# Patient Record
Sex: MALE | Race: WHITE | NOT HISPANIC OR LATINO | Employment: FULL TIME | URBAN - METROPOLITAN AREA
[De-identification: names, ages, dates, MRNs, and addresses within clinical notes are randomized per-mention and may not be internally consistent; named-entity substitution may affect disease eponyms.]

---

## 2017-01-05 DIAGNOSIS — F33.0 MAJOR DEPRESSIVE DISORDER, RECURRENT EPISODE, MILD (H): Primary | ICD-10-CM

## 2017-01-05 DIAGNOSIS — F41.9 ANXIETY: ICD-10-CM

## 2017-01-05 NOTE — TELEPHONE ENCOUNTER
PARoxetine (PAXIL) 20 MG tablet     Last Written Prescription Date: 9/22/16  Last Fill Quantity: 90, # refills: 1  Last Office Visit with [facility] primary care provider:  9/22/16        Last PHQ-9 score on record=   PHQ-9 SCORE 11/25/2016   Total Score -   Total Score 3             Sagar Faarax  [facility]

## 2017-01-10 RX ORDER — PAROXETINE 20 MG/1
TABLET, FILM COATED ORAL
Qty: 90 TABLET | Refills: 0 | OUTPATIENT
Start: 2017-01-10

## 2017-03-12 ENCOUNTER — TELEPHONE (OUTPATIENT)
Dept: NURSING | Facility: CLINIC | Age: 52
End: 2017-03-12

## 2017-03-12 NOTE — TELEPHONE ENCOUNTER
"Call Type: Triage Call    Presenting Problem: On February 6th Raphael \"rubbed against fire coral  in the ocean and has rash on arm.\"  Raphael has tried hctz and is not  working.     This was done on Feb 6th.   No fever.   [facility]  Triage/Skin Lesions/disposition is to be seen within 24 hours and  Raphael agreed.  Triage Note:  Guideline Title: Skin Lesions  Recommended Disposition: See Provider within 24 hours  Original Inclination: Wanted to speak with a nurse  Override Disposition:  Intended Action: Call PCP/HCP  Physician Contacted: No  Painful lesion(s) unrelieved with 24 hours of home care measures ?  YES  Thermal or chemical burn ? NO  Blisters on mouth OR surrounding area ? NO  Known or suspected exposure to Poison [place], [place] OR [place] ? NO  Skin tear(s) caused by friction/shear injury ? NO  Previously confirmed diagnosis of athlete's foot and similar symptoms ? NO  One or more enlarged or tender lymph nodes ? NO  Associated with new onset wheezing or difficulty breathing ? NO  Exposure to , e.g., Super Glue ? NO  Possible exposure to chickenpox or has rash that looks like chickenpox ? NO  Female with any rash, warts or sores on perineal/perianal area. ? NO  Any painful blisters on perineal/perianal area. ? NO  Male with rash, warts, or sores on penis. ? NO  Male with rash, warts, or sores on scrotum/genital area. ? NO  Non-blister lesions on mouth, lip, tongue ? NO  Skin changes that looks like hives (itchy welts or wheals) ? NO  Jaundice (yellowish tint to skin or whites of eyes) that is worsening or not  previously evaluated. ? NO  Large areas of peeling skin or new onset of large blisters over body not related  to sun/UVB exposure ? NO  Foot or toe skin lesions complicated by diagnosed diabetes mellitus. ? NO  Localized or widespread rash that does not have the appearance of hives (itchy  welts or wheals) ? NO  Any temperature elevation in an immunocompromised individual OR frail elderly with  signs of " dehydration ? NO  Any new OR worsening signs and symptoms of soft tissue infection ? NO  Known herpes zoster or undiagnosed blister-like rash on or near eye ? NO  Known herpes zoster or undiagnosed blister-like rash on tip of nose ? NO  Physician Instructions:  Care Advice: Keep any pressure off of the affected area, be careful to avoid  injury to the area.  During pregnancy, call provider if temperature is 100 F (37.7 C) or greater  OR any temperature elevation for 3 days even while taking acetaminophen.  CAUTIONS  SYMPTOM / CONDITION MANAGEMENT  Apply heat or cold, whichever feels better, to the affected area. Try a  heating pad on low or a warm compress. Or use a cloth-covered ice pack,  cold gel pack or a cold compress. Apply either for 15-20 minutes every 2-3  hours while awake.  Analgesic/Antipyretic Advice - NSAIDs: Consider aspirin, ibuprofen,  naproxen or ketoprofen for pain or fever as directed on label or by  pharmacist/provider. PRECAUTIONS: - You should not take this medicine for  more than 10 days unless recommended by your provider. EXCEPTIONS: - Should  not be used if taking blood thinners or have bleeding problems. - Do not  use if have history of sensitivity/allergy to any of these medications  or history of cardiovascular, ulcer, kidney, liver disease or diabetes  unless approved by provider. - Do not exceed recommended dose or frequency.  Analgesic/Antipyretic Advice - Acetaminophen: Consider acetaminophen as  directed on label or by pharmacist/provider for pain or fever. PRECAUTIONS:  - Use if there is no history of liver disease, alcoholism, or intake of  three or more alcohol drinks per day - Only if approved by provider during  pregnancy or when breastfeeding - Do not exceed recommended dose or  frequency. Do not take more than 3000 milligrams (mg) in 24 hours. Do not  take this medicine for more than 10 days unless recommended by your  provider. - During pregnancy, acetaminophen should not  be taken more than 3  consecutive days without telling provider - To make sure you don't take too  much, check other medicines you take to see if they also contain  acetaminophen.  See a provider within 4 hours if having new signs or symptoms of soft  tissue infection (such as redness, warmth, tenderness, swelling  may have drainage).

## 2017-03-13 ENCOUNTER — OFFICE VISIT (OUTPATIENT)
Dept: FAMILY MEDICINE | Facility: CLINIC | Age: 52
End: 2017-03-13
Payer: COMMERCIAL

## 2017-03-13 VITALS
HEIGHT: 67 IN | HEART RATE: 58 BPM | TEMPERATURE: 96.9 F | WEIGHT: 149.2 LBS | DIASTOLIC BLOOD PRESSURE: 76 MMHG | OXYGEN SATURATION: 99 % | BODY MASS INDEX: 23.42 KG/M2 | SYSTOLIC BLOOD PRESSURE: 126 MMHG

## 2017-03-13 DIAGNOSIS — L08.9 SUPERFICIAL SKIN INFECTION: Primary | ICD-10-CM

## 2017-03-13 PROCEDURE — 99213 OFFICE O/P EST LOW 20 MIN: CPT | Performed by: NURSE PRACTITIONER

## 2017-03-13 RX ORDER — CEPHALEXIN 500 MG/1
500 CAPSULE ORAL 4 TIMES DAILY
Qty: 40 CAPSULE | Refills: 0 | Status: SHIPPED | OUTPATIENT
Start: 2017-03-13 | End: 2017-08-25

## 2017-03-13 NOTE — PATIENT INSTRUCTIONS
Based on your medical history and these are the current health maintenance or preventive care services that you are due for (some may have been done at this visit)  Health Maintenance Due   Topic Date Due     ASTHMA CONTROL TEST Q6 MOS (NO INBASKET)  12/24/2016         At [facility], we strive to deliver an exceptional experience to you, every time we see you.    If you receive a survey in the mail, please send us back your thoughts. We really do value your feedback.    Your care team's suggested websites for health information:  Www.[email or web address] : Up to date and easily searchable information on multiple topics.  Www.medlineplus.gov : medication info, interactive tutorials, watch real surgeries online  Www.familydoctor.org : good info from the Academy of Family Physicians  Www.cdc.gov : public health info, travel advisories, epidemics (H1N1)  Www.aap.org : children's health info, normal development, vaccinations  Www.health.[facility].[place].us : [place] dept of health, public health issues in [place], N1N1    How to contact your care team:   Team [facility] [phone]         Pharmacy [phone]    Dr. Hancock, Meli Jimenez PA-C, Dr. Brunson, Virginie CRAIG CNP, Loli Cohen PA-C, Dr. Gomez, and RAFA Baker CNP    Team RNs: [facility] & [place]      Clinic hours  M-Th 7 am-7 pm   Fri 7 am-5 pm.   Urgent care M-F 11 am-9 pm,   Sat/Sun 9 am-5 pm.  Pharmacy M-Th 8 am-8 pm Fri 8 am-6 pm  Sat/Sun 9 am-5 pm.     All password changes, disabled accounts, or ID changes in [employer]/MyHealth will be done by our Access Services Department.    If you need help with your account or password, call: [phone]. Clinic staff no longer has the ability to change passwords.     Cellulitis  Cellulitis is an infection of the deep layers of skin. A break in the skin, such as a cut or scratch, can let bacteria under the skin. If the bacteria get to deep layers of the skin, it can be serious. If not  treated, cellulitis can get into the bloodstream and lymph nodes. The infection can then spread throughout the body. This causes serious illness.  Cellulitis causes the affected skin to become red, swollen, warm, and sore. The reddened areas have a visible border. An open sore may leak fluid (pus). You may have a fever, chills, and pain.  Cellulitis is treated with antibiotics taken for 7 to 10 days. An open sore may be cleaned and covered with cool wet gauze. Symptoms should get better 1 to 2 days after treatment is started. Make sure to take all the antibiotics for the full number of days until they are gone. Keep taking the medicine even if your symptoms go away.  Home care  Follow these tips:    Limit the use of the part of your body with cellulitis. Movement can cause the infection to spread.    If the infection is on your leg, walk as little as possible in the first few days of the treatment. Keep your leg raised while sitting. This will help to reduce swelling.    Take all of the antibiotic medicine exactly as directed until it is gone. Do not miss any doses, especially during the first 7 days. Don t stop taking the medicine when your symptoms get better.    Keep the affected area clean and dry.    Wash your hands with soap and warm water before and after touching your skin. Anyone else who touches your skin should also wash his or her hands. Don't share towels.  Follow-up care  Follow up with your healthcare provider. If your infection does not go away on 1 antibiotic, your healthcare provider will prescribe a different one.  When to seek medical advice  Call your healthcare provider right away if any of these occur:    Red areas that spread    Swelling or pain that gets worse    Fluid leaking from the skin (pus)    Fever higher of 100.4  F (38.0  C) or higher after 2 days on antibiotics    6545-3786 [employer]. [address] [place] [address], [place] [address]. All rights reserved. This information  is not intended as a substitute for professional medical care. Always follow your healthcare professional's instructions.

## 2017-03-13 NOTE — MR AVS SNAPSHOT
After Visit Summary   3/13/2017    Raphael Lizarraga    MRN: [id number]           Patient Information     Date Of Birth          1965        Visit Information        Provider Department      3/13/2017 6:20 PM Mary Carmen Amaya APRN CNP [facility]        Today's Diagnoses     Intermittent asthma, uncomplicated    -  1    Superficial skin infection          Care Instructions    Based on your medical history and these are the current health maintenance or preventive care services that you are due for (some may have been done at this visit)  Health Maintenance Due   Topic Date Due     ASTHMA CONTROL TEST Q6 MOS (NO INBASKET)  12/24/2016         At [facility], we strive to deliver an exceptional experience to you, every time we see you.    If you receive a survey in the mail, please send us back your thoughts. We really do value your feedback.    Your care team's suggested websites for health information:  Www.[employer] : Up to date and easily searchable information on multiple topics.  Www.medlineplus.gov : medication info, interactive tutorials, watch real surgeries online  Www.familydoctor.org : good info from the Academy of Family Physicians  Www.cdc.gov : public health info, travel advisories, epidemics (H1N1)  Www.aap.org : children's health info, normal development, vaccinations  Www.health.[facility].mn.us : [place] dept of health, public health issues in [place], N1N1    How to contact your care team:   Team [facility] [phone]         Pharmacy [phone]    Dr. Hancock, Meli Jimenez PA-C, Virginie Fletcher CNP, Loli Cohen PA-C, Dr. Gomez, and RAFA Baker CNP    Team RNs: Sirena & Becky      Clinic hours  M-Th 7 am-7 pm   Fri 7 am-5 pm.   Urgent care M-F 11 am-9 pm,   Sat/Sun 9 am-5 pm.  Pharmacy M-Th 8 am-8 pm Fri 8 am-6 pm  Sat/Sun 9 am-5 pm.     All password changes, disabled accounts, or ID changes in  MyChart/MyHealth will be done by our Access Services Department.    If you need help with your account or password, call: [phone]. Clinic staff no longer has the ability to change passwords.     Cellulitis  Cellulitis is an infection of the deep layers of skin. A break in the skin, such as a cut or scratch, can let bacteria under the skin. If the bacteria get to deep layers of the skin, it can be serious. If not treated, cellulitis can get into the bloodstream and lymph nodes. The infection can then spread throughout the body. This causes serious illness.  Cellulitis causes the affected skin to become red, swollen, warm, and sore. The reddened areas have a visible border. An open sore may leak fluid (pus). You may have a fever, chills, and pain.  Cellulitis is treated with antibiotics taken for 7 to 10 days. An open sore may be cleaned and covered with cool wet gauze. Symptoms should get better 1 to 2 days after treatment is started. Make sure to take all the antibiotics for the full number of days until they are gone. Keep taking the medicine even if your symptoms go away.  Home care  Follow these tips:    Limit the use of the part of your body with cellulitis. Movement can cause the infection to spread.    If the infection is on your leg, walk as little as possible in the first few days of the treatment. Keep your leg raised while sitting. This will help to reduce swelling.    Take all of the antibiotic medicine exactly as directed until it is gone. Do not miss any doses, especially during the first 7 days. Don t stop taking the medicine when your symptoms get better.    Keep the affected area clean and dry.    Wash your hands with soap and warm water before and after touching your skin. Anyone else who touches your skin should also wash his or her hands. Don't share towels.  Follow-up care  Follow up with your healthcare provider. If your infection does not go away on 1 antibiotic, your healthcare provider  will prescribe a different one.  When to seek medical advice  Call your healthcare provider right away if any of these occur:    Red areas that spread    Swelling or pain that gets worse    Fluid leaking from the skin (pus)    Fever higher of 100.4  F (38.0  C) or higher after 2 days on antibiotics    1852-7104 [employer]. [address] [place] [address]. All rights reserved. This information is not intended as a substitute for professional medical care. Always follow your healthcare professional's instructions.              Follow-ups after your visit        Your next 10 appointments already scheduled     Mar 13, 2017  6:20 PM CDT   Office Visit with RAFA Reyez CNP   [facility] ([facility])    [address] [place] [id number] [phone]           Bring a current list of meds and any records pertaining to this visit.  For Physicals, please bring immunization records and any forms needing to be filled out.  Please arrive 10 minutes early to complete paperwork.            Mar 31, 2017 12:00 PM CDT   New Visit with Anjelica Campbell OD   [facility] ([facility])    [address] [facility] [address] [phone]              Who to contact     If you have questions or need follow up information about today's clinic visit or your schedule please contact [facility] directly at [phone].  Normal or non-critical lab and imaging results will be communicated to you by MyChart, letter or phone within 4 business days after the clinic has received the results. If you do not hear from us within 7 days, please contact the clinic through MyChart or phone. If you have a critical or abnormal lab result, we will notify you by phone as soon as possible.  Submit refill requests through [employer] or call your pharmacy and they will forward the refill  "request to us. Please allow 3 business days for your refill to be completed.          Additional Information About Your Visit        [email or web address] Information     [facility] lets you send messages to your doctor, view your test results, renew your prescriptions, schedule appointments and more. To sign up, go to [email or web address] . Click on \"Log in\" on the left side of the screen, which will take you to the Welcome page. Then click on \"Sign up Now\" on the right side of the page.     You will be asked to enter the access code listed below, as well as some personal information. Please follow the directions to create your username and password.     Your access code is: [email or web address]  Expires: 2017  6:19 PM     Your access code will  in 90 days. If you need help or a new code, please call your [facility] clinic or [phone].        Care EveryWhere ID     This is your Care EveryWhere ID. This could be used by other organizations to access your [facility] medical records  [id number]        Your Vitals Were     Pulse Temperature Height Pulse Oximetry BMI (Body Mass Index)       58 96.9  F (36.1  C) (Oral) 5' 7.13\" (1.705 m) 99% 23.27 kg/m2        Blood Pressure from Last 3 Encounters:   17 126/76   16 114/70   16 125/73    Weight from Last 3 Encounters:   17 149 lb 3.2 oz (67.7 kg)   16 141 lb (64 kg)   16 142 lb 6.4 oz (64.6 kg)              Today, you had the following     No orders found for display         Today's Medication Changes          These changes are accurate as of: 3/13/17  6:19 PM.  If you have any questions, ask your nurse or doctor.               Start taking these medicines.        Dose/Directions    cephALEXin 500 MG capsule   Commonly known as:  KEFLEX   Started by:  Mary Carmen Amaya APRN CNP        Dose:  500 mg   Take 1 capsule (500 mg) by mouth 4 times daily   Quantity:  40 capsule   Refills:  0            Where to get your medicines      These medications " were sent to [facility] [id number] - [facility], [place] - [address] [facility] [address] [facility] [address] [place]  [address], [facility] [address]    Hours:  24-hours Phone:  [phone]     cephALEXin 500 MG capsule                Primary Care Provider Office Phone # Fax #    Chava Belcher [phone]       [facility] [address]  [facility] [address]        Thank you!     Thank you for choosing [facility]  for your care. Our goal is always to provide you with excellent care. Hearing back from our patients is one way we can continue to improve our services. Please take a few minutes to complete the written survey that you may receive in the mail after your visit with us. Thank you!             Your Updated Medication List - Protect others around you: Learn how to safely use, store and throw away your medicines at www.disposemymeds.org.          This list is accurate as of: 3/13/17  6:19 PM.  Always use your most recent med list.                   Brand Name Dispense Instructions for use    albuterol 108 (90 BASE) MCG/ACT Inhaler    PROAIR HFA/PROVENTIL HFA/VENTOLIN HFA    1 Inhaler    Inhale 2 puffs into the lungs every 4 hours as needed (for asthma symptoms)       buPROPion 100 MG 12 hr tablet    WELLBUTRIN SR    90 tablet    Take 1 tablet (100 mg) by mouth every morning for depression and anxiety prevention.       cephALEXin 500 MG capsule    KEFLEX    40 capsule    Take 1 capsule (500 mg) by mouth 4 times daily       DILANTIN PO      100mg a.m. & 200mg p.m.       PARoxetine 20 MG tablet    PAXIL    90 tablet    Take 1 tablet (20 mg) by mouth At Bedtime for depression and anxiety prevention.       VITAMIN D PO      Take 1 tablet by mouth daily

## 2017-03-13 NOTE — NURSING NOTE
"Chief Complaint   Patient presents with     Derm Problem       Initial /76 (BP Location: Left arm, Patient Position: Chair, Cuff Size: Adult Regular)  Pulse 58  Temp 96.9  F (36.1  C) (Oral)  Ht 5' 7.13\" (1.705 m)  Wt 149 lb 3.2 oz (67.7 kg)  SpO2 99%  BMI 23.27 kg/m2 Estimated body mass index is 23.27 kg/(m^2) as calculated from the following:    Height as of this encounter: 5' 7.13\" (1.705 m).    Weight as of this encounter: 149 lb 3.2 oz (67.7 kg).  Medication Reconciliation: complete   Julia Fernandes MA      "

## 2017-03-13 NOTE — PROGRESS NOTES
"  SUBJECTIVE:                                                    Raphael Lizarraga is a 52 year old male who presents to clinic today for the following health issues:      Rash     Onset: 1 month     Description:   Location: Left forearm   Character: round, raised, red  Itching (Pruritis): YES    Progression of Symptoms:  worsening    Accompanying Signs & Symptoms:  Fever: no   Body aches or joint pain: no   Sore throat symptoms: no   Recent cold symptoms: no    History:   Previous similar rash: no     Precipitating factors:   Exposure to similar rash: no   New exposures: None   Recent travel: YES- Was in [place] 1 month ago    Alleviating factors:       Therapies Tried and outcome: Hydrocortisone cream   Patient was in [facility] last month, swam in the ocean and rubbed against coral and had rash the following morning.  He initially felt a burning, stinging pain on both arms after rubbing the coral reef.  He heide been using Hydrocortisone without improvement.      Problem list and histories reviewed & adjusted, as indicated.  Additional history: as documented    BP Readings from Last 3 Encounters:   03/13/17 126/76   09/22/16 114/70   08/02/16 125/73    Wt Readings from Last 3 Encounters:   03/13/17 149 lb 3.2 oz (67.7 kg)   09/22/16 141 lb (64 kg)   08/02/16 142 lb 6.4 oz (64.6 kg)                  Labs reviewed in EPIC    Reviewed and updated as needed this visit by clinical staff       Reviewed and updated as needed this visit by Provider         ROS:  Constitutional, HEENT, cardiovascular, pulmonary, gi and gu systems are negative, except as otherwise noted.    OBJECTIVE:                                                    /76 (BP Location: Left arm, Patient Position: Chair, Cuff Size: Adult Regular)  Pulse 58  Temp 96.9  F (36.1  C) (Oral)  Ht 5' 7.13\" (1.705 m)  Wt 149 lb 3.2 oz (67.7 kg)  SpO2 99%  BMI 23.27 kg/m2  Body mass index is 23.27 kg/(m^2).  GENERAL: healthy, alert and no distress  EYES: Eyes grossly " "normal to inspection, PERRL and conjunctivae and sclerae normal  HENT: ear canals and TM's normal, nose and mouth without ulcers or lesions  NECK: no adenopathy, no asymmetry, masses, or scars and thyroid normal to palpation  RESP: lungs clear to auscultation - no rales, rhonchi or wheezes  CV: regular rate and rhythm, normal S1 S2, no S3 or S4, no murmur, click or rub, no peripheral edema and peripheral pulses strong  ABDOMEN: soft, nontender, no hepatosplenomegaly, no masses and bowel sounds normal  MS: no gross musculoskeletal defects noted, no edema  SKIN: Multiple erythematous, warm maculopapular lesions with excoriation on arms bilaterally extending to shoulders, otherwise, no suspicious lesions or rashes  BACK: no CVA tenderness, no paralumbar tenderness  PSYCH: mentation appears normal, affect normal/bright  LYMPH: normal ant/post cervical, supraclavicular nodes    Diagnostic Test Results:  none      ASSESSMENT/PLAN:                                                        BP Screening:   Last 3 BP Readings:    BP Readings from Last 3 Encounters:   03/13/17 126/76   09/22/16 114/70   08/02/16 125/73       The following was recommended to the patient:  Re-screen BP within a year and recommended lifestyle modifications  BMI:   Estimated body mass index is 23.27 kg/(m^2) as calculated from the following:    Height as of this encounter: 5' 7.13\" (1.705 m).    Weight as of this encounter: 149 lb 3.2 oz (67.7 kg).         1. Superficial skin infection  Treating with Keflex, Ok to use Benadryl prn for itching, can also use Claritin or Zyrtec during the day if needed, return to clinic if not improved, new, or worsening symptoms.   - cephALEXin (KEFLEX) 500 MG capsule; Take 1 capsule (500 mg) by mouth 4 times daily  Dispense: 40 capsule; Refill: 0          See Patient Instructions    RAFA Brown [facility]    "

## 2017-03-14 ASSESSMENT — ASTHMA QUESTIONNAIRES: ACT_TOTALSCORE: 25

## 2017-04-09 DIAGNOSIS — F33.0 MAJOR DEPRESSIVE DISORDER, RECURRENT EPISODE, MILD (H): ICD-10-CM

## 2017-04-09 NOTE — TELEPHONE ENCOUNTER
buPROPion        Last Written Prescription Date: 9/22/16  Last Fill Quantity: 90; # refills: 1  Last Office Visit with [facility], [facility] or [facility] prescribing provider:  3/13/17        Last PHQ-9 score on record=   PHQ-9 SCORE 11/25/2016   Total Score 3       Lab Results   Component Value Date    AST 27 09/29/2011     Lab Results   Component Value Date    ALT 37 09/29/2011

## 2017-04-11 RX ORDER — BUPROPION HYDROCHLORIDE 100 MG/1
TABLET, EXTENDED RELEASE ORAL
Qty: 90 TABLET | Refills: 0 | Status: SHIPPED | OUTPATIENT
Start: 2017-04-11 | End: 2017-10-03

## 2017-04-16 DIAGNOSIS — F41.9 ANXIETY: ICD-10-CM

## 2017-04-16 DIAGNOSIS — F33.0 MAJOR DEPRESSIVE DISORDER, RECURRENT EPISODE, MILD (H): ICD-10-CM

## 2017-04-16 NOTE — TELEPHONE ENCOUNTER
PARoxetine (PAXIL) 20 MG tablet     Last Written Prescription Date: 9/22/16  Last Fill Quantity: 90, # refills: 1  Last Office Visit with [facility] primary care provider:  3/13/17        Last PHQ-9 score on record=   PHQ-9 SCORE 11/25/2016   Total Score 3             Sagar Faaamanda  [facility]

## 2017-04-18 RX ORDER — PAROXETINE 20 MG/1
TABLET, FILM COATED ORAL
Qty: 90 TABLET | Refills: 0 | OUTPATIENT
Start: 2017-04-18

## 2017-04-18 RX ORDER — PAROXETINE 20 MG/1
TABLET, FILM COATED ORAL
Qty: 90 TABLET | Refills: 0 | Status: SHIPPED | OUTPATIENT
Start: 2017-04-18 | End: 2017-07-16

## 2017-04-18 NOTE — TELEPHONE ENCOUNTER
Prescription approved per [facility] Refill Protocol.  Radha Carrera RN

## 2017-04-21 ENCOUNTER — OFFICE VISIT (OUTPATIENT)
Dept: OPTOMETRY | Facility: CLINIC | Age: 52
End: 2017-04-21
Payer: COMMERCIAL

## 2017-04-21 DIAGNOSIS — H52.4 MYOPIA WITH ASTIGMATISM AND PRESBYOPIA, BILATERAL: Primary | ICD-10-CM

## 2017-04-21 DIAGNOSIS — H52.203 MYOPIA WITH ASTIGMATISM AND PRESBYOPIA, BILATERAL: Primary | ICD-10-CM

## 2017-04-21 DIAGNOSIS — H52.13 MYOPIA WITH ASTIGMATISM AND PRESBYOPIA, BILATERAL: Primary | ICD-10-CM

## 2017-04-21 DIAGNOSIS — H53.453 PERIPHERAL VISUAL FIELD DEFECT OF BOTH EYES: ICD-10-CM

## 2017-04-21 PROCEDURE — 92015 DETERMINE REFRACTIVE STATE: CPT | Performed by: OPTOMETRIST

## 2017-04-21 PROCEDURE — 92004 COMPRE OPH EXAM NEW PT 1/>: CPT | Performed by: OPTOMETRIST

## 2017-04-21 ASSESSMENT — VISUAL ACUITY
OS_SC+: +1
CORRECTION_TYPE: GLASSES
OS_SC: 20/150
OD_SC: 20/120
OS_CC: 20/30
OS_CC+: -2
OS_SC: 20/80-1
OD_CC+: -2
METHOD: SNELLEN - LINEAR
OD_SC: 20/200
OD_CC: 20/30

## 2017-04-21 ASSESSMENT — REFRACTION_MANIFEST
OD_CYLINDER: +2.75
OD_SPHERE: -2.75
OS_SPHERE: -2.75
OS_CYLINDER: +2.00
OD_AXIS: 009
OD_CYLINDER: +3.00
OD_ADD: +2.25
OS_SPHERE: -2.00
OD_AXIS: 010
METHOD_AUTOREFRACTION: 1
OS_CYLINDER: +2.00
OD_SPHERE: -2.25
OS_AXIS: 160
OS_ADD: +2.25
OS_AXIS: 160

## 2017-04-21 ASSESSMENT — REFRACTION_WEARINGRX
OD_ADD: +2.25
OD_CYLINDER: +3.00
OS_ADD: +2.25
OD_SPHERE: -3.25
OS_AXIS: 165
OS_CYLINDER: +2.50
OD_AXIS: 017
OS_SPHERE: -2.75
SPECS_TYPE: PAL

## 2017-04-21 ASSESSMENT — CONF VISUAL FIELD
OS_SUPERIOR_NASAL_RESTRICTION: 3
OD_SUPERIOR_TEMPORAL_RESTRICTION: 3
METHOD: COUNTING FINGERS

## 2017-04-21 ASSESSMENT — SLIT LAMP EXAM - LIDS
COMMENTS: NORMAL
COMMENTS: NORMAL

## 2017-04-21 ASSESSMENT — CUP TO DISC RATIO
OD_RATIO: 0.2
OS_RATIO: 0.2

## 2017-04-21 ASSESSMENT — TONOMETRY
OD_IOP_MMHG: 19
IOP_METHOD: APPLANATION
OS_IOP_MMHG: 19

## 2017-04-21 ASSESSMENT — EXTERNAL EXAM - LEFT EYE: OS_EXAM: NORMAL

## 2017-04-21 ASSESSMENT — EXTERNAL EXAM - RIGHT EYE: OD_EXAM: NORMAL

## 2017-04-21 NOTE — MR AVS SNAPSHOT
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    You have a right superior visual field defect in both eyes.    Your eyes may be blurry at near and sensitive to light for several hours from the dilating drops.    Monitor eye health yearly.        Follow-ups after your visit        Follow-up notes from your care team     Return in about 1 year (around 4/21/2018) for comprehensive eye exam.      Who to contact     If you have questions or need follow up information about today's clinic visit or your schedule please contact [facility] directly at [phone].  Normal or non-critical lab and imaging results will be communicated to you by MyChart, letter or phone within 4 business days after the clinic has received the results. If you do not hear from us within 7 days, please contact the clinic through [employer] or phone. If you have a critical or abnormal lab result, we will notify you by phone as soon as possible.  Submit refill requests through [employer] or call your pharmacy and they will forward the refill request to us. Please allow 3 business days for your refill to be completed.          Additional Information About Your Visit        MyChart Information     [employer] lets you send messages to your doctor, view your test results, renew your prescriptions, schedule appointments and more. To sign up, go to [email or web address] . Click on \"Log in\" on the left side of the screen, which will take you to the Welcome page. Then click on \"Sign up Now\" on the " right side of the page.     You will be asked to enter the access code listed below, as well as some personal information. Please follow the directions to create your username and password.     Your access code is: [email or web address]  Expires: 2017  6:19 PM     Your access code will  in 90 days. If you need help or a new code, please call your [place] clinic or [phone].        Care EveryWhere ID     This is your Care EveryWhere ID. This could be used by other organizations to access your [place] medical records  [id number]         Blood Pressure from Last 3 Encounters:   17 126/76   16 114/70   16 125/73    Weight from Last 3 Encounters:   17 67.7 kg (149 lb 3.2 oz)   16 64 kg (141 lb)   16 64.6 kg (142 lb 6.4 oz)              We Performed the Following     EYE EXAM (SIMPLE-NONBILLABLE)     REFRACTION        Primary Care Provider Office Phone # Fax #    Chava Belcher [phone]       [facility] [address] [facility] [address]        Thank you!     Thank you for choosing [facility]  for your care. Our goal is always to provide you with excellent care. Hearing back from our patients is one way we can continue to improve our services. Please take a few minutes to complete the written survey that you may receive in the mail after your visit with us. Thank you!             Your Updated Medication List - Protect others around you: Learn how to safely use, store and throw away your medicines at www.disposemymeds.org.          This list is accurate as of: 17 12:26 PM.  Always use your most recent med list.                   Brand Name Dispense Instructions for use    albuterol 108 (90 BASE) MCG/ACT Inhaler    PROAIR HFA/PROVENTIL HFA/VENTOLIN HFA    1 Inhaler    Inhale 2 puffs into the lungs every 4 hours as needed (for asthma symptoms)       buPROPion 100 MG 12 hr tablet    WELLBUTRIN SR    90 tablet    TAKE  1 TABLET(100 MG) BY MOUTH EVERY MORNING FOR DEPRESSION OR ANXIETY PREVENTION       cephALEXin 500 MG capsule    KEFLEX    40 capsule    Take 1 capsule (500 mg) by mouth 4 times daily       DILANTIN PO      100mg a.m. & 200mg p.m.       PARoxetine 20 MG tablet    PAXIL    90 tablet    TAKE 1 TABLET(20 MG) BY MOUTH AT BEDTIME FOR DEPRESSION OR ANXIETY PREVENTION       VITAMIN D PO      Take 1 tablet by mouth daily

## 2017-04-21 NOTE — PATIENT INSTRUCTIONS
There was a change in the prescription for your glasses.    You have a right superior visual field defect in both eyes.    Your eyes may be blurry at near and sensitive to light for several hours from the dilating drops.    Monitor eye health yearly.

## 2017-04-21 NOTE — PROGRESS NOTES
Chief Complaint   Patient presents with     COMPREHENSIVE EYE EXAM         Last Eye Exam: 2+ years  Dilated Previously: Yes    What are you currently using to see?  glasses       Distance Vision Acuity: Noticed gradual change in both eyes    Near Vision Acuity: Not satisfied     Eye Comfort: good  Do you use eye drops? : No  Occupation or Hobbies: [employer]            Medical, surgical and family histories reviewed and updated 4/21/2017.       Raphael had brain surgery in 1992 because of epilepsy. He thinks the visual field defect occurred after the surgery.    OBJECTIVE: See Ophthalmology exam    ASSESSMENT:    ICD-10-CM    1. Myopia with astigmatism and presbyopia, bilateral H52.13 EYE EXAM (SIMPLE-NONBILLABLE)    H52.203 REFRACTION   2. 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    You have a right superior visual field defect in both eyes.    Your eyes may be blurry at near and sensitive to light for several hours from the dilating drops.    Monitor eye health yearly.

## 2017-05-25 DIAGNOSIS — F33.0 MAJOR DEPRESSIVE DISORDER, RECURRENT EPISODE, MILD (H): ICD-10-CM

## 2017-05-26 NOTE — TELEPHONE ENCOUNTER
buPROPion (WELLBUTRIN SR) 100 MG 12 hr tablet       Last Written Prescription Date: 4/11/17  Last Fill Quantity: 90; # refills: 0  Last Office Visit with [facility], P or [facility] prescribing provider:  3/3/17        Last PHQ-9 score on record=   PHQ-9 SCORE 11/25/2016   Total Score 3       Lab Results   Component Value Date    AST 27 09/29/2011     Lab Results   Component Value Date    ALT 37 09/29/2011         Candice COSTELLO [facility]

## 2017-05-30 RX ORDER — BUPROPION HYDROCHLORIDE 100 MG/1
TABLET, EXTENDED RELEASE ORAL
Qty: 30 TABLET | Refills: 0 | OUTPATIENT
Start: 2017-05-30

## 2017-05-30 NOTE — TELEPHONE ENCOUNTER
Medication is being denied due to: RX was sent 04/11/2017 for #90 with 0 refills.  No additional refills should be needed at this time.  Radha Carrera RN

## 2017-06-27 ENCOUNTER — TELEPHONE (OUTPATIENT)
Dept: FAMILY MEDICINE | Facility: CLINIC | Age: 52
End: 2017-06-27

## 2017-06-27 DIAGNOSIS — F33.0 MAJOR DEPRESSIVE DISORDER, RECURRENT EPISODE, MILD (H): ICD-10-CM

## 2017-06-27 NOTE — TELEPHONE ENCOUNTER
buPROPion (WELLBUTRIN SR) 100 MG 12 hr tablet       Last Written Prescription Date: 04/11/17  Last Fill Quantity: 90; # refills: 0  Last Office Visit with [facility], [facility] or [facility] prescribing provider:  03/13/17        Last PHQ-9 score on record=   PHQ-9 SCORE 11/25/2016   Total Score 3       Lab Results   Component Value Date    AST 27 09/29/2011     Lab Results   Component Value Date    ALT 37 09/29/2011         [facility]ise [facility]

## 2017-07-03 NOTE — TELEPHONE ENCOUNTER
MA's- Please complete PHQ-9.  If over 4 please route to provider. Thanks!    Lissette Reynoso RN

## 2017-07-03 NOTE — TELEPHONE ENCOUNTER
This writer attempted to contact Raphael on 07/03/17      Reason for call update PHQ-9 and left message to return call.      When patient calls back, please contact 1st floor [place] Care Team (MA/TC). If no one available, document that pt called and route to care team. routine priority .          Gabo Quinones MA

## 2017-07-07 RX ORDER — BUPROPION HYDROCHLORIDE 100 MG/1
TABLET, EXTENDED RELEASE ORAL
Qty: 90 TABLET | Refills: 0 | Status: SHIPPED | OUTPATIENT
Start: 2017-07-07 | End: 2017-08-25

## 2017-07-07 NOTE — TELEPHONE ENCOUNTER
Pt returned call  Contact Pt @ home - [phone] - ok to leave detailed message  Thank you  ALIZA MCLEAN

## 2017-07-07 NOTE — TELEPHONE ENCOUNTER
This writer attempted to contact Raphael on 07/07/17      Reason for call update PHQ-9 and left message to return call.      When patient calls back, please contact 1st floor [place] Care Team (MA/TC). If no one available, document that pt called and route to care team. routine priority .          Gabo Quinones MA

## 2017-07-07 NOTE — TELEPHONE ENCOUNTER
PHQ-9 score:    PHQ-9 SCORE 7/7/2017   Total Score 2     Prescription(s) approved per [facility] Refill Protocol.    Rafa Coleman RN

## 2017-07-08 ASSESSMENT — PATIENT HEALTH QUESTIONNAIRE - PHQ9: SUM OF ALL RESPONSES TO PHQ QUESTIONS 1-9: 2

## 2017-07-16 DIAGNOSIS — F33.0 MAJOR DEPRESSIVE DISORDER, RECURRENT EPISODE, MILD (H): ICD-10-CM

## 2017-07-16 DIAGNOSIS — F41.9 ANXIETY: ICD-10-CM

## 2017-07-16 NOTE — TELEPHONE ENCOUNTER
PARoxetine (PAXIL) 20 MG tablet     Last Written Prescription Date: 4/28/17  Last Fill Quantity: 90, # refills: 0  [facility] Visit with [facility] [place] care provider:  3/13/17        Last PHQ-9 score on record=   PHQ-9 SCORE 7/7/2017   Total Score 2           Candice COSTELLO [facility]

## 2017-07-18 RX ORDER — PAROXETINE 20 MG/1
TABLET, FILM COATED ORAL
Qty: 30 TABLET | Refills: 0 | Status: SHIPPED | OUTPATIENT
Start: 2017-07-18 | End: 2017-08-14

## 2017-07-18 NOTE — TELEPHONE ENCOUNTER
Medication is being filled for 1 time refill only due to:  Patient needs to be seen because overdue for a physical.   Sanjuanita Flores RN

## 2017-08-14 DIAGNOSIS — F41.9 ANXIETY: ICD-10-CM

## 2017-08-14 DIAGNOSIS — F33.0 MAJOR DEPRESSIVE DISORDER, RECURRENT EPISODE, MILD (H): ICD-10-CM

## 2017-08-14 NOTE — TELEPHONE ENCOUNTER
PARoxetine (PAXIL) 20 MG tablet     Last Written Prescription Date: 7/18/17  Last Fill Quantity: 30, # refills: 0  [facility] Office Visit with [facility] [place] provider:  3/13/17        Last PHQ-9 score on record=   PHQ-9 SCORE 7/7/2017   Total Score -   Total Score 2         Candice COSTELLO [facility]

## 2017-08-16 RX ORDER — PAROXETINE 20 MG/1
TABLET, FILM COATED ORAL
Qty: 30 TABLET | Refills: 1 | Status: SHIPPED | OUTPATIENT
Start: 2017-08-16 | End: 2017-10-06

## 2017-08-25 ENCOUNTER — OFFICE VISIT (OUTPATIENT)
Dept: NEUROLOGY | Facility: CLINIC | Age: 52
End: 2017-08-25
Payer: COMMERCIAL

## 2017-08-25 VITALS
HEIGHT: 68 IN | WEIGHT: 142.8 LBS | DIASTOLIC BLOOD PRESSURE: 75 MMHG | HEART RATE: 76 BPM | SYSTOLIC BLOOD PRESSURE: 118 MMHG | BODY MASS INDEX: 21.64 KG/M2

## 2017-08-25 DIAGNOSIS — Z98.890 H/O BRAIN SURGERY: ICD-10-CM

## 2017-08-25 DIAGNOSIS — G40.209 PARTIAL SYMPTOMATIC EPILEPSY WITH COMPLEX PARTIAL SEIZURES, NOT INTRACTABLE, WITHOUT STATUS EPILEPTICUS (H): Primary | ICD-10-CM

## 2017-08-25 PROCEDURE — 99204 OFFICE O/P NEW MOD 45 MIN: CPT | Performed by: PSYCHIATRY & NEUROLOGY

## 2017-08-25 NOTE — NURSING NOTE
"Chief Complaint   Patient presents with     Neurologic Problem     Epilepsy. Insurance change and needed to switch provider       Initial /75 (BP Location: Left arm, Patient Position: Chair, Cuff Size: Adult Regular)  Pulse 76  Ht 1.727 m (5' 8\")  Wt 64.8 kg (142 lb 12.8 oz)  BMI 21.71 kg/m2 Estimated body mass index is 21.71 kg/(m^2) as calculated from the following:    Height as of this encounter: 1.727 m (5' 8\").    Weight as of this encounter: 64.8 kg (142 lb 12.8 oz).  Medication Reconciliation: complete   Ina Angelo MA      "

## 2017-08-25 NOTE — MR AVS SNAPSHOT
After Visit Summary   8/25/2017    Raphael Lizarraga    MRN: [id number]           Patient Information     Date Of Birth          1965        Visit Information        Provider Department      8/25/2017 9:00 AM Parker Pichardo MD [facility]        Today's Diagnoses     Partial symptomatic epilepsy with complex partial seizures, not intractable, without status epilepticus (H)    -  1    H/O brain surgery-Left lobectomy          Care Instructions      FUTURE BLOOD ORDERS: Any day after 8/28/2017  Orders Placed This Encounter   Procedures     Phenytoin free     Phenytoin level     Folate     Vitamin B12     GGT     ALT     Vitamin D deficiency screening       *Please call [facility] near your home to have morning appointment for the blood draw.   Blood sample for seizure medication (TROUGH LEVEL), before the 1st dose in the morning                   Blood test results reviewed today     No changes in PHENYTOIN dose at present   To follow the seizure precautions including driving restrictions as per [place] Law including- to report a seizure and date it occurred within thirty (30) days of the episode or event     Importance of compliance with anti-seizure medication (s).      Documenting the frequency of the seizures                 To go to local hospital ER for acute emergencies/seizure management  and arrange office follow-up for further review of medications    Follow-up appointment  On 9/8/2017. Reporting by 10:20 AM. To review ols neurology notes and additional work-up.     Request to our staff JENNYFER Angelo MA to fax and call [facility] at [phone]. Fax #[phone] for previous notes              Follow-ups after your visit        Follow-up notes from your care team     Return in about 2 weeks (around 9/8/2017) for Follow-up 9:40 AM to 10AM .      Your next 10 appointments already scheduled     Sep 08, 2017 10:40 AM CDT   Return Visit with Parker Pichardo MD  "  [facility]    [address] [place] [id number]   [phone]              Future tests that were ordered for you today     Open Future Orders        Priority Expected Expires Ordered    Phenytoin free Routine 8/28/2017 12/30/2017 8/25/2017    Phenytoin level Routine 8/28/2017 12/30/2017 8/25/2017    Folate Routine 8/28/2017 12/30/2017 8/25/2017    Vitamin B12 Routine 8/28/2017 12/30/2017 8/25/2017    GGT Routine 8/28/2017 12/30/2017 8/25/2017    ALT Routine 8/28/2017 12/30/2017 8/25/2017    Vitamin D deficiency screening Routine 8/28/2017 12/30/2017 8/25/2017            Who to contact     If you have questions or need follow up information about today's clinic visit or your schedule please contact [facility] directly at [phone].  Normal or non-critical lab and imaging results will be communicated to you by [employer], letter or phone within 4 business days after the clinic has received the results. If you do not hear from us within 7 days, please contact the clinic through [employer] or phone. If you have a critical or abnormal lab result, we will notify you by phone as soon as possible.  Submit refill requests through [employer] or call your pharmacy and they will forward the refill request to us. Please allow 3 business days for your refill to be completed.          Additional Information About Your Visit        [employer] Information     [employer] lets you send messages to your doctor, view your test results, renew your prescriptions, schedule appointments and more. To sign up, go to [email or web address] . Click on \"Log in\" on the left side of the screen, which will take you to the Welcome page. Then click on \"Sign up Now\" on the right side of the page.     You will be asked to enter the access code listed below, as well as some personal information. Please follow the directions to create your username and password.   " "  Your access code is: [email or web address]  Expires: 2017 10:04 AM     Your access code will  in 90 days. If you need help or a new code, please call your [facility] or [phone].        Care EveryWhere ID     This is your Care EveryWhere ID. This could be used by other organizations to access your [place] medical records  [id number]        Your Vitals Were     Pulse Height BMI (Body Mass Index)             76 1.727 m (5' 8\") 21.71 kg/m2          Blood Pressure from Last 3 Encounters:   17 118/75   17 126/76   16 114/70    Weight from Last 3 Encounters:   17 64.8 kg (142 lb 12.8 oz)   17 67.7 kg (149 lb 3.2 oz)   16 64 kg (141 lb)               Primary Care Provider Office Phone # Fax #    Chava Belcher [phone]       [address] [facility] [address]        Equal Access to Services     [facility] : Hadii aad ku hadasho Soomaali, waaxda luqadaha, qaybta kaalmada adeegyada, kemal escobar . So [facility] [phone].    ATENCIÓN: Si habla español, tiene a stern disposición servicios gratuitos de asistencia lingüística. [facility] [phone].    We comply with applicable federal civil rights laws and [place] laws. We do not discriminate on the basis of race, color, national origin, age, disability sex, sexual orientation or gender identity.            Thank you!     Thank you for choosing [facility]  for your care. Our goal is always to provide you with excellent care. Hearing back from our patients is one way we can continue to improve our services. Please take a few minutes to complete the written survey that you may receive in the mail after your visit with us. Thank you!             Your Updated Medication List - Protect others around you: Learn how to safely use, store and throw away your medicines at www.disposemymeds.org.          This list is accurate as of: 17 10:07 AM.  Always use " your most recent med list.                   Brand Name Dispense Instructions for use Diagnosis    albuterol 108 (90 BASE) MCG/ACT Inhaler    PROAIR HFA/PROVENTIL HFA/VENTOLIN HFA    1 Inhaler    Inhale 2 puffs into the lungs every 4 hours as needed (for asthma symptoms)    Intermittent asthma       buPROPion 100 MG 12 hr tablet    WELLBUTRIN SR    90 tablet    TAKE 1 TABLET(100 MG) BY MOUTH EVERY MORNING FOR DEPRESSION OR ANXIETY PREVENTION    Major depressive disorder, recurrent episode, mild (H)       DILANTIN PO      100mg a.m. & 200mg p.m.        PARoxetine 20 MG tablet    PAXIL    30 tablet    TAKE 1 TABLET(20 MG) BY MOUTH AT BEDTIME FOR DEPRESSION OR ANXIETY PREVENTION    Major depressive disorder, recurrent episode, mild (H), Anxiety

## 2017-08-25 NOTE — Clinical Note
Request to our staff JENNYFER Angelo MA to fax and call [facility] at [phone]. Fax #[phone] for previous notes. Allot f/up slot . Give him AVS

## 2017-08-25 NOTE — PATIENT INSTRUCTIONS
FUTURE BLOOD ORDERS: Any day after 8/28/2017  Orders Placed This Encounter   Procedures     Phenytoin free     Phenytoin level     Folate     Vitamin B12     GGT     ALT     Vitamin D deficiency screening       *Please call [facility] near your home to have morning appointment for the blood draw.   Blood sample for seizure medication (TROUGH LEVEL), before the 1st dose in the morning                   Blood test results reviewed today     No changes in PHENYTOIN dose at present   To follow the seizure precautions including driving restrictions as per [place] Law including- to report a seizure and date it occurred within thirty (30) days of the episode or event     Importance of compliance with anti-seizure medication (s).      Documenting the frequency of the seizures                 To go to local hospital ER for acute emergencies/seizure management  and arrange office follow-up for further review of medications    Follow-up appointment  On 9/8/2017. Reporting by 10:20 AM. To review ols neurology notes and additional work-up.     Request to our staff JENNYFER Angelo MA to fax and call [facility] at [phone]. Fax #[phone] for previous notes

## 2017-08-25 NOTE — NURSING NOTE
Sign release for records faxed to [facility], maureen await records. Form faxed and sent to abstracting to be scanned in chart  Ina Angelo MA

## 2017-08-25 NOTE — PROGRESS NOTES
INITIAL NEUROLOGY CONSULTATION    LOCATION: [facility]  DATE OF VISIT: 2017  MRN: [id number]  NAME: Mr. Raphael Lizarraga  :  1965 (52 year old)    PRIMARY/REFERRING PROVIDER: Chava Belcher MD    REASON FOR CONSULTATION: Seizures    HISTORY OF PRESENT ILLNESS ([place]): 54-year-old Right-handed man seen at the request of Dr. Chava Belcher regarding his long-standing seizures.  Seizures started at the age of 1-1/2 years.  History of for left temporal lobectomy in .  He has been seeing neurologist at [place] [facility], [place], [place]. Last visit with them was in .  He relates that last seizure was many years ago.  He has related that his seizure started with him malking loud sound.  He remembers that in some of the seizures he had jerking of his limbs as per description given to him. Tongue biting with seizure in younger years.  Most of the episodes occurred while he was sleeping.  No recall of incontinence of urine.  After the event he does feel sleepy, tired and confusion for some time.    He has been taking Dilantin 100 in the morning and 200 mg in the evening.    Notes from [place] [facility] mentioned that he has history of complex partial seizures with secondary generalization.  Last seizure episode was in 2002.  He was seen by epileptologist Dr. Anabella Martinez.     PREVIOUS BLOOD TESTS:   Component      Latest Ref Rng & Units 2011 2015 3/25/2016   25 OH Vit D2      ug/L <5     25 OH Vit D3      ug/L 43     25 OH Vit D total      30 - 75 ug/L <48 . . .     Phenytoin Level      10 - 20 mg/L  28.9 (H)    Hemoglobin      13.3 - 17.7 g/dL   13.0 (L)   INR      0.86 - 1.14   1.01   Platelet Count      150 - 450 10e9/L   229   Potassium      3.4 - 5.3 mmol/L   4.7           Review of Systems: All negative except as noted in the [place] and Identifying information.    Current Outpatient Prescriptions   Medication Sig     PARoxetine (PAXIL) 20 MG tablet  TAKE 1 TABLET(20 MG) BY MOUTH AT BEDTIME FOR DEPRESSION OR ANXIETY PREVENTION     buPROPion (WELLBUTRIN SR) 100 MG 12 hr tablet TAKE 1 TABLET(100 MG) BY MOUTH EVERY MORNING FOR DEPRESSION OR ANXIETY PREVENTION     DILANTIN OR 100mg a.m. & 200mg p.m.     albuterol (PROAIR HFA, PROVENTIL HFA, VENTOLIN HFA) 108 (90 BASE) MCG/ACT inhaler Inhale 2 puffs into the lungs every 4 hours as needed (for asthma symptoms) (Patient not taking: Reported on 3/13/2017)     No current facility-administered medications for this visit.      No Known Allergies  Past Medical History:   Diagnosis Date     Anxiety      Diverticulosis      Epilepsy (H)      Intermittent asthma      Major depressive disorder, recurrent episode, unspecified 03/09/2009    Never saw psychiatrist     NELIA (obstructive sleep apnea)     Uses CPAP machine     Osteoporosis/osteopenia increased risk     due to long-term Dilantin use     Past Surgical History:   Procedure Laterality Date     C LAP,PARTIAL NEPHRECTOMY  8/4/08    LT for benign mass, Dr. Donald     LOBECTOMY  1994    LT temporal for epilepsy     VASECTOMY         Current Outpatient Prescriptions on File Prior to Visit:  PARoxetine (PAXIL) 20 MG tablet TAKE 1 TABLET(20 MG) BY MOUTH AT BEDTIME FOR DEPRESSION OR ANXIETY PREVENTION   buPROPion (WELLBUTRIN SR) 100 MG 12 hr tablet TAKE 1 TABLET(100 MG) BY MOUTH EVERY MORNING FOR DEPRESSION OR ANXIETY PREVENTION   DILANTIN OR 100mg a.m. & 200mg p.m.   albuterol (PROAIR HFA, PROVENTIL HFA, VENTOLIN HFA) 108 (90 BASE) MCG/ACT inhaler Inhale 2 puffs into the lungs every 4 hours as needed (for asthma symptoms) (Patient not taking: Reported on 3/13/2017)     No current facility-administered medications on file prior to visit.   Social History   Substance Use Topics     Smoking status: Former Smoker     Quit date: 8/25/1993     Smokeless tobacco: Never Used     Alcohol use Yes      Comment: 4 cans of beer on the weekend       GENERAL EXAMINATION:    General appearance:  "Pleasant male sitting comfortably in a chair  /75 (BP Location: Left arm, Patient Position: Chair, Cuff Size: Adult Regular)  Pulse 76  Ht 1.727 m (5' 8\")  Wt 64.8 kg (142 lb 12.8 oz)  BMI 21.71 kg/m2    IMPRESSION:     ICD-10-CM    1. Partial symptomatic epilepsy with complex partial seizures, not intractable, without status epilepticus (H) G40.209 Phenytoin free     Phenytoin level     Folate     Vitamin B12     GGT     ALT     Vitamin D deficiency screening   2. H/O brain surgery-Left lobectomy Z98.890 Phenytoin free     Phenytoin level     Folate     Vitamin B12     GGT     ALT     Vitamin D deficiency screening       COMMENTS: Seizures were controlled with Dilantin.  Previous history of left temporal lobectomy.  Arranged for him to have updated blood work up including phenytoin levels along with B12 and vitamin D deficiency screening.    PLANS:  Patient Instructions       FUTURE BLOOD ORDERS: Any day after 8/28/2017  Orders Placed This Encounter   Procedures     Phenytoin free     Phenytoin level     Folate     Vitamin B12     GGT     ALT     Vitamin D deficiency screening       *Please call [facility] near your home to have morning appointment for the blood draw.   Blood sample for seizure medication (TROUGH LEVEL), before the 1st dose in the morning                   Blood test results reviewed today     No changes in PHENYTOIN dose at present   To follow the seizure precautions including driving restrictions as per [place] Law including- to report a seizure and date it occurred within thirty (30) days of the episode or event     Importance of compliance with anti-seizure medication (s).      Documenting the frequency of the seizures                 To go to local hospital ER for acute emergencies/seizure management  and arrange office follow-up for further review of medications    Follow-up appointment  On 9/8/2017. Reporting by 10:20 AM. To review [facility] neurolog[facility] notes and additional work-up. "     Request to our staff JENNYFER Angelo MA to fax and call [facility] at [phone]. Fax #[phone] for previous notes      Thanks to Chava Belcher MD for allowing me to participate in Mr. Lizarraga's care. Please feel free to call me with any questions or concerns.     Time with patient 45 minutes, greater than 50% of which was counseling and coordination of care.      *Chart documentation was completed in part with Dragon voice-recognition software. Even though reviewed, some grammatical, spelling, and word errors may remain.       Parker Pichardo MD, [facility]  Neurologist    cc: Chava Belcher MD

## 2017-08-25 NOTE — NURSING NOTE
Hospital ER visit /In-patient: None  Previous Consults: YES:  [facility] in past

## 2017-08-30 NOTE — NURSING NOTE
Records received from [facility], patient next appointment is scheduled for 9/8/2017 at [facility], records will be reviewed at that time  Ina Angelo MA

## 2017-09-08 ENCOUNTER — OFFICE VISIT (OUTPATIENT)
Dept: NEUROLOGY | Facility: CLINIC | Age: 52
End: 2017-09-08
Payer: COMMERCIAL

## 2017-09-08 VITALS
DIASTOLIC BLOOD PRESSURE: 92 MMHG | HEART RATE: 70 BPM | SYSTOLIC BLOOD PRESSURE: 138 MMHG | WEIGHT: 143.4 LBS | HEIGHT: 68 IN | BODY MASS INDEX: 21.73 KG/M2

## 2017-09-08 DIAGNOSIS — G40.209 PARTIAL SYMPTOMATIC EPILEPSY WITH COMPLEX PARTIAL SEIZURES, NOT INTRACTABLE, WITHOUT STATUS EPILEPTICUS (H): ICD-10-CM

## 2017-09-08 DIAGNOSIS — G40.209 LOCALIZATION-RELATED PARTIAL EPILEPSY WITH COMPLEX PARTIAL SEIZURES (H): Primary | ICD-10-CM

## 2017-09-08 DIAGNOSIS — Z98.890 H/O BRAIN SURGERY: ICD-10-CM

## 2017-09-08 LAB
ALT SERPL W P-5'-P-CCNC: 30 U/L (ref 0–70)
DEPRECATED CALCIDIOL+CALCIFEROL SERPL-MC: 43 UG/L (ref 20–75)
FOLATE SERPL-MCNC: 15 NG/ML
GGT SERPL-CCNC: 68 U/L (ref 0–75)
PHENYTOIN SERPL-MCNC: 8.7 MG/L (ref 10–20)
VIT B12 SERPL-MCNC: 679 PG/ML (ref 193–986)

## 2017-09-08 PROCEDURE — 80186 ASSAY OF PHENYTOIN FREE: CPT | Mod: 90 | Performed by: PSYCHIATRY & NEUROLOGY

## 2017-09-08 PROCEDURE — 99214 OFFICE O/P EST MOD 30 MIN: CPT | Performed by: PSYCHIATRY & NEUROLOGY

## 2017-09-08 PROCEDURE — 80185 ASSAY OF PHENYTOIN TOTAL: CPT | Performed by: PSYCHIATRY & NEUROLOGY

## 2017-09-08 PROCEDURE — 82306 VITAMIN D 25 HYDROXY: CPT | Performed by: PSYCHIATRY & NEUROLOGY

## 2017-09-08 PROCEDURE — 82607 VITAMIN B-12: CPT | Performed by: PSYCHIATRY & NEUROLOGY

## 2017-09-08 PROCEDURE — 82746 ASSAY OF FOLIC ACID SERUM: CPT | Performed by: PSYCHIATRY & NEUROLOGY

## 2017-09-08 PROCEDURE — 84460 ALANINE AMINO (ALT) (SGPT): CPT | Performed by: PSYCHIATRY & NEUROLOGY

## 2017-09-08 PROCEDURE — 36415 COLL VENOUS BLD VENIPUNCTURE: CPT | Performed by: PSYCHIATRY & NEUROLOGY

## 2017-09-08 PROCEDURE — 82977 ASSAY OF GGT: CPT | Performed by: PSYCHIATRY & NEUROLOGY

## 2017-09-08 PROCEDURE — 99000 SPECIMEN HANDLING OFFICE-LAB: CPT | Performed by: PSYCHIATRY & NEUROLOGY

## 2017-09-08 RX ORDER — PHENYTOIN SODIUM 100 MG/1
CAPSULE, EXTENDED RELEASE ORAL
Qty: 90 CAPSULE | Refills: 11 | Status: SHIPPED | OUTPATIENT
Start: 2017-09-08 | End: 2018-08-17

## 2017-09-08 NOTE — MR AVS SNAPSHOT
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   Blood sample for seizure medication (TROUGH LEVEL), before the 1st dose in the morning                    Reminded again:  To follow the seizure precautions including driving restrictions as per [place] Law including- to report a seizure and date it occurred within thirty (30) days of the episode or event     Importance of compliance with anti-seizure medication (s).      Documenting the frequency of the seizures                 To go to local hospital ER for acute emergencies/seizure management  and arrange office follow-up for further review of medications    To call us for follow-up appointment in next 2-3 month(s) or earlier if needed.              Follow-ups after your visit        Follow-up notes from your care team     Return in about 2 months (around 2017) for Follow-up.      Who to contact     If you have questions or need follow up information about today's clinic visit or your schedule please contact [facility] directly at [phone].  Normal or non-critical lab and imaging results will be communicated to you by MyChart, letter or phone within 4 business days after the clinic has received the results. If you do not hear from us within 7 days, please contact the clinic through MyChart or phone. If you  "have a critical or abnormal lab result, we will notify you by phone as soon as possible.  Submit refill requests through [employer] or call your pharmacy and they will forward the refill request to us. Please allow 3 business days for your refill to be completed.          Additional Information About Your Visit        [email or web address] Information     [employer] lets you send messages to your doctor, view your test results, renew your prescriptions, schedule appointments and more. To sign up, go to [email or web address] . Click on \"Log in\" on the left side of the screen, which will take you to the Welcome page. Then click on \"Sign up Now\" on the right side of the page.     You will be asked to enter the access code listed below, as well as some personal information. Please follow the directions to create your username and password.     Your access code is: [email or web address]  Expires: 2017 10:04 AM     Your access code will  in 90 days. If you need help or a new code, please call your [place] clinic or [phone].        Care EveryWhere ID     This is your Care EveryWhere ID. This could be used by other organizations to access your [place] medical records  [id number]        Your Vitals Were     Pulse Height BMI (Body Mass Index)             70 1.727 m (5' 8\") 21.8 kg/m2          Blood Pressure from Last 3 Encounters:   17 (!) 138/92   17 118/75   17 126/76    Weight from Last 3 Encounters:   17 65 kg (143 lb 6.4 oz)   17 64.8 kg (142 lb 12.8 oz)   17 67.7 kg (149 lb 3.2 oz)              Today, you had the following     No orders found for display         Today's Medication Changes          These changes are accurate as of: 17 11:38 AM.  If you have any questions, ask your nurse or doctor.               Start taking these medicines.        Dose/Directions    phenytoin 100 MG CR capsule   Commonly known as:  DILANTIN   Used for:  Localization-related partial epilepsy with complex " partial seizures (H)   Started by:  Parker Pichardo MD        1 tablet morning & 2 in the evening   Quantity:  90 capsule   Refills:  11            Where to get your medicines      These medications were sent to [facility] - [place], [place] - [address] AT [address]  [address], [facility] [address]    Hours:  24-hours Phone:  [phone]     phenytoin 100 MG CR capsule                Primary Care Provider Office Phone # Fax #    Chava Belcher [phone]       [address]  [place] [address]        Equal Access to Services     [facility]: Hadii jesus devine hadasho Soomaali, waaxda luqadaha, qaybta kaalmada adeegyada, kemal escobar . So [facility] [phone].    ATENCIÓN: Si habla español, tiene a stern disposición servicios gratuitos de asistencia lingüística. [facility] [phone].    We comply with applicable federal civil rights laws and [place] laws. We do not discriminate on the basis of race, color, national origin, age, disability sex, sexual orientation or gender identity.            Thank you!     Thank you for choosing [facility]  for your care. Our goal is always to provide you with excellent care. Hearing back from our patients is one way we can continue to improve our services. Please take a few minutes to complete the written survey that you may receive in the mail after your visit with us. Thank you!             Your Updated Medication List - Protect others around you: Learn how to safely use, store and throw away your medicines at www.disposemymeds.org.          This list is accurate as of: 9/8/17 11:38 AM.  Always use your most recent med list.                   Brand Name Dispense Instructions for use Diagnosis    buPROPion 100 MG 12 hr tablet    WELLBUTRIN SR    90 tablet    TAKE 1 TABLET(100 MG) BY MOUTH EVERY MORNING FOR DEPRESSION OR ANXIETY PREVENTION    Major  depressive disorder, recurrent episode, mild (H)       DILANTIN PO      100mg a.m. & 200mg p.m.        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

## 2017-09-08 NOTE — LETTER
Raphael Lizarraga  [address]  [place] [facility] [address]    September 10, 2017    Dear Mr. Lizarraga,    Your following blood tests are normal except for low PHENYTOIN level. Therefore suggested additional dose of PHENYTOIN 50 mg chewable tablet in the morning in addition to recently refilled Phenytoin dose.  Please schedule a follow-up appointment ([phone]) if your like to discuss the results further or have any other questions/concerns. Please sure you have PHENYTOIN level done again the morning before the 1st dose in the morning week beginning Sept. 25th, 2017.    Results for orders placed or performed in visit on 09/08/17   Phenytoin free   Result Value Ref Range    Phenytoin Free 0.67 (L) 1.00 - 2.00 ug/ml   Phenytoin level   Result Value Ref Range    Phenytoin Level 8.7 (L) 10 - 20 mg/L   Folate   Result Value Ref Range    Folate 15.0 >5.4 ng/mL   Vitamin B12   Result Value Ref Range    Vitamin B12 679 193 - 986 pg/mL   GGT   Result Value Ref Range    GGT 68 0 - 75 U/L   ALT   Result Value Ref Range    ALT 30 0 - 70 U/L   Vitamin D deficiency screening   Result Value Ref Range    Vitamin D Deficiency screening 43 20 - 75 ug/L       Best wishes.    Thanks.    Sincerely,    Parker Pichardo MD, [facility]  Neurologist

## 2017-09-08 NOTE — NURSING NOTE
IMAGING: None  BLOOD TEST RESULTS: Not recommended on previous visit  Referral: NA  Old records: Paper copies available

## 2017-09-08 NOTE — PROGRESS NOTES
ESTABLISHED PATIENT NEUROLOGY NOTE    LOCATION: [facility]  DATE OF VISIT: 2017  NAME: Mr.John JANNY Lizarraga  : 1965 (52 year old)  MR #: [id number]    PRIMARY/REFERRING PROVIDER: Chava Belcher MD    REASON FOR VISIT: Review of outside notes    HISTORY OF PRESENT ILLNESS: 52-year-old man with a history of seizures since childhood.  He had left temporal lobectomy in .  He has been taking anticonvulsants-Dilantin 100 mg in the morning and 200 mg in the evening.  He has been seen by neurologist at [place] [facility].  He relates that last seizure was many years ago.  Today notes are available from his previous neurologist.  It mentions that his EEG was abnormal due to focal slowing in the left temporal region, dated 2001.  There is also mentioned that MRI brain study done in 2010 showed old postoperative changes from previous anterior temporal lobectomy on the left side including the amygdala and portions of left hippocampus.  There was finding of small amount of encephalomalacia in the left subfrontal region without significant surrounding gliosis.  There were also increased T2 signal abnormalities in the subcortical white matter of both frontal lobes, nonspecific in nature and appeared chronic.  The remainder of the study was normal.  Phenytoin level dated 2016 was subtherapeutic at 6.7 with reference range 10-20.  Phenytoin free level dated 2016 was again subtherapeutic at 0.7 with reference range 1-2    He had previously recommended blood tests only this morning and results are pending.      No Known Allergies    Current Outpatient Prescriptions on File Prior to Visit:  PARoxetine (PAXIL) 20 MG tablet TAKE 1 TABLET(20 MG) BY MOUTH AT BEDTIME FOR DEPRESSION OR ANXIETY PREVENTION   buPROPion (WELLBUTRIN SR) 100 MG 12 hr tablet TAKE 1 TABLET(100 MG) BY MOUTH EVERY MORNING FOR DEPRESSION OR ANXIETY PREVENTION   DILANTIN OR 100mg a.m. &  "200mg p.m.     No current facility-administered medications on file prior to visit.   Past Medical History:   Diagnosis Date     Anxiety      Diverticulosis      Epilepsy (H)      Intermittent asthma      Major depressive disorder, recurrent episode, unspecified 2009    Never saw psychiatrist     NELIA (obstructive sleep apnea)     Uses CPAP machine     Osteoporosis/osteopenia increased risk     due to long-term Dilantin use     Past Surgical History:   Procedure Laterality Date     C LAP,PARTIAL NEPHRECTOMY  08    LT for benign mass, Dr. Donald     LOBECTOMY      LT temporal for epilepsy     VASECTOMY       PERSONAL & SOCIAL HISTORY Reviewed and documented in [facility]  GENERAL EXAMINATION: BP (!) 138/92 (BP Location: Left arm, Patient Position: Chair, Cuff Size: Adult Regular)  Pulse 70  Ht 1.727 m (5' 8\")  Wt 65 kg (143 lb 6.4 oz)  BMI 21.8 kg/m2    IMPRESSION:   Encounter Diagnoses   Name Primary?     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  Although he did not have seizures for many years but he still runs a risk of having breakthrough seizures.   Provided him the prescription for current dose of phenytoin but my feeling is that we'll have to review this prescription again especially if the phenytoin level is subtherapeutic.    PLANS:   Patient Instructions             Orders Placed This Encounter   Medications     phenytoin (DILANTIN) 100 MG CR capsule     Si tablet morning & 2 in the evening     Dispense:  90 capsule     Refill:  11        *Please call [facility] near your home to have morning appointment for the blood draw.   Blood sample for seizure medication (TROUGH LEVEL), before the 1st dose in the morning                    Reminded again:  To follow the seizure precautions including driving " restrictions as per [place] Law including- to report a seizure and date it occurred within thirty (30) days of the episode or event     Importance of compliance with anti-seizure medication (s).      Documenting the frequency of the seizures                 To go to local hospital ER for acute emergencies/seizure management  and arrange office follow-up for further review of medications    To call us for follow-up appointment in next 2-3 month(s) or earlier if needed.      Thanks to Chava Belcher MD for allowing me to participate in Mr. Lizarraga's care. Please feel free to call me with any questions or concerns.     *Chart documentation was completed in part with Dragon voice-recognition software. Even though reviewed, some grammatical, spelling, and word errors may remain.       Total Time: Time with patient 25 minutes, greater than 50% of which was counseling and coordination of care.      Parker Pichardo MD, [facility]  Neurologist    Cc: Chava Belcher MD

## 2017-09-08 NOTE — PATIENT INSTRUCTIONS
Orders Placed This Encounter   Medications     phenytoin (DILANTIN) 100 MG CR capsule     Si tablet morning & 2 in the evening     Dispense:  90 capsule     Refill:  11        *Please call [facility] near your home to have morning appointment for the blood draw.   Blood sample for seizure medication (TROUGH LEVEL), before the 1st dose in the morning                    Reminded again:  To follow the seizure precautions including driving restrictions as per [place] Law including- to report a seizure and date it occurred within thirty (30) days of the episode or event     Importance of compliance with anti-seizure medication (s).      Documenting the frequency of the seizures                 To go to local hospital ER for acute emergencies/seizure management  and arrange office follow-up for further review of medications    To call us for follow-up appointment in next 2-3 month(s) or earlier if needed.

## 2017-09-10 DIAGNOSIS — G40.209 LOCALIZATION-RELATED FOCAL EPILEPSY WITH COMPLEX PARTIAL SEIZURES (H): Primary | ICD-10-CM

## 2017-09-10 DIAGNOSIS — G40.209 LOCALIZATION-RELATED PARTIAL EPILEPSY WITH COMPLEX PARTIAL SEIZURES (H): Primary | ICD-10-CM

## 2017-09-10 LAB — PHENYTOIN FREE SERPL-MCNC: 0.67 UG/ML

## 2017-09-10 RX ORDER — PHENYTOIN 50 MG/1
50 TABLET, CHEWABLE ORAL
Qty: 31 TABLET | Refills: 11 | Status: SHIPPED | OUTPATIENT
Start: 2017-09-10 | End: 2018-09-28

## 2017-09-12 ENCOUNTER — TELEPHONE (OUTPATIENT)
Dept: FAMILY MEDICINE | Facility: CLINIC | Age: 52
End: 2017-09-12

## 2017-09-12 NOTE — TELEPHONE ENCOUNTER
Reason for Call:  Other call back and prescription    Detailed comments: Raphael received  A phone call form [facility] regarding a prescription from Dr Pichardo. He would like to discuss with Dr Pichardo about what he should be doing. Does he need to stop taking his other medication or not. Please call him as soon as you can.  Thank you     Phone Number Patient can be reached at: Other phone number:  [phone]    Best Time: Any    Can we leave a detailed message on this number? YES    Call taken on 9/12/2017 at 5:37 PM by Hasmukh Dunn

## 2017-09-13 NOTE — TELEPHONE ENCOUNTER
Called and spoke with patient and explained lab letter to him, patient says he was confused on what he should be taking, I explained information again. Patient says he will have lab work done the week of the 25th of Sept 2017. Patient as complaining that he works early in the morning at 4am, I explained there is no specific day of the week he should have it done as long as it the week of the 25th of Sept 2017. I advise patient to call back to the clinic if he is confused about anything at this point. He says he will follow up if needed  Ina Angelo MA

## 2017-09-13 NOTE — TELEPHONE ENCOUNTER
Reason for call:  Other   Patient called regarding (reason for call): returning call  Additional comments: Call back at [phone]      Phone number to reach patient:  Cell number on file:    Telephone Information:   Mobile [phone]       Best Time:  ASAP    Can we leave a detailed message on this number?  YES

## 2017-09-13 NOTE — TELEPHONE ENCOUNTER
Called and left patient a message to call me back at clinic for further information on medication  Ina Angelo MA

## 2017-09-29 ENCOUNTER — TELEPHONE (OUTPATIENT)
Dept: FAMILY MEDICINE | Facility: CLINIC | Age: 52
End: 2017-09-29

## 2017-09-29 DIAGNOSIS — F33.0 MAJOR DEPRESSIVE DISORDER, RECURRENT EPISODE, MILD (H): ICD-10-CM

## 2017-09-29 DIAGNOSIS — G40.209 LOCALIZATION-RELATED FOCAL EPILEPSY WITH COMPLEX PARTIAL SEIZURES (H): ICD-10-CM

## 2017-09-29 LAB — PHENYTOIN SERPL-MCNC: 17 MG/L (ref 10–20)

## 2017-09-29 PROCEDURE — 99000 SPECIMEN HANDLING OFFICE-LAB: CPT | Performed by: PSYCHIATRY & NEUROLOGY

## 2017-09-29 PROCEDURE — 80185 ASSAY OF PHENYTOIN TOTAL: CPT | Performed by: PSYCHIATRY & NEUROLOGY

## 2017-09-29 PROCEDURE — 36415 COLL VENOUS BLD VENIPUNCTURE: CPT | Performed by: PSYCHIATRY & NEUROLOGY

## 2017-09-29 PROCEDURE — 80186 ASSAY OF PHENYTOIN FREE: CPT | Mod: 90 | Performed by: PSYCHIATRY & NEUROLOGY

## 2017-09-29 NOTE — TELEPHONE ENCOUNTER
buPROPion (WELLBUTRIN SR) 100 MG 12 hr tablet       Last Written Prescription Date: 04/11/17  Last Fill Quantity: 90; # refills: 0  Last Office Visit with [facility], P or [facility] prescribing provider:  03/13/17        Last PHQ-9 score on record=   PHQ-9 SCORE 7/7/2017   Total Score -   Total Score 2       Lab Results   Component Value Date    AST 17 07/29/2016     Lab Results   Component Value Date    ALT 30 09/08/2017         [facility]

## 2017-09-29 NOTE — LETTER
Raphael Lizarraga  [address]  [place] [facility] [address]    October 2, 2017    Dear Mr. Lizarraga,    Your Phenytoin level is acceptable.  Therefore no changes in your phenytoin schedule. . Please schedule a follow-up appointment ([phone])  to discuss the results further or have any other questions/concerns.     Results for orders placed or performed in visit on 09/29/17   Phenytoin level   Result Value Ref Range    Phenytoin Level 17.0 10 - 20 mg/L   Phenytoin free   Result Value Ref Range    Phenytoin Free 1.13 1.00 - 2.00 ug/ml       Best wishes.    Thanks.    Sincerely,    Parker Pichardo MD, [facility]  Neurologist

## 2017-09-30 LAB — PHENYTOIN FREE SERPL-MCNC: 1.13 UG/ML

## 2017-10-03 RX ORDER — BUPROPION HYDROCHLORIDE 100 MG/1
TABLET, EXTENDED RELEASE ORAL
Qty: 90 TABLET | Refills: 0 | Status: SHIPPED | OUTPATIENT
Start: 2017-10-03 | End: 2017-12-28

## 2017-10-03 NOTE — TELEPHONE ENCOUNTER
PHQ9 is current and less than 4, but it has been more than one year since this diagnosis was addressed at [facility] with PCP (9/22/16)    Routing to [place] to facilitate office visit appointment.

## 2017-10-04 NOTE — TELEPHONE ENCOUNTER
This writer attempted to contact Raphael on 10/04/17      Reason for call refill and left detailed message needs to be seen for further refills.        Fanny Viveros MA

## 2017-10-06 DIAGNOSIS — F41.9 ANXIETY: ICD-10-CM

## 2017-10-06 DIAGNOSIS — F33.0 MAJOR DEPRESSIVE DISORDER, RECURRENT EPISODE, MILD (H): ICD-10-CM

## 2017-10-06 NOTE — TELEPHONE ENCOUNTER
PARoxetine (PAXIL) 20 MG tablet     Last Written Prescription Date: 08/16/17  Last Fill Quantity: 30, # refills: 1  Last Office Visit with [facility] primary care provider:  03/13/17        Last PHQ-9 score on record=   PHQ-9 SCORE 7/7/2017   Total Score -   Total Score 2           [facility]

## 2017-10-10 RX ORDER — PAROXETINE 20 MG/1
TABLET, FILM COATED ORAL
Qty: 30 TABLET | Refills: 2 | Status: SHIPPED | OUTPATIENT
Start: 2017-10-10 | End: 2018-01-09

## 2017-10-10 NOTE — TELEPHONE ENCOUNTER
Prescription approved per [facility] Refill Protocol.  Sanjuanita Flores RN

## 2017-12-28 DIAGNOSIS — F41.9 ANXIETY: Primary | ICD-10-CM

## 2017-12-28 DIAGNOSIS — F33.0 MAJOR DEPRESSIVE DISORDER, RECURRENT EPISODE, MILD (H): ICD-10-CM

## 2017-12-28 NOTE — TELEPHONE ENCOUNTER
Requested Prescriptions   Pending Prescriptions Disp Refills     buPROPion (WELLBUTRIN SR) 100 MG 12 hr tablet [Pharmacy Med Name: BUPROPION SR 100MG TABLETS (12H)]  Last Written Prescription Date:  10/03/17  Last Fill Quantity: 90,  # refills: 0   Last Office Visit with [facility], P or [facility] prescribing provider:  03/13/17   Future Office Visit:    90 tablet 0     Sig: TAKE 1 TABLET BY MOUTH EVERY MORNING FOR DEPRESSION OR ANXIETY PREVENTION    SSRIs Protocol Failed    12/28/2017 11:42 AM       Failed - PHQ-9 score less than 5 in past 6 months    The PHQ-9 criteria is meant to fail. It requires a PHQ-9 score review         Failed - Recent (6 mo) or future visit with authorizing provider's specialty    Patient had office visit in the last 6 months or has a visit in the next 30 days with authorizing provider.  See chart review.            Passed - Medication is Bupropion    If the medication is Bupropion (Wellbutrin), and the patient is taking for smoking cessation; OK to refill.         Passed - Patient is age 18 or older

## 2017-12-28 NOTE — LETTER
January 10, 2018      Raphael Lizarraga  [address]  [place] [facility] [place] [place] [address]        Dear Raphale Lizarraga,      The refill request made by your pharmacy was received at the clinic.     Signed Prescriptions:                        Disp   Refills    buPROPion (WELLBUTRIN SR) 100 MG 12 hr tab*90 tab*0        Sig: Take 1 tablet (100 mg) by mouth every morning for           depression & anxiety prevention.  Authorizing Provider: ANCELMO BLANK    PARoxetine (PAXIL) 20 MG tablet            30 tab*0        Sig: Take 1 tablet (20 mg) by mouth At Bedtime for           depression & anxiety prevention.  Authorizing Provider: ANCELMO BLANK    At this time you are due in the clinic for a follow up appointment. A one time maritza refill has been sent to your pharmacy.     Please call your clinic to make an appointment with your provider before you run out of medication. This will prevent a delay in your next month's refill.    [facility] [phone]    For your convenience we also offer online appointment scheduling at [email or web address] or [email or web address].     We invite you to refill your next prescription at a [facility] or take advantage of our prescription mail service.      Sincerely,      [facility] with Dr. Blank

## 2018-01-01 DIAGNOSIS — F41.9 ANXIETY: ICD-10-CM

## 2018-01-01 DIAGNOSIS — F33.0 MAJOR DEPRESSIVE DISORDER, RECURRENT EPISODE, MILD (H): ICD-10-CM

## 2018-01-01 RX ORDER — PAROXETINE 20 MG/1
TABLET, FILM COATED ORAL
Qty: 30 TABLET | Refills: 0 | Status: CANCELLED | OUTPATIENT
Start: 2018-01-01

## 2018-01-04 NOTE — TELEPHONE ENCOUNTER
Routing refill request to provider for review/approval because:  Patient needs to be seen because:  No visit in 6 months    PHQ-9 SCORE 9/23/2016 11/25/2016 7/7/2017   Total Score - - -   Total Score 8 3 2       Susanna Connelly RN   [facility]

## 2018-01-05 ASSESSMENT — PATIENT HEALTH QUESTIONNAIRE - PHQ9: SUM OF ALL RESPONSES TO PHQ QUESTIONS 1-9: 16

## 2018-01-09 RX ORDER — BUPROPION HYDROCHLORIDE 100 MG/1
100 TABLET, EXTENDED RELEASE ORAL EVERY MORNING
Qty: 90 TABLET | Refills: 0 | Status: SHIPPED | OUTPATIENT
Start: 2018-01-09 | End: 2018-01-26

## 2018-01-09 RX ORDER — PAROXETINE 20 MG/1
20 TABLET, FILM COATED ORAL AT BEDTIME
Qty: 30 TABLET | Refills: 0 | Status: SHIPPED | OUTPATIENT
Start: 2018-01-09 | End: 2018-01-26

## 2018-01-09 NOTE — TELEPHONE ENCOUNTER
Let patient know he needs to be seen for further refills after this. Hasn't been seen for >15 months.

## 2018-01-09 NOTE — TELEPHONE ENCOUNTER
This writer attempted to contact Raphael on 01/09/18      Reason for call medication has been refilled for patient to the pharmacy, patient is due for an appointment, patient may call our appointment line to schedule an appointment before medications run out and left message to return call.      If patient calls back:   Schedule Office Visit appointment within 1 month with Dr. Belcher, document that pt called and close encounter .        Robert Iniguez

## 2018-01-10 NOTE — TELEPHONE ENCOUNTER
No appointment, no call back, letter sent to patient's home address to call our appointment line to schedule an appointment.  Robert Iniguez,  For [facility]

## 2018-01-26 ENCOUNTER — OFFICE VISIT (OUTPATIENT)
Dept: FAMILY MEDICINE | Facility: CLINIC | Age: 53
End: 2018-01-26
Payer: COMMERCIAL

## 2018-01-26 VITALS
HEIGHT: 68 IN | OXYGEN SATURATION: 100 % | DIASTOLIC BLOOD PRESSURE: 66 MMHG | RESPIRATION RATE: 16 BRPM | SYSTOLIC BLOOD PRESSURE: 109 MMHG | TEMPERATURE: 97.5 F | WEIGHT: 145.4 LBS | BODY MASS INDEX: 22.04 KG/M2 | HEART RATE: 60 BPM

## 2018-01-26 DIAGNOSIS — F41.9 ANXIETY: ICD-10-CM

## 2018-01-26 DIAGNOSIS — G40.209 PARTIAL SYMPTOMATIC EPILEPSY WITH COMPLEX PARTIAL SEIZURES, NOT INTRACTABLE, WITHOUT STATUS EPILEPTICUS (H): ICD-10-CM

## 2018-01-26 DIAGNOSIS — F33.0 MAJOR DEPRESSIVE DISORDER, RECURRENT EPISODE, MILD (H): Primary | ICD-10-CM

## 2018-01-26 DIAGNOSIS — J45.20 INTERMITTENT ASTHMA WITHOUT COMPLICATION, UNSPECIFIED ASTHMA SEVERITY: ICD-10-CM

## 2018-01-26 DIAGNOSIS — Z12.11 SCREEN FOR COLON CANCER: ICD-10-CM

## 2018-01-26 LAB
ALT SERPL W P-5'-P-CCNC: 31 U/L (ref 0–70)
AST SERPL W P-5'-P-CCNC: 19 U/L (ref 0–45)
DEPRECATED CALCIDIOL+CALCIFEROL SERPL-MC: 18 UG/L (ref 20–75)
ERYTHROCYTE [DISTWIDTH] IN BLOOD BY AUTOMATED COUNT: 12.9 % (ref 10–15)
HCT VFR BLD AUTO: 44.4 % (ref 40–53)
HGB BLD-MCNC: 14.5 G/DL (ref 13.3–17.7)
MCH RBC QN AUTO: 31.2 PG (ref 26.5–33)
MCHC RBC AUTO-ENTMCNC: 32.7 G/DL (ref 31.5–36.5)
MCV RBC AUTO: 96 FL (ref 78–100)
PHENYTOIN SERPL-MCNC: 18.8 MG/L (ref 10–20)
PLATELET # BLD AUTO: 241 10E9/L (ref 150–450)
RBC # BLD AUTO: 4.65 10E12/L (ref 4.4–5.9)
WBC # BLD AUTO: 2.6 10E9/L (ref 4–11)

## 2018-01-26 PROCEDURE — 82306 VITAMIN D 25 HYDROXY: CPT | Performed by: FAMILY MEDICINE

## 2018-01-26 PROCEDURE — 84460 ALANINE AMINO (ALT) (SGPT): CPT | Performed by: FAMILY MEDICINE

## 2018-01-26 PROCEDURE — 80185 ASSAY OF PHENYTOIN TOTAL: CPT | Performed by: FAMILY MEDICINE

## 2018-01-26 PROCEDURE — 85027 COMPLETE CBC AUTOMATED: CPT | Performed by: FAMILY MEDICINE

## 2018-01-26 PROCEDURE — 84450 TRANSFERASE (AST) (SGOT): CPT | Performed by: FAMILY MEDICINE

## 2018-01-26 PROCEDURE — 99214 OFFICE O/P EST MOD 30 MIN: CPT | Performed by: FAMILY MEDICINE

## 2018-01-26 PROCEDURE — 36415 COLL VENOUS BLD VENIPUNCTURE: CPT | Performed by: FAMILY MEDICINE

## 2018-01-26 RX ORDER — BUPROPION HYDROCHLORIDE 100 MG/1
100 TABLET, EXTENDED RELEASE ORAL EVERY MORNING
Qty: 90 TABLET | Refills: 1 | Status: SHIPPED | OUTPATIENT
Start: 2018-01-26 | End: 2018-04-12

## 2018-01-26 RX ORDER — ALBUTEROL SULFATE 90 UG/1
2 AEROSOL, METERED RESPIRATORY (INHALATION) EVERY 4 HOURS PRN
Qty: 1 INHALER | Refills: 1 | Status: SHIPPED | OUTPATIENT
Start: 2018-01-26 | End: 2019-09-12

## 2018-01-26 RX ORDER — PAROXETINE 20 MG/1
20 TABLET, FILM COATED ORAL AT BEDTIME
Qty: 90 TABLET | Refills: 1 | Status: SHIPPED | OUTPATIENT
Start: 2018-01-26 | End: 2018-07-20

## 2018-01-26 ASSESSMENT — ANXIETY QUESTIONNAIRES
IF YOU CHECKED OFF ANY PROBLEMS ON THIS QUESTIONNAIRE, HOW DIFFICULT HAVE THESE PROBLEMS MADE IT FOR YOU TO DO YOUR WORK, TAKE CARE OF THINGS AT HOME, OR GET ALONG WITH OTHER PEOPLE: SOMEWHAT DIFFICULT
3. WORRYING TOO MUCH ABOUT DIFFERENT THINGS: NOT AT ALL
1. FEELING NERVOUS, ANXIOUS, OR ON EDGE: NOT AT ALL
6. BECOMING EASILY ANNOYED OR IRRITABLE: MORE THAN HALF THE DAYS
7. FEELING AFRAID AS IF SOMETHING AWFUL MIGHT HAPPEN: NOT AT ALL
5. BEING SO RESTLESS THAT IT IS HARD TO SIT STILL: NOT AT ALL
2. NOT BEING ABLE TO STOP OR CONTROL WORRYING: NOT AT ALL
GAD7 TOTAL SCORE: 2

## 2018-01-26 ASSESSMENT — PATIENT HEALTH QUESTIONNAIRE - PHQ9: 5. POOR APPETITE OR OVEREATING: NOT AT ALL

## 2018-01-26 ASSESSMENT — PAIN SCALES - GENERAL: PAINLEVEL: NO PAIN (0)

## 2018-01-26 NOTE — NURSING NOTE
"Chief Complaint   Patient presents with     Recheck Medication       Initial /66 (BP Location: Left arm, Patient Position: Sitting, Cuff Size: Adult Regular)  Pulse 60  Temp 97.5  F (36.4  C) (Oral)  Resp 16  Ht 5' 8\" (1.727 m)  Wt 145 lb 6.4 oz (66 kg)  SpO2 100%  BMI 22.11 kg/m2 Estimated body mass index is 22.11 kg/(m^2) as calculated from the following:    Height as of this encounter: 5' 8\" (1.727 m).    Weight as of this encounter: 145 lb 6.4 oz (66 kg).  Medication Reconciliation: complete     Will Emma GUERRERO      "

## 2018-01-26 NOTE — LETTER
My Depression Action Plan  Name: Raphael Lizarraga   Date of Birth 1965  Date: 1/26/2018    My doctor: Chava Belcher   My clinic: [address] [id number] [phone]          GREEN    ZONE   Good Control    What it looks like:     Things are going generally well. You have normal up s and down s. You may even feel depressed from time to time, but bad moods usually last less than a day.   What you need to do:  1. Continue to care for yourself (see self care plan)  2. Check your depression survival kit and update it as needed  3. Follow your physician s recommendations including any medication.  4. Do not stop taking medication unless you consult with your physician first.           YELLOW         ZONE Getting Worse    What it looks like:     Depression is starting to interfere with your life.     It may be hard to get out of bed; you may be starting to isolate yourself from others.    Symptoms of depression are starting to last most all day and this has happened for several days.     You may have suicidal thoughts but they are not constant.   What you need to do:     1. Call your care team, your response to treatment will improve if you keep your care team informed of your progress. Yellow periods are signs an adjustment may need to be made.     2. Continue your self-care, even if you have to fake it!    3. Talk to someone in your support network    4. Open up your depression survival kit           RED    ZONE Medical Alert - Get Help    What it looks like:     Depression is seriously interfering with your life.     You may experience these or other symptoms: You can t get out of bed most days, can t work or engage in other necessary activities, you have trouble taking care of basic hygiene, or basic responsibilities, thoughts of suicide or death that will not go away, self-injurious behavior.     What you need to do:  1. Call your care  team and request a same-day appointment. If they are not available (weekends or after hours) call your local crisis line, emergency room or 911.      Electronically signed by: Daniella Mejia, January 26, 2018    Depression Self Care Plan / Survival Kit    Self-Care for Depression  Here s the deal. Your body and mind are really not as separate as most people think.  What you do and think affects how you feel and how you feel influences what you do and think. This means if you do things that people who feel good do, it will help you feel better.  Sometimes this is all it takes.  There is also a place for medication and therapy depending on how severe your depression is, so be sure to consult with your medical provider and/ or Behavioral Health Consultant if your symptoms are worsening or not improving.     In order to better manage my stress, I will:    Exercise  Get some form of exercise, every day. This will help reduce pain and release endorphins, the  feel good  chemicals in your brain. This is almost as good as taking antidepressants!  This is not the same as joining a gym and then never going! (they count on that by the way ) It can be as simple as just going for a walk or doing some gardening, anything that will get you moving.      Hygiene   Maintain good hygiene (Get out of bed in the morning, Make your bed, Brush your teeth, Take a shower, and Get dressed like you were going to work, even if you are unemployed).  If your clothes don't fit try to get ones that do.    Diet  I will strive to eat foods that are good for me, drink plenty of water, and avoid excessive sugar, caffeine, alcohol, and other mood-altering substances.  Some foods that are helpful in depression are: complex carbohydrates, B vitamins, flaxseed, fish or fish oil, fresh fruits and vegetables.    Psychotherapy  I agree to participate in Individual Therapy (if recommended).    Medication  If prescribed medications, I agree to take them.   Missing doses can result in serious side effects.  I understand that drinking alcohol, or other illicit drug use, may cause potential side effects.  I will not stop my medication abruptly without first discussing it with my provider.    Staying Connected With Others  I will stay in touch with my friends, family members, and my primary care provider/team.    Use your imagination  Be creative.  We all have a creative side; it doesn t matter if it s oil painting, sand castles, or mud pies! This will also kick up the endorphins.    Witness Beauty  (AKA stop and smell the roses) Take a look outside, even in mid-winter. Notice colors, textures. Watch the squirrels and birds.     Service to others  Be of service to others.  There is always someone else in need.  By helping others we can  get out of ourselves  and remember the really important things.  This also provides opportunities for practicing all the other parts of the program.    Humor  Laugh and be silly!  Adjust your TV habits for less news and crime-drama and more comedy.    Control your stress  Try breathing deep, massage therapy, biofeedback, and meditation. Find time to relax each day.     My support system    Clinic Contact:  Phone number:    Contact 1:  Phone number:    Contact 2:  Phone number:    Sabianist/:  Phone number:    Therapist:  Phone number:    Local crisis center:    Phone number:    Other community support:  Phone number:

## 2018-01-26 NOTE — MR AVS SNAPSHOT
After Visit Summary   1/26/2018    Raphael Lizarraga    MRN: [id number]           Patient Information     Date Of Birth          1965        Visit Information        Provider Department      1/26/2018 9:00 AM Chava Belcher MD [facility]        Today's Diagnoses     Major depressive disorder, recurrent episode, mild (H)    -  1    Anxiety        Partial symptomatic epilepsy with complex partial seizures, not intractable, without status epilepticus (H)        Intermittent asthma without complication, unspecified asthma severity        Screen for colon cancer           Follow-ups after your visit        Additional Services     GASTROENTEROLOGY ADULT REF PROCEDURE ONLY       Last Lab Result: Creatinine (mg/dL)       Date                     Value                 07/29/2016               0.85             ----------  Body mass index is 22.11 kg/(m^2).     Needed:  No  Language:  English    Patient will be contacted to schedule the colonoscopy, or call the Specialty Scheduling Line [phone].      Please be aware that coverage of these services is subject to the terms and limitations of your health insurance plan.  Call member services at your health plan with any benefit or coverage questions.  Any procedures must be performed at a [facility] facility OR coordinated by your clinic's referral office.    Please bring the following with you to your appointment:    (1) Any X-Rays, CTs or MRIs which have been performed.  Contact the facility where they were done to arrange for  prior to your scheduled appointment.    (2) List of current medications   (3) This referral request   (4) Any documents/labs given to you for this referral            NEUROLOGY ADULT REFERRAL       Your provider has referred you for the following:   Consult at   [facility]: [facility] [phone]   http://www.[place].[facility]/[place]/index.htm  [facility]: [facility]  [facility] [phone]   Http://www.[facility]/index.htm    [facility]: [facility]   [place] [phone]   http://www.[facility].ru/locations.html  [place] [phone]   http://www.[facility].com/locations.html  [place] [phone]   http://www.[facility] Hospital[facility]Star Systems Internationalcom/locations.html  [place] [phone]   http://www.[facility].[employer]/locations.html  [place] [phone]   Http://www.[facility] Hospital[facility].ru/locations.html    [facility]: [facility] [phone]   http://www.[place].[facility]/[place]/  UMP: [facility] [phone]   http://www.[facility].org/[facility]/  UMP: [facility] Epilepsy Care [facility] [phone]   http://www.[facility].org/Clinics/mincep/  UMP: Neurology Clinic [facility] [phone]   http://www.[facility].org/Clinics/neurology-clinic/  Epilepsy    [facility]: [facility] Neurological [facility] [phone]   http://www.[facility] [phone]   http://www.iDoneThis.[place]  [place] [phone]   http://www.[facility].[place]o    Please be aware that coverage of these services is subject to the terms and limitations of your health insurance plan.  Call member services at your health plan with any benefit or coverage questions.      Please bring the following with you to your appointment:    (1) Any X-Rays, CTs or MRIs which have been performed.  Contact the facility where they were done to arrange for  prior to your scheduled appointment.    (2) List of current medications  (3) This referral request   (4) Any documents/labs given to you for this referral                  Follow-up notes from your care team     Return in about 6 months (around 7/26/2018) for recheck depression & asthma.      Who to contact     If you have questions or need  "follow up information about today's clinic visit or your schedule please contact [facility] [place] directly at [phone].  Normal or non-critical lab and imaging results will be communicated to you by MyChart, letter or phone within 4 business days after the clinic has received the results. If you do not hear from us within 7 days, please contact the clinic through [facility]in[facility]art or phone. If you have a critical or abnormal lab result, we will notify you by phone as soon as possible.  Submit refill requests through Swagsy or call your pharmacy and they will forward the refill request to us. Please allow 3 business days for your refill to be completed.          Additional Information About Your Visit        [facility]in[facility] [employer] Information     Swagsy lets you send messages to your doctor, view your test results, renew your prescriptions, schedule appointments and more. To sign up, go to [email or web address] . Click on \"Log in\" on the left side of the screen, which will take you to the Welcome page. Then click on \"Sign up Now\" on the right side of the page.     You will be asked to enter the access code listed below, as well as some personal information. Please follow the directions to create your username and password.     Your access code is: [email or web address]  Expires: 2018  9:43 AM     Your access code will  in 90 days. If you need help or a new code, please call your [facility] clinic or [phone].        Care EveryWhere ID     This is your Care EveryWhere ID. This could be used by other organizations to access your [facility] medical records  [id number]        Your Vitals Were     Pulse Temperature Respirations Height Pulse Oximetry BMI (Body Mass Index)    60 97.5  F (36.4  C) (Oral) 16 5' 8\" (1.727 m) 100% 22.11 kg/m2       Blood Pressure from Last 3 Encounters:   18 109/66   17 (!) 138/92   17 118/75    Weight from Last 3 Encounters:   18 145 lb 6.4 oz (66 kg)   17 143 lb " 6.4 oz (65 kg)   08/25/17 142 lb 12.8 oz (64.8 kg)              We Performed the Following     ALT     AST     Asthma Action Plan (AAP)     CBC with platelets     DEPRESSION ACTION PLAN (DAP)     GASTROENTEROLOGY ADULT REF PROCEDURE ONLY     NEUROLOGY ADULT REFERRAL     Phenytoin level     Vitamin D Deficiency          Today's Medication Changes          These changes are accurate as of 1/26/18  9:43 AM.  If you have any questions, ask your nurse or doctor.               Start taking these medicines.        Dose/Directions    albuterol 108 (90 BASE) MCG/ACT Inhaler   Commonly known as:  PROAIR HFA/PROVENTIL HFA/VENTOLIN HFA   Used for:  Intermittent asthma without complication, unspecified asthma severity   Started by:  Chava Belcher MD        Dose:  2 puff   Inhale 2 puffs into the lungs every 4 hours as needed (for asthma symptoms)   Quantity:  1 Inhaler   Refills:  1            Where to get your medicines      These medications were sent to [facility] [address] [place] [address] AT [facility]  [address] [facility] [address]    Hours:  24-hours Phone:  [phone]     albuterol 108 (90 BASE) MCG/ACT Inhaler    buPROPion 100 MG 12 hr tablet    PARoxetine 20 MG tablet                Primary Care Provider Office Phone # Fax #    Chava Belcher [phone]       [address]  [place] [address]        Equal Access to Services     [facility] AH: Hadii aad ku hadasho Soomaali, waaxda luqadaha, qaybta kaalmada adeegyada, waxay idiin hayaan adesienna jacoboarahiren la'anastacia . So [facility] [phone].    ATENCIÓN: Si habla español, tiene a stern disposición servicios gratuitos de asistencia lingüística. Llame al [phone].    We comply with applicable federal civil rights laws and [place] laws. We do not discriminate on the basis of race, color, national origin, age, disability, sex, sexual orientation, or gender identity.            Thank  you!     Thank you for choosing [facility]  for your care. Our goal is always to provide you with excellent care. Hearing back from our patients is one way we can continue to improve our services. Please take a few minutes to complete the written survey that you may receive in the mail after your visit with us. Thank you!             Your Updated Medication List - Protect others around you: Learn how to safely use, store and throw away your medicines at www.disposemymeds.org.          This list is accurate as of 1/26/18  9:43 AM.  Always use your most recent med list.                   Brand Name Dispense Instructions for use Diagnosis    albuterol 108 (90 BASE) MCG/ACT Inhaler    PROAIR HFA/PROVENTIL HFA/VENTOLIN HFA    1 Inhaler    Inhale 2 puffs into the lungs every 4 hours as needed (for asthma symptoms)    Intermittent asthma without complication, unspecified asthma severity       buPROPion 100 MG 12 hr tablet    WELLBUTRIN SR    90 tablet    Take 1 tablet (100 mg) by mouth every morning for depression & anxiety prevention.    Major depressive disorder, recurrent episode, mild (H), Anxiety       PARoxetine 20 MG tablet    PAXIL    90 tablet    Take 1 tablet (20 mg) by mouth At Bedtime for depression & anxiety prevention.    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

## 2018-01-26 NOTE — LETTER
My Asthma Action Plan  Name: Raphael Lizarraga   YOB: 1965  Date: 1/26/2018   My doctor: Chava Belcher MD   My clinic: [facility]        My Control Medicine: None  My Rescue Medicine: Albuterol (Proair/Ventolin/Proventil) inhaler :  2 puffs every 4 hours as needed, or as directed below:  My Asthma Severity: intermittent  Avoid your asthma triggers.  None            GREEN ZONE   Good Control    I feel good    No cough or wheeze    Can work, sleep and play without asthma symptoms       Take your asthma control medicine every day.     1. If exercise triggers your asthma, take your rescue medication    15 minutes before exercise or sports, and    During exercise if you have asthma symptoms  2. Spacer to use with inhaler: If you have a spacer, make sure to use it with your inhaler             YELLOW ZONE Getting Worse  I have ANY of these:    I do not feel good    Cough or wheeze    Chest feels tight    Wake up at night   1. Keep taking your Green Zone medications  2. Start taking your rescue medicine:    every 20 minutes for up to 1 hour. Then every 4 hours for 24-48 hours.  3. If you stay in the Yellow Zone for more than 12-24 hours, contact your doctor.  4. If you do not return to the Green Zone in 12-24 hours or you get worse, start taking your oral steroid medicine if prescribed by your provider.           RED ZONE Medical Alert - Get Help  I have ANY of these:    I feel awful    Medicine is not helping    Breathing getting harder    Trouble walking or talking    Nose opens wide to breathe       1. Take your rescue medicine NOW  2. If your provider has prescribed an oral steroid medicine, start taking it NOW  3. Call your doctor NOW  4. If you are still in the Red Zone after 20 minutes and you have not reached your doctor:    Take your rescue medicine again and    Call 911 or go to the emergency room right away    See your regular doctor within 2 weeks of an Emergency Room or  Urgent Care visit for follow-up treatment.        Electronically signed by: Daniella Mejia, January 26, 2018    Annual Reminders:  Meet with Asthma Educator,  Flu Shot in the Fall, consider Pneumonia Vaccination for patients with asthma (aged 19 and older).    [facility] [address] [place] [address] [address] [facility] [address] [facility]                    Asthma Triggers  How To Control Things That Make Your Asthma Worse    Triggers are things that make your asthma worse.  Look at the list below to help you find your triggers and what you can do about them.  You can help prevent asthma flare-ups by staying away from your triggers.      Trigger                                                          What you can do   Cigarette Smoke  Tobacco smoke can make asthma worse. Do not allow smoking in your home, car or around you.  Be sure no one smokes at a child s day care or school.  If you smoke, ask your health care provider for ways to help you quit.  Ask family members to quit too.  Ask your health care provider for a referral to Quit Plan to help you quit smoking, or call 1-574-910-PLAN.     Colds, Flu, Bronchitis  These are common triggers of asthma. Wash your hands often.  Don t touch your eyes, nose or mouth.  Get a flu shot every year.     Dust Mites  These are tiny bugs that live in cloth or carpet. They are too small to see. Wash sheets and blankets in hot water every week.   Encase pillows and mattress in dust mite proof covers.  Avoid having carpet if you can. If you have carpet, vacuum weekly.   Use a dust mask and HEPA vacuum.   Pollen and Outdoor Mold  Some people are allergic to trees, grass, or weed pollen, or molds. Try to keep your windows closed.  Limit time out doors when pollen count is high.   Ask you health care provider about taking medicine during allergy season.     Animal Dander  Some people are allergic to skin flakes, urine or saliva from pets with fur or  feathers. Keep pets with fur or feathers out of your home.    If you can t keep the pet outdoors, then keep the pet out of your bedroom.  Keep the bedroom door closed.  Keep pets off cloth furniture and away from stuffed toys.     Mice, Rats, and Cockroaches  Some people are allergic to the waste from these pests.   Cover food and garbage.  Clean up spills and food crumbs.  Store grease in the refrigerator.   Keep food out of the bedroom.   Indoor Mold  This can be a trigger if your home has high moisture. Fix leaking faucets, pipes, or other sources of water.   Clean moldy surfaces.  Dehumidify basement if it is damp and smelly.   Smoke, Strong Odors, and Sprays  These can reduce air quality. Stay away from strong odors and sprays, such as perfume, powder, hair spray, paints, smoke incense, paint, cleaning products, candles and new carpet.   Exercise or Sports  Some people with asthma have this trigger. Be active!  Ask your doctor about taking medicine before sports or exercise to prevent symptoms.    Warm up for 5-10 minutes before and after sports or exercise.     Other Triggers of Asthma  Cold air:  Cover your nose and mouth with a scarf.  Sometimes laughing or crying can be a trigger.  Some medicines and food can trigger asthma.

## 2018-01-26 NOTE — PROGRESS NOTES
SUBJECTIVE:   Raphael Lizarraga is a 53 year old male who presents to clinic today for the following health issues:      Depression and Anxiety Follow-Up    Status since last visit: stable - has been going well    Other associated symptoms:None    Complicating factors:     Significant life event: No     Current substance abuse: None    PHQ-9 11/25/2016 7/7/2017 1/5/2018   Total Score 3 2 16   Q9: Suicide Ideation Not at all Not at all Several days     JOSE-7 SCORE 6/24/2016 7/28/2016 9/23/2016   Total Score - - -   Total Score 10 11 2       PHQ-9  English  PHQ-9   Any Language  JOSE-7  Suicide Assessment Five-step Evaluation and Treatment (SAFE-T)          Asthma Follow-Up    Was ACT completed today?    Yes    ACT Total Scores 3/13/2017   ACT TOTAL SCORE -   ASTHMA ER VISITS -   ASTHMA HOSPITALIZATIONS -   ACT TOTAL SCORE (Goal Greater than or Equal to 20) 25   In the past 12 months, how many times did you visit the emergency room for your asthma without being admitted to the hospital? 0   In the past 12 months, how many times were you hospitalized overnight because of your asthma? 0       Recent asthma triggers that patient is dealing with: None        Amount of exercise or physical activity: active    Problems taking medications regularly: No    Medication side effects: none    Diet: regular (no restrictions)      Past medical, family, and social histories, medications, and allergies are reviewed and updated in Epic.     ROS:  C: NEGATIVE for fever, chills, change in weight  E/M: NEGATIVE for ear, mouth and throat problems  R: ACT = 25. NEGATIVE for significant cough or SOB  CV: NEGATIVE for chest pain, palpitations or peripheral edema  PSYCHIATRIC: PHQ-9 = 6; JOSE-7 = 2.  NEURO: He has not had a seizure in a long time (at least over 5 years).   ROS otherwise negative    This document serves as a record of the services and decisions personally performed and made by Dr. Belcher. It was created on his behalf by Jackie  "Daniella, a trained medical scribe. The creation of this document is based the provider's statements to the medical scribe.  Jackie Brewer January 26, 2018 9:32 AM     OBJECTIVE:                                                    /66 (BP Location: Left arm, Patient Position: Sitting, Cuff Size: Adult Regular)  Pulse 60  Temp 97.5  F (36.4  C) (Oral)  Resp 16  Ht 1.727 m (5' 8\")  Wt 66 kg (145 lb 6.4 oz)  SpO2 100%  BMI 22.11 kg/m2   Body mass index is 22.11 kg/(m^2).     GENERAL: healthy, alert and no distress  EYES: Eyes grossly normal to inspection, PERRL, EOMI, sclerae white and conjunctivae normal  RESP: lungs clear to auscultation - no crackles or wheezes, no areas of dullness, no tachypnea  CV: Heart regular rate and rhythm without murmur, click or rub. No peripheral edema and peripheral pulses strong  MS: no gross musculoskeletal defects noted, no edema  SKIN: no suspicious lesions or rashes  NEURO: Normal strength and tone, sensory exam grossly normal, mentation intact, oriented times 3 and cranial nerves 2-12 intact  PSYCH: mentation appears normal, affect normal/bright     Diagnostic Test Results:  none      ASSESSMENT/PLAN:                                                      (F33.0) Major depressive disorder, recurrent episode, mild (H)  (primary encounter diagnosis)  (F41.9) Anxiety  Comment: Stable.  Plan: DEPRESSION ACTION PLAN (DAP), buPROPion         (WELLBUTRIN SR) 100 MG 12 hr tablet, PARoxetine        (PAXIL) 20 MG tablet        Return in about 6 months (around 7/26/2018) for recheck depression & asthma.     (G40.209) Partial symptomatic epilepsy with complex partial seizures, not intractable, without status epilepticus (H)  Comment: Stable, has not had a seizure in over 5 years. He needs to re-establish with a neurologist.   Plan: NEUROLOGY ADULT REFERRAL, Phenytoin level, CBC         with platelets, Vitamin D Deficiency, AST, ALT            (J45.20) Intermittent asthma without " complication, unspecified asthma severity  Comment: Well controlled.  Plan: Asthma Action Plan (AAP), albuterol (PROAIR         HFA/PROVENTIL HFA/VENTOLIN HFA) 108 (90 BASE)         MCG/ACT Inhaler        Follow up in 6 months.     (Z12.11) Screen for colon cancer  Comment: Colonoscopy due in June 2018.  Plan: GASTROENTEROLOGY ADULT REF PROCEDURE ONLY              The information in this document, created by the medical scribe for me, accurately reflects the services I personally performed and the decisions made by me. I have reviewed and approved this document for accuracy prior to leaving the patient care area. January 26, 2018 9:32 AM   Chava Belcher MD

## 2018-01-27 ASSESSMENT — PATIENT HEALTH QUESTIONNAIRE - PHQ9: SUM OF ALL RESPONSES TO PHQ QUESTIONS 1-9: 6

## 2018-01-27 ASSESSMENT — ANXIETY QUESTIONNAIRES: GAD7 TOTAL SCORE: 2

## 2018-01-27 ASSESSMENT — ASTHMA QUESTIONNAIRES: ACT_TOTALSCORE: 25

## 2018-03-20 ENCOUNTER — OFFICE VISIT (OUTPATIENT)
Dept: FAMILY MEDICINE | Facility: CLINIC | Age: 53
End: 2018-03-20
Payer: COMMERCIAL

## 2018-03-20 VITALS
TEMPERATURE: 97 F | WEIGHT: 146 LBS | BODY MASS INDEX: 22.13 KG/M2 | SYSTOLIC BLOOD PRESSURE: 121 MMHG | HEIGHT: 68 IN | HEART RATE: 75 BPM | DIASTOLIC BLOOD PRESSURE: 69 MMHG | OXYGEN SATURATION: 100 %

## 2018-03-20 DIAGNOSIS — J34.89 NASAL DRAINAGE: ICD-10-CM

## 2018-03-20 DIAGNOSIS — R09.81 SINUS CONGESTION: Primary | ICD-10-CM

## 2018-03-20 PROCEDURE — 99214 OFFICE O/P EST MOD 30 MIN: CPT | Performed by: FAMILY MEDICINE

## 2018-03-20 RX ORDER — AMOXICILLIN 500 MG/1
500 CAPSULE ORAL 3 TIMES DAILY
Qty: 21 CAPSULE | Refills: 0 | Status: SHIPPED | OUTPATIENT
Start: 2018-03-20 | End: 2019-02-22

## 2018-03-20 RX ORDER — FLUTICASONE PROPIONATE 50 MCG
1-2 SPRAY, SUSPENSION (ML) NASAL DAILY
Qty: 1 BOTTLE | Refills: 0 | Status: SHIPPED | OUTPATIENT
Start: 2018-03-20 | End: 2018-09-28

## 2018-03-20 ASSESSMENT — PAIN SCALES - GENERAL: PAINLEVEL: NO PAIN (0)

## 2018-03-20 NOTE — PROGRESS NOTES
"  SUBJECTIVE:   Raphael Lizarraga is a 53 year old male who presents to clinic today for the following health issues:      ENT Symptoms             Symptoms: cc Present Absent Comment   Fever/Chills  x     Fatigue   x    Muscle Aches   x    Eye Irritation   x    Sneezing  x     Nasal Devaughn/Drg  x  First symptom, started 2 weeks ago   Sinus Pressure/Pain  x     Loss of smell   x    Dental pain   x    Sore Throat  x  Onset 3/19   Swollen Glands    x    Ear Pain/Fullness   x    Cough  x     Chest pain   x    Wheeze   x    Shortness of breath   x    Rash   x    Other         Symptom duration:  2 weeks   Symptom severity:  moderate   Treatments tried:  Advil   Contacts:         Medications updated and reviewed.  Past, family and surgical history is updated and reviewed in the record.    ROS:  Other than noted above, general, HEENT, respiratory, cardiac and gastrointestinal systems are negative.    This document serves as a record of the services and decisions personally performed and made by Dr. Belcher. It was created on his behalf by Jackie Brewer, a trained medical scribe. The creation of this document is based the provider's statements to the medical scribe.  Jackie Brewer March 20, 2018 9:26 AM     OBJECTIVE:                                                    /69 (BP Location: Left arm, Patient Position: Chair, Cuff Size: Adult Regular)  Pulse 75  Temp 97  F (36.1  C) (Oral)  Ht 1.727 m (5' 8\")  Wt 66.2 kg (146 lb)  SpO2 100%  BMI 22.2 kg/m2   Body mass index is 22.2 kg/(m^2).     GENERAL APPEARANCE ADULT: Alert, no acute distress  EYES: PERRL, EOM normal, conjunctiva and lids normal  HENT: right TM normal, left TM normal, nose clear rhinorrhea, mucosal edema, septum deviated to the left, frontal sinus tenderness no, maxillary sinus tenderness no, throat/mouth:normal, mucous membranes moist  NECK: Enlarged anterior cervical nodes, tender on the left  RESP: lungs clear to auscultation   CV: normal rate, " regular rhythm, no murmur or gallop  MS: extremities normal, no peripheral edema  SKIN: no suspicious lesions or rashes  NEURO: Alert, oriented, speech and mentation normal, Cranial nerves 2-12 are normal.  PSYCH: mentation appears normal., affect and mood normal    Diagnostic Test Results:  none      ASSESSMENT/PLAN:                                                      (R09.81) Sinus congestion  (primary encounter diagnosis)  (J34.89) Nasal drainage  Comment: Sinusitis versus spring allergies. I will treat empirically for both.  Plan: amoxicillin (AMOXIL) 500 MG capsule,         fluticasone (FLONASE) 50 MCG/ACT spray        Return in about 10 days (around 3/30/2018) for recheck if symptoms fail to resolve by then.       He is reminded to schedule a colonoscopy, re-establish with a neurologist, and to follow up with me in late July to recheck depression and asthma.    The information in this document, created by the medical scribe for me, accurately reflects the services I personally performed and the decisions made by me. I have reviewed and approved this document for accuracy prior to leaving the patient care area. March 20, 2018 9:26 AM   Chava Belcher MD

## 2018-03-20 NOTE — PATIENT INSTRUCTIONS
At [facility], we strive to deliver an exceptional experience to you, every time we see you.  If you receive a survey in the mail, please send us back your thoughts. We really do value your feedback.    Based on your medical history, these are the current health maintenance/preventive care services that you are due for (some may have been done at this visit.)  There are no preventive care reminders to display for this patient.      Suggested websites for health information:  Www.[email or web address] : Up to date and easily searchable information on multiple topics.  Www.medlineplus.gov : medication info, interactive tutorials, watch real surgeries online  Www.familydoctor.org : good info from the Academy of Family Physicians  Www.cdc.gov : public health info, travel advisories, epidemics (H1N1)  Www.aap.org : children's health info, normal development, vaccinations  Www.health.Count includes the [facility].[place].us : [place] dept of health, public health issues in [place], N1N1    Your care team:                            Family Medicine Internal Medicine   MD Jeff Juárez MD Shantel Branch-Fleming, MD Katya Georgiev PA-C Megan Hill, APRN CNP    Carmelo Pang MD Pediatrics   Sameer Urrutia, PAJAMES Donato, CNP Virginie Foote APRN CNP   MD Randa Bailey MD Deborah Mielke, MD Kim Thein, APRN [facility]      Clinic hours: Monday - Thursday 7 am-7 pm; Fridays 7 am-5 pm.   Urgent care: Monday - Friday 11 am-9 pm; Saturday and Sunday 9 am-5 pm.  Pharmacy : Monday -Thursday 8 am-8 pm; Friday 8 am-6 pm; Saturday and Sunday 9 am-5 pm.     Clinic: [phone]   Pharmacy: [phone]      Follow up around 7/26/18 for recheck depression and asthma.     Please call the Specialty Scheduling Line [phone] or [facility] [phone] to schedule your colonoscopy.

## 2018-04-07 DIAGNOSIS — F33.0 MAJOR DEPRESSIVE DISORDER, RECURRENT EPISODE, MILD (H): ICD-10-CM

## 2018-04-07 DIAGNOSIS — F41.9 ANXIETY: ICD-10-CM

## 2018-04-07 NOTE — LETTER
April 13, 2018      Raphael Lizarraga  [address]  [place] [address]        Dear Raphael Lizarraga,    The refill request made by your pharmacy was received at the clinic.     Signed Prescriptions:                        Disp   Refills    buPROPion (WELLBUTRIN SR) 100 MG 12 hr tab*90 tab*0        Sig: Take 1 tablet (100 mg) by mouth every morning for           depression & anxiety prevention.  Authorizing Provider: ANCELMO BLANK      At this time you are due in the clinic for a follow up appointment in July. A one time maritza refill has been sent to your pharmacy.     Please call your clinic to make an appointment with your provider before you run out of medication. This will prevent a delay in your next month's refill.    [facility] [phone]    For your convenience we also offer online appointment scheduling at [email or web address].     We invite you to refill your next prescription at a [facility] or take advantage of our prescription mail service.      Sincerely,    [facility] with Dr. Blank

## 2018-04-07 NOTE — TELEPHONE ENCOUNTER
"Requested Prescriptions   Pending Prescriptions Disp Refills     buPROPion (WELLBUTRIN SR) 100 MG 12 hr tablet [Pharmacy Med Name: BUPROPION SR 100MG TABLETS (12H)] 90 tablet 0    Last Written Prescription Date:  1/26/18  Last Fill Quantity: 90,  # refills: 1   Last Office Visit with [facility], P or [facility] prescribing provider:  3/20/2018     Future Office Visit:      Sig: TAKE 1 TABLET(100 MG) BY MOUTH EVERY MORNING FOR DEPRESSION OR ANXIETY PREVENTION    SSRIs Protocol Failed    4/7/2018  3:53 AM       Failed - PHQ-9 score less than 5 in past 6 months    Please review last PHQ-9 score.          Passed - Medication is Bupropion    If the medication is Bupropion (Wellbutrin), and the patient is taking for smoking cessation; OK to refill.         Passed - Patient is age 18 or older       Passed - Recent (6 mo) or future (30 days) visit within the authorizing provider's specialty    Patient had office visit in the last 6 months or has a visit in the next 30 days with authorizing provider or within the authorizing provider's specialty.  See \"Patient Info\" tab in inbasket, or \"Choose Columns\" in Meds & Orders section of the refill encounter.              "

## 2018-04-11 NOTE — TELEPHONE ENCOUNTER
PHQ-9 SCORE 7/7/2017 1/5/2018 1/26/2018   Total Score - - -   Total Score 2 16 6     Routing refill request to provider for review/approval because:  PHQ-9 is over 5.    Yovani Rea RN, BSN

## 2018-04-12 RX ORDER — BUPROPION HYDROCHLORIDE 100 MG/1
100 TABLET, EXTENDED RELEASE ORAL EVERY MORNING
Qty: 90 TABLET | Refills: 0 | Status: SHIPPED | OUTPATIENT
Start: 2018-04-12 | End: 2018-08-03

## 2018-04-13 NOTE — TELEPHONE ENCOUNTER
Refill letter is mailed to patient's home address to call our appointment line to schedule an appointment for a follow up in July and patient's medication has been refilled to patient's pharmacy.  Robert Iniguez,  For [facility]

## 2018-05-04 ENCOUNTER — SURGERY (OUTPATIENT)
Age: 53
End: 2018-05-04

## 2018-05-04 ENCOUNTER — HOSPITAL ENCOUNTER (OUTPATIENT)
Facility: AMBULATORY SURGERY CENTER | Age: 53
Discharge: HOME | End: 2018-05-04
Attending: INTERNAL MEDICINE | Admitting: INTERNAL MEDICINE
Payer: COMMERCIAL

## 2018-05-04 VITALS
DIASTOLIC BLOOD PRESSURE: 71 MMHG | OXYGEN SATURATION: 97 % | SYSTOLIC BLOOD PRESSURE: 119 MMHG | TEMPERATURE: 98.2 F | RESPIRATION RATE: 18 BRPM

## 2018-05-04 LAB — COLONOSCOPY: NORMAL

## 2018-05-04 PROCEDURE — G8918 PT W/O PREOP ORDER IV AB PRO: HCPCS

## 2018-05-04 PROCEDURE — G8907 PT DOC NO EVENTS ON DISCHARG: HCPCS

## 2018-05-04 PROCEDURE — 45378 DIAGNOSTIC COLONOSCOPY: CPT

## 2018-05-04 RX ORDER — ONDANSETRON 2 MG/ML
4 INJECTION INTRAMUSCULAR; INTRAVENOUS
Status: DISCONTINUED | OUTPATIENT
Start: 2018-05-04 | End: 2018-05-05 | Stop reason: HOSPADM

## 2018-05-04 RX ORDER — FENTANYL CITRATE 50 UG/ML
INJECTION, SOLUTION INTRAMUSCULAR; INTRAVENOUS PRN
Status: DISCONTINUED | OUTPATIENT
Start: 2018-05-04 | End: 2018-05-04 | Stop reason: HOSPADM

## 2018-05-04 RX ORDER — LIDOCAINE 40 MG/G
CREAM TOPICAL
Status: DISCONTINUED | OUTPATIENT
Start: 2018-05-04 | End: 2018-05-05 | Stop reason: HOSPADM

## 2018-05-04 RX ADMIN — FENTANYL CITRATE 50 MCG: 50 INJECTION, SOLUTION INTRAMUSCULAR; INTRAVENOUS at 08:46

## 2018-05-04 RX ADMIN — FENTANYL CITRATE 50 MCG: 50 INJECTION, SOLUTION INTRAMUSCULAR; INTRAVENOUS at 08:54

## 2018-05-04 RX ADMIN — FENTANYL CITRATE 100 MCG: 50 INJECTION, SOLUTION INTRAMUSCULAR; INTRAVENOUS at 08:41

## 2018-05-30 ENCOUNTER — TELEPHONE (OUTPATIENT)
Dept: FAMILY MEDICINE | Facility: CLINIC | Age: 53
End: 2018-05-30

## 2018-05-31 NOTE — TELEPHONE ENCOUNTER
Form is completed and signed and  will be deliver to the  this afternoon for pickup after 1pm.  Robert Iniguez,  [facility]    Please call patient  to let them know the above info.  And remind the person who is picking up to bring their photo ID.  Robert Iniguez,  [facility]     NOTE: If patient/gaurdian calls the clinic before the care teams gets a chance to call them, please let pt know the above information regarding pickup times, remind them to bring a photo ID and close the encounter.  Robert Iniguez,  For [facility]    FYI:  Anything completed after 2:00p will not be delivered until the next business day after 11a.   Robert Iniguez,  for [facility].

## 2018-05-31 NOTE — TELEPHONE ENCOUNTER
The original is put in our outgoing mail bin and a copy is put at the  for patient to pickup.  Robert Iniguez,  [facility]

## 2018-05-31 NOTE — TELEPHONE ENCOUNTER
Called left msg for patient regarding my notes below.  Robert Iniguez,  For [facility]

## 2018-07-20 DIAGNOSIS — F33.0 MAJOR DEPRESSIVE DISORDER, RECURRENT EPISODE, MILD (H): ICD-10-CM

## 2018-07-20 DIAGNOSIS — F41.9 ANXIETY: ICD-10-CM

## 2018-07-20 NOTE — TELEPHONE ENCOUNTER
"Requested Prescriptions   Pending Prescriptions Disp Refills     PARoxetine (PAXIL) 20 MG tablet [Pharmacy Med Name: PAROXETINE 20MG TABLETS] 90 tablet 0    Last Written Prescription Date:  1/26/18  Last Fill Quantity: 90,  # refills: 1   Last Office Visit with [facility], [facility] or [facility] prescribing provider:  3/20/2018     Future Office Visit:      Sig: TAKE 1 TABLET(20 MG) BY MOUTH AT BEDTIME FOR DEPRESSION OR ANXIETY PREVENTION    SSRIs Protocol Failed    7/20/2018  4:44 PM       Failed - PHQ-9 score less than 5 in past 6 months    Please review last PHQ-9 score.          Passed - Patient is age 18 or older       Passed - Recent (6 mo) or future (30 days) visit within the authorizing provider's specialty    Patient had office visit in the last 6 months or has a visit in the next 30 days with authorizing provider or within the authorizing provider's specialty.  See \"Patient Info\" tab in inbasket, or \"Choose Columns\" in Meds & Orders section of the refill encounter.              "

## 2018-07-24 NOTE — TELEPHONE ENCOUNTER
Routing refill request to provider for review/approval because:  Last PHQ9 was greater than 4  Nely Elias RN

## 2018-07-26 RX ORDER — PAROXETINE 20 MG/1
20 TABLET, FILM COATED ORAL AT BEDTIME
Qty: 90 TABLET | Refills: 0 | Status: SHIPPED | OUTPATIENT
Start: 2018-07-26 | End: 2018-08-03

## 2018-08-03 ENCOUNTER — OFFICE VISIT (OUTPATIENT)
Dept: FAMILY MEDICINE | Facility: CLINIC | Age: 53
End: 2018-08-03
Payer: COMMERCIAL

## 2018-08-03 VITALS
WEIGHT: 142 LBS | HEART RATE: 68 BPM | OXYGEN SATURATION: 99 % | RESPIRATION RATE: 16 BRPM | DIASTOLIC BLOOD PRESSURE: 67 MMHG | HEIGHT: 68 IN | BODY MASS INDEX: 21.52 KG/M2 | SYSTOLIC BLOOD PRESSURE: 112 MMHG | TEMPERATURE: 97.8 F

## 2018-08-03 DIAGNOSIS — F33.0 MAJOR DEPRESSIVE DISORDER, RECURRENT EPISODE, MILD (H): Primary | ICD-10-CM

## 2018-08-03 DIAGNOSIS — J45.20 INTERMITTENT ASTHMA WITHOUT COMPLICATION, UNSPECIFIED ASTHMA SEVERITY: ICD-10-CM

## 2018-08-03 DIAGNOSIS — Z23 NEED FOR PROPHYLACTIC VACCINATION WITH TETANUS-DIPHTHERIA (TD): ICD-10-CM

## 2018-08-03 DIAGNOSIS — F41.9 ANXIETY: ICD-10-CM

## 2018-08-03 DIAGNOSIS — Z23 NEED FOR SHINGLES VACCINE: ICD-10-CM

## 2018-08-03 PROCEDURE — 90750 HZV VACC RECOMBINANT IM: CPT | Performed by: FAMILY MEDICINE

## 2018-08-03 PROCEDURE — 90472 IMMUNIZATION ADMIN EACH ADD: CPT | Performed by: FAMILY MEDICINE

## 2018-08-03 PROCEDURE — 90714 TD VACC NO PRESV 7 YRS+ IM: CPT | Performed by: FAMILY MEDICINE

## 2018-08-03 PROCEDURE — 90471 IMMUNIZATION ADMIN: CPT | Performed by: FAMILY MEDICINE

## 2018-08-03 PROCEDURE — 99214 OFFICE O/P EST MOD 30 MIN: CPT | Mod: 25 | Performed by: FAMILY MEDICINE

## 2018-08-03 RX ORDER — CHOLECALCIFEROL (VITAMIN D3) 50 MCG
2000 TABLET ORAL DAILY
COMMUNITY
Start: 2018-08-03 | End: 2020-07-20

## 2018-08-03 RX ORDER — PAROXETINE 20 MG/1
20 TABLET, FILM COATED ORAL AT BEDTIME
Qty: 90 TABLET | Refills: 0 | Status: SHIPPED | OUTPATIENT
Start: 2018-08-03 | End: 2019-01-16

## 2018-08-03 RX ORDER — BUPROPION HYDROCHLORIDE 100 MG/1
100 TABLET, EXTENDED RELEASE ORAL EVERY MORNING
Qty: 90 TABLET | Refills: 1 | Status: SHIPPED | OUTPATIENT
Start: 2018-08-03 | End: 2019-02-22

## 2018-08-03 ASSESSMENT — PATIENT HEALTH QUESTIONNAIRE - PHQ9: 5. POOR APPETITE OR OVEREATING: NOT AT ALL

## 2018-08-03 ASSESSMENT — ANXIETY QUESTIONNAIRES
1. FEELING NERVOUS, ANXIOUS, OR ON EDGE: NOT AT ALL
5. BEING SO RESTLESS THAT IT IS HARD TO SIT STILL: NOT AT ALL
IF YOU CHECKED OFF ANY PROBLEMS ON THIS QUESTIONNAIRE, HOW DIFFICULT HAVE THESE PROBLEMS MADE IT FOR YOU TO DO YOUR WORK, TAKE CARE OF THINGS AT HOME, OR GET ALONG WITH OTHER PEOPLE: NOT DIFFICULT AT ALL
2. NOT BEING ABLE TO STOP OR CONTROL WORRYING: NOT AT ALL
3. WORRYING TOO MUCH ABOUT DIFFERENT THINGS: NOT AT ALL
6. BECOMING EASILY ANNOYED OR IRRITABLE: SEVERAL DAYS
GAD7 TOTAL SCORE: 1
7. FEELING AFRAID AS IF SOMETHING AWFUL MIGHT HAPPEN: NOT AT ALL

## 2018-08-03 ASSESSMENT — PAIN SCALES - GENERAL: PAINLEVEL: NO PAIN (0)

## 2018-08-03 NOTE — PROGRESS NOTES
"  SUBJECTIVE:   Raphael Lizarraga is a 53 year old male who presents to clinic today for the following health issues:      Depression and Anxiety Follow-Up    Status since last visit: stable    Other associated symptoms:None    Complicating factors:     Significant life event: No     Current substance abuse: None    PHQ-9 1/5/2018 1/26/2018 8/3/2018   Total Score 16 6 4   Q9: Suicide Ideation Several days Not at all Not at all     JOSE-7 SCORE 9/23/2016 1/26/2018 8/3/2018   Total Score - - -   Total Score 2 2 1         PHQ-9  English  PHQ-9   Any Language  JOSE-7  Suicide Assessment Five-step Evaluation and Treatment (SAFE-T)  Asthma Follow-Up    Was ACT completed today?    Yes    ACT Total Scores 8/3/2018   ACT TOTAL SCORE -   ASTHMA ER VISITS -   ASTHMA HOSPITALIZATIONS -   ACT TOTAL SCORE (Goal Greater than or Equal to 20) 25   In the past 12 months, how many times did you visit the emergency room for your asthma without being admitted to the hospital? 0   In the past 12 months, how many times were you hospitalized overnight because of your asthma? 0       Recent asthma triggers that patient is dealing with: None        Amount of exercise or physical activity: None    Problems taking medications regularly: No    Medication side effects: none    Diet: regular (no restrictions)      Past medical, family, and social histories, medications, and allergies are reviewed and updated in Epic.       ROS:  Constitutional, HEENT, cardiovascular, pulmonary, gi and gu systems are negative, except as otherwise noted.    OBJECTIVE:     /67 (BP Location: Left arm, Patient Position: Chair, Cuff Size: Adult Regular)  Pulse 68  Temp 97.8  F (36.6  C) (Oral)  Resp 16  Ht 5' 8\" (1.727 m)  Wt 142 lb (64.4 kg)  SpO2 99%  BMI 21.59 kg/m2  Body mass index is 21.59 kg/(m^2).  GENERAL: healthy, alert and no distress  EYES: Eyes grossly normal to inspection, PERRL, EOMI, sclerae white and conjunctivae normal  RESP: lungs clear to " auscultation - no crackles or wheezes, no areas of dullness, no tachypnea  CV: Heart regular rate and rhythm without murmur, click or rub. No peripheral edema and peripheral pulses strong  MS: no gross musculoskeletal defects noted, no edema  SKIN: no suspicious lesions or rashes  NEURO: Normal strength and tone, sensory exam grossly normal, mentation intact, oriented times 3 and cranial nerves 2-12 intact  PSYCH: mentation appears normal, affect normal/bright     Diagnostic Test Results:  none     ASSESSMENT/PLAN:   ASSESSMENT/PLAN:  (F33.0) Major depressive disorder, recurrent episode, mild (H)  (primary encounter diagnosis)  Comment: well controlled, and pt has no concerns about dose or side effects  Plan: PARoxetine (PAXIL) 20 MG tablet, buPROPion         (WELLBUTRIN SR) 100 MG 12 hr tablet        Continue current regimen. F/u @ [facility] ~1/29/19    (F41.9) Anxiety  Comment: likelwise  Plan: PARoxetine (PAXIL) 20 MG tablet, buPROPion         (WELLBUTRIN SR) 100 MG 12 hr tablet        See above    (J45.20) Intermittent asthma without complication, unspecified asthma severity  Comment: well controlled  Plan: pt declines albuterol refill since he rarely uses it    (Z23) Need for prophylactic vaccination with tetanus-diphtheria (TD)  Comment:   Plan: TD (ADULT, 7+) PRESERVE FREE, EA ADD'L VACCINE          (Z23) Need for shingles vaccine  Comment:   Plan: HC ZOSTER VACCINE RECOMBINANT ADJUVANTED IM         NJX, ADMIN 1st VACCINE        He can get second dose during flu shot season or at his next f/u with me     Chava Belcher MD

## 2018-08-03 NOTE — PATIENT INSTRUCTIONS
At [facility], we strive to deliver an exceptional experience to you, every time we see you.  If you receive a survey in the mail, please send us back your thoughts. We really do value your feedback.    Based on your medical history, these are the current health maintenance/preventive care services that you are due for (some may have been done at this visit.)  Health Maintenance Due   Topic Date Due     HIV SCREEN (SYSTEM ASSIGNED)  01/25/1985     EYE EXAM Q1 YEAR  04/21/2018     TETANUS IMMUNIZATION (SYSTEM ASSIGNED)  08/01/2018     ASTHMA CONTROL TEST Q6 MOS  07/26/2018     PHQ-9 Q6 MONTHS  07/26/2018         Suggested websites for health information:  Www.HackerRank.org : Up to date and easily searchable information on multiple topics.  Www.medlineplus.gov : medication info, interactive tutorials, watch real surgeries online  Www.familydoctor.org : good info from the Academy of Family Physicians  Www.cdc.gov : public health info, travel advisories, epidemics (H1N1)  Www.aap.org : children's health info, normal development, vaccinations  Www.health.[facility].[place].us : [place] dept of health, public health issues in [place], N1N1    Your care team:                            Family Medicine Internal Medicine   MD Jeff Juárez MD Shantel Branch-Fleming, MD Katya Georgiev PA-C Nam Ho, MD Pediatrics   EULALIA Kitchen, MD Randa Savage CNP, MD Deborah Mielke, MD Kim Thein, APRN [facility]      Clinic hours: Monday - Thursday 7 am-7 pm; Fridays 7 am-5 pm.   Urgent care: Monday - Friday 11 am-9 pm; Saturday and Sunday 9 am-5 pm.  Pharmacy : Monday -Thursday 8 am-8 pm; Friday 8 am-6 pm; Saturday and Sunday 9 am-5 pm.     Clinic: [phone]   Pharmacy: [phone]

## 2018-08-03 NOTE — MR AVS SNAPSHOT
After Visit Summary   8/3/2018    Raphael Lizarraga    MRN: [id number]           Patient Information     Date Of Birth          1965        Visit Information        Provider Department      8/3/2018 4:40 PM Chava Belcher MD [facility]        Today's Diagnoses     Major depressive disorder, recurrent episode, mild (H)    -  1    Anxiety        Intermittent asthma without complication, unspecified asthma severity        Need for prophylactic vaccination with tetanus-diphtheria (TD)        Need for shingles vaccine          Care Instructions    At [facility], we strive to deliver an exceptional experience to you, every time we see you.  If you receive a survey in the mail, please send us back your thoughts. We really do value your feedback.    Based on your medical history, these are the current health maintenance/preventive care services that you are due for (some may have been done at this visit.)  Health Maintenance Due   Topic Date Due     HIV SCREEN (SYSTEM ASSIGNED)  01/25/1985     EYE EXAM Q1 YEAR  04/21/2018     TETANUS IMMUNIZATION (SYSTEM ASSIGNED)  08/01/2018     ASTHMA CONTROL TEST Q6 MOS  07/26/2018     PHQ-9 Q6 MONTHS  07/26/2018         Suggested websites for health information:  Www.Super Ele[employer]Tec.3yy game platform : Up to date and easily searchable information on multiple topics.  Www.medlineplus.gov : medication info, interactive tutorials, watch real surgeries online  Www.familydoctor.org : good info from the Academy of Family Physicians  Www.cdc.gov : public health info, travel advisories, epidemics (H1N1)  Www.aap.org : children's health info, normal development, vaccinations  Www.health.state[place]us : [place] dept of health, public health issues in [place], N1N1    Your care team:                            Family Medicine Internal Medicine   MD Jeff Juárez MD Shantel Branch-Fleming, MD Katya Georgiev PA-C Nam Ho, MD Pediatrics   Sameer Urrutia,  EULALIA Donato, MD Randa Savage CNP, MD Deborah Mielke, MD Kim Thein, APRN CNP      Clinic hours: Monday - Thursday 7 am-7 pm; Fridays 7 am-5 pm.   Urgent care: Monday - Friday 11 am-9 pm; Saturday and Sunday 9 am-5 pm.  Pharmacy : Monday -Thursday 8 am-8 pm; Friday 8 am-6 pm; Saturday and Sunday 9 am-5 pm.     Clinic: [phone]   Pharmacy: [phone]            Follow-ups after your visit        Follow-up notes from your care team     Return in about 6 months (around 1/21/2019) for full physical, recheck medications.      Who to contact     If you have questions or need follow up information about today's clinic visit or your schedule please contact [facility] directly at [phone].  Normal or non-critical lab and imaging results will be communicated to you by MyChart, letter or phone within 4 business days after the clinic has received the results. If you do not hear from us within 7 days, please contact the clinic through Phanfarehart or phone. If you have a critical or abnormal lab result, we will notify you by phone as soon as possible.  Submit refill requests through [employer] or call your pharmacy and they will forward the refill request to us. Please allow 3 business days for your refill to be completed.          Additional Information About Your Visit        Phanfarehart Information     [employer] gives you secure access to your electronic health record. If you see a primary care provider, you can also send messages to your care team and make appointments. If you have questions, please call your primary care clinic.  If you do not have a primary care provider, please call [phone] and they will assist you.        Care EveryWhere ID     This is your Care EveryWhere ID. This could be used by other organizations to access your [place] medical records  [id number]        Your Vitals Were     Pulse Temperature Respirations Height  "Pulse Oximetry BMI (Body Mass Index)    68 97.8  F (36.6  C) (Oral) 16 5' 8\" (1.727 m) 99% 21.59 kg/m2       Blood Pressure from Last 3 Encounters:   08/03/18 112/67   05/04/18 119/71   03/20/18 121/69    Weight from Last 3 Encounters:   08/03/18 142 lb (64.4 kg)   03/20/18 146 lb (66.2 kg)   01/26/18 145 lb 6.4 oz (66 kg)              We Performed the Following     HC ZOSTER VACCINE RECOMBINANT ADJUVANTED IM NJX     TD (ADULT, 7+) PRESERVE FREE          Where to get your medicines      These medications were sent to [facility] [address] [facility] [address] [facility] AT [facility]  [address] [facility] [address]    Hours:  24-hours Phone:  [phone]     buPROPion 100 MG 12 hr tablet    PARoxetine 20 MG tablet          Primary Care Provider Office Phone # Fax #    Chava Belcher [phone]       [address]  [facility] [address]        Equal Access to Services     CHRISTEN PABLO AH: Hadii jesus ku hadasho Soomaali, waaxda luqadaha, qaybta kaalmada adeegyada, waxay deseanin hayanastacia costello. So [facility] [phone].    ATENCIÓN: Si habla español, tiene a stern disposición servicios gratuitos de asistencia lingüística. Llame al [phone].    We comply with applicable federal civil rights laws and [place] laws. We do not discriminate on the basis of race, color, national origin, age, disability, sex, sexual orientation, or gender identity.            Thank you!     Thank you for choosing [facility]  for your care. Our goal is always to provide you with excellent care. Hearing back from our patients is one way we can continue to improve our services. Please take a few minutes to complete the written survey that you may receive in the mail after your visit with us. Thank you!             Your Updated Medication List - Protect others around you: Learn how to safely use, store and throw away your medicines at " www.disposemymeds.org.          This list is accurate as of 8/3/18  5:15 PM.  Always use your most recent med list.                   Brand Name Dispense Instructions for use Diagnosis    albuterol 108 (90 Base) MCG/ACT Inhaler    PROAIR HFA/PROVENTIL HFA/VENTOLIN HFA    1 Inhaler    Inhale 2 puffs into the lungs every 4 hours as needed (for asthma symptoms)    Intermittent asthma without complication, unspecified asthma severity       buPROPion 100 MG 12 hr tablet    WELLBUTRIN SR    90 tablet    Take 1 tablet (100 mg) by mouth every morning for depression & anxiety prevention.    Major depressive disorder, recurrent episode, mild (H), Anxiety       fluticasone 50 MCG/ACT spray    FLONASE    1 Bottle    Spray 1-2 sprays into both nostrils daily for allergy prevention.    Nasal drainage, Sinus congestion       IBUPROFEN PO           PARoxetine 20 MG tablet    PAXIL    90 tablet    Take 1 tablet (20 mg) by mouth At Bedtime for depression & anxiety prevention.    Major depressive disorder, recurrent episode, mild (H), Anxiety       phenytoin 100 MG CR capsule    DILANTIN    90 capsule    1 tablet morning & 2 in the evening    Localization-related partial epilepsy with complex partial seizures (H)       phenytoin 50 MG chewable tablet    DILANTIN    31 tablet    Take 1 tablet (50 mg) by mouth daily (with breakfast)    Localization-related partial epilepsy with complex partial seizures (H)       vitamin D 2000 units tablet      Take 2,000 Units by mouth daily

## 2018-08-03 NOTE — NURSING NOTE
Screening Questionnaire for Adult Immunization    Are you sick today?   No   Do you have allergies to medications, food, a vaccine component or latex?   No   Have you ever had a serious reaction after receiving a vaccination?   No   Do you have a long-term health problem with heart disease, lung disease, asthma, kidney disease, metabolic disease (e.g. diabetes), anemia, or other blood disorder?   No   Do you have cancer, leukemia, HIV/AIDS, or any other immune system problem?   No   In the past 3 months, have you taken medications that affect  your immune system, such as prednisone, other steroids, or anticancer drugs; drugs for the treatment of rheumatoid arthritis, Crohn s disease, or psoriasis; or have you had radiation treatments?   No   Have you had a seizure, or a brain or other nervous system problem?   No   During the past year, have you received a transfusion of blood or blood     products, or been given immune (gamma) globulin or antiviral drug?   No   For women: Are you pregnant or is there a chance you could become        pregnant during the next month?   No   Have you received any vaccinations in the past 4 weeks?   No     Immunization questionnaire answers were all negative.        Per orders of Dr. Belcher, injection of td and shingrix given by Kaitlin Coelho. Patient instructed to remain in clinic for 15 minutes afterwards, and to report any adverse reaction to me immediately.       Screening performed by Kaitlin Coelho on 8/3/2018 at 5:23 PM.

## 2018-08-04 ASSESSMENT — PATIENT HEALTH QUESTIONNAIRE - PHQ9: SUM OF ALL RESPONSES TO PHQ QUESTIONS 1-9: 4

## 2018-08-04 ASSESSMENT — ANXIETY QUESTIONNAIRES: GAD7 TOTAL SCORE: 1

## 2018-08-04 ASSESSMENT — ASTHMA QUESTIONNAIRES: ACT_TOTALSCORE: 25

## 2018-08-17 DIAGNOSIS — G40.209 LOCALIZATION-RELATED PARTIAL EPILEPSY WITH COMPLEX PARTIAL SEIZURES (H): ICD-10-CM

## 2018-08-20 NOTE — TELEPHONE ENCOUNTER
Routing refill request to provider for review/approval because:  Protocol failed    Yovani Rea RN, BSN

## 2018-08-21 RX ORDER — PHENYTOIN SODIUM 100 MG/1
CAPSULE, EXTENDED RELEASE ORAL
Qty: 90 CAPSULE | Refills: 1 | Status: SHIPPED | OUTPATIENT
Start: 2018-08-21 | End: 2018-09-28

## 2018-08-21 NOTE — TELEPHONE ENCOUNTER
This writer attempted to contact Raphael on 08/21/18      Reason for call medication request and left message with female person on the phone his [place] clinic called. Will try him again tomorrow.      If patient calls back:   Patient contacted by 1st floor [facility] Care Team (MA/TC). Inform patient that someone from the team will contact them, document that pt called and route to care team.         Vitaly Galicia

## 2018-09-26 ENCOUNTER — PRE VISIT (OUTPATIENT)
Dept: NEUROLOGY | Facility: CLINIC | Age: 53
End: 2018-09-26

## 2018-09-26 NOTE — TELEPHONE ENCOUNTER
[facility] CLINICAL DOCUMENTATION    Pre-Visit Planning   PREVISIT INFORMATION                                                    Raphael Lizarraga scheduled for future visit at [facility] specialty clinics.    Patient is scheduled to see Dominguez (provider) on 9/28/18 (date)  Reason for visit: Seizures  Referring provider Simi  Has patient seen previous specialist? Yes.  Name of provider Kylee, Clinic/Facility [facility].   Medical Records:  Available in chart.  Patient was previously seen at a [facility] or [facility] facility.    REVIEW                                                      New patient packet mailed to patient: Yes  Medication reconciliation complete: no      Current Outpatient Prescriptions   Medication Sig Dispense Refill     albuterol (PROAIR HFA/PROVENTIL HFA/VENTOLIN HFA) 108 (90 BASE) MCG/ACT Inhaler Inhale 2 puffs into the lungs every 4 hours as needed (for asthma symptoms) 1 Inhaler 1     buPROPion (WELLBUTRIN SR) 100 MG 12 hr tablet Take 1 tablet (100 mg) by mouth every morning for depression & anxiety prevention. 90 tablet 1     Cholecalciferol (VITAMIN D) 2000 units tablet Take 2,000 Units by mouth daily       fluticasone (FLONASE) 50 MCG/ACT spray Spray 1-2 sprays into both nostrils daily for allergy prevention. (Patient not taking: Reported on 8/3/2018) 1 Bottle 0     IBUPROFEN PO        PARoxetine (PAXIL) 20 MG tablet Take 1 tablet (20 mg) by mouth At Bedtime for depression & anxiety prevention. 90 tablet 0     phenytoin (DILANTIN) 100 MG CR capsule Take 1 tablet every morning & 2 tablets every evening for seizure prevention. 90 capsule 1     phenytoin (DILANTIN) 50 MG chewable tablet Take 1 tablet (50 mg) by mouth daily (with breakfast) 31 tablet 11       Allergies: Review of patient's allergies indicates no known allergies.    (insert provider dot-phrase for provider specific visit requirements)    PLAN/FOLLOW-UP NEEDED                                                       Previsit review complete.  Patient will see provider at future scheduled appointment.     Patient Reminders Given:  Please, make sure you bring an updated list of your medications.   If you are having a procedure, please, present 15 minutes early.  If you need to cancel or reschedule,please call [phone].    Darla Severin-Brown

## 2018-09-28 ENCOUNTER — OFFICE VISIT (OUTPATIENT)
Dept: NEUROLOGY | Facility: CLINIC | Age: 53
End: 2018-09-28
Attending: FAMILY MEDICINE
Payer: COMMERCIAL

## 2018-09-28 VITALS
HEART RATE: 57 BPM | BODY MASS INDEX: 22.32 KG/M2 | WEIGHT: 142.2 LBS | DIASTOLIC BLOOD PRESSURE: 68 MMHG | HEIGHT: 67 IN | SYSTOLIC BLOOD PRESSURE: 128 MMHG | OXYGEN SATURATION: 100 %

## 2018-09-28 DIAGNOSIS — G40.209 LOCALIZATION-RELATED PARTIAL EPILEPSY WITH COMPLEX PARTIAL SEIZURES (H): ICD-10-CM

## 2018-09-28 LAB — PHENYTOIN SERPL-MCNC: 9.8 MG/L (ref 10–20)

## 2018-09-28 PROCEDURE — 80186 ASSAY OF PHENYTOIN FREE: CPT | Mod: 90 | Performed by: PSYCHIATRY & NEUROLOGY

## 2018-09-28 PROCEDURE — 82306 VITAMIN D 25 HYDROXY: CPT | Performed by: PSYCHIATRY & NEUROLOGY

## 2018-09-28 PROCEDURE — 36415 COLL VENOUS BLD VENIPUNCTURE: CPT | Performed by: PSYCHIATRY & NEUROLOGY

## 2018-09-28 PROCEDURE — 99215 OFFICE O/P EST HI 40 MIN: CPT | Performed by: PSYCHIATRY & NEUROLOGY

## 2018-09-28 PROCEDURE — 80185 ASSAY OF PHENYTOIN TOTAL: CPT | Performed by: PSYCHIATRY & NEUROLOGY

## 2018-09-28 PROCEDURE — 99000 SPECIMEN HANDLING OFFICE-LAB: CPT | Performed by: PSYCHIATRY & NEUROLOGY

## 2018-09-28 RX ORDER — PHENYTOIN 50 MG/1
50 TABLET, CHEWABLE ORAL
Qty: 93 TABLET | Refills: 3 | Status: SHIPPED | OUTPATIENT
Start: 2018-09-28 | End: 2018-11-09 | Stop reason: DRUGHIGH

## 2018-09-28 RX ORDER — PHENYTOIN SODIUM 100 MG/1
CAPSULE, EXTENDED RELEASE ORAL
Qty: 270 CAPSULE | Refills: 3 | Status: SHIPPED | OUTPATIENT
Start: 2018-09-28 | End: 2018-11-09

## 2018-09-28 ASSESSMENT — PAIN SCALES - GENERAL: PAINLEVEL: NO PAIN (0)

## 2018-09-28 NOTE — NURSING NOTE
"Raphael Lizarraga's goals for this visit include: consult  He requests these members of his care team be copied on today's visit information:     PCP: Chava Belcher    Referring Provider:  Chava Belcher MD  [address] [place] [address]    /68  Pulse 57  Ht 1.702 m (5' 7\")  Wt 64.5 kg (142 lb 3.2 oz)  SpO2 100%  BMI 22.27 kg/m2    Do you need any medication refills at today's visit? y  "

## 2018-09-28 NOTE — MR AVS SNAPSHOT
After Visit Summary   9/28/2018    Raphael Lizarraga    MRN: [id number]           Patient Information     Date Of Birth          1965        Visit Information        Provider Department      9/28/2018 9:00 AM Ml Armstrong MD [facility]        Today's Diagnoses     Localization-related partial epilepsy with complex partial seizures (H)           Follow-ups after your visit        Follow-up notes from your care team     Return in about 6 months (around 3/28/2019).      Your next 10 appointments already scheduled     Mar 29, 2019  4:00 PM CDT   Return Visit with Ml Armstrong MD   [facility]    [address] [place] [address] [phone]              Who to contact     If you have questions or need follow up information about today's clinic visit or your schedule please contact [facility] directly at [phone].  Normal or non-critical lab and imaging results will be communicated to you by Cleverbughart, letter or phone within 4 business days after the clinic has received the results. If you do not hear from us within 7 days, please contact the clinic through Mappyfriendst or phone. If you have a critical or abnormal lab result, we will notify you by phone as soon as possible.  Submit refill requests through nanoRETE or call your pharmacy and they will forward the refill request to us. Please allow 3 business days for your refill to be completed.          Additional Information About Your Visit        Cleverbughart Information     [employer] gives you secure access to your electronic health record. If you see a primary care provider, you can also send messages to your care team and make appointments. If you have questions, please call your primary care clinic.  If you do not have a primary care provider, please call [phone] and they will assist you.      nanoRETE is an electronic gateway that provides  "easy, online access to your medical records. With Clickpass, you can request a clinic appointment, read your test results, renew a prescription or communicate with your care team.     To access your existing account, please contact your [facility] or call [phone] for assistance.        Care EveryWhere ID     This is your Care EveryWhere ID. This could be used by other organizations to access your [facility] medical records  [id number]        Your Vitals Were     Pulse Height Pulse Oximetry BMI (Body Mass Index)          57 1.702 m (5' 7\") 100% 22.27 kg/m2         Blood Pressure from Last 3 Encounters:   09/28/18 128/68   08/03/18 112/67   05/04/18 119/71    Weight from Last 3 Encounters:   09/28/18 64.5 kg (142 lb 3.2 oz)   08/03/18 64.4 kg (142 lb)   03/20/18 66.2 kg (146 lb)              We Performed the Following     Phenytoin free     Phenytoin level     Vitamin D Deficiency          Where to get your medicines      These medications were sent to [facility] [address] [place] [address] [address] [facility] AT [facility]  [address] [facility] [address]    Hours:  24-hours Phone:  [phone]     phenytoin 100 MG CR capsule    phenytoin 50 MG chewable tablet          Primary Care Provider Office Phone # Fax #    Chava Belcher [phone]       [address]  [place] [address]        Equal Access to Services     BRYAN [facility] HospitalJANNY : Hadii aad ku hadasho Soomaali, waaxda luqadaha, qaybta kaalmada adeegyanolan, kemal costello. So [facility] [phone].    ATENCIÓN: Si habla español, tiene a stern disposición servicios gratuitos de asistencia lingüística. Llame al [phone].    We comply with applicable federal civil rights laws and [place] laws. We do not discriminate on the basis of race, color, national origin, age, disability, sex, sexual orientation, or gender identity.            Thank you!     " Thank you for choosing [facility]  for your care. Our goal is always to provide you with excellent care. Hearing back from our patients is one way we can continue to improve our services. Please take a few minutes to complete the written survey that you may receive in the mail after your visit with us. Thank you!             Your Updated Medication List - Protect others around you: Learn how to safely use, store and throw away your medicines at www.disposemymeds.org.          This list is accurate as of 9/28/18  9:56 AM.  Always use your most recent med list.                   Brand Name Dispense Instructions for use Diagnosis    albuterol 108 (90 Base) MCG/ACT inhaler    PROAIR HFA/PROVENTIL HFA/VENTOLIN HFA    1 Inhaler    Inhale 2 puffs into the lungs every 4 hours as needed (for asthma symptoms)    Intermittent asthma without complication, unspecified asthma severity       buPROPion 100 MG 12 hr tablet    WELLBUTRIN SR    90 tablet    Take 1 tablet (100 mg) by mouth every morning for depression & anxiety prevention.    Major depressive disorder, recurrent episode, mild (H), Anxiety       IBUPROFEN PO      Take 400-800 mg by mouth as needed        PARoxetine 20 MG tablet    PAXIL    90 tablet    Take 1 tablet (20 mg) by mouth At Bedtime for depression & anxiety prevention.    Major depressive disorder, recurrent episode, mild (H), Anxiety       phenytoin 100 MG CR capsule    DILANTIN    270 capsule    Take 1 tablet every morning & 2 tablets every evening for seizure prevention.    Localization-related partial epilepsy with complex partial seizures (H)       phenytoin 50 MG chewable tablet    DILANTIN    93 tablet    Take 1 tablet (50 mg) by mouth daily (with breakfast)    Localization-related partial epilepsy with complex partial seizures (H)       vitamin D 2000 units tablet      Take 2,000 Units by mouth daily

## 2018-09-28 NOTE — PROGRESS NOTES
NEUROLOGY HISTORY AND PHYSICAL  [facility]     Patient:  Raphael Lizarraga  :  1965   Age:  53 year old   Today's Office Visit:  2018    Referring Provider:    Chava Belcher MD  [address] [place] [address]    History of Present Illness: Patient is a 53-year-old right-handed male with history of epilepsy, status post left anterior temporal lobectomy in .  The patient does not recall much about his seizures since he did not have a seizure for a long time.  His seizures started at age of 1-1/2 years old.  He does not recall details about his seizures.  He has been seeing a neurologist at [place] [facility] [place], [place]. Last visit with them was in .  After he had surgery with left temporal lobectomy in  he was seizure-free for about a year and then his medications aware tapered, and then he had another seizure and medication was restarted.  He has been on Dilantin since his surgery.  He does not recall other medications that he has tried in the past.  He recalls most of his seizures before surgery were happening during sleep; was told that his limbs were shaking, had tongue biting and his teeth are grinded due to seizures.  He does not recall the frequency of his seizures.  Does not remember when his last seizure was.  He also saw Dr. Pichardo couple times recently and according to Dr. Pichardo's note, on 2017, his last seizure was in 2002.      Epilepsy Risk Factors: He denies history of meningitis, encephalitis, or family history of epilepsy.  He is not sure if he had febrile seizures or not.  He says he was hit by a swing and stopped breathing at the age of 1-1/2 years old, but was not taken to the hospital.    Precipitating factors:   Lack of sleep  Past Medical History:   Diagnosis Date     Anxiety      Diverticulosis      Epilepsy (H)      Intermittent asthma      Major depressive disorder, recurrent episode, unspecified 2009     Never saw psychiatrist     NELIA (obstructive sleep apnea)     Uses CPAP machine     Osteoporosis/osteopenia increased risk     due to long-term Dilantin use      Past Surgical History:   Procedure Laterality Date     C LAP,PARTIAL NEPHRECTOMY  08    LT for benign mass, Dr. Donald     COLONOSCOPY WITH CO2 INSUFFLATION N/A 2018    Procedure: COLONOSCOPY WITH CO2 INSUFFLATION;  Colonoscopy, Dr. Belcher, Screen for colon cancer, [place] 22.11[facility];  Surgeon: Duane, William Charles, MD;  Location: [facility] OR     LOBECTOMY      LT temporal for epilepsy     VASECTOMY       Family History   Problem Relation Age of Onset     Diabetes Father      Hypertension Father      Cerebrovascular Disease Paternal Grandfather      Asthma No family hx of      C.A.D. No family hx of      Breast Cancer No family hx of      Cancer - colorectal No family hx of      Prostate Cancer No family hx of      Anesthesia Reaction No family hx of      Thrombophilia No family hx of      Bleeding Disorder No family hx of      Glaucoma No family hx of      Macular Degeneration No family hx of      Retinal detachment No family hx of       Social History     Social History     Marital status:      Spouse name: Rashad     Number of children: 1     Years of education: 12     Occupational History      Other     2 years     Social History Main Topics     Smoking status: Former Smoker     Quit date: 1993     Smokeless tobacco: Never Used     Alcohol use Yes      Comment: 4 cans of beer on the weekend     Drug use: No     Sexual activity: Yes     Partners: Female     Other Topics Concern     None     Social History Narrative      Employment/School:  [employer]  Driving:  Currently patient is:  Driving  Previous Evaluations for Epilepsy:  EE2001showed left temporal slowing, no epileptiform discharges.   MRI of Brain: Not available in the chart    Review of Systems:  Lethargy / Tiredness:   No  Nausea / Vomiting:  No  Double Vision:  No  Sleepiness:  No  Depression:  No  Slowed Cognitive Function:  No  Memory Problems:  No  Poor Balance:  No  Dizziness:  No  Appetite Changes:  No  Blurred Vision:  No  Sleep Changes: has sleep apnea  Behavioral Changes:  No  Skin: negative  Respiratory: No shortness of breath and No cough  Cardiovascular: negative  Have you experienced a traumatic fall related to your events: No    Current Outpatient Prescriptions   Medication Sig Dispense Refill     albuterol (PROAIR HFA/PROVENTIL HFA/VENTOLIN HFA) 108 (90 BASE) MCG/ACT Inhaler Inhale 2 puffs into the lungs every 4 hours as needed (for asthma symptoms) 1 Inhaler 1     buPROPion (WELLBUTRIN SR) 100 MG 12 hr tablet Take 1 tablet (100 mg) by mouth every morning for depression & anxiety prevention. 90 tablet 1     Cholecalciferol (VITAMIN D) 2000 units tablet Take 2,000 Units by mouth daily       IBUPROFEN PO Take 400-800 mg by mouth as needed        PARoxetine (PAXIL) 20 MG tablet Take 1 tablet (20 mg) by mouth At Bedtime for depression & anxiety prevention. 90 tablet 0     phenytoin (DILANTIN) 100 MG CR capsule Take 1 tablet every morning & 2 tablets every evening for seizure prevention. 270 capsule 3     phenytoin (DILANTIN) 50 MG chewable tablet Take 1 tablet (50 mg) by mouth daily (with breakfast) 93 tablet 3     [DISCONTINUED] phenytoin (DILANTIN) 100 MG CR capsule Take 1 tablet every morning & 2 tablets every evening for seizure prevention. 90 capsule 1     [DISCONTINUED] phenytoin (DILANTIN) 50 MG chewable tablet Take 1 tablet (50 mg) by mouth daily (with breakfast) 31 tablet 11     Perceived AED Side Effects: No known side effects    Medication Notes:   AED Medication Compliance:  compliant most of the time    Past AEDs:  AED - ANTIEPILEPTIC DRUGS 8/21/2018   phenytoin (Oral) Take 1 tablet every morning & 2 tablets every evening for seizure prevention. (100 MG CAPS)   phenytoin (Oral) 50 MG Daily with breakfast  "      Exam:    /68  Pulse 57  Ht 1.702 m (5' 7\")  Wt 64.5 kg (142 lb 3.2 oz)  SpO2 100%  BMI 22.27 kg/m2     Wt Readings from Last 5 Encounters:   09/28/18 64.5 kg (142 lb 3.2 oz)   08/03/18 64.4 kg (142 lb)   03/20/18 66.2 kg (146 lb)   01/26/18 66 kg (145 lb 6.4 oz)   09/08/17 65 kg (143 lb 6.4 oz)     General Appearance: Alert, awake, cooperative, pleasant, NAD  Gait: steady, tandem gait: trouble performing tandem gait  Attention Span:  Normal  Language/speech: no aphasia or dysarthria  Cranial nerves: Pupils are round and reactive to light, extraocular movements are intact, facial sensation intact to light touch, face is symmetric, shrug shoulders normal, hearing is intact. Visual fields: right upper quadrantanopia.   Coordination:  Normal FNF  Motor Exam: normal tone, bulk and strength 5/5 bilaterally  Limb Sensation:  Normal  DTRs: 2+ symmetric, toes downgoing    Assessment and Plan:    Focal epilepsy, status post left anterior temporal lobectomy in 1993.  Seizures have been controlled for at least the past 15 years.  Patient has been on Dilantin at least since his surgery.  I discussed long-term side effects from Dilantin including osteoporosis, imbalance, gum disease and liver disease and suggested switching to a newer medications with less side effects.  The patient is not comfortable doing this as Dilantin has been working for him and when he came off of it he had seizures.  He is not sure if new medication control his seizures.  I advised him to take vitamin D, in fact he had a history of vitamin D deficiency and is taking vitamin D 2000 international units daily.  I advised him to have a good dental hygiene to avoid gum disease.  Since we do not have an EEG and MRI in our system and it has been several years since his last testings, I suggested to do an EEG however the patient was not inclined to do it due to finances.    Anxiety and depression: the patient is taking Wellbutrin.  I advised him " regarding decreased seizure threshold on Wellbutrin.  This is prescribed by his primary care physician and he has been taking it for the past 5 years.  Will discuss this with his primary care physician.  He is also taking paroxetine for his depression and anxiety.  He is not getting any psychotherapy.    - Continue phenytoin 150-200 mg/day  - Obtain phenytoin level for efficacy and compliance   - Obtain vitamin D level  - RTC in 6 months        This note was completed using dragon software.  As described above, I met with the patient for 45 minutes and during this time counseling was greater than 50% of the visit time.  Ml Armstrong MD

## 2018-09-28 NOTE — LETTER
2018         RE: Raphael Lizarraga  [address]  [place] [address]        Dear Colleague,    Thank you for referring your patient, Raphael Lizarraga, to the [facility]. Please see a copy of my visit note below.    NEUROLOGY HISTORY AND PHYSICAL  [facility]     Patient:  Raphael Lizarraga  :  1965   Age:  53 year old   Today's Office Visit:  2018    Referring Provider:    MD Maureen Kay  [facility] [place] [address]    History of Present Illness: Patient is a 53-year-old right-handed male with history of epilepsy, status post left anterior temporal lobectomy in .  The patient does not recall much about his seizures since he did not have a seizure for a long time.  His seizures started at age of 1-1/2 years old.  He does not recall details about his seizures.  He has been seeing a neurologist at [place] [email or web address] [place], [place]. Last visit with them was in .  After he had surgery with left temporal lobectomy in  he was seizure-free for about a year and then his medications aware tapered, and then he had another seizure and medication was restarted.  He has been on Dilantin since his surgery.  He does not recall other medications that he has tried in the past.  He recalls most of his seizures before surgery were happening during sleep; was told that his limbs were shaking, had tongue biting and his teeth are grinded due to seizures.  He does not recall the frequency of his seizures.  Does not remember when his last seizure was.  He also saw Dr. Pichardo couple times recently and according to Dr. Pichardo's note, on 2017, his last seizure was in 2002.      Epilepsy Risk Factors: He denies history of meningitis, encephalitis, or family history of epilepsy.  He is not sure if he had febrile seizures or not.  He says he was hit by a swing and stopped breathing at the age of 1-1/2 years old, but was not taken to the  [facility].    Precipitating factors:   Lack of sleep  Past Medical History:   Diagnosis Date     Anxiety      Diverticulosis      Epilepsy (H)      Intermittent asthma      Major depressive disorder, recurrent episode, unspecified 03/09/2009    Never saw psychiatrist     NELIA (obstructive sleep apnea)     Uses CPAP machine     Osteoporosis/osteopenia increased risk     due to long-term Dilantin use      Past Surgical History:   Procedure Laterality Date     C LAP,PARTIAL NEPHRECTOMY  8/4/08    LT for benign mass, Dr. Donald     COLONOSCOPY WITH CO2 INSUFFLATION N/A 5/4/2018    Procedure: COLONOSCOPY WITH CO2 INSUFFLATION;  Colonoscopy, Dr. Belcher, Screen for colon cancer, BMI 22.11, [facility] [address];  Surgeon: Duane, William Charles, MD;  Location: [facility] OR     LOBECTOMY  1994    LT temporal for epilepsy     VASECTOMY       Family History   Problem Relation Age of Onset     Diabetes Father      Hypertension Father      Cerebrovascular Disease Paternal Grandfather      Asthma No family hx of      C.A.D. No family hx of      Breast Cancer No family hx of      Cancer - colorectal No family hx of      Prostate Cancer No family hx of      Anesthesia Reaction No family hx of      Thrombophilia No family hx of      Bleeding Disorder No family hx of      Glaucoma No family hx of      Macular Degeneration No family hx of      Retinal detachment No family hx of       Social History     Social History     Marital status:      Spouse name: Rashad     Number of children: 1     Years of education: 12     Occupational History      Other     2 years     Social History Main Topics     Smoking status: Former Smoker     Quit date: 8/25/1993     Smokeless tobacco: Never Used     Alcohol use Yes      Comment: 4 cans of beer on the weekend     Drug use: No     Sexual activity: Yes     Partners: Female     Other Topics Concern     None     Social History Narrative      Employment/School:  [employer]  Driving:  Currently patient is:  Driving  Previous Evaluations for Epilepsy:  EE2001showed left temporal slowing, no epileptiform discharges.   MRI of Brain: Not available in the chart    Review of Systems:  Lethargy / Tiredness:  No  Nausea / Vomiting:  No  Double Vision:  No  Sleepiness:  No  Depression:  No  Slowed Cognitive Function:  No  Memory Problems:  No  Poor Balance:  No  Dizziness:  No  Appetite Changes:  No  Blurred Vision:  No  Sleep Changes: has sleep apnea  Behavioral Changes:  No  Skin: negative  Respiratory: No shortness of breath and No cough  Cardiovascular: negative  Have you experienced a traumatic fall related to your events: No    Current Outpatient Prescriptions   Medication Sig Dispense Refill     albuterol (PROAIR HFA/PROVENTIL HFA/VENTOLIN HFA) 108 (90 BASE) MCG/ACT Inhaler Inhale 2 puffs into the lungs every 4 hours as needed (for asthma symptoms) 1 Inhaler 1     buPROPion (WELLBUTRIN SR) 100 MG 12 hr tablet Take 1 tablet (100 mg) by mouth every morning for depression & anxiety prevention. 90 tablet 1     Cholecalciferol (VITAMIN D) 2000 units tablet Take 2,000 Units by mouth daily       IBUPROFEN PO Take 400-800 mg by mouth as needed        PARoxetine (PAXIL) 20 MG tablet Take 1 tablet (20 mg) by mouth At Bedtime for depression & anxiety prevention. 90 tablet 0     phenytoin (DILANTIN) 100 MG CR capsule Take 1 tablet every morning & 2 tablets every evening for seizure prevention. 270 capsule 3     phenytoin (DILANTIN) 50 MG chewable tablet Take 1 tablet (50 mg) by mouth daily (with breakfast) 93 tablet 3     [DISCONTINUED] phenytoin (DILANTIN) 100 MG CR capsule Take 1 tablet every morning & 2 tablets every evening for seizure prevention. 90 capsule 1     [DISCONTINUED] phenytoin (DILANTIN) 50 MG chewable tablet Take 1 tablet (50 mg) by mouth daily (with breakfast) 31 tablet 11     Perceived AED Side Effects: No known side effects    Medication Notes:   AED Medication  "Compliance:  compliant most of the time    Past AEDs:  AED - ANTIEPILEPTIC DRUGS 8/21/2018   phenytoin (Oral) Take 1 tablet every morning & 2 tablets every evening for seizure prevention. (100 MG CAPS)   phenytoin (Oral) 50 MG Daily with breakfast       Exam:    /68  Pulse 57  Ht 1.702 m (5' 7\")  Wt 64.5 kg (142 lb 3.2 oz)  SpO2 100%  BMI 22.27 kg/m2     Wt Readings from Last 5 Encounters:   09/28/18 64.5 kg (142 lb 3.2 oz)   08/03/18 64.4 kg (142 lb)   03/20/18 66.2 kg (146 lb)   01/26/18 66 kg (145 lb 6.4 oz)   09/08/17 65 kg (143 lb 6.4 oz)     General Appearance: Alert, awake, cooperative, pleasant, NAD  Gait: steady, tandem gait: trouble performing tandem gait  Attention Span:  Normal  Language/speech: no aphasia or dysarthria  Cranial nerves: Pupils are round and reactive to light, extraocular movements are intact, facial sensation intact to light touch, face is symmetric, shrug shoulders normal, hearing is intact. Visual fields: right upper quadrantanopia.   Coordination:  Normal FNF  Motor Exam: normal tone, bulk and strength 5/5 bilaterally  Limb Sensation:  Normal  DTRs: 2+ symmetric, toes downgoing    Assessment and Plan:    Focal epilepsy, status post left anterior temporal lobectomy in 1993.  Seizures have been controlled for at least the past 15 years.  Patient has been on Dilantin at least since his surgery.  I discussed long-term side effects from Dilantin including osteoporosis, imbalance, gum disease and liver disease and suggested switching to a newer medications with less side effects.  The patient is not comfortable doing this as Dilantin has been working for him and when he came off of it he had seizures.  He is not sure if new medication control his seizures.  I advised him to take vitamin D, in fact he had a history of vitamin D deficiency and is taking vitamin D 2000 international units daily.  I advised him to have a good dental hygiene to avoid gum disease.  Since we do not have " an EEG and MRI in our system and it has been several years since his last testings, I suggested to do an EEG however the patient was not inclined to do it due to finances.    Anxiety and depression: the patient is taking Wellbutrin.  I advised him regarding decreased seizure threshold on Wellbutrin.  This is prescribed by his primary care physician and he has been taking it for the past 5 years.  Will discuss this with his primary care physician.  He is also taking paroxetine for his depression and anxiety.  He is not getting any psychotherapy.    - Continue phenytoin 150-200 mg/day  - Obtain phenytoin level for efficacy and compliance   - Obtain vitamin D level  - RTC in 6 months        This note was completed using dragon software.  As described above, I met with the patient for 45 minutes and during this time counseling was greater than 50% of the visit time.  Ml Armstrong MD        Again, thank you for allowing me to participate in the care of your patient.        Sincerely,        Ml Armstrong MD

## 2018-09-29 LAB
DEPRECATED CALCIDIOL+CALCIFEROL SERPL-MC: 55 UG/L (ref 20–75)
PHENYTOIN FREE SERPL-MCNC: 0.55 UG/ML

## 2018-11-09 DIAGNOSIS — G40.209 LOCALIZATION-RELATED PARTIAL EPILEPSY WITH COMPLEX PARTIAL SEIZURES (H): ICD-10-CM

## 2018-11-09 RX ORDER — PHENYTOIN SODIUM 100 MG/1
CAPSULE, EXTENDED RELEASE ORAL
Qty: 360 CAPSULE | Refills: 3 | Status: SHIPPED | OUTPATIENT
Start: 2018-11-09 | End: 2019-09-12

## 2019-01-16 DIAGNOSIS — F33.0 MAJOR DEPRESSIVE DISORDER, RECURRENT EPISODE, MILD (H): ICD-10-CM

## 2019-01-16 DIAGNOSIS — F41.9 ANXIETY: ICD-10-CM

## 2019-01-16 NOTE — LETTER
February 8, 2019      Raphael Lizarraga  [address]  [place] [facility] [address]        Dear Raphael Lizarraga,    The refill request made by your pharmacy was received at the clinic.     Signed Prescriptions:                        Disp   Refills    PARoxetine (PAXIL) 20 MG tablet            90 tab*0        Sig: TAKE 1 TABLET(20 MG) BY MOUTH AT BEDTIME FOR           DEPRESSION OR ANXIETY PREVENTION  Authorizing Provider: ANCELMO BLANK      At this time you are due in the clinic for a follow up appointment. A one time maritza refill has been sent to your pharmacy.     Please call your clinic to make an appointment with your provider before you run out of medication. This will prevent a delay in your next month's refill.    [facility] [phone]    For your convenience we also offer online appointment scheduling at [email or web address] or [email or web address].     We invite you to refill your next prescription at a [facility] or take advantage of our prescription mail service.      Sincerely,    [facility] with Dr. Blank

## 2019-01-17 NOTE — TELEPHONE ENCOUNTER
"Requested Prescriptions   Pending Prescriptions Disp Refills     PARoxetine (PAXIL) 20 MG tablet [Pharmacy Med Name: PAROXETINE 20MG TABLETS] 90 tablet 0          Last Written Prescription Date:  8/3/18  Last Fill Quantity: 90,  # refills: 0   Last Office Visit with [facility], [facility] or [facility] prescribing provider:  8/3/18   Future Office Visit:    Next 5 appointments (look out 90 days)    Mar 29, 2019  4:00 PM CDT  [facility] [address] [place] [id number] [phone]          Sig: TAKE 1 TABLET(20 MG) BY MOUTH AT BEDTIME FOR DEPRESSION OR ANXIETY PREVENTION    SSRIs Protocol Passed - 1/16/2019  5:26 PM       Passed - PHQ-9 score less than 5 in past 6 months    Please review last PHQ-9 score.          Passed - Medication is active on med list       Passed - Patient is age 18 or older       Passed - Recent (6 mo) or future (30 days) visit within the authorizing provider's specialty    Patient had office visit in the last 6 months or has a visit in the next 30 days with authorizing provider or within the authorizing provider's specialty.  See \"Patient Info\" tab in inbasket, or \"Choose Columns\" in Meds & Orders section of the refill encounter.                  Sagarautumn Feldmanx  [facility]  "

## 2019-01-18 NOTE — TELEPHONE ENCOUNTER
Routing refill request to provider for review/approval because:  Break in medication  Nely Elias RN

## 2019-01-22 RX ORDER — PAROXETINE 20 MG/1
TABLET, FILM COATED ORAL
Qty: 90 TABLET | Refills: 0 | Status: SHIPPED | OUTPATIENT
Start: 2019-01-22 | End: 2019-02-22

## 2019-01-23 NOTE — TELEPHONE ENCOUNTER
This writer attempted to contact patient on 01/23/19      Reason for call schedule JULIANO  and unable to leave message.      If patient calls back:   Patient contacted by 1st floor [facility] Team (MA/TC). Inform patient that someone from the team will contact them, document that pt called and route to care team.         Kaitlin Coelho MA

## 2019-01-24 NOTE — TELEPHONE ENCOUNTER
No call back, no appointment scheduled, postponing message.  Robert Iniguez,  [facility]

## 2019-01-30 NOTE — TELEPHONE ENCOUNTER
No appointment schedule, sent my chart message to patient to call our appointment line and schedule an appointment.  Robert Iniguez,  [facility]

## 2019-02-08 NOTE — TELEPHONE ENCOUNTER
No return phone call, no appointment scheduled, maritza refill letter is mailed to patient's home address to call our appointment line and schedule an appointment.  Robert Iniguez,  [facility]

## 2019-02-22 ENCOUNTER — OFFICE VISIT (OUTPATIENT)
Dept: FAMILY MEDICINE | Facility: CLINIC | Age: 54
End: 2019-02-22
Payer: COMMERCIAL

## 2019-02-22 VITALS
DIASTOLIC BLOOD PRESSURE: 68 MMHG | HEIGHT: 67 IN | WEIGHT: 146 LBS | HEART RATE: 58 BPM | OXYGEN SATURATION: 100 % | TEMPERATURE: 98.2 F | BODY MASS INDEX: 22.91 KG/M2 | RESPIRATION RATE: 18 BRPM | SYSTOLIC BLOOD PRESSURE: 117 MMHG

## 2019-02-22 DIAGNOSIS — F33.0 MAJOR DEPRESSIVE DISORDER, RECURRENT EPISODE, MILD (H): Primary | ICD-10-CM

## 2019-02-22 DIAGNOSIS — F41.9 ANXIETY: ICD-10-CM

## 2019-02-22 DIAGNOSIS — Z11.4 SCREENING FOR HIV (HUMAN IMMUNODEFICIENCY VIRUS): ICD-10-CM

## 2019-02-22 DIAGNOSIS — G40.209 PARTIAL SYMPTOMATIC EPILEPSY WITH COMPLEX PARTIAL SEIZURES, NOT INTRACTABLE, WITHOUT STATUS EPILEPTICUS (H): ICD-10-CM

## 2019-02-22 DIAGNOSIS — Z23 NEED FOR SHINGLES VACCINE: ICD-10-CM

## 2019-02-22 PROCEDURE — 90471 IMMUNIZATION ADMIN: CPT | Performed by: FAMILY MEDICINE

## 2019-02-22 PROCEDURE — 99214 OFFICE O/P EST MOD 30 MIN: CPT | Performed by: FAMILY MEDICINE

## 2019-02-22 PROCEDURE — 90750 HZV VACC RECOMBINANT IM: CPT | Performed by: FAMILY MEDICINE

## 2019-02-22 RX ORDER — BUPROPION HYDROCHLORIDE 100 MG/1
100 TABLET, EXTENDED RELEASE ORAL EVERY MORNING
Qty: 90 TABLET | Refills: 1 | Status: SHIPPED | OUTPATIENT
Start: 2019-02-22 | End: 2019-08-25

## 2019-02-22 RX ORDER — PAROXETINE 20 MG/1
20 TABLET, FILM COATED ORAL AT BEDTIME
Qty: 90 TABLET | Refills: 0 | Status: SHIPPED | OUTPATIENT
Start: 2019-02-22 | End: 2019-06-21

## 2019-02-22 ASSESSMENT — ANXIETY QUESTIONNAIRES
2. NOT BEING ABLE TO STOP OR CONTROL WORRYING: NOT AT ALL
5. BEING SO RESTLESS THAT IT IS HARD TO SIT STILL: NOT AT ALL
IF YOU CHECKED OFF ANY PROBLEMS ON THIS QUESTIONNAIRE, HOW DIFFICULT HAVE THESE PROBLEMS MADE IT FOR YOU TO DO YOUR WORK, TAKE CARE OF THINGS AT HOME, OR GET ALONG WITH OTHER PEOPLE: NOT DIFFICULT AT ALL
7. FEELING AFRAID AS IF SOMETHING AWFUL MIGHT HAPPEN: NOT AT ALL
GAD7 TOTAL SCORE: 1
1. FEELING NERVOUS, ANXIOUS, OR ON EDGE: NOT AT ALL
3. WORRYING TOO MUCH ABOUT DIFFERENT THINGS: NOT AT ALL
6. BECOMING EASILY ANNOYED OR IRRITABLE: SEVERAL DAYS

## 2019-02-22 ASSESSMENT — PATIENT HEALTH QUESTIONNAIRE - PHQ9
5. POOR APPETITE OR OVEREATING: NOT AT ALL
SUM OF ALL RESPONSES TO PHQ QUESTIONS 1-9: 3

## 2019-02-22 ASSESSMENT — MIFFLIN-ST. JEOR: SCORE: 1470.88

## 2019-02-22 ASSESSMENT — PAIN SCALES - GENERAL: PAINLEVEL: NO PAIN (0)

## 2019-02-22 NOTE — PROGRESS NOTES
SUBJECTIVE:   Raphael Lizarraga is a 54 year old male who presents to clinic today for the following health issues:      Depression and Anxiety Follow-Up    Status since last visit: Stable    Other associated symptoms:None    Complicating factors:     Significant life event: No     Current substance abuse: None    PHQ 1/5/2018 1/26/2018 8/3/2018   PHQ-9 Total Score 16 6 4   Q9: Suicide Ideation Several days Not at all Not at all     JOSE-7 SCORE 9/23/2016 1/26/2018 8/3/2018   Total Score - - -   Total Score 2 2 1       PHQ-9  English  PHQ-9   Any Language  JOSE-7  Suicide Assessment Five-step Evaluation and Treatment (SAFE-T)  Asthma Follow-Up    Was ACT completed today?    Yes    ACT Total Scores 8/3/2018   ACT TOTAL SCORE -   ASTHMA ER VISITS -   ASTHMA HOSPITALIZATIONS -   ACT TOTAL SCORE (Goal Greater than or Equal to 20) 25   In the past 12 months, how many times did you visit the emergency room for your asthma without being admitted to the hospital? 0   In the past 12 months, how many times were you hospitalized overnight because of your asthma? 0       Recent asthma triggers that patient is dealing with: None        Amount of exercise or physical activity: None    Problems taking medications regularly: No    Medication side effects: none    Diet: regular (no restrictions)    Additional Information   Patient received flu vaccine through work a few months ago he says. Second shingrix vaccine discussed with patient today. ACT=24, PHQ-9=3, JOSE-7=1    ROS:  Constitutional, HEENT, cardiovascular, pulmonary, gi and gu systems are negative, except as otherwise noted.    This document serves as a record of the services and decisions personally performed and made by Chava Belcher MD . It was created on his behalf by Pop Rosa, a trained medical scribe. The creation of this document is based on the provider's statements to the medical scribe.  Pop Rosa 8:08 AM February 22, 2019    OBJECTIVE:     /68 (BP  "Location: Left arm, Patient Position: Chair, Cuff Size: Adult Regular)   Pulse 58   Temp 98.2  F (36.8  C) (Oral)   Resp 18   Ht 1.702 m (5' 7\")   Wt 66.2 kg (146 lb)   SpO2 100%   BMI 22.87 kg/m    Body mass index is 22.87 kg/m .  GENERAL: healthy, alert and no distress  EYES: Eyes grossly normal to inspection, PERRL, EOMI, sclerae white and conjunctivae normal  RESP: lungs clear to auscultation - no crackles or wheezes, no areas of dullness, no tachypnea  CV: Heart regular rate and rhythm without murmur, click or rub. No peripheral edema and peripheral pulses strong  MS: no gross musculoskeletal defects noted, no edema  SKIN: no suspicious lesions or rashes to visible skin   NEURO: Normal strength and tone, sensory exam grossly normal, mentation intact, oriented times 3 and cranial nerves 2-12 intact  PSYCH: mentation appears normal, affect normal/bright        ASSESSMENT/PLAN:     (F33.0) Major depressive disorder, recurrent episode, mild (H)  (primary encounter diagnosis)  (F41.9) Anxiety  Comment: stable   Plan: buPROPion (WELLBUTRIN SR) 100 MG 12 hr tablet,         PARoxetine (PAXIL) 20 MG tablet        Continue with current treatment   Return in about 6 months (around 8/22/2019) for full physical, recheck medications.     (Z11.4) Screening for HIV (human immunodeficiency virus)  Comment: indications for screening discussed with patient  Plan: HIV Antigen Antibody Combo, CANCELED: HIV         Screening         (G40.209) Partial symptomatic epilepsy with complex partial seizures, not intractable, without status epilepticus (H)  Comment:   Plan: ALT, AST, CBC with platelets,   I recommended that we have labs at next visit with his neurologist on 3/29/19.    (Z23) Need for shingles vaccine  Comment: He had no side effects to #1  Plan: HC ZOSTER VACCINE RECOMBINANT ADJUVANTED IM NJX             The information in this document, created by the medical scribe for me, accurately reflects the services I " personally performed and the decisions made by me. I have reviewed and approved this document for accuracy prior to leaving the patient care area.  Chava Belcher MD

## 2019-02-22 NOTE — PATIENT INSTRUCTIONS
================================================================================  Normal Values   Blood pressure  <140/90 for most adults    <130/80 for some chronic diseases (ask your care team about yours)    BMI (body mass index)  18.5-25 kg/m2 (based on height and weight)     Thank you for visiting [facility]    Normal or non-critical lab and imaging results will be communicated to you by MyChart, letter or phone within 7 days.  If you do not hear from us within 10 days, please call the clinic. If you have a critical or abnormal lab result, we will notify you by phone as soon as possible.     If you have any questions regarding your visit please contact:     Team Comfort:   Clinic Hours Telephone Number   Dr. Chava Pang Dr. [place] 7am-5pm  Monday - Friday [phone]  Fortino RN  Lorena RN  Lissette RN   Pharmacy 8:00am-8pm Monday-Friday    9am-5pm Saturday-Sunday [phone]   Urgent Care 11am-9pm Monday-Friday        9am-5pm Saturday-Sunday [phone]     After hours, weekend or if you need to make an appointment with your primary provider please call [phone].   After Hours nurse advise: call [facility]: [phone]    Medication Refills:  Call your pharmacy and they will forward the refill to us. Please allow 3 business days for your refills to be completed.

## 2019-02-22 NOTE — LETTER
[address] [address]  [phone]        August 27, 2019    Raphael Lizarraga  [address]  [facility] [address]              Dear Raphael Lizarraga    This is to remind you that your fasting lab is due.    You may call our office at [phone] to schedule an appointment.    Please disregard this notice if you have already had your labs drawn or made an appointment.        Sincerely,        Chava Belcher MD

## 2019-02-22 NOTE — NURSING NOTE
1.  Has the patient received the information for the Zostavax vaccine? YES    2.  Does the patient have any of the following contraindications?     Allergy to Neomycin or Gelatin? No       Current moderate or severe illness? No  Temp = 98.2  If temp > 99.0 degrees orally or patient has above allergies, vaccine is deferred    3.  The vaccine has been administered in the usual fashion and the patient was instructed to wait 15 minutes before leaving the building in the event of an allergic reaction: YES    Vaccination given by CM Quinones MA    Recorded by Gabo Quinones

## 2019-02-23 ASSESSMENT — ASTHMA QUESTIONNAIRES: ACT_TOTALSCORE: 24

## 2019-02-23 ASSESSMENT — ANXIETY QUESTIONNAIRES: GAD7 TOTAL SCORE: 1

## 2019-03-29 ENCOUNTER — OFFICE VISIT (OUTPATIENT)
Dept: NEUROLOGY | Facility: CLINIC | Age: 54
End: 2019-03-29
Payer: COMMERCIAL

## 2019-03-29 VITALS
BODY MASS INDEX: 22.21 KG/M2 | SYSTOLIC BLOOD PRESSURE: 124 MMHG | HEART RATE: 72 BPM | OXYGEN SATURATION: 99 % | DIASTOLIC BLOOD PRESSURE: 79 MMHG | WEIGHT: 141.8 LBS

## 2019-03-29 DIAGNOSIS — G40.109 FOCAL EPILEPSY (H): Primary | ICD-10-CM

## 2019-03-29 LAB — PHENYTOIN SERPL-MCNC: 13.4 MG/L (ref 10–20)

## 2019-03-29 PROCEDURE — 99213 OFFICE O/P EST LOW 20 MIN: CPT | Performed by: PSYCHIATRY & NEUROLOGY

## 2019-03-29 PROCEDURE — 80186 ASSAY OF PHENYTOIN FREE: CPT | Mod: 90 | Performed by: PSYCHIATRY & NEUROLOGY

## 2019-03-29 PROCEDURE — 36415 COLL VENOUS BLD VENIPUNCTURE: CPT | Performed by: PSYCHIATRY & NEUROLOGY

## 2019-03-29 PROCEDURE — 80185 ASSAY OF PHENYTOIN TOTAL: CPT | Performed by: PSYCHIATRY & NEUROLOGY

## 2019-03-29 PROCEDURE — 99000 SPECIMEN HANDLING OFFICE-LAB: CPT | Performed by: PSYCHIATRY & NEUROLOGY

## 2019-03-29 ASSESSMENT — PAIN SCALES - GENERAL: PAINLEVEL: NO PAIN (0)

## 2019-03-29 NOTE — LETTER
3/29/2019         RE: Rosie Lizarraga  [address]  [place] [address]        Dear Colleague,    Thank you for referring your patient, Rosie Lizarraga, to the [facility]. Please see a copy of my visit note below.     NEUROLOGY PROGRESS NOTE   [facility]    Patient:Rosie Lizarraga  : 1965  Age: 54 year old  Today's Office Visit: 2019    History of present illness:     The patient did not have any seizures since last visit. His last Dilantin level was low and it was increased to 200 mg bid from 150-200, but he wasn't aware of it, so he kept taking 150-200.      Current Outpatient Medications   Medication Sig Dispense Refill     albuterol (PROAIR HFA/PROVENTIL HFA/VENTOLIN HFA) 108 (90 BASE) MCG/ACT Inhaler Inhale 2 puffs into the lungs every 4 hours as needed (for asthma symptoms) 1 Inhaler 1     buPROPion (WELLBUTRIN SR) 100 MG 12 hr tablet Take 1 tablet (100 mg) by mouth every morning for depression & anxiety prevention. 90 tablet 1     Cholecalciferol (VITAMIN D) 2000 units tablet Take 2,000 Units by mouth daily       IBUPROFEN PO Take 400-800 mg by mouth as needed        PARoxetine (PAXIL) 20 MG tablet Take 1 tablet (20 mg) by mouth At Bedtime for depression & anxiety prevention. 90 tablet 0     phenytoin (DILANTIN) 100 MG CR capsule Take 2 tablets every morning & 2 tablets every evening for seizure prevention. 360 capsule 3     Results for ROSIE LIZARRAGA (MRN [id number]) as of 3/29/2019 16:11   Ref. Range 2018 10:10   Phenytoin Level Latest Ref Range: 10 - 20 mg/L 9.8 (L)   Phenytoin Free Latest Ref Range: 1.00 - 2.00 ug/ml 0.55 (L)       Review of Systems:    Lethargy / Tiredness:  No  Sleepiness:  No  Nausea / Vomiting:  No  Blurred Vision:  No  Double Vision:  No  Depression:  Yes  Anxiety: Yes  Slowed Cognitive Function:  No  Memory Problems:  No  Poor Balance:  No  Dizziness:  No  Appetite Changes:  No  Sleep Changes:  No  Headache: No  Behavioral Changes:   No  Rash: negative  Respiratory: No shortness of breath and No cough  Cardiovascular: negative  Have you experienced a traumatic fall related to your events: No    Exam:    /79 (BP Location: Left arm, Patient Position: Sitting, Cuff Size: Adult Regular)   Pulse 72   Wt 64.3 kg (141 lb 12.8 oz)   SpO2 99%   BMI 22.21 kg/m        Wt Readings from Last 5 Encounters:   03/29/19 64.3 kg (141 lb 12.8 oz)   02/22/19 66.2 kg (146 lb)   09/28/18 64.5 kg (142 lb 3.2 oz)   08/03/18 64.4 kg (142 lb)   03/20/18 66.2 kg (146 lb)     General Appearance: Alert, awake, cooperative, pleasant, NAD  Gait: steady, tandem gait: trouble performing tandem gait  Attention Span:  Normal  Language/speech: no aphasia or dysarthria  Cranial nerves: extraocular movements are intact, face is symmetric, hearing is intact.  Coordination:  Normal FNF  Motor Exam: normal tone, bulk and strength 5/5 bilaterally    Assessment/Plan:      Focal epilepsy, status post left anterior temporal lobectomy in 1993.  Seizures have been controlled for at least the past 15 years.  Patient has been on Dilantin at least since his surgery.   Long-term side effects of Dilantin including osteoporosis, imbalance, gum disease and liver disease have been discussed with the patient. Consideration of newer medications with less side effects was discussed.  The patient is not comfortable doing this as Dilantin has been working for him and when he came off of it he had seizures.  He is not sure if new medication control his seizures.     - Check Dilantin total/free level  - Continue Dilantin 150-200  - RTC in 1 year.          Ml Armstrong MD

## 2019-03-29 NOTE — PROGRESS NOTES
NEUROLOGY PROGRESS NOTE   [facility]    Patient:Rosie Lizarraga  : 1965  Age: 54 year old  Today's Office Visit: 2019    History of present illness:     The patient did not have any seizures since last visit. His last Dilantin level was low and it was increased to 200 mg bid from 150-200, but he wasn't aware of it, so he kept taking 150-200.      Current Outpatient Medications   Medication Sig Dispense Refill     albuterol (PROAIR HFA/PROVENTIL HFA/VENTOLIN HFA) 108 (90 BASE) MCG/ACT Inhaler Inhale 2 puffs into the lungs every 4 hours as needed (for asthma symptoms) 1 Inhaler 1     buPROPion (WELLBUTRIN SR) 100 MG 12 hr tablet Take 1 tablet (100 mg) by mouth every morning for depression & anxiety prevention. 90 tablet 1     Cholecalciferol (VITAMIN D) 2000 units tablet Take 2,000 Units by mouth daily       IBUPROFEN PO Take 400-800 mg by mouth as needed        PARoxetine (PAXIL) 20 MG tablet Take 1 tablet (20 mg) by mouth At Bedtime for depression & anxiety prevention. 90 tablet 0     phenytoin (DILANTIN) 100 MG CR capsule Take 2 tablets every morning & 2 tablets every evening for seizure prevention. 360 capsule 3     Results for ROSIE LIZARRAGA (MRN [id number]) as of 3/29/2019 16:11   Ref. Range 2018 10:10   Phenytoin Level Latest Ref Range: 10 - 20 mg/L 9.8 (L)   Phenytoin Free Latest Ref Range: 1.00 - 2.00 ug/ml 0.55 (L)       Review of Systems:    Lethargy / Tiredness:  No  Sleepiness:  No  Nausea / Vomiting:  No  Blurred Vision:  No  Double Vision:  No  Depression:  Yes  Anxiety: Yes  Slowed Cognitive Function:  No  Memory Problems:  No  Poor Balance:  No  Dizziness:  No  Appetite Changes:  No  Sleep Changes:  No  Headache: No  Behavioral Changes:  No  Rash: negative  Respiratory: No shortness of breath and No cough  Cardiovascular: negative  Have you experienced a traumatic fall related to your events: No    Exam:    /79 (BP Location: Left arm, Patient Position: Sitting, Cuff Size:  Adult Regular)   Pulse 72   Wt 64.3 kg (141 lb 12.8 oz)   SpO2 99%   BMI 22.21 kg/m       Wt Readings from Last 5 Encounters:   03/29/19 64.3 kg (141 lb 12.8 oz)   02/22/19 66.2 kg (146 lb)   09/28/18 64.5 kg (142 lb 3.2 oz)   08/03/18 64.4 kg (142 lb)   03/20/18 66.2 kg (146 lb)     General Appearance: Alert, awake, cooperative, pleasant, NAD  Gait: steady, tandem gait: trouble performing tandem gait  Attention Span:  Normal  Language/speech: no aphasia or dysarthria  Cranial nerves: extraocular movements are intact, face is symmetric, hearing is intact.  Coordination:  Normal FNF  Motor Exam: normal tone, bulk and strength 5/5 bilaterally    Assessment/Plan:      Focal epilepsy, status post left anterior temporal lobectomy in 1993.  Seizures have been controlled for at least the past 15 years.  Patient has been on Dilantin at least since his surgery.  Long-term side effects of Dilantin including osteoporosis, imbalance, gum disease and liver disease have been discussed with the patient. Consideration of newer medications with less side effects was discussed.  The patient is not comfortable doing this as Dilantin has been working for him and when he came off of it he had seizures.  He is not sure if new medication control his seizures.     - Check Dilantin total/free level  - Continue Dilantin 150-200  - RTC in 1 year.          Ml Armstrong MD

## 2019-03-29 NOTE — NURSING NOTE
Raphael Lizarraga's goals for this visit include:   Chief Complaint   Patient presents with     RECHECK     med question on the dosing of the Dilantin      He requests these members of his care team be copied on today's visit information: pcp    PCP: Chava Belcher    Referring Provider:  No referring provider defined for this encounter.    /79 (BP Location: Left arm, Patient Position: Sitting, Cuff Size: Adult Regular)   Pulse 72   Wt 64.3 kg (141 lb 12.8 oz)   SpO2 99%   BMI 22.21 kg/m      Do you need any medication refills at today's visit? yes

## 2019-04-02 LAB — PHENYTOIN FREE SERPL-MCNC: 0.83 UG/ML

## 2019-04-27 DIAGNOSIS — F41.9 ANXIETY: ICD-10-CM

## 2019-04-27 DIAGNOSIS — F33.0 MAJOR DEPRESSIVE DISORDER, RECURRENT EPISODE, MILD (H): ICD-10-CM

## 2019-04-27 NOTE — TELEPHONE ENCOUNTER
"Requested Prescriptions   Pending Prescriptions Disp Refills     PARoxetine (PAXIL) 20 MG tablet [Pharmacy Med Name: PAROXETINE 20MG TABLETS]      Last Written Prescription Date:  2/22/19  Last Fill Quantity: 90,  # refills: 0   Last Office Visit with [facility], P or [facility] prescribing provider:  2/22/19   Future Office Visit:      90 tablet 0     Sig: TAKE 1 TABLET(20 MG) BY MOUTH AT BEDTIME FOR DEPRESSION OR ANXIETY PREVENTION       SSRIs Protocol Passed - 4/27/2019  9:35 AM        Passed - PHQ-9 score less than 5 in past 6 months     Please review last PHQ-9 score.           Passed - Medication is active on med list        Passed - Patient is age 18 or older        Passed - Recent (6 mo) or future (30 days) visit within the authorizing provider's specialty     Patient had office visit in the last 6 months or has a visit in the next 30 days with authorizing provider or within the authorizing provider's specialty.  See \"Patient Info\" tab in inbasket, or \"Choose Columns\" in Meds & Orders section of the refill encounter.                  Sagar Faarax  [facility]  "

## 2019-04-29 RX ORDER — PAROXETINE 20 MG/1
TABLET, FILM COATED ORAL
Qty: 90 TABLET | Refills: 0 | Status: SHIPPED | OUTPATIENT
Start: 2019-04-29 | End: 2019-09-12

## 2019-05-31 ENCOUNTER — OFFICE VISIT (OUTPATIENT)
Dept: OPTOMETRY | Facility: CLINIC | Age: 54
End: 2019-05-31
Payer: COMMERCIAL

## 2019-05-31 DIAGNOSIS — H53.40 VISUAL FIELD DEFECT: ICD-10-CM

## 2019-05-31 DIAGNOSIS — H52.223 REGULAR ASTIGMATISM OF BOTH EYES: ICD-10-CM

## 2019-05-31 DIAGNOSIS — H25.813 COMBINED FORMS OF AGE-RELATED CATARACT OF BOTH EYES: Primary | ICD-10-CM

## 2019-05-31 DIAGNOSIS — H52.4 PRESBYOPIA: ICD-10-CM

## 2019-05-31 PROCEDURE — 92004 COMPRE OPH EXAM NEW PT 1/>: CPT | Performed by: OPTOMETRIST

## 2019-05-31 PROCEDURE — 92015 DETERMINE REFRACTIVE STATE: CPT | Performed by: OPTOMETRIST

## 2019-05-31 ASSESSMENT — EXTERNAL EXAM - LEFT EYE: OS_EXAM: BROW PTOSIS

## 2019-05-31 ASSESSMENT — VISUAL ACUITY
OS_CC: 20/30
OD_CC: 20/30
OS_SC: 20/200
OS_CC: 20/25
OD_SC: 20/150
CORRECTION_TYPE: GLASSES
METHOD: SNELLEN - LINEAR
OD_CC: 20/25-1

## 2019-05-31 ASSESSMENT — REFRACTION_WEARINGRX
OS_SPHERE: -2.75
OS_CYLINDER: +2.00
OD_AXIS: 010
OD_ADD: +2.25
OD_CYLINDER: +2.75
OD_SPHERE: -2.75
OS_ADD: +2.25
SPECS_TYPE: PAL
OS_AXIS: 160

## 2019-05-31 ASSESSMENT — REFRACTION_MANIFEST
OS_CYLINDER: +2.75
OS_SPHERE: -3.00
OD_AXIS: 010
OD_SPHERE: -2.75
OS_ADD: +2.25
OS_AXIS: 165
OD_CYLINDER: +3.00
OD_ADD: +2.25

## 2019-05-31 ASSESSMENT — CONF VISUAL FIELD
OD_SUPERIOR_TEMPORAL_RESTRICTION: 3
OS_SUPERIOR_NASAL_RESTRICTION: 3

## 2019-05-31 ASSESSMENT — CUP TO DISC RATIO
OS_RATIO: 0.2
OD_RATIO: 0.2

## 2019-05-31 ASSESSMENT — TONOMETRY
IOP_METHOD: ICARE
OS_IOP_MMHG: 14
OD_IOP_MMHG: 15

## 2019-05-31 ASSESSMENT — SLIT LAMP EXAM - LIDS
COMMENTS: MEIBOMIAN GLAND DYSFUNCTION
COMMENTS: MEIBOMIAN GLAND DYSFUNCTION

## 2019-05-31 ASSESSMENT — EXTERNAL EXAM - RIGHT EYE: OD_EXAM: BROW PTOSIS

## 2019-05-31 NOTE — PROGRESS NOTES
Chief Complaint   Patient presents with     Annual Eye Exam       History of brain surgery 1992 due to epilepsy with visual field defect both eyes.  Last Eye Exam: 4/21/2017  Dilated Previously: Yes    What are you currently using to see?  Glasses Progressive     Pt here to have his cataracts checked.  He feels his vision has declined because of cataracts in the distance and improved near vision.    Pt has a history of brain surgery 30 years ago.    Distance Vision Acuity: Noticed gradual change in both eyes    Near Vision Acuity: Satisfied with vision while reading  unaided    Eye Comfort: dry -Pt put drops in at night but made his eyes hurt in the morning when he blinks.  Lately has has not had that problem.  Do you use eye drops? : Yes: only once in a while  Occupation or Hobbies: works at [employer]            Medical, surgical and family histories reviewed and updated 5/31/2019.       OBJECTIVE: See Ophthalmology exam    ASSESSMENT:    ICD-10-CM    1. Combined forms of age-related cataract of both eyes H25.813 OPHTHALMOLOGY ADULT REFERRAL     EYE EXAM (SIMPLE-NONBILLABLE)    Questionable visually significant.   2. Visual field defect H53.40 EYE EXAM (SIMPLE-NONBILLABLE)   3. Presbyopia H52.4 REFRACTION   4. Regular astigmatism of both eyes H52.223 REFRACTION      PLAN:     Patient Instructions   Referral to Dr. Mcdowell at [facility] for cataract evaluation.  He will let you know if cataracts are ready to be removed.    Schedule visual field post cataract surgery.    Continue artificial tears 2-4 x day.    Andres Wayne, OD

## 2019-05-31 NOTE — PATIENT INSTRUCTIONS
Referral to Dr. Mcdowell at [facility] for cataract evaluation.  He will let you know if cataracts are ready to be removed.    Schedule visual field post cataract surgery.    Continue artificial tears 2-4 x day.    Andres Wayne, RILEY    The affects of the dilating drops last for 4- 6 hours.  You will be more sensitive to light and vision will be blurry up close.  Mydriatic sunglasses were given if needed.      Optometry Providers       Clinic Locations                                 Telephone Number   Dr. Verna Scott    [place]   [facility]   [place] [phone]     [facility] Hours:                [place] [facility] Hours:       [place] [facility] Hours:   [address]   [address]      [address] [place] [address]   [place] [address]    [place] [address]  Phone: [phone]                    Phone: [phone]     Phone: [phone]                      Monday 8:00-7:00                          Monday 8:00-7:00                          Monday 8:00-7:00              Tuesday 8:00-6:00                          Tuesday 8:00-7:00                          Tuesday 8:00-7:00              Wednesday 8:00-6:00                  Wednesday 8:00-7:00                   Wednesday 8:00-7:00      Thursday 8:00-6:00                        Thursday 8:00-7:00                         Thursday 8:00-7:00            Friday 8:00-5:00                              Friday 8:00-5:00                              Friday 8:00-5:00    [facility] Hours:   [address] [place] [place] [address]  [phone]    Monday 8:00-7:00  Tuesday 8:00-7:00  Wednesday 8:00-7:00  Thursday 8:00-7:00  Friday 8:00-5:00  Please log on to "[email or web address], [email or web address] to order your contact lenses.  The link is found on the [facility] Care [facility] Vision Services page.  As always, Thank you for trusting us with your health care needs!

## 2019-06-04 ENCOUNTER — TELEPHONE (OUTPATIENT)
Dept: OPHTHALMOLOGY | Facility: CLINIC | Age: 54
End: 2019-06-04

## 2019-06-04 ENCOUNTER — OFFICE VISIT (OUTPATIENT)
Dept: OPHTHALMOLOGY | Facility: CLINIC | Age: 54
End: 2019-06-04
Payer: COMMERCIAL

## 2019-06-04 DIAGNOSIS — H25.13 AGE-RELATED NUCLEAR CATARACT OF BOTH EYES: Primary | ICD-10-CM

## 2019-06-04 DIAGNOSIS — H52.223 REGULAR ASTIGMATISM OF BOTH EYES: ICD-10-CM

## 2019-06-04 PROCEDURE — 76519 ECHO EXAM OF EYE: CPT | Performed by: OPHTHALMOLOGY

## 2019-06-04 PROCEDURE — 92002 INTRM OPH EXAM NEW PATIENT: CPT | Performed by: OPHTHALMOLOGY

## 2019-06-04 ASSESSMENT — SLIT LAMP EXAM - LIDS
COMMENTS: MEIBOMIAN GLAND DYSFUNCTION
COMMENTS: MEIBOMIAN GLAND DYSFUNCTION

## 2019-06-04 ASSESSMENT — CUP TO DISC RATIO
OS_RATIO: 0.2
OD_RATIO: 0.2

## 2019-06-04 ASSESSMENT — VISUAL ACUITY
OS_CC: 20/40
OS_PH_CC: 20/30
OS_CC+: -2
OD_CC: 20/30
METHOD: SNELLEN - LINEAR
CORRECTION_TYPE: GLASSES

## 2019-06-04 ASSESSMENT — EXTERNAL EXAM - LEFT EYE: OS_EXAM: BROW PTOSIS

## 2019-06-04 ASSESSMENT — TONOMETRY
IOP_METHOD: ICARE
OD_IOP_MMHG: 13
OS_IOP_MMHG: 18

## 2019-06-04 ASSESSMENT — EXTERNAL EXAM - RIGHT EYE: OD_EXAM: BROW PTOSIS

## 2019-06-04 NOTE — PROGRESS NOTES
Assessment & Plan   Raphael Lizarraga is a 54 year old male who presents with:   Review of systems for the eyes was negative other than the pertinent positives and negatives noted in the HPI.    Age-related nuclear cataract of both eyes  - A+K's  - Schedule CE/PCIOL each eye  - Standard distance each eye  - Left eye first    Regular astigmatism of both eyes  - Discussed TORIC IOL's  - Doesn't mind glasses      Attending Physician Attestation:  Complete documentation of historical and exam elements from today's encounter can be found in the full encounter summary report (not reduplicated in this progress note).  I personally obtained the chief complaint(s) and history of present illness.  I confirmed and edited as necessary the review of systems, past medical/surgical history, family history, social history, and examination findings as documented by others; and I examined the patient myself.  I personally reviewed the relevant tests, images, and reports as documented above.  I formulated and edited as necessary the assessment and plan and discussed the findings and management plan with the patient and family. - Gutierrez Mcdowell MD

## 2019-06-04 NOTE — TELEPHONE ENCOUNTER
"Procedure: left Cataract  Facility: [facility]  Length: 10.0 minute(s)  Surgeon:Anders Mcdowell MD  Anesthesia: Local with MAC  Diagnosis: Cataract  Out Patient or AM admit:  (Same day)  BMI:Estimated body mass index is 22.21 kg/m  as calculated from the following:    Height as of 2/22/19: 1.702 m (5' 7\").    Weight as of 3/29/19: 64.3 kg (141 lb 12.8 oz). (If over 43 for general or 45 for MAC cannot be scheduled at [facility])   Pre-op Appointments needed: History & Physical within 30 days of surgery  Post-op appointments needed: Cataract 1 day 1 week   needed:No   Surgery packet/instructions given to patient?:  Yes  Pre op teaching done:  Yes  Lens Orders Needed: Yes: [id number]   Referring provider:   Special Considerations:       Procedure: right Cataract  Facility: [facility]  Length: 10.0 minute(s)  Surgeon:Anders Mcdowell MD  Anesthesia: Local with MAC  Diagnosis: Cataract  Out Patient or AM admit:  (Same day)  BMI:Estimated body mass index is 22.21 kg/m  as calculated from the following:    Height as of 2/22/19: 1.702 m (5' 7\").    Weight as of 3/29/19: 64.3 kg (141 lb 12.8 oz). (If over 43 for general or 45 for MAC cannot be scheduled at [facility])   Pre-op Appointments needed: History & Physical within 30 days of surgery  Post-op appointments needed: Cataract 1 day 1 week 4 weeks   needed:No   Surgery packet/instructions given to patient?:  Yes  Pre op teaching done:  Yes  Lens Orders Needed: Yes: [id number]   Referring provider:   Special Considerations:           "

## 2019-06-04 NOTE — TELEPHONE ENCOUNTER
Date Scheduled: 6-27 and 7-11  Facility: [facility]  Surgeon: Dr. Mcdowell   Post-op appointment scheduled:   Next 5 appointments (look out 90 days)    Jun 21, 2019  8:20 AM CDT  Pre-Op physical with Carmelo Pang MD  [facility] [address] [place] [address]  [phone]   Jun 28, 2019 12:20 PM CDT  Return Visit with Andres Wayne OD  [facility] [address] [place] [address]  [phone]   Jul 19, 2019  8:20 AM CDT  Return Visit with Andres Wayne OD  [facility] ([facility]) [address] [place] [address]  [phone]   Aug 09, 2019  8:20 AM CDT  Return Visit with Andres Wayne OD  [facility] ([facility]) [address] [place] [address]  [phone]           scheduled?: No  Surgery packet/instructions confirmed received?  Yes  Special Considerations:   May Stone, Surgery Scheduling Coordinator

## 2019-06-21 ENCOUNTER — OFFICE VISIT (OUTPATIENT)
Dept: FAMILY MEDICINE | Facility: CLINIC | Age: 54
End: 2019-06-21
Payer: COMMERCIAL

## 2019-06-21 VITALS
HEIGHT: 67 IN | SYSTOLIC BLOOD PRESSURE: 118 MMHG | RESPIRATION RATE: 12 BRPM | DIASTOLIC BLOOD PRESSURE: 71 MMHG | OXYGEN SATURATION: 100 % | BODY MASS INDEX: 22.44 KG/M2 | WEIGHT: 143 LBS | TEMPERATURE: 97.6 F | HEART RATE: 59 BPM

## 2019-06-21 DIAGNOSIS — Z01.818 PREOP GENERAL PHYSICAL EXAM: Primary | ICD-10-CM

## 2019-06-21 PROCEDURE — 99214 OFFICE O/P EST MOD 30 MIN: CPT | Performed by: FAMILY MEDICINE

## 2019-06-21 RX ORDER — PREDNISOLONE ACETATE 10 MG/ML
1 SUSPENSION/ DROPS OPHTHALMIC 4 TIMES DAILY
Qty: 1 BOTTLE | Refills: 1 | Status: SHIPPED | OUTPATIENT
Start: 2019-06-25 | End: 2019-07-19

## 2019-06-21 RX ORDER — OFLOXACIN 3 MG/ML
1 SOLUTION/ DROPS OPHTHALMIC 4 TIMES DAILY
Qty: 1 BOTTLE | Refills: 1 | Status: SHIPPED | OUTPATIENT
Start: 2019-06-25 | End: 2019-07-19

## 2019-06-21 RX ORDER — KETOROLAC TROMETHAMINE 5 MG/ML
1 SOLUTION OPHTHALMIC 4 TIMES DAILY
Qty: 1 BOTTLE | Refills: 1 | Status: SHIPPED | OUTPATIENT
Start: 2019-06-25 | End: 2019-07-19

## 2019-06-21 ASSESSMENT — MIFFLIN-ST. JEOR: SCORE: 1457.27

## 2019-06-21 ASSESSMENT — PAIN SCALES - GENERAL: PAINLEVEL: NO PAIN (0)

## 2019-06-21 NOTE — PATIENT INSTRUCTIONS
Before Your Surgery      Call your surgeon if there is any change in your health. This includes signs of a cold or flu (such as a sore throat, runny nose, cough, rash or fever).    Do not smoke, drink alcohol or take over the counter medicine (unless your surgeon or primary care doctor tells you to) for the 24 hours before and after surgery.    If you take prescribed drugs: Follow your doctor s orders about which medicines to take and which to stop until after surgery.    Eating and drinking prior to surgery: follow the instructions from your surgeon    Take a shower or bath the night before surgery. Use the soap your surgeon gave you to gently clean your skin. If you do not have soap from your surgeon, use your regular soap. Do not shave or scrub the surgery site.  Wear clean pajamas and have clean sheets on your bed.     At [facility], we strive to deliver an exceptional experience to you, every time we see you.  If you receive a survey in the mail, please send us back your thoughts. We really do value your feedback.    Based on your medical history, these are the current health maintenance/preventive care services that you are due for (some may have been done at this visit.)  Health Maintenance Due   Topic Date Due     PREVENTIVE CARE VISIT  1965     HIV SCREENING  01/25/1982     ASTHMA ACTION PLAN  01/26/2019     LIPID  04/24/2019         Suggested websites for health information:  Www.[place].org : Up to date and easily searchable information on multiple topics.  Www.medlineplus.gov : medication info, interactive tutorials, watch real surgeries online  Www.familydoctor.org : good info from the Academy of Family Physicians  Www.cdc.gov : public health info, travel advisories, epidemics (H1N1)  Www.aap.org : children's health info, normal development, vaccinations  Www.health.state.[place].us : [place] dept of health, public health issues in [place], N1N1    Your care team:                             Family Medicine Internal Medicine   MD Jeff Juárez MD Shantel Branch-Fleming, MD Katya Georgiev PA-C Nam Ho, MD Pediatrics   EULALIA Kitchen, MD Randa Savage CNP, MD Deborah Mielke, MD Kim Thein, APRN [facility]      Clinic hours: Monday - Thursday 7 am-7 pm; Fridays 7 am-5 pm.   Urgent care: Monday - Friday 11 am-9 pm; Saturday and Sunday 9 am-5 pm.  Pharmacy : Monday -Thursday 8 am-8 pm; Friday 8 am-6 pm; Saturday and Sunday 9 am-5 pm.     Clinic: [phone]   Pharmacy: [phone]

## 2019-06-21 NOTE — TELEPHONE ENCOUNTER
IOL calcs and orders emailed to [facility]. Medication orders sent to Sofiya Walker.  Phone call to pt, discussed drop use, starting drops in the left eye on 6/25, in preparation for cataract surgery on 6/27.  Pt verbalized understanding.  Nella Stephen RN

## 2019-06-21 NOTE — PROGRESS NOTES
[address] [address]  [phone]  Dept: [phone]    PRE-OP EVALUATION:  Today's date: 2019    Raphael Lizarraga (: 1965) presents for pre-operative evaluation assessment as requested by Dr. Mcdowell.  He requires evaluation and anesthesia risk assessment prior to undergoing surgery/procedure for treatment of cataracts.    Proposed Surgery/ Procedure:PHACOEMULSIFICATION, CATARACT, WITH STANDARD IOL INSERTION    Date of Surgery/ Procedure: 19 and 19  Time of Surgery/ Procedure:   [facility]  Fax number for surgical facility: does not have  Primary Physician: Chava Belcher  Type of Anesthesia Anticipated: Local with MAC    Patient has a Health Care Directive or Living Will:  YES     1. NO - Do you have a history of heart attack, stroke, stent, bypass or surgery on an artery in the head, neck, heart or legs?  2. NO - Do you ever have any pain or discomfort in your chest?  3. NO - Do you have a history of  Heart Failure?  4. NO - Are you troubled by shortness of breath when: walking on the level, up a slight hill or at night?  5. NO - Do you currently have a cold, bronchitis or other respiratory infection?  6. NO - Do you have a cough, shortness of breath or wheezing?  7. NO - Do you sometimes get pains in the calves of your legs when you walk?  8. NO - Do you or anyone in your family have previous history of blood clots?  9. NO - Do you or does anyone in your family have a serious bleeding problem such as prolonged bleeding following surgeries or cuts?  10. NO - Have you ever had problems with anemia or been told to take iron pills?  11. NO - Have you had any abnormal blood loss such as black, tarry or bloody stools, or abnormal vaginal bleeding?  12. NO - Have you ever had a blood transfusion?  13. NO - Have you or any of your relatives ever had problems with anesthesia?  14. YES - DO YOU  HAVE SLEEP APNEA, EXCESSIVE SNORING OR DAYTIME DROWSINESS?  15. NO - Do you have any prosthetic heart valves?  16. NO - Do you have prosthetic joints?  17. NO - Is there any chance that you may be pregnant?      HPI:     HPI related to upcoming procedure: h/o cataracts with poor vision.      See problem list for active medical problems.  Problems all longstanding and stable, except as noted/documented.  See ROS for pertinent symptoms related to these conditions.      MEDICAL HISTORY:     Patient Active Problem List    Diagnosis Date Noted     Pain in thoracic spine 06/02/2014     Priority: Medium     Strain of thoracic region 05/19/2014     Priority: Medium     CARDIOVASCULAR SCREENING; LDL GOAL LESS THAN 160 10/31/2010     Priority: Medium     Osteoporosis/osteopenia increased risk      Priority: Medium     due to long-term Dilantin use       Epilepsy (H)      Priority: Medium     Anxiety      Priority: Medium     NELIA (obstructive sleep apnea)      Priority: Medium     Intermittent asthma      Priority: Medium     Major depressive disorder, recurrent episode, mild (H) 03/09/2009     Priority: Medium     Relapse May 2011        Past Medical History:   Diagnosis Date     Anxiety      Diverticulosis      Epilepsy (H)      Intermittent asthma      Major depressive disorder, recurrent episode, unspecified 03/09/2009    Never saw psychiatrist     Nonsenile cataract      NELIA (obstructive sleep apnea)     Uses CPAP machine     Osteoporosis/osteopenia increased risk     due to long-term Dilantin use     Past Surgical History:   Procedure Laterality Date     C LAP,PARTIAL NEPHRECTOMY  8/4/08    LT for benign mass, Dr. Donald     COLONOSCOPY WITH CO2 INSUFFLATION N/A 5/4/2018    Procedure: COLONOSCOPY WITH CO2 INSUFFLATION;  Colonoscopy, Dr. Belcher, Screen for colon cancer, BMI 22.11, [facility];  Surgeon: Duane, William Charles, MD;  Location: [facility] OR     LOBECTOMY  1994    LT temporal for epilepsy      "VASECTOMY       Current Outpatient Medications   Medication Sig Dispense Refill     albuterol (PROAIR HFA/PROVENTIL HFA/VENTOLIN HFA) 108 (90 BASE) MCG/ACT Inhaler Inhale 2 puffs into the lungs every 4 hours as needed (for asthma symptoms) 1 Inhaler 1     buPROPion (WELLBUTRIN SR) 100 MG 12 hr tablet Take 1 tablet (100 mg) by mouth every morning for depression & anxiety prevention. 90 tablet 1     Cholecalciferol (VITAMIN D) 2000 units tablet Take 2,000 Units by mouth daily       IBUPROFEN PO Take 400-800 mg by mouth as needed        PARoxetine (PAXIL) 20 MG tablet TAKE 1 TABLET(20 MG) BY MOUTH AT BEDTIME FOR DEPRESSION OR ANXIETY PREVENTION 90 tablet 0     phenytoin (DILANTIN) 100 MG CR capsule Take 2 tablets every morning & 2 tablets every evening for seizure prevention. 360 capsule 3     OTC products: no recent use of OTC ASA, NSAIDS or Steroids    No Known Allergies   Latex Allergy: NO    Social History     Tobacco Use     Smoking status: Former Smoker     Last attempt to quit: 1993     Years since quittin.8     Smokeless tobacco: Never Used   Substance Use Topics     Alcohol use: Yes     Comment: 4 cans of beer on the weekend     History   Drug Use No       REVIEW OF SYSTEMS:   CONSTITUTIONAL: NEGATIVE for fever, chills, change in weight  ENT/MOUTH: NEGATIVE for ear, mouth and throat problems  RESP: NEGATIVE for significant cough or SOB  CV: NEGATIVE for chest pain, palpitations or peripheral edema    EXAM:   /71 (BP Location: Left arm, Patient Position: Chair, Cuff Size: Adult Regular)   Pulse 59   Temp 97.6  F (36.4  C) (Oral)   Resp 12   Ht 1.702 m (5' 7\")   Wt 64.9 kg (143 lb)   SpO2 100%   BMI 22.40 kg/m    GENERAL APPEARANCE: healthy, alert and no distress  HENT: ear canals and TM's normal and nose and mouth without ulcers or lesions  RESP: lungs clear to auscultation - no rales, rhonchi or wheezes  CV: regular rate and rhythm, normal S1 S2, no S3 or S4 and no murmur, click or rub "   ABDOMEN: soft, nontender, no HSM or masses and bowel sounds normal  NEURO: Normal strength and tone, sensory exam grossly normal, mentation intact and speech normal    DIAGNOSTICS:   No labs or EKG required for low risk surgery (cataract, skin procedure, breast biopsy, etc)    Recent Labs   Lab Test 01/26/18  0951 07/29/16 03/25/16  1109   HGB 14.5  --  13.0*     --  229   INR  --   --  1.01   POTASSIUM  --  5.2 4.7   CR  --  0.85  --         IMPRESSION:   Reason for surgery/procedure: PHACOEMULSIFICATION, CATARACT, WITH STANDARD IOL INSERTION    Diagnosis/reason for consult: preop clearance    The proposed surgical procedure is considered LOW risk.    REVISED CARDIAC RISK INDEX  The patient has the following serious cardiovascular risks for perioperative complications such as (MI, PE, VFib and 3  AV Block):  No serious cardiac risks  INTERPRETATION: 0 risks: Class I (very low risk - 0.4% complication rate)    The patient has the following additional risks for perioperative complications:  No identified additional risks      ICD-10-CM    1. Preop general physical exam Z01.818        RECOMMENDATIONS:     --Patient is to take all scheduled medications on the day of surgery EXCEPT for modifications listed below.    APPROVAL GIVEN to proceed with proposed procedure, without further diagnostic evaluation       Signed Electronically by: Carmelo Pang MD    Copy of this evaluation report is provided to requesting physician.    [facility] Preop Guidelines    Revised Cardiac Risk Index

## 2019-06-21 NOTE — LETTER
My Asthma Action Plan  Name: Raphael Lizarraga   YOB: 1965  Date: 6/21/2019   My doctor: Carmelo Pang MD, MD   My clinic: [facility]        My Control Medicine: { :150156}  My Rescue Medicine: { :813415}  {AAP include Oral Steroid:003855} My Asthma Severity: { :888485}  Avoid your asthma triggers: { :153546}  None     {Is patient a child or adult?:246292}       GREEN ZONE   Good Control    I feel good    No cough or wheeze    Can work, sleep and play without asthma symptoms       Take your asthma control medicine every day.     1. If exercise triggers your asthma, take your rescue medication    15 minutes before exercise or sports, and    During exercise if you have asthma symptoms  2. Spacer to use with inhaler: If you have a spacer, make sure to use it with your inhaler             YELLOW ZONE Getting Worse  I have ANY of these:    I do not feel good    Cough or wheeze    Chest feels tight    Wake up at night   1. Keep taking your Green Zone medications  2. Start taking your rescue medicine:    every 20 minutes for up to 1 hour. Then every 4 hours for 24-48 hours.  3. If you stay in the Yellow Zone for more than 12-24 hours, contact your doctor.  4. If you do not return to the Green Zone in 12-24 hours or you get worse, start taking your oral steroid medicine if prescribed by your provider.           RED ZONE Medical Alert - Get Help  I have ANY of these:    I feel awful    Medicine is not helping    Breathing getting harder    Trouble walking or talking    Nose opens wide to breathe       1. Take your rescue medicine NOW  2. If your provider has prescribed an oral steroid medicine, start taking it NOW  3. Call your doctor NOW  4. If you are still in the Red Zone after 20 minutes and you have not reached your doctor:    Take your rescue medicine again and    Call 911 or go to the emergency room right away    See your regular doctor within 2 weeks of an Emergency Room or Urgent Care  visit for follow-up treatment.          Annual Reminders:  Meet with Asthma Educator,  Flu Shot in the Fall, consider Pneumonia Vaccination for patients with asthma (aged 19 and older).    Pharmacy: [facility] [id number] [place] [address] [id number]  [facility]                      Asthma Triggers  How To Control Things That Make Your Asthma Worse    Triggers are things that make your asthma worse.  Look at the list below to help you find your triggers and what you can do about them.  You can help prevent asthma flare-ups by staying away from your triggers.      Trigger                                                          What you can do   Cigarette Smoke  Tobacco smoke can make asthma worse. Do not allow smoking in your home, car or around you.  Be sure no one smokes at a child s day care or school.  If you smoke, ask your health care provider for ways to help you quit.  Ask family members to quit too.  Ask your health care provider for a referral to Quit Plan to help you quit smoking, or call 2-711-843-PLAN.     Colds, Flu, Bronchitis  These are common triggers of asthma. Wash your hands often.  Don t touch your eyes, nose or mouth.  Get a flu shot every year.     Dust Mites  These are tiny bugs that live in cloth or carpet. They are too small to see. Wash sheets and blankets in hot water every week.   Encase pillows and mattress in dust mite proof covers.  Avoid having carpet if you can. If you have carpet, vacuum weekly.   Use a dust mask and HEPA vacuum.   Pollen and Outdoor Mold  Some people are allergic to trees, grass, or weed pollen, or molds. Try to keep your windows closed.  Limit time out doors when pollen count is high.   Ask you health care provider about taking medicine during allergy season.     Animal Dander  Some people are allergic to skin flakes, urine or saliva from pets with fur or feathers. Keep pets with fur or feathers out of your home.    If you can t  keep the pet outdoors, then keep the pet out of your bedroom.  Keep the bedroom door closed.  Keep pets off cloth furniture and away from stuffed toys.     Mice, Rats, and Cockroaches  Some people are allergic to the waste from these pests.   Cover food and garbage.  Clean up spills and food crumbs.  Store grease in the refrigerator.   Keep food out of the bedroom.   Indoor Mold  This can be a trigger if your home has high moisture. Fix leaking faucets, pipes, or other sources of water.   Clean moldy surfaces.  Dehumidify basement if it is damp and smelly.   Smoke, Strong Odors, and Sprays  These can reduce air quality. Stay away from strong odors and sprays, such as perfume, powder, hair spray, paints, smoke incense, paint, cleaning products, candles and new carpet.   Exercise or Sports  Some people with asthma have this trigger. Be active!  Ask your doctor about taking medicine before sports or exercise to prevent symptoms.    Warm up for 5-10 minutes before and after sports or exercise.     Other Triggers of Asthma  Cold air:  Cover your nose and mouth with a scarf.  Sometimes laughing or crying can be a trigger.  Some medicines and food can trigger asthma.

## 2019-06-24 ENCOUNTER — TELEPHONE (OUTPATIENT)
Dept: OPHTHALMOLOGY | Facility: CLINIC | Age: 54
End: 2019-06-24

## 2019-06-24 NOTE — TELEPHONE ENCOUNTER
Reason for call:  Other   Patient called regarding (reason for call): appointment  Additional comments: Patient stated that no one called him to let him know what time his surgery is scheduled for. Please call patient to confirm what time he needs to arrive for check-in and if there are any specific instructions he needs to do before his surgery.    Phone number to reach patient:  Cell number on file:    Telephone Information:   Mobile [phone]       Best Time:  any    Can we leave a detailed message on this number?  YES     Jessica RHODES  Central Scheduler

## 2019-06-25 NOTE — TELEPHONE ENCOUNTER
Surgery time and information relayed to patient. He stated that he has not gotten any phone calls from the surgery center at this time. I told him that I would check with the ASC as they normally want to go through a pre admin phone checklist before arriving.  May Stone, Surgery Scheduling Coordinator

## 2019-06-27 ENCOUNTER — ANESTHESIA EVENT (OUTPATIENT)
Dept: SURGERY | Facility: AMBULATORY SURGERY CENTER | Age: 54
End: 2019-06-27

## 2019-06-27 ENCOUNTER — HOSPITAL ENCOUNTER (OUTPATIENT)
Facility: AMBULATORY SURGERY CENTER | Age: 54
Discharge: HOME | End: 2019-06-27
Attending: OPHTHALMOLOGY | Admitting: OPHTHALMOLOGY
Payer: COMMERCIAL

## 2019-06-27 ENCOUNTER — SURGERY (OUTPATIENT)
Age: 54
End: 2019-06-27
Payer: COMMERCIAL

## 2019-06-27 ENCOUNTER — ANESTHESIA (OUTPATIENT)
Dept: SURGERY | Facility: AMBULATORY SURGERY CENTER | Age: 54
End: 2019-06-27
Payer: COMMERCIAL

## 2019-06-27 VITALS
RESPIRATION RATE: 16 BRPM | OXYGEN SATURATION: 99 % | SYSTOLIC BLOOD PRESSURE: 124 MMHG | DIASTOLIC BLOOD PRESSURE: 89 MMHG | TEMPERATURE: 97.8 F

## 2019-06-27 DIAGNOSIS — H25.12 AGE-RELATED NUCLEAR CATARACT OF LEFT EYE: Primary | ICD-10-CM

## 2019-06-27 PROCEDURE — 66984 XCAPSL CTRC RMVL W/O ECP: CPT | Mod: LT | Performed by: OPHTHALMOLOGY

## 2019-06-27 PROCEDURE — G8907 PT DOC NO EVENTS ON DISCHARG: HCPCS

## 2019-06-27 PROCEDURE — 66984 XCAPSL CTRC RMVL W/O ECP: CPT | Mod: LT

## 2019-06-27 PROCEDURE — G8918 PT W/O PREOP ORDER IV AB PRO: HCPCS

## 2019-06-27 DEVICE — EYE IMP IOL AMO PCL TECNIS ZCB00 19.5: Type: IMPLANTABLE DEVICE | Site: EYE | Status: FUNCTIONAL

## 2019-06-27 RX ORDER — MOXIFLOXACIN 5 MG/ML
SOLUTION/ DROPS OPHTHALMIC PRN
Status: DISCONTINUED | OUTPATIENT
Start: 2019-06-27 | End: 2019-06-27 | Stop reason: HOSPADM

## 2019-06-27 RX ORDER — SODIUM CHLORIDE, SODIUM LACTATE, POTASSIUM CHLORIDE, CALCIUM CHLORIDE 600; 310; 30; 20 MG/100ML; MG/100ML; MG/100ML; MG/100ML
500 INJECTION, SOLUTION INTRAVENOUS CONTINUOUS
Status: DISCONTINUED | OUTPATIENT
Start: 2019-06-27 | End: 2019-06-28 | Stop reason: HOSPADM

## 2019-06-27 RX ORDER — KETAMINE HYDROCHLORIDE 10 MG/ML
INJECTION, SOLUTION INTRAMUSCULAR; INTRAVENOUS PRN
Status: DISCONTINUED | OUTPATIENT
Start: 2019-06-27 | End: 2019-06-27

## 2019-06-27 RX ORDER — MOXIFLOXACIN 5 MG/ML
1 SOLUTION/ DROPS OPHTHALMIC
Status: COMPLETED | OUTPATIENT
Start: 2019-06-27 | End: 2019-06-27

## 2019-06-27 RX ORDER — KETOROLAC TROMETHAMINE 5 MG/ML
1 SOLUTION OPHTHALMIC
Status: COMPLETED | OUTPATIENT
Start: 2019-06-27 | End: 2019-06-27

## 2019-06-27 RX ORDER — CYCLOPENTOLATE HYDROCHLORIDE 10 MG/ML
1 SOLUTION/ DROPS OPHTHALMIC
Status: COMPLETED | OUTPATIENT
Start: 2019-06-27 | End: 2019-06-27

## 2019-06-27 RX ORDER — TETRACAINE HYDROCHLORIDE 5 MG/ML
SOLUTION OPHTHALMIC PRN
Status: DISCONTINUED | OUTPATIENT
Start: 2019-06-27 | End: 2019-06-27 | Stop reason: HOSPADM

## 2019-06-27 RX ORDER — PHENYLEPHRINE HYDROCHLORIDE 25 MG/ML
1 SOLUTION/ DROPS OPHTHALMIC
Status: COMPLETED | OUTPATIENT
Start: 2019-06-27 | End: 2019-06-27

## 2019-06-27 RX ORDER — PROPARACAINE HYDROCHLORIDE 5 MG/ML
1 SOLUTION/ DROPS OPHTHALMIC
Status: COMPLETED | OUTPATIENT
Start: 2019-06-27 | End: 2019-06-27

## 2019-06-27 RX ORDER — LIDOCAINE 40 MG/G
CREAM TOPICAL
Status: DISCONTINUED | OUTPATIENT
Start: 2019-06-27 | End: 2019-06-28 | Stop reason: HOSPADM

## 2019-06-27 RX ADMIN — TETRACAINE HYDROCHLORIDE 1 DROP: 5 SOLUTION OPHTHALMIC at 09:25

## 2019-06-27 RX ADMIN — MOXIFLOXACIN 1 DROP: 5 SOLUTION/ DROPS OPHTHALMIC at 09:41

## 2019-06-27 RX ADMIN — KETOROLAC TROMETHAMINE 1 DROP: 5 SOLUTION OPHTHALMIC at 08:41

## 2019-06-27 RX ADMIN — CYCLOPENTOLATE HYDROCHLORIDE 1 DROP: 10 SOLUTION/ DROPS OPHTHALMIC at 08:36

## 2019-06-27 RX ADMIN — PHENYLEPHRINE HYDROCHLORIDE 1 DROP: 25 SOLUTION/ DROPS OPHTHALMIC at 08:47

## 2019-06-27 RX ADMIN — KETOROLAC TROMETHAMINE 1 DROP: 5 SOLUTION OPHTHALMIC at 08:46

## 2019-06-27 RX ADMIN — CYCLOPENTOLATE HYDROCHLORIDE 1 DROP: 10 SOLUTION/ DROPS OPHTHALMIC at 08:53

## 2019-06-27 RX ADMIN — PHENYLEPHRINE HYDROCHLORIDE 1 DROP: 25 SOLUTION/ DROPS OPHTHALMIC at 08:52

## 2019-06-27 RX ADMIN — MOXIFLOXACIN 1 DROP: 5 SOLUTION/ DROPS OPHTHALMIC at 08:52

## 2019-06-27 RX ADMIN — PHENYLEPHRINE HYDROCHLORIDE 1 DROP: 25 SOLUTION/ DROPS OPHTHALMIC at 08:36

## 2019-06-27 RX ADMIN — MOXIFLOXACIN 1 DROP: 5 SOLUTION/ DROPS OPHTHALMIC at 08:40

## 2019-06-27 RX ADMIN — SODIUM CHLORIDE, SODIUM LACTATE, POTASSIUM CHLORIDE, CALCIUM CHLORIDE: 600; 310; 30; 20 INJECTION, SOLUTION INTRAVENOUS at 09:35

## 2019-06-27 RX ADMIN — KETOROLAC TROMETHAMINE 1 DROP: 5 SOLUTION OPHTHALMIC at 08:51

## 2019-06-27 RX ADMIN — Medication 1 DROP: at 09:26

## 2019-06-27 RX ADMIN — Medication 250 ML: at 09:31

## 2019-06-27 RX ADMIN — PROPARACAINE HYDROCHLORIDE 1 DROP: 5 SOLUTION/ DROPS OPHTHALMIC at 08:35

## 2019-06-27 RX ADMIN — MOXIFLOXACIN 1 DROP: 5 SOLUTION/ DROPS OPHTHALMIC at 08:46

## 2019-06-27 RX ADMIN — CYCLOPENTOLATE HYDROCHLORIDE 1 DROP: 10 SOLUTION/ DROPS OPHTHALMIC at 08:48

## 2019-06-27 RX ADMIN — KETAMINE HYDROCHLORIDE 10 MG: 10 INJECTION, SOLUTION INTRAMUSCULAR; INTRAVENOUS at 09:27

## 2019-06-27 RX ADMIN — SODIUM CHLORIDE, SODIUM LACTATE, POTASSIUM CHLORIDE, CALCIUM CHLORIDE 500 ML: 600; 310; 30; 20 INJECTION, SOLUTION INTRAVENOUS at 08:46

## 2019-06-27 ASSESSMENT — ENCOUNTER SYMPTOMS: SEIZURES: 1

## 2019-06-27 NOTE — ANESTHESIA POSTPROCEDURE EVALUATION
Anesthesia POST Procedure Evaluation    Patient: Raphael Lizarraga   MRN:     [id number] Gender:   male   Age:    54 year old :      1965        Preoperative Diagnosis: LEFT CATARACT   Procedure(s):  PHACOEMULSIFICATION, CATARACT, WITH STANDARD IOL INSERTION, LEFT   Postop Comments: No value filed.       Anesthesia Type:  MAC  No value filed.    Reportable Event: NO     PAIN: Uncomplicated   Sign Out status: Comfortable, Well controlled pain     PONV: No PONV   Sign Out status:  No Nausea or Vomiting     Neuro/Psych: Uneventful perioperative course   Sign Out Status: Preoperative baseline; Age appropriate mentation     Airway/Resp.: Uneventful perioperative course   Sign Out Status: Non labored breathing, age appropriate RR; Resp. Status within EXPECTED Parameters     CV: Uneventful perioperative course   Sign Out status: Appropriate BP and perfusion indices; Appropriate HR/Rhythm     Disposition:   Sign Out in:  PACU  Disposition:  Phase II; Home  Recovery Course: Uneventful  Follow-Up: Not required           Last Anesthesia Record Vitals:  CRNA VITALS  2019 0908 - 2019 1008      2019             Pulse:  59    SpO2:  100 %          Last PACU Vitals:  Vitals Value Taken Time   BP     Temp     Pulse     Resp     SpO2     Temp src     NIBP 114/76 2019  9:36 AM   Pulse 59 2019  9:38 AM   SpO2 100 % 2019  9:38 AM   Resp     Temp     Ht Rate     Temp 2           Electronically Signed By: Trevin Ramirez MD, 2019, 3:08 PM

## 2019-06-27 NOTE — OP NOTE
PATIENT NAME:  Raphael Lizarraga    :  1965    PATIENT NUMBER:  [id number]    DATE OF SURGERY:  2019    SURGEON:  Gutierrez Mcdowell M.D.    PREOPERATIVE DIAGNOSIS: Cataract left eye.    POSTOPERATIVE DIAGNOSIS:  Same    PROCEDURE PERFORMED:    1. Phacoemulsification with posterior chamber intraocular lens left eye.    ANESTHESIA:  Topical/MAC    COMPLICATIONS:  None    PROCEDURE: Following adequate preoperative dilation the patient was given topical anesthesia consisting of Proparacaine.  The patient was brought to the operative suite where the eye was prepped and draped in the usual sterile fashion.  A lid speculum was applied. A super sharp blade was used to create a paracentesis, through which 1% preservative free Lidocaine was injected.  Visoelastic was then used to inflate the anterior chamber.  A biplanar incision at the clear cornea limbus was created with a keratome.  A continuous curvilinear capsulorrhexis was started with a cystitome and completed using Utrata forceps.  The lens was hydrodissected and hydro delineated using BSS on a cannula.  The lens nucleus was removed using phacoemulsification.  Remaining cortex was removed using irrigation and aspiration.  Viscoelastic was injected to inflate the capsular bag and a 19.5 D PCB00 IOL was inserted into the capsular bag without difficulty.  Residual viscoelastic and provisc material was removed with irrigation and aspiration.  BSS was used to hydrate the corneal incision and paracentesis sites which were checked and noted to be watertight.  A drop of Vigamox was applied to the eye and a clear plastic shield was placed.  The patient tolerated the procedure well and left the operative suite in stable condition.    Gutierrez Mcdowell M.D.

## 2019-06-27 NOTE — DISCHARGE INSTRUCTIONS
Raphael Lizarraga    Cataract Surgery Postoperative Instructions    Postoperative Medications: After surgery, you will use several different eye drop  medications. In most cases you will start these eye drops 2 days before surgery.    1. Ocuflox - is an antibiotic drop that is used to minimize the risk of infection. It should be used 4 times daily for 10 days total or until you are told to discontinue.    (Acceptable alternatives to Ocuflox include: Zymaxid, Besivance, Gatafloxacin, Vigamox)     2.  Ketorolac - is an anti-inflammatory drop. Use it 4 time daily for 21 days total or until you are told to discontinue.  (Acceptable alternatives to Ketorolac include: Acuvail, Nevenac, Xibrom)    3. Prednisilone - is a steroid eye drop, used to minimize inflammation and modulate  healing. It should be used 4 times daily for 21 days total or until you are told to discontinue.  (Acceptable alternatives for Prednisilone include: Pred Forte, Omnipred, Econopred)      IF YOU GET AN ALTERNATIVE EYE DROP PLEASE FOLLOW THE DIRECTIONS ON THE BOTTLE OF DROPS. THEY WILL BE DIFFERENT!      The drops might sting a little when they are instilled, and that is normal.    It doesn t matter what order you put the drops into your eyes, but you should wait at least one minute between drops.    Please continue any glaucoma, dry eye, or other medications you were using prior to the surgery.    Please allow 24 to 48 hours when requesting refills, and call BEFORE you run out of drops.      Artificial Tears - are lubricating drops used to moisturize the eye. You can use these as much as you want, particularly if your eyes feel watery, gritty, or uncomfortable. Chilling these drops in the refrigerator results in a more soothing feeling. There are several brands of artificial tears available including, but not limited to: Optive, Refresh, Systane, Blink, Genteal, Soothe, and others. You should not use drops that  get the red out . You do not  need a prescription for these medications.      Restriction on Activities - It is extremely important that you DO NOT RUB THE  TREATED EYE.  - You will be given a clear plastic shield to wear as protection over your eye the  night after surgery.  - Refrain from any activities that may put your eye at risk of injury, as well as areas  containing a high volume of chemicals, dust, and debris.  - Do Not wear any eye makeup or moisturizer around the eye for 1 week after  surgery.  - Do Not swim or go into a hot-tub, Jacuzzi, or sauna for 1 week after surgery. You  can take showers as normal, but avoid getting shampoo or soap in your eyes.  - Avoid strenuous activity, including lifting more than 30 lbs, for 1 week after  surgery.  - It is fine to bathe, read, watch TV, and use the computer.  Symptoms requiring medical attention:  - Sudden onset of increased discharge from the eye  - Persistent or increasing pain in the eye  - Sudden decrease in vision  - Persistent nausea or vomiting    If you have any questions or concerns before or after your surgery, please contact:    Dr. Mcdowell s office at [phone]        [facility]  Same-Day Surgery   Adult Discharge Orders & Instructions   For 24 hours after surgery  1. Get plenty of rest.  A responsible adult must stay with you for at least 24 hours after you leave the hospital.   2. Do not drive or use heavy equipment.  If you have weakness or tingling, don't drive or use heavy equipment until this feeling goes away.  3. Do not drink alcohol.  4. Avoid strenuous or risky activities.  Ask for help when climbing stairs.   5. You may feel lightheaded.  IF so, sit for a few minutes before standing.  Have someone help you get up.   6. If you have nausea (feel sick to your stomach): Drink only clear liquids such as apple juice, ginger ale, broth or 7-Up.  Rest may also help.  Be sure to drink enough fluids.  Move to a regular diet as you feel  able.  7. You may have a slight fever. Call the doctor if your fever is over 100 F (37.7 C) (taken under the tongue) or lasts longer than 24 hours.  8. You may have a dry mouth, a sore throat, muscle aches or trouble sleeping.  These should go away after 24 hours.  9. Do not make important or legal decisions.   Call your doctor for any of the followin.  Signs of infection (fever, growing tenderness at the surgery site, a large amount of drainage or bleeding, severe pain, foul-smelling drainage, redness, swelling).    2. It has been over 8 to 10 hours since surgery and you are still not able to urinate (pass water).    3.  Headache for over 24 hours.    4.  Numbness, tingling or weakness the day after surgery (if you had spinal anesthesia).    [facility]  Same-Day Surgery   Adult Discharge Orders & Instructions   For 24 hours after surgery  10. Get plenty of rest.  A responsible adult must stay with you for at least 24 hours after you leave the hospital.   11. Do not drive or use heavy equipment.  If you have weakness or tingling, don't drive or use heavy equipment until this feeling goes away.  12. Do not drink alcohol.  13. Avoid strenuous or risky activities.  Ask for help when climbing stairs.   14. You may feel lightheaded.  IF so, sit for a few minutes before standing.  Have someone help you get up.   15. If you have nausea (feel sick to your stomach): Drink only clear liquids such as apple juice, ginger ale, broth or 7-Up.  Rest may also help.  Be sure to drink enough fluids.  Move to a regular diet as you feel able.  16. You may have a slight fever. Call the doctor if your fever is over 100 F (37.7 C) (taken under the tongue) or lasts longer than 24 hours.  17. You may have a dry mouth, a sore throat, muscle aches or trouble sleeping.  These should go away after 24 hours.  18. Do not make important or legal decisions.   Call your doctor for any of the followin.  Signs of  infection (fever, growing tenderness at the surgery site, a large amount of drainage or bleeding, severe pain, foul-smelling drainage, redness, swelling).    2. It has been over 8 to 10 hours since surgery and you are still not able to urinate (pass water).    3.  Headache for over 24 hours.      To contact a doctor, call _____[phone]______________________   ____

## 2019-06-27 NOTE — ANESTHESIA PREPROCEDURE EVALUATION
Anesthesia Pre-Procedure Evaluation    Patient: Raphael Lizarraga   MRN:     [id number] Gender:   male   Age:    54 year old :      1965        Preoperative Diagnosis: LEFT CATARACT   Procedure(s):  PHACOEMULSIFICATION, CATARACT, WITH STANDARD IOL INSERTION, LEFT     Past Medical History:   Diagnosis Date     Anxiety      Diverticulosis      Epilepsy (H)      Intermittent asthma      Major depressive disorder, recurrent episode, unspecified 2009    Never saw psychiatrist     Nonsenile cataract      NELIA (obstructive sleep apnea)     Uses CPAP machine     Osteoporosis/osteopenia increased risk     due to long-term Dilantin use      Past Surgical History:   Procedure Laterality Date     C LAP,PARTIAL NEPHRECTOMY  08    LT for benign mass, Dr. Donald     COLONOSCOPY WITH CO2 INSUFFLATION N/A 2018    Procedure: COLONOSCOPY WITH CO2 INSUFFLATION;  Colonoscopy, Dr. Belcher, Screen for colon cancer, BMI 22.11, [facility] [address];  Surgeon: Duane, William Charles, MD;  Location: [facility] OR     LOBECTOMY      LT temporal for epilepsy     VASECTOMY            Anesthesia Evaluation     .             ROS/MED HX    ENT/Pulmonary:  - neg pulmonary ROS   (+)sleep apnea, asthma , . .    Neurologic:  - neg neurologic ROS   (+)seizures last seizure: years ago     Cardiovascular:  - neg cardiovascular ROS       METS/Exercise Tolerance:     Hematologic:  - neg hematologic  ROS       Musculoskeletal:  - neg musculoskeletal ROS       GI/Hepatic:  - neg GI/hepatic ROS       Renal/Genitourinary:  - ROS Renal section negative       Endo:  - neg endo ROS       Psychiatric:  - neg psychiatric ROS   (+) psychiatric history anxiety and depression      Infectious Disease:  - neg infectious disease ROS       Malignancy:      - no malignancy   Other:    - neg other ROS                     PHYSICAL EXAM:   Mental Status/Neuro: A/A/O   Airway: Facies: Feasible  Mallampati: I  Mouth/Opening: Full  TM distance: > 6 cm  Neck  "ROM: Full   Respiratory: Auscultation: CTAB     Resp. Rate: Normal     Resp. Effort: Normal      CV: Rhythm: Regular  Rate: Age appropriate  Heart: Normal Sounds   Comments:      Dental: Normal                  Lab Results   Component Value Date    WBC 2.6 (L) 01/26/2018    HGB 14.5 01/26/2018    HCT 44.4 01/26/2018     01/26/2018    POTASSIUM 5.2 07/29/2016    CR 0.85 07/29/2016    GLC 88 07/29/2016    ALT 31 01/26/2018    AST 19 01/26/2018    GGT 68 09/08/2017    INR 1.01 03/25/2016       Preop Vitals  BP Readings from Last 3 Encounters:   06/27/19 127/80   06/21/19 118/71   03/29/19 124/79    Pulse Readings from Last 3 Encounters:   06/21/19 59   03/29/19 72   02/22/19 58      Resp Readings from Last 3 Encounters:   06/27/19 16   06/21/19 12   02/22/19 18    SpO2 Readings from Last 3 Encounters:   06/27/19 96%   06/21/19 100%   03/29/19 99%      Temp Readings from Last 1 Encounters:   06/27/19 37  C (98.6  F) (Temporal)    Ht Readings from Last 1 Encounters:   06/21/19 1.702 m (5' 7\")      Wt Readings from Last 1 Encounters:   06/21/19 64.9 kg (143 lb)    Estimated body mass index is 22.4 kg/m  as calculated from the following:    Height as of 6/21/19: 1.702 m (5' 7\").    Weight as of 6/21/19: 64.9 kg (143 lb).     LDA:            Assessment:   ASA SCORE: 3    NPO Status: > 6 hours since completed Solid Foods   Documentation: H&P complete; Preop Testing complete; Consents complete   Proceeding: Proceed without further delay  Tobacco Use:  NO Active use of Tobacco/UNKNOWN Tobacco use status     Plan:   Anes. Type:  MAC   Pre-Induction: Midazolam IV   Induction:  Not applicable   Airway: Native Airway   Access/Monitoring: PIV   Maintenance: N/a   Emergence: N/a   Logistics: Same Day Surgery     Postop Pain/Sedation Strategy:  Standard-Options: Opioids PRN     PONV Management:  Adult Risk Factors:, Non-Smoker, Postop Opioids  Prevention: Ondansetron     CONSENT: Direct conversation   Plan and risks discussed " with: Patient   Blood Products: Consent Deferred (Minimal Blood Loss)                         Trevin Ramirez MD

## 2019-06-27 NOTE — ANESTHESIA CARE TRANSFER NOTE
Patient: Raphael Lizarraga    Procedure(s):  PHACOEMULSIFICATION, CATARACT, WITH STANDARD IOL INSERTION, LEFT    Diagnosis: LEFT CATARACT  Diagnosis Additional Information: No value filed.    Anesthesia Type:   No value filed.     Note:  Airway :Room Air  Patient transferred to:Phase II  Comments: Care of Transfer report given to RN.  Spont Respirations. Responds   Easy.Handoff Report: Identifed the Patient, Identified the Reponsible Provider, Reviewed the pertinent medical history, Discussed the surgical course, Reviewed Intra-OP anesthesia mangement and issues during anesthesia, Set expectations for post-procedure period and Allowed opportunity for questions and acknowledgement of understanding      Vitals: (Last set prior to Anesthesia Care Transfer)    CRNA VITALS  6/27/2019 0908 - 6/27/2019 0939      6/27/2019             Pulse:  59    SpO2:  100 %                Electronically Signed By: RAFA MARTE CRNA  June 27, 2019  9:39 AM

## 2019-06-28 ENCOUNTER — OFFICE VISIT (OUTPATIENT)
Dept: OPTOMETRY | Facility: CLINIC | Age: 54
End: 2019-06-28
Payer: COMMERCIAL

## 2019-06-28 DIAGNOSIS — Z96.1 PSEUDOPHAKIA OF LEFT EYE: Primary | ICD-10-CM

## 2019-06-28 PROCEDURE — 99024 POSTOP FOLLOW-UP VISIT: CPT | Performed by: OPTOMETRIST

## 2019-06-28 ASSESSMENT — VISUAL ACUITY
OD_PH_SC: 20/50
METHOD: SNELLEN - LINEAR
OS_SC: 20/100
OS_PH_SC: 20/20
OD_SC: 20/150

## 2019-06-28 ASSESSMENT — REFRACTION_WEARINGRX
OS_ADD: +2.25
OD_CYLINDER: +2.75
OD_SPHERE: -2.75
OD_AXIS: 010
OS_SPHERE: -2.75
OS_CYLINDER: +2.00
OS_AXIS: 160
OD_ADD: +2.25

## 2019-06-28 ASSESSMENT — EXTERNAL EXAM - LEFT EYE: OS_EXAM: NORMAL

## 2019-06-28 ASSESSMENT — TONOMETRY
OS_IOP_MMHG: 17
IOP_METHOD: APPLANATION

## 2019-06-28 ASSESSMENT — SLIT LAMP EXAM - LIDS: COMMENTS: NORMAL

## 2019-06-28 NOTE — LETTER
June 28, 2019      Raphael Lizarraga  [address]  [place] [facility] [address]        To Whom It May Concern:    Raphael Lizarraga  was seen on 6/28/2019 after cataract surgery left eye.   Restrictions at work are no heavy lifting- greater than 30 lbs for 1 week.  He should protect his eyes at all times from dust and debris for 1 week.      Sincerely,            Andres Wayne, OD  [facility]  [address]  [place] [address]  Tel- [phone]  [phone]

## 2019-06-28 NOTE — PROGRESS NOTES
CHIEF COMPLAINT:   Chief Complaint   Patient presents with     Cataract Follow-Up     1 day post op os eye       Type of surgery cataract   Date of surgery 6- os eye    Ginny Suero, Optometric Assistant, [facility]     Drops reviewed.    OBJECTIVE:     See ophthalmology exam    ASSESSMENT:         ICD-10-CM    1. Pseudophakia of left eye Z96.1 POST-OP FOLLOW-UP VISIT     PLAN:      Patient Instructions        Patient Instructions   Continue with drops and scheduled follow up appointments.  Follow directions on your bottles as far as how many drops to use daily.     Wear shield while sleeping.     After 1 week discontinue Ofloxacin and continue other 2 drops 4 x day for the full 21 days.  After 1 week all restrictions are lifted and you do not need to wear the shield anymore at night.     Start all 3 drops on surgical eye 2 days before surgery.     Contact your pharmacy to refill drops if needed.     If any questions or concerns contact Dr. Mcdowell's [place] office at [phone].        Please continue any glaucoma, dry eye, or other medications you were using prior to the surgery.           Andres Wayne, OD  [facility]  [address] [place] [address]  Tel- [phone]  Xvj-[phone]

## 2019-06-28 NOTE — PATIENT INSTRUCTIONS
Patient Instructions   Continue with drops and scheduled follow up appointments.  Follow directions on your bottles as far as how many drops to use daily.     Wear shield while sleeping.     After 1 week discontinue Ofloxacin and continue other 2 drops 4 x day for the full 21 days.  After 1 week all restrictions are lifted and you do not need to wear the shield anymore at night.     Start all 3 drops on surgical eye 2 days before surgery.     Contact your pharmacy to refill drops if needed.     If any questions or concerns contact Dr. Mcdowell's [place] office at [phone].        Please continue any glaucoma, dry eye, or other medications you were using prior to the surgery.           Andres Wayne, RILEY  [facility]  [address] [place] [address]  Tel- [phone]  Nyg-[phone]

## 2019-07-01 ENCOUNTER — TELEPHONE (OUTPATIENT)
Dept: OPHTHALMOLOGY | Facility: CLINIC | Age: 54
End: 2019-07-01

## 2019-07-01 ENCOUNTER — OFFICE VISIT (OUTPATIENT)
Dept: OPHTHALMOLOGY | Facility: CLINIC | Age: 54
End: 2019-07-01
Payer: COMMERCIAL

## 2019-07-01 DIAGNOSIS — Z96.1 PSEUDOPHAKIA: Primary | ICD-10-CM

## 2019-07-01 PROCEDURE — 99024 POSTOP FOLLOW-UP VISIT: CPT | Performed by: STUDENT IN AN ORGANIZED HEALTH CARE EDUCATION/TRAINING PROGRAM

## 2019-07-01 RX ORDER — ERYTHROMYCIN 5 MG/G
0.5 OINTMENT OPHTHALMIC 2 TIMES DAILY
Qty: 1 TUBE | Refills: 0 | Status: SHIPPED | OUTPATIENT
Start: 2019-07-01 | End: 2019-09-12

## 2019-07-01 ASSESSMENT — VISUAL ACUITY
METHOD: SNELLEN - LINEAR
OD_CC: 20/25
OS_PH_SC: 20/20
OS_SC: 20/40-2

## 2019-07-01 ASSESSMENT — SLIT LAMP EXAM - LIDS: COMMENTS: NORMAL

## 2019-07-01 ASSESSMENT — TONOMETRY
OD_IOP_MMHG: 17
OS_IOP_MMHG: 23
IOP_METHOD: APPLANATION

## 2019-07-01 ASSESSMENT — EXTERNAL EXAM - LEFT EYE: OS_EXAM: NORMAL

## 2019-07-01 NOTE — TELEPHONE ENCOUNTER
Patient experiencing left eye pain when blinking. FB sensation and pain has increased since yesterday and discomfort level has increased to 8/10 states it feels like he is being poked in the eye with a pin.

## 2019-07-01 NOTE — LETTER
7/1/2019         RE: Raphael Lizarraga  [address]  [place] [address]        Dear Colleague,    Thank you for referring your patient, Raphael Lizarraga, to the [facility]. Please see a copy of my visit note below.     Current Eye Medications:  Ofloxacin, ketorolac and prednisolone four times a day left eye     Subjective:  FBS left eye since yesterday. Pt does not recall getting anything in this eye. Has had similar pain in the past upon waking and eyes can feel dry at times.  Pt has cataract surgery in the left eye by Dr. Mcdowell about 5 days ago.     Objective:  See Ophthalmology Exam.       Assessment:  Raphael Lizarraga is a 54 year old male who presents with:   Encounter Diagnosis   Name Primary?     Pseudophakia - Left Eye Small abraded area paracentrally in the left eye. ABMD/erosion? Will continue same drops, but add erythromycin ointment twice a day.        Plan:  Continue ofloxacin, ketorolac and prednisolone drops four times a day in the left eye    Use erythromycin ointment twice a day in the left eye (at bedtime and one other time during the day) for the next few days    Continue other instructions per Dr. Wayne/Dr. Jeremias Wyman MD  [phone]        Again, thank you for allowing me to participate in the care of your patient.        Sincerely,        Jorge Wyman MD

## 2019-07-01 NOTE — PATIENT INSTRUCTIONS
Continue ofloxacin, ketorolac and prednisolone drops four times a day in the left eye    Use erythromycin ointment twice a day in the left eye (at bedtime and one other time during the day) for the next few days    Continue other instructions per Dr. Wayne/Dr. Jeremias Wyman MD  [phone]

## 2019-07-01 NOTE — TELEPHONE ENCOUNTER
Dr. Mcdowell's post op KPE left eye 6-27-19 called.  His left eye has been really painful since Saturday per Nella the RN at [facility]. She was trying to get the patient in with Dr. Wayne and/or Jeremias, but they are both out this pm.  Dr. Mcdowell to return 7-8-19.  Told Nella to have the patient come in today at 2 pm for MD check.

## 2019-07-01 NOTE — PROGRESS NOTES
Current Eye Medications:  Ofloxacin, ketorolac and prednisolone four times a day left eye     Subjective:  FBS left eye since yesterday. Pt does not recall getting anything in this eye. Has had similar pain in the past upon waking and eyes can feel dry at times.  Pt has cataract surgery in the left eye by Dr. Mcdowell about 5 days ago.     Objective:  See Ophthalmology Exam.       Assessment:  Raphael Lizarraga is a 54 year old male who presents with:   Encounter Diagnosis   Name Primary?     Pseudophakia - Left Eye Small abraded area paracentrally in the left eye. ABMD/erosion? Will continue same drops, but add erythromycin ointment twice a day.        Plan:  Continue ofloxacin, ketorolac and prednisolone drops four times a day in the left eye    Use erythromycin ointment twice a day in the left eye (at bedtime and one other time during the day) for the next few days    Continue other instructions per Dr. Wayne/Dr. Jeremias Wyman MD  [phone]

## 2019-07-11 ENCOUNTER — SURGERY (OUTPATIENT)
Age: 54
End: 2019-07-11
Payer: COMMERCIAL

## 2019-07-11 ENCOUNTER — HOSPITAL ENCOUNTER (OUTPATIENT)
Facility: AMBULATORY SURGERY CENTER | Age: 54
Discharge: HOME | End: 2019-07-11
Attending: OPHTHALMOLOGY | Admitting: OPHTHALMOLOGY
Payer: COMMERCIAL

## 2019-07-11 ENCOUNTER — ANESTHESIA (OUTPATIENT)
Dept: SURGERY | Facility: AMBULATORY SURGERY CENTER | Age: 54
End: 2019-07-11
Payer: COMMERCIAL

## 2019-07-11 ENCOUNTER — ANESTHESIA EVENT (OUTPATIENT)
Dept: SURGERY | Facility: AMBULATORY SURGERY CENTER | Age: 54
End: 2019-07-11

## 2019-07-11 VITALS
SYSTOLIC BLOOD PRESSURE: 129 MMHG | TEMPERATURE: 97.8 F | DIASTOLIC BLOOD PRESSURE: 65 MMHG | OXYGEN SATURATION: 99 % | WEIGHT: 142 LBS | HEIGHT: 68 IN | BODY MASS INDEX: 21.52 KG/M2 | RESPIRATION RATE: 16 BRPM

## 2019-07-11 DIAGNOSIS — H25.11 AGE-RELATED NUCLEAR CATARACT OF RIGHT EYE: Primary | ICD-10-CM

## 2019-07-11 PROCEDURE — 66984 XCAPSL CTRC RMVL W/O ECP: CPT | Mod: 79 | Performed by: OPHTHALMOLOGY

## 2019-07-11 PROCEDURE — G8918 PT W/O PREOP ORDER IV AB PRO: HCPCS

## 2019-07-11 PROCEDURE — G8907 PT DOC NO EVENTS ON DISCHARG: HCPCS

## 2019-07-11 PROCEDURE — 66984 XCAPSL CTRC RMVL W/O ECP: CPT | Mod: RT

## 2019-07-11 DEVICE — EYE IMP IOL AMO PCL TECNIS ZCB00 19.5: Type: IMPLANTABLE DEVICE | Site: EYE | Status: FUNCTIONAL

## 2019-07-11 RX ORDER — CYCLOPENTOLATE HYDROCHLORIDE 10 MG/ML
1 SOLUTION/ DROPS OPHTHALMIC
Status: COMPLETED | OUTPATIENT
Start: 2019-07-11 | End: 2019-07-11

## 2019-07-11 RX ORDER — LIDOCAINE 40 MG/G
CREAM TOPICAL
Status: DISCONTINUED | OUTPATIENT
Start: 2019-07-11 | End: 2019-07-12 | Stop reason: HOSPADM

## 2019-07-11 RX ORDER — PHENYLEPHRINE HYDROCHLORIDE 25 MG/ML
1 SOLUTION/ DROPS OPHTHALMIC
Status: COMPLETED | OUTPATIENT
Start: 2019-07-11 | End: 2019-07-11

## 2019-07-11 RX ORDER — MOXIFLOXACIN 5 MG/ML
SOLUTION/ DROPS OPHTHALMIC PRN
Status: DISCONTINUED | OUTPATIENT
Start: 2019-07-11 | End: 2019-07-11 | Stop reason: HOSPADM

## 2019-07-11 RX ORDER — SODIUM CHLORIDE, SODIUM LACTATE, POTASSIUM CHLORIDE, CALCIUM CHLORIDE 600; 310; 30; 20 MG/100ML; MG/100ML; MG/100ML; MG/100ML
500 INJECTION, SOLUTION INTRAVENOUS CONTINUOUS
Status: DISCONTINUED | OUTPATIENT
Start: 2019-07-11 | End: 2019-07-12 | Stop reason: HOSPADM

## 2019-07-11 RX ORDER — KETAMINE HYDROCHLORIDE 10 MG/ML
INJECTION, SOLUTION INTRAMUSCULAR; INTRAVENOUS PRN
Status: DISCONTINUED | OUTPATIENT
Start: 2019-07-11 | End: 2019-07-11

## 2019-07-11 RX ORDER — PROPARACAINE HYDROCHLORIDE 5 MG/ML
1 SOLUTION/ DROPS OPHTHALMIC
Status: COMPLETED | OUTPATIENT
Start: 2019-07-11 | End: 2019-07-11

## 2019-07-11 RX ORDER — MOXIFLOXACIN 5 MG/ML
1 SOLUTION/ DROPS OPHTHALMIC
Status: COMPLETED | OUTPATIENT
Start: 2019-07-11 | End: 2019-07-11

## 2019-07-11 RX ORDER — KETOROLAC TROMETHAMINE 5 MG/ML
1 SOLUTION OPHTHALMIC
Status: COMPLETED | OUTPATIENT
Start: 2019-07-11 | End: 2019-07-11

## 2019-07-11 RX ORDER — TETRACAINE HYDROCHLORIDE 5 MG/ML
SOLUTION OPHTHALMIC PRN
Status: DISCONTINUED | OUTPATIENT
Start: 2019-07-11 | End: 2019-07-11 | Stop reason: HOSPADM

## 2019-07-11 RX ADMIN — Medication 500 ML: at 08:09

## 2019-07-11 RX ADMIN — KETOROLAC TROMETHAMINE 1 DROP: 5 SOLUTION OPHTHALMIC at 06:43

## 2019-07-11 RX ADMIN — MOXIFLOXACIN 1 DROP: 5 SOLUTION/ DROPS OPHTHALMIC at 08:10

## 2019-07-11 RX ADMIN — KETAMINE HYDROCHLORIDE 15 MG: 10 INJECTION, SOLUTION INTRAMUSCULAR; INTRAVENOUS at 08:06

## 2019-07-11 RX ADMIN — Medication 1 DROP: at 08:11

## 2019-07-11 RX ADMIN — PHENYLEPHRINE HYDROCHLORIDE 1 DROP: 25 SOLUTION/ DROPS OPHTHALMIC at 06:56

## 2019-07-11 RX ADMIN — CYCLOPENTOLATE HYDROCHLORIDE 1 DROP: 10 SOLUTION/ DROPS OPHTHALMIC at 06:50

## 2019-07-11 RX ADMIN — CYCLOPENTOLATE HYDROCHLORIDE 1 DROP: 10 SOLUTION/ DROPS OPHTHALMIC at 06:56

## 2019-07-11 RX ADMIN — MOXIFLOXACIN 1 DROP: 5 SOLUTION/ DROPS OPHTHALMIC at 06:43

## 2019-07-11 RX ADMIN — PHENYLEPHRINE HYDROCHLORIDE 1 DROP: 25 SOLUTION/ DROPS OPHTHALMIC at 06:43

## 2019-07-11 RX ADMIN — SODIUM CHLORIDE, SODIUM LACTATE, POTASSIUM CHLORIDE, CALCIUM CHLORIDE 500 ML: 600; 310; 30; 20 INJECTION, SOLUTION INTRAVENOUS at 06:53

## 2019-07-11 RX ADMIN — PROPARACAINE HYDROCHLORIDE 1 DROP: 5 SOLUTION/ DROPS OPHTHALMIC at 06:42

## 2019-07-11 RX ADMIN — MOXIFLOXACIN 1 DROP: 5 SOLUTION/ DROPS OPHTHALMIC at 06:50

## 2019-07-11 RX ADMIN — SODIUM CHLORIDE, SODIUM LACTATE, POTASSIUM CHLORIDE, CALCIUM CHLORIDE: 600; 310; 30; 20 INJECTION, SOLUTION INTRAVENOUS at 08:04

## 2019-07-11 RX ADMIN — TETRACAINE HYDROCHLORIDE 1 DROP: 5 SOLUTION OPHTHALMIC at 08:09

## 2019-07-11 RX ADMIN — CYCLOPENTOLATE HYDROCHLORIDE 1 DROP: 10 SOLUTION/ DROPS OPHTHALMIC at 06:43

## 2019-07-11 RX ADMIN — KETOROLAC TROMETHAMINE 1 DROP: 5 SOLUTION OPHTHALMIC at 06:56

## 2019-07-11 RX ADMIN — MOXIFLOXACIN 1 DROP: 5 SOLUTION/ DROPS OPHTHALMIC at 06:56

## 2019-07-11 RX ADMIN — KETOROLAC TROMETHAMINE 1 DROP: 5 SOLUTION OPHTHALMIC at 06:50

## 2019-07-11 RX ADMIN — PHENYLEPHRINE HYDROCHLORIDE 1 DROP: 25 SOLUTION/ DROPS OPHTHALMIC at 06:50

## 2019-07-11 ASSESSMENT — MIFFLIN-ST. JEOR: SCORE: 1468.61

## 2019-07-11 ASSESSMENT — ENCOUNTER SYMPTOMS: SEIZURES: 1

## 2019-07-11 NOTE — ANESTHESIA POSTPROCEDURE EVALUATION
Anesthesia POST Procedure Evaluation    Patient: Raphael Lizarraga   MRN:     [id number] Gender:   male   Age:    54 year old :      1965        Preoperative Diagnosis: RIGHT CATARACT   Procedure(s):  PHACOEMULSIFICATION, CATARACT, WITH STANDARD IOL INSERTION, RIGHT   Postop Comments: No value filed.       Anesthesia Type:  MAC  No value filed.    Reportable Event: NO     PAIN: Uncomplicated   Sign Out status: Comfortable, Well controlled pain     PONV: No PONV   Sign Out status:  No Nausea or Vomiting     Neuro/Psych: Uneventful perioperative course   Sign Out Status: Preoperative baseline; Age appropriate mentation     Airway/Resp.: Uneventful perioperative course   Sign Out Status: Non labored breathing, age appropriate RR; Resp. Status within EXPECTED Parameters     CV: Uneventful perioperative course   Sign Out status: Appropriate BP and perfusion indices; Appropriate HR/Rhythm     Disposition:   Sign Out in:  PACU  Disposition:  Phase II; Home  Recovery Course: Uneventful  Follow-Up: Not required           Last Anesthesia Record Vitals:  CRNA VITALS  2019 0749 - 2019 0849      2019             Pulse:  61    SpO2:  100 %          Last PACU Vitals:  Vitals Value Taken Time   BP     Temp     Pulse     Resp     SpO2     Temp src     NIBP 120/79 2019  8:15 AM   Pulse 61 2019  8:18 AM   SpO2 100 % 2019  8:18 AM   Resp     Temp     Ht Rate     Temp 2           Electronically Signed By: Trevin Ramirez MD, 2019, 1:57 PM

## 2019-07-11 NOTE — ANESTHESIA CARE TRANSFER NOTE
Patient: Raphael Lizarraga    Procedure(s):  PHACOEMULSIFICATION, CATARACT, WITH STANDARD IOL INSERTION, RIGHT    Diagnosis: RIGHT CATARACT  Diagnosis Additional Information: No value filed.    Anesthesia Type:   No value filed.     Note:  Airway :Room Air  Patient transferred to:Phase II  Comments: Care of Transfer report given to RN.  Spont Respirations. Responds   Easy.Handoff Report: Identifed the Patient, Identified the Reponsible Provider, Reviewed the pertinent medical history, Discussed the surgical course, Reviewed Intra-OP anesthesia mangement and issues during anesthesia, Set expectations for post-procedure period and Allowed opportunity for questions and acknowledgement of understanding      Vitals: (Last set prior to Anesthesia Care Transfer)    CRNA VITALS  7/11/2019 0749 - 7/11/2019 0819      7/11/2019             Pulse:  61    SpO2:  100 %                Electronically Signed By: RAFA MARTE CRNA  July 11, 2019  8:19 AM

## 2019-07-11 NOTE — ANESTHESIA PREPROCEDURE EVALUATION
Anesthesia Pre-Procedure Evaluation    Patient: Raphael Lizarraga   MRN:     [id number] Gender:   male   Age:    54 year old :      1965        Preoperative Diagnosis: LEFT CATARACT   Procedure(s):  PHACOEMULSIFICATION, CATARACT, WITH STANDARD IOL INSERTION, LEFT     Past Medical History:   Diagnosis Date     Anxiety      Diverticulosis      Epilepsy (H)      Intermittent asthma      Major depressive disorder, recurrent episode, unspecified 2009    Never saw psychiatrist     Nonsenile cataract      NELIA (obstructive sleep apnea)     Uses CPAP machine     Osteoporosis/osteopenia increased risk     due to long-term Dilantin use      Past Surgical History:   Procedure Laterality Date     C LAP,PARTIAL NEPHRECTOMY  08    LT for benign mass, Dr. Donald     COLONOSCOPY WITH CO2 INSUFFLATION N/A 2018    Procedure: COLONOSCOPY WITH CO2 INSUFFLATION;  Colonoscopy, Dr. Belcher, Screen for colon cancer, BMI 22.11[facility];  Surgeon: Duane, William Charles, MD;  Location: [facility] OR     LOBECTOMY  1994    LT temporal for epilepsy     PHACOEMULSIFICATION WITH STANDARD INTRAOCULAR LENS IMPLANT Left 2019    Procedure: PHACOEMULSIFICATION, CATARACT, WITH STANDARD IOL INSERTION, LEFT;  Surgeon: Gutierrez Mcdowell MD;  Location: [facility] OR     VASECTOMY            Anesthesia Evaluation     .             ROS/MED HX    ENT/Pulmonary:  - neg pulmonary ROS   (+)sleep apnea, asthma , . .    Neurologic:  - neg neurologic ROS   (+)seizures last seizure: years ago     Cardiovascular:  - neg cardiovascular ROS       METS/Exercise Tolerance:     Hematologic:  - neg hematologic  ROS       Musculoskeletal:  - neg musculoskeletal ROS       GI/Hepatic:  - neg GI/hepatic ROS       Renal/Genitourinary:  - ROS Renal section negative       Endo:  - neg endo ROS       Psychiatric:  - neg psychiatric ROS   (+) psychiatric history anxiety and depression      Infectious Disease:  - neg infectious disease ROS      "  Malignancy:      - no malignancy   Other:    - neg other ROS                     PHYSICAL EXAM:   Mental Status/Neuro: A/A/O   Airway: Facies: Feasible  Mallampati: I  Mouth/Opening: Full  TM distance: > 6 cm  Neck ROM: Full   Respiratory: Auscultation: CTAB     Resp. Rate: Normal     Resp. Effort: Normal      CV: Rhythm: Regular  Rate: Age appropriate  Heart: Normal Sounds   Comments:      Dental: Normal                  Lab Results   Component Value Date    WBC 2.6 (L) 01/26/2018    HGB 14.5 01/26/2018    HCT 44.4 01/26/2018     01/26/2018    POTASSIUM 5.2 07/29/2016    CR 0.85 07/29/2016    GLC 88 07/29/2016    ALT 31 01/26/2018    AST 19 01/26/2018    GGT 68 09/08/2017    INR 1.01 03/25/2016       Preop Vitals  BP Readings from Last 3 Encounters:   07/11/19 118/72   06/27/19 124/89   06/21/19 118/71    Pulse Readings from Last 3 Encounters:   06/21/19 59   03/29/19 72   02/22/19 58      Resp Readings from Last 3 Encounters:   07/11/19 16   06/27/19 16   06/21/19 12    SpO2 Readings from Last 3 Encounters:   07/11/19 97%   06/27/19 99%   06/21/19 100%      Temp Readings from Last 1 Encounters:   07/11/19 36.7  C (98  F) (Temporal)    Ht Readings from Last 1 Encounters:   07/11/19 1.727 m (5' 8\")      Wt Readings from Last 1 Encounters:   07/11/19 64.4 kg (142 lb)    Estimated body mass index is 21.59 kg/m  as calculated from the following:    Height as of an earlier encounter on 7/11/19: 1.727 m (5' 8\").    Weight as of an earlier encounter on 7/11/19: 64.4 kg (142 lb).     LDA:  Peripheral IV 07/11/19 Left Hand (Active)   Site Assessment WDL 7/11/2019  6:52 AM   Line Status Infusing 7/11/2019  6:52 AM   Phlebitis Scale 0-->no symptoms 7/11/2019  6:52 AM   Infiltration Scale 0 7/11/2019  6:52 AM   Infiltration Site Treatment Method  None 7/11/2019  6:52 AM   Extravasation? No 7/11/2019  6:52 AM   Dressing Intervention New dressing  7/11/2019  6:52 AM   Number of days: 0            Assessment:   ASA " SCORE: 3    NPO Status: > 6 hours since completed Solid Foods   Documentation: H&P complete; Preop Testing complete; Consents complete   Proceeding: Proceed without further delay  Tobacco Use:  NO Active use of Tobacco/UNKNOWN Tobacco use status     Plan:   Anes. Type:  MAC   Pre-Induction: Midazolam IV   Induction:  Not applicable   Airway: Native Airway   Access/Monitoring: PIV   Maintenance: N/a   Emergence: N/a   Logistics: Same Day Surgery     Postop Pain/Sedation Strategy:  Standard-Options: Opioids PRN     PONV Management:  Adult Risk Factors:, Non-Smoker, Postop Opioids  Prevention: Ondansetron     CONSENT: Direct conversation   Plan and risks discussed with: Patient   Blood Products: Consent Deferred (Minimal Blood Loss)                             Trevin Ramirez MD

## 2019-07-11 NOTE — DISCHARGE INSTRUCTIONS
Raphael Lizarraga    Cataract Surgery Postoperative Instructions    Postoperative Medications: After surgery, you will use several different eye drop  medications. In most cases you will start these eye drops 2 days before surgery.    1. Ocuflox - is an antibiotic drop that is used to minimize the risk of infection. It should be used 4 times daily for 10 days total or until you are told to discontinue.    (Acceptable alternatives to Ocuflox include: Zymaxid, Besivance, Gatafloxacin, Vigamox)     2.  Ketorolac - is an anti-inflammatory drop. Use it 4 time daily for 21 days total or until you are told to discontinue.  (Acceptable alternatives to Ketorolac include: Acuvail, Nevenac, Xibrom)    3. Prednisilone - is a steroid eye drop, used to minimize inflammation and modulate  healing. It should be used 4 times daily for 21 days total or until you are told to discontinue.  (Acceptable alternatives for Prednisilone include: Pred Forte, Omnipred, Econopred)      IF YOU GET AN ALTERNATIVE EYE DROP PLEASE FOLLOW THE DIRECTIONS ON THE BOTTLE OF DROPS. THEY WILL BE DIFFERENT!      The drops might sting a little when they are instilled, and that is normal.    It doesn t matter what order you put the drops into your eyes, but you should wait at least one minute between drops.    Please continue any glaucoma, dry eye, or other medications you were using prior to the surgery.    Please allow 24 to 48 hours when requesting refills, and call BEFORE you run out of drops.      Artificial Tears - are lubricating drops used to moisturize the eye. You can use these as much as you want, particularly if your eyes feel watery, gritty, or uncomfortable. Chilling these drops in the refrigerator results in a more soothing feeling. There are several brands of artificial tears available including, but not limited to: Optive, Refresh, Systane, Blink, Genteal, Soothe, and others. You should not use drops that  get the red out . You do not  need a prescription for these medications.      Restriction on Activities - It is extremely important that you DO NOT RUB THE  TREATED EYE.  - You will be given a clear plastic shield to wear as protection over your eye the  night after surgery.  - Refrain from any activities that may put your eye at risk of injury, as well as areas  containing a high volume of chemicals, dust, and debris.  - Do Not wear any eye makeup or moisturizer around the eye for 1 week after  surgery.  - Do Not swim or go into a hot-tub, Jacuzzi, or sauna for 1 week after surgery. You  can take showers as normal, but avoid getting shampoo or soap in your eyes.  - Avoid strenuous activity, including lifting more than 30 lbs, for 1 week after  surgery.  - It is fine to bathe, read, watch TV, and use the computer.  Symptoms requiring medical attention:  - Sudden onset of increased discharge from the eye  - Persistent or increasing pain in the eye  - Sudden decrease in vision  - Persistent nausea or vomiting    If you have any questions or concerns before or after your surgery, please contact:    Dr. Mcdowell s office at [phone]    [facility]  Same-Day Surgery   Adult Discharge Orders & Instructions   For 24 hours after surgery  1. Get plenty of rest.  A responsible adult must stay with you for at least 24 hours after you leave the hospital.   2. Do not drive or use heavy equipment.  If you have weakness or tingling, don't drive or use heavy equipment until this feeling goes away.  3. Do not drink alcohol.  4. Avoid strenuous or risky activities.  Ask for help when climbing stairs.   5. You may feel lightheaded.  IF so, sit for a few minutes before standing.  Have someone help you get up.   6. If you have nausea (feel sick to your stomach): Drink only clear liquids such as apple juice, ginger ale, broth or 7-Up.  Rest may also help.  Be sure to drink enough fluids.  Move to a regular diet as you feel able.  7. You  may have a slight fever. Call the doctor if your fever is over 100 F (37.7 C) (taken under the tongue) or lasts longer than 24 hours.  8. You may have a dry mouth, a sore throat, muscle aches or trouble sleeping.  These should go away after 24 hours.  9. Do not make important or legal decisions.   Call your doctor for any of the followin.  Signs of infection (fever, growing tenderness at the surgery site, a large amount of drainage or bleeding, severe pain, foul-smelling drainage, redness, swelling).    2. It has been over 8 to 10 hours since surgery and you are still not able to urinate (pass water).    3.  Headache for over 24 hours.

## 2019-07-11 NOTE — OP NOTE
PATIENT NAME:  Raphael Lizarraga    :  1965    PATIENT NUMBER:  [id number]    DATE OF SURGERY:  2019    SURGEON:  Gutierrez Mcdowell M.D.    PREOPERATIVE DIAGNOSIS: Cataract right eye.    POSTOPERATIVE DIAGNOSIS:  Same    PROCEDURE PERFORMED:    1. Phacoemulsification with posterior chamber intraocular lens right eye.    ANESTHESIA:  Topical/MAC    COMPLICATIONS:  None    PROCEDURE: Following adequate preoperative dilation the patient was given topical anesthesia consisting of Proparacaine.  The patient was brought to the operative suite where the eye was prepped and draped in the usual sterile fashion.  A lid speculum was applied. A super sharp blade was used to create a paracentesis, through which 1% preservative free Lidocaine was injected.  Visoelastic was then used to inflate the anterior chamber.  A biplanar incision at the clear cornea limbus was created with a keratome.  A continuous curvilinear capsulorrhexis was started with a cystitome and completed using Utrata forceps.  The lens was hydrodissected and hydro delineated using BSS on a cannula.  The lens nucleus was removed using phacoemulsification.  Remaining cortex was removed using irrigation and aspiration.  Viscoelastic was injected to inflate the capsular bag and a 19.5 D ZCB00 IOL was inserted into the capsular bag without difficulty.  Residual viscoelastic and provisc material was removed with irrigation and aspiration.  BSS was used to hydrate the corneal incision and paracentesis sites which were checked and noted to be watertight.  A drop of Vigamox was applied to the eye and a clear plastic shield was placed.  The patient tolerated the procedure well and left the operative suite in stable condition.    Gutierrez Mcdowell M.D.

## 2019-07-19 ENCOUNTER — OFFICE VISIT (OUTPATIENT)
Dept: OPTOMETRY | Facility: CLINIC | Age: 54
End: 2019-07-19
Payer: COMMERCIAL

## 2019-07-19 DIAGNOSIS — Z96.1 PSEUDOPHAKIA OF RIGHT EYE: Primary | ICD-10-CM

## 2019-07-19 PROCEDURE — 99207 ZZC POST PARTUM EXAM: CPT | Performed by: OPTOMETRIST

## 2019-07-19 ASSESSMENT — TONOMETRY
OS_IOP_MMHG: 14
IOP_METHOD: TONOPEN
OD_IOP_MMHG: 15

## 2019-07-19 ASSESSMENT — SLIT LAMP EXAM - LIDS
COMMENTS: MEIBOMIAN GLAND DYSFUNCTION
COMMENTS: MEIBOMIAN GLAND DYSFUNCTION

## 2019-07-19 ASSESSMENT — EXTERNAL EXAM - RIGHT EYE: OD_EXAM: BROW PTOSIS

## 2019-07-19 ASSESSMENT — VISUAL ACUITY
OS_SC: 20/30
METHOD: SNELLEN - LINEAR
OS_SC+: -1
OD_SC: 20/40
OD_PH_SC: 20/20-3
OS_PH_SC: 20/20
OD_SC+: -2

## 2019-07-19 ASSESSMENT — EXTERNAL EXAM - LEFT EYE: OS_EXAM: BROW PTOSIS

## 2019-07-19 NOTE — PATIENT INSTRUCTIONS
Discontinue ofloxacin.  Continue prednisolone acetate and ketorolac for the full 21 days.    Ok to discontinue shield while sleeping.  All restrictions are lifted.    Keep follow up appointments as scheduled.    Contact your pharmacy to refill drops if needed.    Return in 3 weeks for final refraction.  Your pupils will be dilated at that visit.    Artificial tears both eyes - 1 drop 3-4 x daily.  1 drop especially before bedtime.       Please continue any glaucoma, dry eye, or other medications you were using prior to the surgery.        Andres Wayne, OD  [facility]  [address] [place] [address]  Tel- [phone]

## 2019-07-19 NOTE — PROGRESS NOTES
CHIEF COMPLAINT:   Chief Complaint   Patient presents with     Cataract Follow-Up     1 week surgical follow up PHACOEMULSIFICATION, CATARACT, WITH STANDARD IOL INSERTION, RIGHT Right       Type of surgery   PHACOEMULSIFICATION, CATARACT, WITH STANDARD IOL INSERTION, RIGHT        Date of surgery July 11 - Right eye; June 27 - left eye    Pt says left eye is feeling better.  No concerns or issues with the right eye.    Drops reviewed. Drops on right eye last used this morning at 7:30 am.    OBJECTIVE:     See ophthalmology exam    ASSESSMENT:         ICD-10-CM    1. Pseudophakia of right eye Z96.1      PLAN:      Patient Instructions   Discontinue ofloxacin.  Continue prednisolone acetate and ketorolac for the full 21 days.    Ok to discontinue shield while sleeping.  All restrictions are lifted.    Keep follow up appointments as scheduled.    Contact your pharmacy to refill drops if needed.    Return in 3 weeks for final refraction.  Your pupils will be dilated at that visit.    Artificial tears both eyes - 1 drop 3-4 x daily.  1 drop especially before bedtime.       Please continue any glaucoma, dry eye, or other medications you were using prior to the surgery.        Andres Wayne, RILEY  [facility]  [address] [place] [address]  Tel- [phone]

## 2019-08-09 ENCOUNTER — OFFICE VISIT (OUTPATIENT)
Dept: OPTOMETRY | Facility: CLINIC | Age: 54
End: 2019-08-09
Payer: COMMERCIAL

## 2019-08-09 ENCOUNTER — APPOINTMENT (OUTPATIENT)
Dept: OPTOMETRY | Facility: CLINIC | Age: 54
End: 2019-08-09
Payer: COMMERCIAL

## 2019-08-09 DIAGNOSIS — Z96.1 PSEUDOPHAKIA OF BOTH EYES: Primary | ICD-10-CM

## 2019-08-09 PROCEDURE — V2781 PROGRESSIVE LENS PER LENS: HCPCS | Mod: GA | Performed by: OPTOMETRIST

## 2019-08-09 PROCEDURE — 99024 POSTOP FOLLOW-UP VISIT: CPT | Performed by: OPTOMETRIST

## 2019-08-09 PROCEDURE — V2300 LENS SPHERE TRIFOCAL 4.00D: HCPCS | Mod: RT | Performed by: OPTOMETRIST

## 2019-08-09 ASSESSMENT — REFRACTION_WEARINGRX
OD_CYLINDER: +2.75
OD_AXIS: 010
OD_ADD: +2.25
OS_AXIS: 160
OS_ADD: +2.25
OD_SPHERE: -2.75
OS_SPHERE: -2.75
OS_CYLINDER: +2.00

## 2019-08-09 ASSESSMENT — REFRACTION_MANIFEST
OS_AXIS: 163
OD_AXIS: 017
OS_CYLINDER: +1.75
OD_CYLINDER: +2.25
OD_ADD: +2.25
OS_SPHERE: -1.50
OS_ADD: +2.25
OD_SPHERE: -1.50

## 2019-08-09 ASSESSMENT — TONOMETRY
IOP_METHOD: ICARE
OS_IOP_MMHG: 16
OD_IOP_MMHG: 13

## 2019-08-09 ASSESSMENT — VISUAL ACUITY
OD_SC+: -2
OD_SC: 20/60
OS_SC: 20/60
OD_PH_SC+: +2
OS_PH_SC: 20/25
METHOD: SNELLEN - LINEAR
OD_PH_SC: 20/25
OS_SC+: -1

## 2019-08-09 ASSESSMENT — EXTERNAL EXAM - RIGHT EYE: OD_EXAM: BROW PTOSIS

## 2019-08-09 ASSESSMENT — SLIT LAMP EXAM - LIDS
COMMENTS: MEIBOMIAN GLAND DYSFUNCTION
COMMENTS: MEIBOMIAN GLAND DYSFUNCTION

## 2019-08-09 ASSESSMENT — CUP TO DISC RATIO
OD_RATIO: 0.2
OS_RATIO: 0.2

## 2019-08-09 ASSESSMENT — CONF VISUAL FIELD
OS_SUPERIOR_NASAL_RESTRICTION: 3
OD_SUPERIOR_TEMPORAL_RESTRICTION: 3

## 2019-08-09 ASSESSMENT — EXTERNAL EXAM - LEFT EYE: OS_EXAM: BROW PTOSIS

## 2019-08-09 NOTE — PATIENT INSTRUCTIONS
Eyeglass prescription given.    Return for visual field at [place] as a baseline secondary to visual field loss post brain surgery due to epilepsy.    Andres Wayne, OD

## 2019-08-09 NOTE — PROGRESS NOTES
CHIEF COMPLAINT:   Chief Complaint   Patient presents with     Post Op (Ophthalmology) Both Eyes   Pt here for final MR is frustrated with his uncorrected VA.    PHACOEMULSIFICATION, CATARACT, WITH STANDARD IOL INSERTION,     RIGHT  07/11/2019  LEFT 06/27/2019    Loss of field of vision right eye secondary to brain surgery-epilepsy-30 years ago- feels no change.    Drops reviewed. Pt is finished with all post op gtts OU    OBJECTIVE:     See ophthalmology exam    ASSESSMENT:         ICD-10-CM    1. Pseudophakia of both eyes Z96.1 POST-OP FOLLOW-UP VISIT     PLAN:      Patient Instructions   Eyeglass prescription given.    Return for visual field at [place] as a baseline secondary to visual field loss post brain surgery due to epilepsy.    Andres Wayne, OD

## 2019-08-22 ENCOUNTER — HOSPITAL ENCOUNTER (EMERGENCY)
Facility: CLINIC | Age: 54
Discharge: HOME | End: 2019-08-22
Attending: EMERGENCY MEDICINE | Admitting: EMERGENCY MEDICINE
Payer: OTHER MISCELLANEOUS

## 2019-08-22 ENCOUNTER — OFFICE VISIT (OUTPATIENT)
Dept: OPHTHALMOLOGY | Facility: CLINIC | Age: 54
End: 2019-08-22
Payer: OTHER MISCELLANEOUS

## 2019-08-22 VITALS
RESPIRATION RATE: 16 BRPM | WEIGHT: 140 LBS | SYSTOLIC BLOOD PRESSURE: 130 MMHG | BODY MASS INDEX: 21.22 KG/M2 | TEMPERATURE: 98.5 F | HEIGHT: 68 IN | OXYGEN SATURATION: 99 % | DIASTOLIC BLOOD PRESSURE: 91 MMHG | HEART RATE: 64 BPM

## 2019-08-22 DIAGNOSIS — H18.891 CORNEAL RUST RING OF RIGHT EYE: ICD-10-CM

## 2019-08-22 DIAGNOSIS — H18.891 CORNEAL RUST RING OF RIGHT EYE: Primary | ICD-10-CM

## 2019-08-22 PROCEDURE — 99283 EMERGENCY DEPT VISIT LOW MDM: CPT

## 2019-08-22 PROCEDURE — 65222 REMOVE FOREIGN BODY FROM EYE: CPT | Mod: RT | Performed by: OPHTHALMOLOGY

## 2019-08-22 PROCEDURE — 92012 INTRM OPH EXAM EST PATIENT: CPT | Mod: 25 | Performed by: OPHTHALMOLOGY

## 2019-08-22 RX ORDER — NEOMYCIN POLYMYXIN B SULFATES AND DEXAMETHASONE 3.5; 10000; 1 MG/ML; [USP'U]/ML; MG/ML
1 SUSPENSION/ DROPS OPHTHALMIC 4 TIMES DAILY
Qty: 5 ML | Refills: 0 | Status: SHIPPED | OUTPATIENT
Start: 2019-08-22 | End: 2019-08-29

## 2019-08-22 RX ORDER — POLYMYXIN B SULFATE AND TRIMETHOPRIM 1; 10000 MG/ML; [USP'U]/ML
1-2 SOLUTION OPHTHALMIC EVERY 4 HOURS
Qty: 6 ML | Refills: 0 | Status: SHIPPED | OUTPATIENT
Start: 2019-08-22 | End: 2019-09-01

## 2019-08-22 RX ORDER — PROPARACAINE HYDROCHLORIDE 5 MG/ML
1 SOLUTION/ DROPS OPHTHALMIC ONCE
Status: DISCONTINUED | OUTPATIENT
Start: 2019-08-22 | End: 2019-08-22 | Stop reason: HOSPADM

## 2019-08-22 ASSESSMENT — ENCOUNTER SYMPTOMS
EYE PAIN: 1
FEVER: 0

## 2019-08-22 ASSESSMENT — VISUAL ACUITY
METHOD: SNELLEN - LINEAR
OD_CC: 20/80
OS_CC: 20/20
OD_SC: 20/70
OD_PH_SC: 20/30

## 2019-08-22 ASSESSMENT — MIFFLIN-ST. JEOR: SCORE: 1459.54

## 2019-08-22 NOTE — PROGRESS NOTES
Current Eye Medications:  systane both eyes three times a day.      Subjective:  Patient was seen in the ER today and diagnosed with a corneal rust ring, right eye.  The ER Doctor was able to remove a portion of the rust ring, then recommended he see Dr. Quevedo.  He was given a prescription for Polytrim.    Patient was grinding at work on 8-20-19,  and felt a piece of metal hit his right eye.  His right eye was hurting that night and somewhat yesterday and today.  History of cataract surgery both eyes within the past two months.  He just had new glasses made, but thinks there is something wrong with the right lens.    History of foreign body's in his eyes.       Objective:  See Ophthalmology Exam.       Assessment:  Corneal rust ring right eye.      Plan:  Rust ring right eye removed (90%) at slit lamp with preston under Proparacaine anesthetic without difficulty.  Well tolerated.  Use Carlos A/Dex drop right eye four times daily for one week.  Contact Dr. Mcdowell's office to evaluate difficulty with new glasses.  Return visit as needed.  Quinn Quevedo M.D.  [phone]

## 2019-08-22 NOTE — ED PROVIDER NOTES
"  History     Chief Complaint:  Eye Pain    HPI   Raphael Lizarraga is a 54 year old male who presents with right eye pain. The patient reports on 8/20/19 he was at work and grinding metal when he believes a piece of metal flew up into his right eye. He reports feeling something in his right eye with some blurry vision. He states that typically when this happens, it resolves on its own but it has not this time.     Allergies:  No known drug allergies    Medications:    Albuterol inhaler  Wellbutrin  Dilantin  Paxil    Past Medical History:    Anxiety  Diverticulosis  Epilepsy  Asthma  Depression  Nonsenile cataract  Obstructive sleep apnea    Past Surgical History:    Partial nephrectomy  Cataract IOL  Lobectomy  Phacoemulsification with standard intraocular lens implant (Bilateral)  Vasectomy    Family History:    Diabetes   Hypertension    Social History:  Smoking status: Former, quit 8/25/1993  Alcohol use: Yes, 4 cans of beer on the weekend  Drug use: No  PCP: Chava Belcher  Last Td/Tdap was 8/3/3/2018  Marital Status:   [2]    Review of Systems   Constitutional: Negative for fever.   Eyes: Positive for pain and visual disturbance.   All other systems reviewed and are negative.        Physical Exam     Patient Vitals for the past 24 hrs:   BP Temp Temp src Pulse Resp SpO2 Height Weight   08/22/19 0958 (!) 130/91 98.5  F (36.9  C) Oral 64 16 99 % 1.727 m (5' 8\") 63.5 kg (140 lb)     Physical Exam  Nursing note and vitals reviewed.  Constitutional:  Oriented to person, place, and time. Cooperative.   HENT:   Nose:    Nose normal.   Mouth/Throat:   Mucous membranes are normal.   Eyes:    Conjunctivae normal and EOM are normal.      Pupils are equal, round, and reactive to light.  Small rust ring present on the right cornea.  Otherwise no foreign bodies identified and no other floor seen uptake on Woods lamp or slit-lamp exams. Visual acuity (R): 20/200, (L): 20/20  Neck:    Trachea normal.   Cardiovascular: "  Normal rate, regular rhythm, normal heart sounds and normal pulses. No murmur heard.  Pulmonary/Chest:  Effort normal and breath sounds normal.   Abdominal:   Soft. Normal appearance and bowel sounds are normal.      There is no tenderness.      There is no rebound and no CVA tenderness.   Musculoskeletal:  Extremities atraumatic x 4.   Lymphadenopathy:  No cervical adenopathy.   Neurological:   Alert and oriented to person, place, and time. Normal strength.      No cranial nerve deficit or sensory deficit. GCS eye subscore is 4. GCS verbal subscore is 5. GCS motor subscore is 6.   Skin:    Skin is intact. No rash noted.   Psychiatric:   Normal mood and affect.              Emergency Department Course   Emergency Department Course:  Past medical records, nursing notes, and vitals reviewed.  1011: I performed an exam of the patient and obtained history, as documented above.    1038: The patient's visual acuity was checked here in the emergency department.    Findings and plan explained to the Patient. Patient discharged home with instructions regarding supportive care, medications, and reasons to return. The importance of close follow-up was reviewed.      Impression & Plan    Medical Decision Making:  This is a 54-year-old male who came in for further evaluation of right eye pain.  He was concerned that he might have a piece of metal still in the eye.  I checked both the upper and lower eyelids and inverted the upper eyelid, and I was unable to visualize any retained foreign bodies.  However he does have an apparent rust ring on the cornea.  I was able to remove some of this per the above procedure note using an eye drill.  I will prescribe Polytrim eyedrops and have him follow-up with either his ophthalmologist or the one I am providing to him.  Of note his tetanus is up-to-date.    Diagnosis:    ICD-10-CM   1. Corneal rust ring of right eye H18.891     Disposition:  discharged to home    Discharge  Medications:  New Prescriptions   TRIMETHOPRIM-POLYMYXIN B (POLYTRIM) 72913-7.1 UNIT/ML-% OPHTHALMIC SOLUTION    Place 1-2 drops into the right eye every 4 hours for 10 days     Isael Soares  8/22/2019    EMERGENCY DEPARTMENT  Isael ABRAMS, am serving as a scribe at 10:11 AM on 8/22/2019 to document services personally performed by Jonhathan Mckeon MD based on my observations and the provider's statements to me.     Johnathan Mckeon MD  08/22/19 [id number]

## 2019-08-22 NOTE — PATIENT INSTRUCTIONS
Use Carlos A/Dex drop right eye four times daily for one week.  Contact Dr. Mcdowell's office to evaluate difficulty with new glasses.  Return visit as needed.  Quinn Quevedo M.D.  [phone]

## 2019-08-22 NOTE — LETTER
8/22/2019         RE: Raphael Lizarraga  [address]  [place] [address]        Dear Colleague,    Thank you for referring your patient, Raphael Lizarraga, to the [facility]. Please see a copy of my visit note below.     Current Eye Medications:  systane both eyes three times a day.      Subjective:  Patient was seen in the ER today and diagnosed with a corneal rust ring, right eye.  The ER Doctor was able to remove a portion of the rust ring, then recommended he see Dr. Quevedo.  He was given a prescription for Polytrim.    Patient was grinding at work on 8-20-19,  and felt a piece of metal hit his right eye.  His right eye was hurting that night and somewhat yesterday and today.  History of cataract surgery both eyes within the past two months.  He just had new glasses made, but thinks there is something wrong with the right lens.    History of foreign body's in his eyes.       Objective:  See Ophthalmology Exam.       Assessment:  Corneal rust ring right eye.      Plan:  Rust ring right eye removed (90%) at slit lamp with preston under Proparacaine anesthetic without difficulty.  Well tolerated.  Use Carlos A/Dex drop right eye four times daily for one week.  Contact Dr. Mcdowell's office to evaluate difficulty with new glasses.  Return visit as needed.  Quinn Quevedo M.D.  [phone]           Again, thank you for allowing me to participate in the care of your patient.        Sincerely,        Quinn Quevedo MD

## 2019-08-22 NOTE — ED AVS SNAPSHOT
Emergency Department  [address] [place] [address]  Phone:  [phone]  Fax:  [phone]                                    Raphael Lizarraga   MRN: [id number]    Department:   Emergency Department   Date of Visit:  8/22/2019           After Visit Summary Signature Page    I have received my discharge instructions, and my questions have been answered. I have discussed any challenges I see with this plan with the nurse or doctor.    ..........................................................................................................................................  Patient/Patient Representative Signature      ..........................................................................................................................................  Patient Representative Print Name and Relationship to Patient    ..................................................               ................................................  Date                                   Time    ..........................................................................................................................................  Reviewed by Signature/Title    ...................................................              ..............................................  Date                                               Time          22EPIC Rev 08/18

## 2019-08-22 NOTE — ED TRIAGE NOTES
Right eye pain - while at work grinding metal piece flew up into right eye - pt reports feeling of something in right eye with blurry vision

## 2019-08-24 PROBLEM — H18.891 CORNEAL RUST RING OF RIGHT EYE: Status: ACTIVE | Noted: 2019-08-24

## 2019-08-24 ASSESSMENT — SLIT LAMP EXAM - LIDS
COMMENTS: MEIBOMIAN GLAND DYSFUNCTION
COMMENTS: MEIBOMIAN GLAND DYSFUNCTION

## 2019-08-24 ASSESSMENT — EXTERNAL EXAM - LEFT EYE: OS_EXAM: BROW PTOSIS

## 2019-08-24 ASSESSMENT — EXTERNAL EXAM - RIGHT EYE: OD_EXAM: BROW PTOSIS

## 2019-08-25 DIAGNOSIS — F41.9 ANXIETY: ICD-10-CM

## 2019-08-25 DIAGNOSIS — F33.0 MAJOR DEPRESSIVE DISORDER, RECURRENT EPISODE, MILD (H): ICD-10-CM

## 2019-08-25 NOTE — TELEPHONE ENCOUNTER
"Requested Prescriptions   Pending Prescriptions Disp Refills     buPROPion (WELLBUTRIN SR) 100 MG 12 hr tablet [Pharmacy Med Name: BUPROPION SR 100MG TABLETS (12H)]  Last Written Prescription Date:  2/22/19  Last Fill Quantity: 90,  # refills: 1   Last Office Visit with [facility], [facility] or [facility] prescribing provider:  6/21/19   Future Office Visit:      90 tablet 0     Sig: TAKE 1 TABLET(100 MG) BY MOUTH EVERY MORNING FOR DEPRESSION OR ANXIETY PREVENTION       SSRIs Protocol Failed - 8/25/2019  8:57 AM        Failed - PHQ-9 score less than 5 in past 6 months     Please review last PHQ-9 score.           Passed - Medication is Bupropion     If the medication is Bupropion (Wellbutrin), and the patient is taking for smoking cessation; OK to refill.          Passed - Medication is active on med list        Passed - Patient is age 18 or older        Passed - Recent (6 mo) or future (30 days) visit within the authorizing provider's specialty     Patient had office visit in the last 6 months or has a visit in the next 30 days with authorizing provider or within the authorizing provider's specialty.  See \"Patient Info\" tab in inbasket, or \"Choose Columns\" in Meds & Orders section of the refill encounter.              "

## 2019-08-27 NOTE — TELEPHONE ENCOUNTER
PHQ-9 SCORE 1/26/2018 8/3/2018 2/22/2019   PHQ-9 Total Score - - -   PHQ-9 Total Score 6 4 3     Routing refill request to provider for review/approval because:  PHQ-9 is overdue      Yovani Rea RN, BSN, PHN

## 2019-08-28 RX ORDER — BUPROPION HYDROCHLORIDE 100 MG/1
TABLET, EXTENDED RELEASE ORAL
Qty: 90 TABLET | Refills: 0 | Status: SHIPPED | OUTPATIENT
Start: 2019-08-28 | End: 2019-09-12

## 2019-08-29 ENCOUNTER — TELEPHONE (OUTPATIENT)
Dept: NEUROLOGY | Facility: CLINIC | Age: 54
End: 2019-08-29

## 2019-08-29 NOTE — TELEPHONE ENCOUNTER
[facility] Call Center    Phone Message    May a detailed message be left on voicemail: yes    Reason for Call: Other: Raphael calling to find out when his last seizure was.  He states he needs this information for his 's license.  Please call him back to discuss     Action Taken: Message routed to:  Adult Clinics: Neurology p [phone]

## 2019-08-30 DIAGNOSIS — Z11.4 SCREENING FOR HIV (HUMAN IMMUNODEFICIENCY VIRUS): ICD-10-CM

## 2019-08-30 DIAGNOSIS — G40.209 PARTIAL SYMPTOMATIC EPILEPSY WITH COMPLEX PARTIAL SEIZURES, NOT INTRACTABLE, WITHOUT STATUS EPILEPTICUS (H): ICD-10-CM

## 2019-08-30 LAB
ALT SERPL W P-5'-P-CCNC: 28 U/L (ref 0–70)
AST SERPL W P-5'-P-CCNC: 15 U/L (ref 0–45)
ERYTHROCYTE [DISTWIDTH] IN BLOOD BY AUTOMATED COUNT: 12.7 % (ref 10–15)
HCT VFR BLD AUTO: 41.2 % (ref 40–53)
HGB BLD-MCNC: 13.3 G/DL (ref 13.3–17.7)
HIV 1+2 AB+HIV1 P24 AG SERPL QL IA: NONREACTIVE
MCH RBC QN AUTO: 31.5 PG (ref 26.5–33)
MCHC RBC AUTO-ENTMCNC: 32.3 G/DL (ref 31.5–36.5)
MCV RBC AUTO: 98 FL (ref 78–100)
PLATELET # BLD AUTO: 252 10E9/L (ref 150–450)
RBC # BLD AUTO: 4.22 10E12/L (ref 4.4–5.9)
WBC # BLD AUTO: 3.7 10E9/L (ref 4–11)

## 2019-08-30 PROCEDURE — 84450 TRANSFERASE (AST) (SGOT): CPT | Performed by: FAMILY MEDICINE

## 2019-08-30 PROCEDURE — 84460 ALANINE AMINO (ALT) (SGPT): CPT | Performed by: FAMILY MEDICINE

## 2019-08-30 PROCEDURE — 85027 COMPLETE CBC AUTOMATED: CPT | Performed by: FAMILY MEDICINE

## 2019-08-30 PROCEDURE — 87389 HIV-1 AG W/HIV-1&-2 AB AG IA: CPT | Performed by: FAMILY MEDICINE

## 2019-08-30 PROCEDURE — 36415 COLL VENOUS BLD VENIPUNCTURE: CPT | Performed by: FAMILY MEDICINE

## 2019-08-30 NOTE — TELEPHONE ENCOUNTER
I returned call to patient and informed him that I found the last seizure date of 3/26/02 on his previous records from  [place] [facility].  Pt states that he does think this was his last date of seizure.  Pt states he would like this documented in chart for future reference.        Sofiya Au LPN

## 2019-08-30 NOTE — TELEPHONE ENCOUNTER
Sent questionnaires over my chart. Marking TC to assist with scheduling follow up appointment    Thanks    Josué Carter MA

## 2019-09-04 NOTE — TELEPHONE ENCOUNTER
Sent my chart to patient to schedule an appointment.  Robert Iniguez,  [facility]

## 2019-09-06 ENCOUNTER — TELEPHONE (OUTPATIENT)
Dept: NEUROLOGY | Facility: CLINIC | Age: 54
End: 2019-09-06

## 2019-09-06 NOTE — TELEPHONE ENCOUNTER
I contacted patient's wife to let her know I faxed the Loss of consciousness or voluntary control form to the [place] [employer] public [employer].  Copy mailed to patient    Justine Cooper CMA

## 2019-09-12 ENCOUNTER — OFFICE VISIT (OUTPATIENT)
Dept: FAMILY MEDICINE | Facility: CLINIC | Age: 54
End: 2019-09-12
Payer: COMMERCIAL

## 2019-09-12 VITALS
DIASTOLIC BLOOD PRESSURE: 72 MMHG | RESPIRATION RATE: 16 BRPM | WEIGHT: 140 LBS | SYSTOLIC BLOOD PRESSURE: 120 MMHG | HEART RATE: 65 BPM | BODY MASS INDEX: 21.22 KG/M2 | HEIGHT: 68 IN | TEMPERATURE: 98.2 F | OXYGEN SATURATION: 98 %

## 2019-09-12 DIAGNOSIS — G40.209 LOCALIZATION-RELATED PARTIAL EPILEPSY WITH COMPLEX PARTIAL SEIZURES (H): ICD-10-CM

## 2019-09-12 DIAGNOSIS — F41.9 ANXIETY: ICD-10-CM

## 2019-09-12 DIAGNOSIS — Z13.6 CARDIOVASCULAR SCREENING; LDL GOAL LESS THAN 160: ICD-10-CM

## 2019-09-12 DIAGNOSIS — J45.20 INTERMITTENT ASTHMA WITHOUT COMPLICATION, UNSPECIFIED ASTHMA SEVERITY: ICD-10-CM

## 2019-09-12 DIAGNOSIS — F33.0 MAJOR DEPRESSIVE DISORDER, RECURRENT EPISODE, MILD (H): Primary | ICD-10-CM

## 2019-09-12 DIAGNOSIS — Z23 NEED FOR PROPHYLACTIC VACCINATION AND INOCULATION AGAINST INFLUENZA: ICD-10-CM

## 2019-09-12 PROCEDURE — 99214 OFFICE O/P EST MOD 30 MIN: CPT | Mod: 25 | Performed by: FAMILY MEDICINE

## 2019-09-12 PROCEDURE — 80061 LIPID PANEL: CPT | Performed by: FAMILY MEDICINE

## 2019-09-12 PROCEDURE — 96127 BRIEF EMOTIONAL/BEHAV ASSMT: CPT | Mod: 59 | Performed by: FAMILY MEDICINE

## 2019-09-12 PROCEDURE — 90471 IMMUNIZATION ADMIN: CPT | Performed by: FAMILY MEDICINE

## 2019-09-12 PROCEDURE — 90686 IIV4 VACC NO PRSV 0.5 ML IM: CPT | Performed by: FAMILY MEDICINE

## 2019-09-12 PROCEDURE — 80185 ASSAY OF PHENYTOIN TOTAL: CPT | Performed by: FAMILY MEDICINE

## 2019-09-12 PROCEDURE — 36415 COLL VENOUS BLD VENIPUNCTURE: CPT | Performed by: FAMILY MEDICINE

## 2019-09-12 RX ORDER — BUPROPION HYDROCHLORIDE 100 MG/1
100 TABLET, EXTENDED RELEASE ORAL EVERY MORNING
Qty: 90 TABLET | Refills: 1 | Status: SHIPPED | OUTPATIENT
Start: 2019-09-12 | End: 2019-12-05

## 2019-09-12 RX ORDER — PAROXETINE 20 MG/1
20 TABLET, FILM COATED ORAL AT BEDTIME
Qty: 90 TABLET | Refills: 1 | Status: SHIPPED | OUTPATIENT
Start: 2019-09-12 | End: 2020-01-20

## 2019-09-12 RX ORDER — PHENYTOIN SODIUM 100 MG/1
CAPSULE, EXTENDED RELEASE ORAL
Qty: 360 CAPSULE | Refills: 1 | Status: SHIPPED | OUTPATIENT
Start: 2019-09-12 | End: 2020-07-10

## 2019-09-12 RX ORDER — ALBUTEROL SULFATE 90 UG/1
2 AEROSOL, METERED RESPIRATORY (INHALATION) EVERY 4 HOURS PRN
Qty: 1 INHALER | Refills: 1 | Status: SHIPPED | OUTPATIENT
Start: 2019-09-12 | End: 2021-02-18

## 2019-09-12 ASSESSMENT — ANXIETY QUESTIONNAIRES
3. WORRYING TOO MUCH ABOUT DIFFERENT THINGS: NOT AT ALL
1. FEELING NERVOUS, ANXIOUS, OR ON EDGE: NOT AT ALL
6. BECOMING EASILY ANNOYED OR IRRITABLE: SEVERAL DAYS
IF YOU CHECKED OFF ANY PROBLEMS ON THIS QUESTIONNAIRE, HOW DIFFICULT HAVE THESE PROBLEMS MADE IT FOR YOU TO DO YOUR WORK, TAKE CARE OF THINGS AT HOME, OR GET ALONG WITH OTHER PEOPLE: NOT DIFFICULT AT ALL
2. NOT BEING ABLE TO STOP OR CONTROL WORRYING: NOT AT ALL
GAD7 TOTAL SCORE: 1
7. FEELING AFRAID AS IF SOMETHING AWFUL MIGHT HAPPEN: NOT AT ALL
5. BEING SO RESTLESS THAT IT IS HARD TO SIT STILL: NOT AT ALL

## 2019-09-12 ASSESSMENT — MIFFLIN-ST. JEOR: SCORE: 1459.54

## 2019-09-12 ASSESSMENT — PAIN SCALES - GENERAL: PAINLEVEL: NO PAIN (0)

## 2019-09-12 ASSESSMENT — PATIENT HEALTH QUESTIONNAIRE - PHQ9
SUM OF ALL RESPONSES TO PHQ QUESTIONS 1-9: 3
5. POOR APPETITE OR OVEREATING: NOT AT ALL

## 2019-09-12 NOTE — LETTER
My Asthma Action Plan    Name: Raphael Lizarraga   YOB: 1965  Date: 9/12/2019   My doctor: Chava Belcher MD   My clinic: [facility]        My Rescue Medicine:   Albuterol inhaler (Proair/Ventolin/Proventil HFA)  2 puffs EVERY 4 HOURS as needed, or as directed below.   My Asthma Severity:   Intermittent / Exercise Induced  Know your asthma triggers.  None          GREEN ZONE   Good Control    I feel good    No cough or wheeze    Can work, sleep and play without asthma symptoms       Take your asthma control medicine every day.     1. If exercise triggers your asthma, take your rescue medication    15 minutes before exercise or sports, and    During exercise if you have asthma symptoms  2. Spacer to use with inhaler: If you have a spacer, make sure to use it with your inhaler             YELLOW ZONE Getting Worse  I have ANY of these:    I do not feel good    Cough or wheeze    Chest feels tight    Wake up at night   1. Keep taking your Green Zone medications  2. Start taking your rescue medicine:    every 20 minutes for up to 1 hour. Then every 4 hours for 24-48 hours.  3. If you stay in the Yellow Zone for more than 12-24 hours, contact your doctor.  4. If you do not return to the Green Zone in 12-24 hours or you get worse, start taking your oral steroid medicine if prescribed by your provider.           RED ZONE Medical Alert - Get Help  I have ANY of these:    I feel awful    Medicine is not helping    Breathing getting harder    Trouble walking or talking    Nose opens wide to breathe       1. Take your rescue medicine NOW  2. If your provider has prescribed an oral steroid medicine, start taking it NOW  3. Call your doctor NOW  4. If you are still in the Red Zone after 20 minutes and you have not reached your doctor:    Take your rescue medicine again and    Call 911 or go to the emergency room right away    See your regular doctor within 2 weeks of an Emergency Room or  Urgent Care visit for follow-up treatment.          Annual Reminders:  Meet with Asthma Educator,  Flu Shot in the Fall, consider Pneumonia Vaccination for patients with asthma (aged 19 and older).    Pharmacy: [id number] - [facility] [address] [facility] [address] [place]                        Asthma Triggers  How To Control Things That Make Your Asthma Worse    Triggers are things that make your asthma worse.  Look at the list below to help you find your triggers and   what you can do about them. You can help prevent asthma flare-ups by staying away from your triggers.      Trigger                                                          What you can do   Cigarette Smoke  Tobacco smoke can make asthma worse. Do not allow smoking in your home, car or around you.  Be sure no one smokes at a child s day care or school.  If you smoke, ask your health care provider for ways to help you quit.  Ask family members to quit too.  Ask your health care provider for a referral to Quit Plan to help you quit smoking, or call 2-865-802-PLAN.     Colds, Flu, Bronchitis  These are common triggers of asthma. Wash your hands often.  Don t touch your eyes, nose or mouth.  Get a flu shot every year.     Dust Mites  These are tiny bugs that live in cloth or carpet. They are too small to see. Wash sheets and blankets in hot water every week.   Encase pillows and mattress in dust mite proof covers.  Avoid having carpet if you can. If you have carpet, vacuum weekly.   Use a dust mask and HEPA vacuum.   Pollen and Outdoor Mold  Some people are allergic to trees, grass, or weed pollen, or molds. Try to keep your windows closed.  Limit time out doors when pollen count is high.   Ask you health care provider about taking medicine during allergy season.     Animal Dander  Some people are allergic to skin flakes, urine or saliva from pets with fur or feathers. Keep pets with fur or feathers out of your home.     If you can t keep the pet outdoors, then keep the pet out of your bedroom.  Keep the bedroom door closed.  Keep pets off cloth furniture and away from stuffed toys.     Mice, Rats, and Cockroaches  Some people are allergic to the waste from these pests.   Cover food and garbage.  Clean up spills and food crumbs.  Store grease in the refrigerator.   Keep food out of the bedroom.   Indoor Mold  This can be a trigger if your home has high moisture. Fix leaking faucets, pipes, or other sources of water.   Clean moldy surfaces.  Dehumidify basement if it is damp and smelly.   Smoke, Strong Odors, and Sprays  These can reduce air quality. Stay away from strong odors and sprays, such as perfume, powder, hair spray, paints, smoke incense, paint, cleaning products, candles and new carpet.   Exercise or Sports  Some people with asthma have this trigger. Be active!  Ask your doctor about taking medicine before sports or exercise to prevent symptoms.    Warm up for 5-10 minutes before and after sports or exercise.     Other Triggers of Asthma  Cold air:  Cover your nose and mouth with a scarf.  Sometimes laughing or crying can be a trigger.  Some medicines and food can trigger asthma.

## 2019-09-12 NOTE — PATIENT INSTRUCTIONS
At [facility], we strive to deliver an exceptional experience to you, every time we see you.  If you receive a survey in the mail, please send us back your thoughts. We really do value your feedback.    Based on your medical history, these are the current health maintenance/preventive care services that you are due for (some may have been done at this visit.)  Health Maintenance Due   Topic Date Due     PREVENTIVE CARE VISIT  1965     ASTHMA ACTION PLAN  01/26/2019     LIPID  04/24/2019     ASTHMA CONTROL TEST  08/22/2019     INFLUENZA VACCINE (1) 09/01/2019         Suggested websites for health information:  Www.[facility].org : Up to date and easily searchable information on multiple topics.  Www.[employer].gov : medication info, interactive tutorials, watch real surgeries online  Www.familydoctor.org : good info from the Academy of Family Physicians  Www.cdc.gov : public health info, travel advisories, epidemics (H1N1)  Www.aap.org : children's health info, normal development, vaccinations  Www.health.[facility].us : [place] dept of health, public health issues in [place], N1N1    Your care team:                            Family Medicine Internal Medicine   MD Jeff Juárez MD Shantel Branch-Fleming, MD Katya Georgiev PA-C Nam Ho, MD Pediatrics   EULALIA Kitchen, MARIA ELENA Foote APRDREW CNP   MD Randa Bailey MD Deborah Mielke, MD Kim Thein, APRN [facility]      Clinic hours: Monday - Thursday 7 am-7 pm; Fridays 7 am-5 pm.   Urgent care: Monday - Friday 11 am-9 pm; Saturday and Sunday 9 am-5 pm.  Pharmacy : Monday -Thursday 8 am-8 pm; Friday 8 am-6 pm; Saturday and Sunday 9 am-5 pm.     Clinic: [phone]   Pharmacy: [phone]

## 2019-09-12 NOTE — PROGRESS NOTES
Subjective     Raphael Lizarraga is a 54 year old male who presents to clinic today for the following health issues:    HPI   Depression and Anxiety Follow-Up    How are you doing with your depression since your last visit? No change    How are you doing with your anxiety since your last visit?  No change    Are you having other symptoms that might be associated with depression or anxiety? No    Have you had a significant life event? No     Do you have any concerns with your use of alcohol or other drugs? No    Social History     Tobacco Use     Smoking status: Former Smoker     Last attempt to quit: 1993     Years since quittin.0     Smokeless tobacco: Never Used   Substance Use Topics     Alcohol use: Yes     Comment: 4 cans of beer on the weekend     Drug use: No     PHQ 2019   PHQ-9 Total Score 3 4 3   Q9: Thoughts of better off dead/self-harm past 2 weeks Not at all Not at all Not at all     JOSE-7 SCORE 2019   Total Score - - -   Total Score - 1 (minimal anxiety) -   Total Score 1 1 1         Suicide Assessment Five-step Evaluation and Treatment (SAFE-T)  Asthma Follow-Up    Was ACT completed today?    Yes    ACT Total Scores 2019   ACT TOTAL SCORE -   ASTHMA ER VISITS -   ASTHMA HOSPITALIZATIONS -   ACT TOTAL SCORE (Goal Greater than or Equal to 20) 25   In the past 12 months, how many times did you visit the emergency room for your asthma without being admitted to the hospital? 0   In the past 12 months, how many times were you hospitalized overnight because of your asthma? 0       How many days per week do you miss taking your asthma controller medication?  0 only use when needed.    Please describe any recent triggers for your asthma: None    Have you had any Emergency Room Visits, Urgent Care Visits, or Hospital Admissions since your last office visit?  No          How many servings of fruits and vegetables do you eat daily?  0-1    On average,  "how many sweetened beverages do you drink each day (soda, juice, sweet tea, etc)?   0    How many days per week do you miss taking your medication? 0    Past medical, family, and social histories, medications, and allergies are reviewed and updated in [facility].     Review of Systems   ROS COMP: Constitutional, HEENT, cardiovascular, pulmonary, gi and gu systems are negative, except as otherwise noted.      Objective    /72 (BP Location: Left arm, Patient Position: Sitting, Cuff Size: Adult Regular)   Pulse 65   Temp 98.2  F (36.8  C) (Oral)   Resp 16   Ht 1.727 m (5' 8\")   Wt 63.5 kg (140 lb)   SpO2 98%   BMI 21.29 kg/m    Body mass index is 21.29 kg/m .  Physical Exam   GENERAL: healthy, alert and no distress  EYES: Eyes grossly normal to inspection, PERRL, EOMI, sclerae white and conjunctivae normal  RESP: lungs clear to auscultation - no crackles or wheezes, no areas of dullness, no tachypnea  CV: Heart regular rate and rhythm without murmur, click or rub. No peripheral edema and peripheral pulses strong  MS: no gross musculoskeletal defects noted, no edema  SKIN: no suspicious lesions or rashes to visible skin  NEURO: Normal strength and tone, sensory exam grossly normal, mentation intact, oriented times 3 and cranial nerves 2-12 intact  PSYCH: mentation appears normal, affect normal/bright         Assessment & Plan     (F33.0) Major depressive disorder, recurrent episode, mild (H)  (primary encounter diagnosis)  (F41.9) Anxiety  Comment: stable on current regimen  Plan: buPROPion (WELLBUTRIN SR) 100 MG 12 hr tablet,         PARoxetine (PAXIL) 20 MG tablet        Return in about 7 months (around 3/30/2020) for full physical, and recheck medications with your neurologist.      (J45.20) Intermittent asthma without complication, unspecified asthma severity  Comment: well controlled, used albuterol once this year  Plan: albuterol (PROAIR HFA/PROVENTIL HFA/VENTOLIN         HFA) 108 (90 Base) MCG/ACT inhaler, " Asthma         Action Plan (AAP)            (G40.209) Localization-related partial epilepsy with complex partial seizures (H)  Comment: first recheck of levels since dose increase  Plan: phenytoin (DILANTIN) 100 MG capsule, PHENYTOIN         LEVEL        Forward labs to Neurology if abnormal. Return in about 7 months (around 3/30/2020) for full physical, and recheck medications with your neurologist.      (Z13.6) CARDIOVASCULAR SCREENING; LDL GOAL LESS THAN 160  Comment: indications for screening discussed with the patient   Plan: Lipid panel reflex to direct LDL Non-fasting          (Z23) Need for prophylactic vaccination and inoculation against influenza  Comment: Influenza vaccine offered and accepted by patient. He has received it before without problems.   Plan: HC FLU VAC PRESRV FREE QUAD SPLIT VIR 3+YRS IM               Chava Belcher MD

## 2019-09-13 LAB
CHOLEST SERPL-MCNC: 198 MG/DL
HDLC SERPL-MCNC: 72 MG/DL
LDLC SERPL CALC-MCNC: 109 MG/DL
NONHDLC SERPL-MCNC: 126 MG/DL
PHENYTOIN SERPL-MCNC: 24.8 MG/L (ref 10–20)
TRIGL SERPL-MCNC: 86 MG/DL

## 2019-09-13 ASSESSMENT — ANXIETY QUESTIONNAIRES: GAD7 TOTAL SCORE: 1

## 2019-09-13 ASSESSMENT — ASTHMA QUESTIONNAIRES: ACT_TOTALSCORE: 25

## 2019-10-17 ENCOUNTER — OFFICE VISIT (OUTPATIENT)
Dept: FAMILY MEDICINE | Facility: CLINIC | Age: 54
End: 2019-10-17
Payer: COMMERCIAL

## 2019-10-17 VITALS
HEIGHT: 68 IN | RESPIRATION RATE: 18 BRPM | OXYGEN SATURATION: 100 % | DIASTOLIC BLOOD PRESSURE: 71 MMHG | SYSTOLIC BLOOD PRESSURE: 118 MMHG | BODY MASS INDEX: 21.11 KG/M2 | HEART RATE: 55 BPM | WEIGHT: 139.3 LBS | TEMPERATURE: 97.5 F

## 2019-10-17 DIAGNOSIS — M62.830 SPASM OF THORACIC BACK MUSCLE: Primary | ICD-10-CM

## 2019-10-17 PROCEDURE — 99213 OFFICE O/P EST LOW 20 MIN: CPT | Performed by: PHYSICIAN ASSISTANT

## 2019-10-17 RX ORDER — NAPROXEN 500 MG/1
500 TABLET ORAL 2 TIMES DAILY PRN
Qty: 30 TABLET | Refills: 1 | Status: SHIPPED | OUTPATIENT
Start: 2019-10-17 | End: 2020-07-17

## 2019-10-17 RX ORDER — METHOCARBAMOL 750 MG/1
750 TABLET, FILM COATED ORAL 3 TIMES DAILY PRN
Qty: 45 TABLET | Refills: 1 | Status: SHIPPED | OUTPATIENT
Start: 2019-10-17 | End: 2020-07-17

## 2019-10-17 ASSESSMENT — PAIN SCALES - GENERAL: PAINLEVEL: NO PAIN (1)

## 2019-10-17 ASSESSMENT — MIFFLIN-ST. JEOR: SCORE: 1456.36

## 2019-10-17 NOTE — PROGRESS NOTES
Subjective     Raphael Lizarraga is a 54 year old male who presents to clinic today for the following health issues:    HPI   Back Pain       Duration: a few days         Specific cause: none    Description:   Location of pain: middle of back   Character of pain: dull ache- only hurt when patient touched it  Pain radiation:none    New numbness or weakness in legs, not attributed to pain:  no    Intensity: Currently 1/10    History:   Pain interferes with job: No  History of back problems: no prior back problems  Any previous MRI or X-rays: None  Sees a specialist for back pain:  No  Therapies tried without relief: none    Alleviating factors:   Improved by: none      Precipitating factors:  Worsened by: Nothing makes it worse other than touching it           Accompanying Signs & Symptoms:  Risk of Fracture:  None  Risk of Cauda Equina:  None  Risk of Infection:  None  Risk of Cancer:  None  Risk of Ankylosing Spondylitis:  Onset at age <35, male, AND morning back stiffness. no     Had spasm in thoracic back bilaterally yesterday, took Ibuprofen 800 mg once and it resolved. Patient wants to have something on had to help with spasms and wants to know how to prevent them in the future.       Patient Active Problem List   Diagnosis     Epilepsy (H)     Anxiety     Major depressive disorder, recurrent episode, mild (H)     NELIA (obstructive sleep apnea)     Intermittent asthma     Osteoporosis/osteopenia increased risk     CARDIOVASCULAR SCREENING; LDL GOAL LESS THAN 160     Strain of thoracic region     Pain in thoracic spine     Corneal rust ring of right eye     Past Surgical History:   Procedure Laterality Date     C LAP,PARTIAL NEPHRECTOMY  8/4/08    LT for benign mass, Dr. Donald     CATARACT IOL, RT/LT Bilateral     July 2019     COLONOSCOPY WITH CO2 INSUFFLATION N/A 5/4/2018    Procedure: COLONOSCOPY WITH CO2 INSUFFLATION;  Colonoscopy, Dr. Belcher, Screen for colon cancer, BMI 22.11, [facility] [address];   Surgeon: Duane, William Charles, MD;  Location: [facility] OR     LOBECTOMY  1994    LT temporal for epilepsy     PHACOEMULSIFICATION WITH STANDARD INTRAOCULAR LENS IMPLANT Left 2019    Procedure: PHACOEMULSIFICATION, CATARACT, WITH STANDARD IOL INSERTION, LEFT;  Surgeon: Gutierrez Mcdowell MD;  Location: [facility] OR     PHACOEMULSIFICATION WITH STANDARD INTRAOCULAR LENS IMPLANT Right 2019    Procedure: PHACOEMULSIFICATION, CATARACT, WITH STANDARD IOL INSERTION, RIGHT;  Surgeon: Gutierrez Mcdowell MD;  Location: [facility] OR     VASECTOMY         Social History     Tobacco Use     Smoking status: Former Smoker     Last attempt to quit: 1993     Years since quittin.1     Smokeless tobacco: Never Used   Substance Use Topics     Alcohol use: Yes     Comment: 4 cans of beer on the weekend     Family History   Problem Relation Age of Onset     Diabetes Father      Hypertension Father      Cerebrovascular Disease Paternal Grandfather      Asthma No family hx of      C.A.D. No family hx of      Breast Cancer No family hx of      Cancer - colorectal No family hx of      Prostate Cancer No family hx of      Anesthesia Reaction No family hx of      Thrombophilia No family hx of      Bleeding Disorder No family hx of      Glaucoma No family hx of      Macular Degeneration No family hx of      Retinal detachment No family hx of          Current Outpatient Medications   Medication Sig Dispense Refill     albuterol (PROAIR HFA/PROVENTIL HFA/VENTOLIN HFA) 108 (90 Base) MCG/ACT inhaler Inhale 2 puffs into the lungs every 4 hours as needed (for asthma symptoms) 1 Inhaler 1     buPROPion (WELLBUTRIN SR) 100 MG 12 hr tablet Take 1 tablet (100 mg) by mouth every morning for depression & anxiety prevention. 90 tablet 1     Cholecalciferol (VITAMIN D) 2000 units tablet Take 2,000 Units by mouth daily       IBUPROFEN PO Take 400-800 mg by mouth as needed        methocarbamol (ROBAXIN) 750 MG tablet Take 1 tablet (750 mg) by  "mouth 3 times daily as needed for muscle spasms 45 tablet 1     naproxen (NAPROSYN) 500 MG tablet Take 1 tablet (500 mg) by mouth 2 times daily as needed for moderate pain 30 tablet 1     PARoxetine (PAXIL) 20 MG tablet Take 1 tablet (20 mg) by mouth At Bedtime for depression & anxiety prevention. 90 tablet 1     phenytoin (DILANTIN) 100 MG capsule Take 2 tablets every morning & 2 tablets every evening for seizure prevention. 360 capsule 1     No Known Allergies      Reviewed and updated as needed this visit by Provider  Tobacco  Allergies  Meds  Problems  Med Hx  Surg Hx  Fam Hx         Review of Systems   ROS COMP: Constitutional, HEENT, cardiovascular, pulmonary, gi and gu systems are negative, except as otherwise noted.      Objective    /71 (BP Location: Right arm, Patient Position: Sitting, Cuff Size: Adult Large)   Pulse 55   Temp 97.5  F (36.4  C) (Oral)   Resp 18   Ht 1.727 m (5' 8\")   Wt 63.2 kg (139 lb 4.8 oz)   SpO2 100%   BMI 21.18 kg/m    Body mass index is 21.18 kg/m .  Physical Exam   GENERAL: healthy, alert and no distress  NECK: no adenopathy, no asymmetry, masses, or scars and thyroid normal to palpation  RESP: lungs clear to auscultation - no rales, rhonchi or wheezes  CV: regular rate and rhythm, normal S1 S2, no S3 or S4, no murmur, click or rub, no peripheral edema and peripheral pulses strong  ABDOMEN: soft, nontender, no hepatosplenomegaly, no masses and bowel sounds normal  MS: no gross musculoskeletal defects noted, no edema  BACK: no CVA tenderness, no paralumbar tenderness  Comprehensive back pain exam:  No tenderness and Range of motion not limited by pain    Diagnostic Test Results:  Labs reviewed in Epic  none         Assessment & Plan       ICD-10-CM    1. Spasm of thoracic back muscle M62.830 methocarbamol (ROBAXIN) 750 MG tablet     naproxen (NAPROSYN) 500 MG tablet   Naproxen 1 tablet twice a day as needed for pain   Robaxin 1 table three times a day  as needed " for muscles spasms  When get spasms apply heating pad  Rest  Do daily back stretches        Return in about 2 weeks (around 10/31/2019), or if symptoms worsen or fail to improve.    Michelle Jimenez PA-C  [facility]

## 2019-10-17 NOTE — PATIENT INSTRUCTIONS
Naproxen 1 tablet twice a day as needed for pain   Robaxin 1 table three times a day  as needed for muscles spasms  When get spasms apply heating pad  Rest  Do daily back stretches     Patient Education     Back Exercises: Lower Back Stretch    To start, sit in a chair with your feet flat on the floor. Shift your weight slightly forward. Relax, and keep your ears, shoulders, and hips aligned while you do the following:    Sit with your feet well apart.    Bend forward and touch the floor with the backs of your hands. Relax and let your body drop.    Hold for 20 seconds. Return to starting position.    Repeat 2 times.   Date Last Reviewed: 11/1/2017 2000-2018 [employer]. [address] [place] [address]. All rights reserved. This information is not intended as a substitute for professional medical care. Always follow your healthcare professional's instructions.           Patient Education     Back Exercises: Leg Pull    To start, lie on your back with your knees bent and feet flat on the floor. Don t press your neck or lower back to the floor. Breathe deeply. You should feel comfortable and relaxed in this position.    Pull one knee to your chest.    Hold for 30 to 60 seconds. Return to starting position.    Repeat 2 times.    Switch legs.    For a double leg pull, pull both legs to your chest at the same time. Repeat 2 times.  For your safety, check with your healthcare provider before starting an exercise program.  Date Last Reviewed: 3/1/2018    7321-0280 [employer]. [address] [place] [address]. All rights reserved. This information is not intended as a substitute for professional medical care. Always follow your healthcare professional's instructions.           Patient Education     Back Exercises: Lower Back Rotation    To start, lie on your back with your knees bent and feet flat on the floor. Don t press your neck or lower back to the floor. Breathe  deeply. You should feel comfortable and relaxed in this position.    Drop both knees to one side. Turn your head to the other side. Keep your shoulders flat on the floor.    Do not push through pain.    Hold for 20 seconds.    Slowly switch sides.    Repeat 2 to 5 times.  Date Last Reviewed: 3/1/2018    6220-1072 [employer]. [address] [place] [address], [place] [address]. All rights reserved. This information is not intended as a substitute for professional medical care. Always follow your healthcare professional's instructions.

## 2019-10-18 ENCOUNTER — OFFICE VISIT (OUTPATIENT)
Dept: OPTOMETRY | Facility: CLINIC | Age: 54
End: 2019-10-18
Payer: COMMERCIAL

## 2019-10-18 DIAGNOSIS — H26.493 POSTERIOR CAPSULE OPACIFICATION, BILATERAL: ICD-10-CM

## 2019-10-18 DIAGNOSIS — Z96.1 PSEUDOPHAKIA OF BOTH EYES: Primary | ICD-10-CM

## 2019-10-18 DIAGNOSIS — H02.889 MEIBOMIAN GLAND DYSFUNCTION: ICD-10-CM

## 2019-10-18 PROCEDURE — 99213 OFFICE O/P EST LOW 20 MIN: CPT | Performed by: OPTOMETRIST

## 2019-10-18 ASSESSMENT — EXTERNAL EXAM - LEFT EYE: OS_EXAM: BROW PTOSIS

## 2019-10-18 ASSESSMENT — SLIT LAMP EXAM - LIDS
COMMENTS: MEIBOMIAN GLAND DYSFUNCTION
COMMENTS: MEIBOMIAN GLAND DYSFUNCTION

## 2019-10-18 ASSESSMENT — REFRACTION_WEARINGRX
OS_AXIS: 163
OD_ADD: +2.25
OD_SPHERE: -1.50
OS_CYLINDER: +1.75
OS_ADD: +2.25
OS_SPHERE: -1.50
OD_CYLINDER: +2.25
OD_AXIS: 017

## 2019-10-18 ASSESSMENT — REFRACTION_MANIFEST
OD_ADD: +2.25
OD_SPHERE: -1.50
OS_ADD: +2.25
OS_AXIS: 163
OD_AXIS: 017
OS_SPHERE: -1.50
OS_CYLINDER: +1.75
OD_CYLINDER: +2.25

## 2019-10-18 ASSESSMENT — VISUAL ACUITY
OS_CC: 20/20
CORRECTION_TYPE: GLASSES
OD_CC: 20/20
METHOD: SNELLEN - LINEAR
OD_CC+: -3

## 2019-10-18 ASSESSMENT — EXTERNAL EXAM - RIGHT EYE: OD_EXAM: BROW PTOSIS

## 2019-10-18 NOTE — PATIENT INSTRUCTIONS
No change in eyeglass prescription.    There is mild clouding of the capsule.  If vision worsens during the year then schedule with Dr. Mcdowell for a YAG evaluation.    Artificial tears- Systane/Refresh/Blink 1 drop both eyes 2-4 x daily.  Heat to the eyes daily for 10-15 minutes nightly with warm washcloth or reusable gel masks from the pharmacy or  [employer] heat masks can be purchased at [facility].    Return 6/20 for comprehensive eye exam or sooner if needed.    Andres Wayne, OD

## 2019-10-18 NOTE — PROGRESS NOTES
Chief Complaint   Patient presents with     Follow Up     Check cataract implants.    Pt is here to make sure everything is ok after cataract surgery. Pt has progressive lenses after cataract surgery.  Glasses are working well.  No complaints with vision or comfort of eyes.    PHACOEMULSIFICATION, CATARACT, WITH STANDARD IOL INSERTION,      RIGHT  07/11/2019  LEFT 06/27/2019       Medical, surgical and family histories reviewed and updated 10/18/2019.       OBJECTIVE: See Ophthalmology exam    ASSESSMENT:    ICD-10-CM    1. Pseudophakia of both eyes Z96.1    2. Posterior capsule opacification, bilateral H26.493    3. Meibomian gland dysfunction H02.889       PLAN:     Patient Instructions   No change in eyeglass prescription.    There is mild clouding of the capsule.  If vision worsens during the year then schedule with Dr. Mcdowell for a YAG evaluation.    Artificial tears- Systane/Refresh/Blink 1 drop both eyes 2-4 x daily.  Heat to the eyes daily for 10-15 minutes nightly with warm washcloth or reusable gel masks from the pharmacy or  [employer] heat masks can be purchased at [employer].    Return 6/20 for comprehensive eye exam or sooner if needed.    Andres Wayne, OD

## 2019-11-06 ENCOUNTER — HEALTH MAINTENANCE LETTER (OUTPATIENT)
Age: 54
End: 2019-11-06

## 2019-12-05 ENCOUNTER — OFFICE VISIT (OUTPATIENT)
Dept: FAMILY MEDICINE | Facility: CLINIC | Age: 54
End: 2019-12-05
Payer: COMMERCIAL

## 2019-12-05 VITALS
BODY MASS INDEX: 21.22 KG/M2 | DIASTOLIC BLOOD PRESSURE: 73 MMHG | TEMPERATURE: 98.2 F | WEIGHT: 140 LBS | RESPIRATION RATE: 12 BRPM | HEIGHT: 68 IN | SYSTOLIC BLOOD PRESSURE: 127 MMHG | OXYGEN SATURATION: 100 % | HEART RATE: 60 BPM

## 2019-12-05 DIAGNOSIS — F33.0 MAJOR DEPRESSIVE DISORDER, RECURRENT EPISODE, MILD (H): ICD-10-CM

## 2019-12-05 DIAGNOSIS — Z53.20 PROSTATE CANCER SCREENING DECLINED: ICD-10-CM

## 2019-12-05 DIAGNOSIS — F41.9 ANXIETY: ICD-10-CM

## 2019-12-05 DIAGNOSIS — Z00.00 ROUTINE GENERAL MEDICAL EXAMINATION AT A HEALTH CARE FACILITY: Primary | ICD-10-CM

## 2019-12-05 DIAGNOSIS — G40.209 PARTIAL SYMPTOMATIC EPILEPSY WITH COMPLEX PARTIAL SEIZURES, NOT INTRACTABLE, WITHOUT STATUS EPILEPTICUS (H): ICD-10-CM

## 2019-12-05 LAB
ERYTHROCYTE [DISTWIDTH] IN BLOOD BY AUTOMATED COUNT: 12.7 % (ref 10–15)
HCT VFR BLD AUTO: 39.3 % (ref 40–53)
HGB BLD-MCNC: 13 G/DL (ref 13.3–17.7)
MCH RBC QN AUTO: 32.2 PG (ref 26.5–33)
MCHC RBC AUTO-ENTMCNC: 33.1 G/DL (ref 31.5–36.5)
MCV RBC AUTO: 97 FL (ref 78–100)
PLATELET # BLD AUTO: 261 10E9/L (ref 150–450)
RBC # BLD AUTO: 4.04 10E12/L (ref 4.4–5.9)
WBC # BLD AUTO: 4.2 10E9/L (ref 4–11)

## 2019-12-05 PROCEDURE — 99213 OFFICE O/P EST LOW 20 MIN: CPT | Mod: 25 | Performed by: FAMILY MEDICINE

## 2019-12-05 PROCEDURE — 80053 COMPREHEN METABOLIC PANEL: CPT | Performed by: FAMILY MEDICINE

## 2019-12-05 PROCEDURE — 99396 PREV VISIT EST AGE 40-64: CPT | Performed by: FAMILY MEDICINE

## 2019-12-05 PROCEDURE — 85027 COMPLETE CBC AUTOMATED: CPT | Performed by: FAMILY MEDICINE

## 2019-12-05 PROCEDURE — 36415 COLL VENOUS BLD VENIPUNCTURE: CPT | Performed by: FAMILY MEDICINE

## 2019-12-05 PROCEDURE — 80185 ASSAY OF PHENYTOIN TOTAL: CPT | Performed by: FAMILY MEDICINE

## 2019-12-05 RX ORDER — BUPROPION HYDROCHLORIDE 150 MG/1
150 TABLET, EXTENDED RELEASE ORAL EVERY MORNING
Qty: 90 TABLET | Refills: 0 | Status: SHIPPED | OUTPATIENT
Start: 2019-12-05 | End: 2020-02-24

## 2019-12-05 ASSESSMENT — MIFFLIN-ST. JEOR: SCORE: 1459.54

## 2019-12-05 ASSESSMENT — PAIN SCALES - GENERAL: PAINLEVEL: NO PAIN (0)

## 2019-12-06 LAB
ALBUMIN SERPL-MCNC: 4.3 G/DL (ref 3.4–5)
ALP SERPL-CCNC: 58 U/L (ref 40–150)
ALT SERPL W P-5'-P-CCNC: 35 U/L (ref 0–70)
ANION GAP SERPL CALCULATED.3IONS-SCNC: 3 MMOL/L (ref 3–14)
AST SERPL W P-5'-P-CCNC: 22 U/L (ref 0–45)
BILIRUB SERPL-MCNC: 0.2 MG/DL (ref 0.2–1.3)
BUN SERPL-MCNC: 15 MG/DL (ref 7–30)
CALCIUM SERPL-MCNC: 9.1 MG/DL (ref 8.5–10.1)
CHLORIDE SERPL-SCNC: 103 MMOL/L (ref 94–109)
CO2 SERPL-SCNC: 34 MMOL/L (ref 20–32)
CREAT SERPL-MCNC: 0.91 MG/DL (ref 0.66–1.25)
GFR SERPL CREATININE-BSD FRML MDRD: >90 ML/MIN/{1.73_M2}
GLUCOSE SERPL-MCNC: 77 MG/DL (ref 70–99)
PHENYTOIN SERPL-MCNC: 25.9 MG/L (ref 10–20)
POTASSIUM SERPL-SCNC: 4.4 MMOL/L (ref 3.4–5.3)
PROT SERPL-MCNC: 7.8 G/DL (ref 6.8–8.8)
SODIUM SERPL-SCNC: 140 MMOL/L (ref 133–144)

## 2019-12-06 NOTE — PROGRESS NOTES
SUBJECTIVE:   CC: Raphael Lizarraga is an 54 year old male who presents for preventive health visit.     Healthy Habits:    Do you get at least three servings of calcium containing foods daily (dairy, green leafy vegetables, etc.)? yes    Amount of exercise or daily activities, outside of work: 0 day(s) per week    Problems taking medications regularly No    Medication side effects: No    Have you had an eye exam in the past two years? yes    Do you see a dentist twice per year? yes    Do you have sleep apnea, excessive snoring or daytime drowsiness?no      Today's PHQ-2 Score:   PHQ-2 (  [employer]) 2019   Q1: Little interest or pleasure in doing things 3 0   Q2: Feeling down, depressed or hopeless 3 0   PHQ-2 Score 6 0       Abuse: Current or Past(Physical, Sexual or Emotional)- No  Do you feel safe in your environment? Yes        Social History     Tobacco Use     Smoking status: Former Smoker     Last attempt to quit: 1993     Years since quittin.2     Smokeless tobacco: Never Used   Substance Use Topics     Alcohol use: Yes     Comment: 4 cans of beer on the weekend     If you drink alcohol do you typically have >3 drinks per day or >7 drinks per week? No                      Last PSA: No results found for: PSA    Past medical, family, and social histories, medications, and allergies are reviewed and updated in [facility].    ROS:  CONSTITUTIONAL: NEGATIVE for fever, chills, change in weight  INTEGUMENTARY/SKIN: NEGATIVE for worrisome rashes, moles or lesions  EYES: NEGATIVE for vision changes or irritation  ENT: NEGATIVE for ear, mouth and throat problems  RESP: NEGATIVE for significant cough or SOB  CV: NEGATIVE for chest pain, palpitations or peripheral edema  GI: NEGATIVE for nausea, abdominal pain, heartburn, or change in bowel habits   male: negative for dysuria, hematuria, decreased urinary stream, erectile dysfunction, urethral discharge  MUSCULOSKELETAL: NEGATIVE for significant  "arthralgias or myalgia  NEURO: NEGATIVE for weakness, dizziness or paresthesias  PSYCHIATRIC: NEGATIVE for changes in mood or affect; his wife wants him to make a dose adjustment on depression medication, but he hasn't really noticed a difference because he feels the same every day    This document serves as a record of the services and decisions personally performed and made by Dr. Belcher. It was created on his behalf by Angela Brown, a trained medical scribe. The creation of this document is based the provider's statements to the medical scribe.  Angela Brown,  6:22 PM     OBJECTIVE:   /73 (BP Location: Left arm, Patient Position: Sitting, Cuff Size: Adult Regular)   Pulse 60   Temp 98.2  F (36.8  C) (Oral)   Resp 12   Ht 1.727 m (5' 8\")   Wt 63.5 kg (140 lb)   SpO2 100%   BMI 21.29 kg/m      EXAM:  GENERAL: healthy, alert and no distress  EYES: Eyes grossly normal to inspection, PERRL and conjunctivae and sclerae normal  HENT: ear canals and TM's normal, nose and mouth without ulcers or lesions  NECK: no adenopathy, no asymmetry, masses, or scars and thyroid normal to palpation  RESP: lungs clear to auscultation - no rales, rhonchi or wheezes  CV: regular rate and rhythm, normal S1 S2, no S3 or S4, no murmur, click or rub, no peripheral edema and peripheral pulses strong  ABDOMEN: soft, nontender, no hepatosplenomegaly, no masses and bowel sounds normal   (male): normal male genitalia without lesions or urethral discharge, no hernia  MS: no gross musculoskeletal defects noted, no edema  SKIN: no suspicious lesions or rashes to visible skin   NEURO: Normal strength and tone, mentation intact and speech normal, DTRs symmetrical, cranial nerves 2-12 intact   PSYCH: mentation appears normal, affect normal/bright    Diagnostic Test Results:   Ref. Range 9/12/2019 19:31   Cholesterol Latest Ref Range: <200 mg/dL 198   HDL Cholesterol Latest Ref Range: >39 mg/dL 72   LDL Cholesterol Calculated Latest " "Ref Range: <100 mg/dL 109 (H)   Non HDL Cholesterol Latest Ref Range: <130 mg/dL 126   Triglycerides Latest Ref Range: <150 mg/dL 86   Phenytoin Level Latest Ref Range: 10 - 20 mg/L 24.8 (H)         ASSESSMENT/PLAN:     (Z00.00) Routine general medical examination at a health care facility  (primary encounter diagnosis)  Comment: Negative screening exam; up-to-date on preventive services.   Plan: Follow-up in one year     (G40.209) Partial symptomatic epilepsy with complex partial seizures, not intractable, without status epilepticus (H)  Comment: stable, however his phenytoin level was above the reference range on last check  Plan: Phenytoin level, Comprehensive metabolic panel         (BMP + Alb, Alk Phos, ALT, AST, Total. Bili,         TP), CBC with platelets        If his level is elevated again, he will need to follow-up with his neurologist shortly.      (F33.0) Major depressive disorder, recurrent episode, mild (H)  (F41.9) Anxiety  Comment: anxiety well-controlled, but he rates his depressive symptoms worse than before. He does not seem to perceive worsening or improvement of symptoms on a day-to-day basis. He has been on his current regimen since August 2016.  Plan: buPROPion (WELLBUTRIN SR) 150 MG 12 hr tablet       Increase bupropion from 100 to 150. Recheck PHQ-9 in 5 - 6 weeks.  Return in about 6 months (around 6/5/2020) for recheck medications. , or sooner if needed.    (Z53.20) Prostate cancer screening declined  Comment:   Plan:     COUNSELING:  Reviewed preventive health counseling, as reflected in patient instructions       Prostate cancer screening    Estimated body mass index is 21.29 kg/m  as calculated from the following:    Height as of this encounter: 1.727 m (5' 8\").    Weight as of this encounter: 63.5 kg (140 lb).       reports that he quit smoking about 26 years ago. He has never used smokeless tobacco.      Counseling Resources:  ATP IV Guidelines  Pooled Cohorts Equation " Calculator  FRAX Risk Assessment  ICSI Preventive Guidelines  Dietary Guidelines for Americans, 2010  USDA's MyPlate  ASA Prophylaxis  Lung CA Screening    The information in this document, created by the medical scribe for me, accurately reflects the services I personally performed and the decisions made by me. I have reviewed and approved this document for accuracy prior to leaving the patient care area.  Chava Belcher MD

## 2019-12-06 NOTE — PATIENT INSTRUCTIONS
Preventive Health Recommendations  Male Ages 50 - 64    Yearly exam:             See your health care provider every year in order to  o   Review health changes.   o   Discuss preventive care.    o   Review your medicines if your doctor has prescribed any.     Have a cholesterol test every 5 years, or more frequently if you are at risk for high cholesterol/heart disease.     Have a diabetes test (fasting glucose) every three years. If you are at risk for diabetes, you should have this test more often.     Have a colonoscopy at age 50, or have a yearly FIT test (stool test). These exams will check for colon cancer.      Talk with your health care provider about whether or not a prostate cancer screening test (PSA) is right for you.    You should be tested each year for STDs (sexually transmitted diseases), if you re at risk.     Shots: Get a flu shot each year. Get a tetanus shot every 10 years.     Nutrition:    Eat at least 5 servings of fruits and vegetables daily.     Eat whole-grain bread, whole-wheat pasta and brown rice instead of white grains and rice.     Get adequate Calcium and Vitamin D.     Lifestyle    Exercise for at least 150 minutes a week (30 minutes a day, 5 days a week). This will help you control your weight and prevent disease.     Limit alcohol to one drink per day.     No smoking.     Wear sunscreen to prevent skin cancer.     See your dentist every six months for an exam and cleaning.     See your eye doctor every 1 to 2 years.    At [facility], we strive to deliver an exceptional experience to you, every time we see you.  If you receive a survey in the mail, please send us back your thoughts. We really do value your feedback.    Based on your medical history, these are the current health maintenance/preventive care services that you are due for (some may have been done at this visit.)  Health Maintenance Due   Topic Date Due     PREVENTIVE CARE VISIT  1965          Suggested websites for health information:  Www.[facility].org : Up to date and easily searchable information on multiple topics.  Www.medlineplus.gov : medication info, interactive tutorials, watch real surgeries online  Www.familydoctor.org : good info from the Academy of Family Physicians  Www.cdc.gov : public health info, travel advisories, epidemics (H1N1)  Www.aap.org : children's health info, normal development, vaccinations  Www.health.[facility].[place].us : [place] dept of health, public health issues in [place], N1N1    Your care team:                            Family Medicine Internal Medicine   MD Jeff Juárez MD Shantel Branch-Fleming, MD Katya Georgiev PA-C Nam Ho, MD Pediatrics   EULALIA Kitchen, MARIA ELENA Foote APRN MD Randa Mc MD Deborah Mielke, MD Kim Thein, APRN CNP      Clinic hours: Monday - Thursday 7 am-7 pm; Fridays 7 am-5 pm.   Urgent care: Monday - Friday 11 am-9 pm; Saturday and Sunday 9 am-5 pm.  Pharmacy : Monday -Thursday 8 am-8 pm; Friday 8 am-6 pm; Saturday and Sunday 9 am-5 pm.     Clinic: [phone]   Pharmacy: [phone]

## 2019-12-07 ASSESSMENT — PATIENT HEALTH QUESTIONNAIRE - PHQ9: SUM OF ALL RESPONSES TO PHQ QUESTIONS 1-9: 13

## 2020-01-18 DIAGNOSIS — F41.9 ANXIETY: ICD-10-CM

## 2020-01-18 DIAGNOSIS — F33.0 MAJOR DEPRESSIVE DISORDER, RECURRENT EPISODE, MILD (H): ICD-10-CM

## 2020-01-18 NOTE — TELEPHONE ENCOUNTER
"Requested Prescriptions   Pending Prescriptions Disp Refills     PARoxetine (PAXIL) 20 MG tablet [Pharmacy Med Name: PAROXETINE 20MG TABLETS] 90 tablet 1     Sig: TAKE 1 TABLET(20 MG) BY MOUTH AT BEDTIME FOR DEPRESSION OR ANXIETY PREVENTION           Last Written Prescription Date:  9/12/19  Last Fill Quantity: 90,  # refills: 1   Last Office Visit with [facility] or [facility] prescribing provider:  12/5/19   Future Office Visit:         SSRIs Protocol Passed - 1/18/2020  3:53 AM        Passed - PHQ-9 score less than 5 in past 6 months     Please review last PHQ-9 score.           Passed - Medication is active on med list        Passed - Patient is age 18 or older        Passed - Recent (6 mo) or future (30 days) visit within the authorizing provider's specialty     Patient had office visit in the last 6 months or has a visit in the next 30 days with authorizing provider or within the authorizing provider's specialty.  See \"Patient Info\" tab in inbasket, or \"Choose Columns\" in Meds & Orders section of the refill encounter.                  Sagar Faarax  [facility]  "

## 2020-01-20 ENCOUNTER — TELEPHONE (OUTPATIENT)
Dept: FAMILY MEDICINE | Facility: CLINIC | Age: 55
End: 2020-01-20

## 2020-01-20 RX ORDER — PAROXETINE 20 MG/1
TABLET, FILM COATED ORAL
Qty: 90 TABLET | Refills: 1 | Status: SHIPPED | OUTPATIENT
Start: 2020-01-20 | End: 2020-07-20

## 2020-01-20 NOTE — TELEPHONE ENCOUNTER
Prescription approved per [facility] Refill Protocol.  Angela Laurent RN on 1/20/2020 at 4:29 PM

## 2020-01-20 NOTE — TELEPHONE ENCOUNTER
This writer attempted to contact Raphael on 01/20/20    Reason for call Update PHQ9 and left message to return call.    When patient calls back, please contact 1st floor [place]. routine priority.        Vitaly Galicia

## 2020-01-20 NOTE — TELEPHONE ENCOUNTER
----- Message from Chava Belcher MD sent at 12/5/2019  6:44 PM CST -----  Regarding: depression  Please updayte PHQ-9. If it is greater than 4, schedule f/u appointment with me.

## 2020-01-22 ENCOUNTER — MYC MEDICAL ADVICE (OUTPATIENT)
Dept: FAMILY MEDICINE | Facility: CLINIC | Age: 55
End: 2020-01-22

## 2020-01-23 ASSESSMENT — PATIENT HEALTH QUESTIONNAIRE - PHQ9
10. IF YOU CHECKED OFF ANY PROBLEMS, HOW DIFFICULT HAVE THESE PROBLEMS MADE IT FOR YOU TO DO YOUR WORK, TAKE CARE OF THINGS AT HOME, OR GET ALONG WITH OTHER PEOPLE: SOMEWHAT DIFFICULT
SUM OF ALL RESPONSES TO PHQ QUESTIONS 1-9: 4
SUM OF ALL RESPONSES TO PHQ QUESTIONS 1-9: 4

## 2020-01-27 DIAGNOSIS — G40.209 LOCALIZATION-RELATED PARTIAL EPILEPSY WITH COMPLEX PARTIAL SEIZURES (H): ICD-10-CM

## 2020-01-29 RX ORDER — PHENYTOIN SODIUM 100 MG/1
CAPSULE, EXTENDED RELEASE ORAL
Qty: 360 CAPSULE | Refills: 1 | OUTPATIENT
Start: 2020-01-29

## 2020-02-22 DIAGNOSIS — F41.9 ANXIETY: ICD-10-CM

## 2020-02-22 DIAGNOSIS — F33.0 MAJOR DEPRESSIVE DISORDER, RECURRENT EPISODE, MILD (H): ICD-10-CM

## 2020-02-24 RX ORDER — BUPROPION HYDROCHLORIDE 150 MG/1
TABLET, EXTENDED RELEASE ORAL
Qty: 90 TABLET | Refills: 0 | Status: SHIPPED | OUTPATIENT
Start: 2020-02-24 | End: 2020-05-27

## 2020-02-24 NOTE — TELEPHONE ENCOUNTER
"Requested Prescriptions   Pending Prescriptions Disp Refills     buPROPion (WELLBUTRIN SR) 150 MG 12 hr tablet [Pharmacy Med Name: BUPROPION SR 150MG TABLETS (12 H)]  Last Written Prescription Date:  12/05/19  Last Fill Quantity: 90,  # refills: 0   Last Office Visit with [facility], [facility] or [facility] prescribing provider:  12/05/19Donna   Future Office Visit:    90 tablet 0     Sig: TAKE 1 TABLET BY MOUTH EVERY IN THE MORNING FOR DEPRESSION AND ANXIETY PREVENTION       SSRIs Protocol Passed - 2/22/2020  7:44 AM        Passed - PHQ-9 score less than 5 in past 6 months     Please review last PHQ-9 score.           Passed - Medication is Bupropion     If the medication is Bupropion (Wellbutrin), and the patient is taking for smoking cessation; OK to refill.          Passed - Medication is active on med list        Passed - Patient is age 18 or older        Passed - Recent (6 mo) or future (30 days) visit within the authorizing provider's specialty     Patient had office visit in the last 6 months or has a visit in the next 30 days with authorizing provider or within the authorizing provider's specialty.  See \"Patient Info\" tab in inbasket, or \"Choose Columns\" in Meds & Orders section of the refill encounter.              "

## 2020-04-29 ENCOUNTER — TELEPHONE (OUTPATIENT)
Dept: NEUROLOGY | Facility: CLINIC | Age: 55
End: 2020-04-29

## 2020-04-29 NOTE — TELEPHONE ENCOUNTER
Health Call Center    Phone Message    May a detailed message be left on voicemail: yes     Reason for Call: The patient returned a call to Dr. Mix Care Team.  The patient declined a video visit.  Scheduled for 7/10/20.  Thank you     Action Taken: Message routed to:  Adult Clinics: Neurology p [id number]    Travel Screening: Not Applicable                                                                       Intact

## 2020-04-29 NOTE — TELEPHONE ENCOUNTER
Writer attempted to reach the patient and schedule a one year return visit with Dr. Mix, offer video visit.    CALL CENTER OK TO SCHEDULE    Lvm.    [facility]  Adult Med Spec/Surg Spec   [phone]

## 2020-05-04 ENCOUNTER — TELEPHONE (OUTPATIENT)
Dept: OPTOMETRY | Facility: CLINIC | Age: 55
End: 2020-05-04

## 2020-05-04 NOTE — TELEPHONE ENCOUNTER
Attempted to call patient to discuss an appointment this week, but had to leave a message requesting him to call out office.

## 2020-05-04 NOTE — TELEPHONE ENCOUNTER
"Patient called - scheduled an appointment at 3:00 (he requested a \"late as possible time\" on 5-6-20.   "

## 2020-05-06 ENCOUNTER — OFFICE VISIT (OUTPATIENT)
Dept: OPTOMETRY | Facility: CLINIC | Age: 55
End: 2020-05-06
Payer: COMMERCIAL

## 2020-05-06 DIAGNOSIS — H43.811 POSTERIOR VITREOUS DETACHMENT OF RIGHT EYE: Primary | ICD-10-CM

## 2020-05-06 PROCEDURE — 92012 INTRM OPH EXAM EST PATIENT: CPT | Performed by: OPTOMETRIST

## 2020-05-06 ASSESSMENT — TONOMETRY
OD_IOP_MMHG: 9
OS_IOP_MMHG: 11
IOP_METHOD: APPLANATION

## 2020-05-06 ASSESSMENT — CUP TO DISC RATIO
OD_RATIO: 0.2
OS_RATIO: 0.2

## 2020-05-06 ASSESSMENT — REFRACTION_WEARINGRX
OD_ADD: +2.25
OS_SPHERE: -1.50
OD_SPHERE: -1.50
OS_ADD: +2.25
OD_CYLINDER: +2.25
OS_AXIS: 163
OS_CYLINDER: +1.75
OD_AXIS: 017

## 2020-05-06 ASSESSMENT — VISUAL ACUITY
OS_CC+: -1
OS_CC: 20/30
METHOD: SNELLEN - LINEAR
OD_CC+: -1
OD_CC: 20/30
CORRECTION_TYPE: GLASSES

## 2020-05-06 ASSESSMENT — EXTERNAL EXAM - RIGHT EYE: OD_EXAM: BROW PTOSIS

## 2020-05-06 ASSESSMENT — EXTERNAL EXAM - LEFT EYE: OS_EXAM: BROW PTOSIS

## 2020-05-06 NOTE — LETTER
5/6/2020         RE: Raphael Lizarraga  [address]  [place] [address]        Dear Colleague,    Thank you for referring your patient, Raphael Lziarraga, to the [facility]. Please see a copy of my visit note below.    Chief Complaint   Patient presents with     Eye Problem Right Eye       Do you wear contact lenses? No.     In right eye. Onset was sudden. This started 2 weeks ago. Charactertized as spots in vision and blurred vision. Occurring episodically. It is worse at random times. Associated symptoms include floaters. Treatments tried include artificial tears. Response to treatment was mild improvement. Pain was noted as 0/10.   Comments      Patient had cataract surgery and said that he has had floaters in his right eye for at least a couple weeks. He said that when he moves his eye to the left the spot moves to the right. He described it as a floating spot that goes by quickly, but looks like it has a hook on the end. He said it's like a fog going by. Also said that it seems to be worse when he is outside.  Also mentioned that the vision in his right eye is not as clear when he reads              Sofiya Apple Optometric Assistant      See Review Of Systems     PHACOEMULSIFICATION, CATARACT, WITH STANDARD IOL INSERTION,      RIGHT  07/11/2019  LEFT 06/27/2019      Medical, surgical and family histories reviewed and updated 5/6/2020.         OBJECTIVE: See Ophthalmology exam    ASSESSMENT:    ICD-10-CM    1. Posterior vitreous detachment of right eye  H43.811       PLAN:    Patient Instructions   You have a PVD- posterior vitreous detachment which is due to the gel of the eye shrinking and clumping together.  This can sometimes cause holes or tears in the retina.  The signs of a retinal detachment are flashes of light or a curtain coming over the vision. If you notice any of these changes please let me know right away.  If I am not available this is an emergency type situation that you  would need to be seen.      Return for comprehensive eye exam in 3-4 months or sooner if needed.    Andres Wayne, OD           Again, thank you for allowing me to participate in the care of your patient.        Sincerely,        Andres Wayne, OD

## 2020-05-06 NOTE — PROGRESS NOTES
Chief Complaint   Patient presents with     Eye Problem Right Eye       Do you wear contact lenses? No.     In right eye. Onset was sudden. This started 2 weeks ago. Charactertized as spots in vision and blurred vision. Occurring episodically. It is worse at random times. Associated symptoms include floaters. Treatments tried include artificial tears. Response to treatment was mild improvement. Pain was noted as 0/10.   Comments      Patient had cataract surgery and said that he has had floaters in his right eye for at least a couple weeks. He said that when he moves his eye to the left the spot moves to the right. He described it as a floating spot that goes by quickly, but looks like it has a hook on the end. He said it's like a fog going by. Also said that it seems to be worse when he is outside.  Also mentioned that the vision in his right eye is not as clear when he reads              Sofiay Apple Optometric Assistant      See Review Of Systems     PHACOEMULSIFICATION, CATARACT, WITH STANDARD IOL INSERTION,      RIGHT  07/11/2019  LEFT 06/27/2019      Medical, surgical and family histories reviewed and updated 5/6/2020.         OBJECTIVE: See Ophthalmology exam    ASSESSMENT:    ICD-10-CM    1. Posterior vitreous detachment of right eye  H43.811       PLAN:    Patient Instructions   You have a PVD- posterior vitreous detachment which is due to the gel of the eye shrinking and clumping together.  This can sometimes cause holes or tears in the retina.  The signs of a retinal detachment are flashes of light or a curtain coming over the vision. If you notice any of these changes please let me know right away.  If I am not available this is an emergency type situation that you would need to be seen.      Return for comprehensive eye exam in 3-4 months or sooner if needed.    Andres Wayne, OD

## 2020-05-07 ASSESSMENT — SLIT LAMP EXAM - LIDS
COMMENTS: NORMAL
COMMENTS: NORMAL

## 2020-05-07 NOTE — PATIENT INSTRUCTIONS
You have a PVD- posterior vitreous detachment which is due to the gel of the eye shrinking and clumping together.  This can sometimes cause holes or tears in the retina.  The signs of a retinal detachment are flashes of light or a curtain coming over the vision. If you notice any of these changes please let me know right away.  If I am not available this is an emergency type situation that you would need to be seen.      Return for comprehensive eye exam in 3-4 months or sooner if needed.    Andres Wayne, OD    The affects of the dilating drops last for 4- 6 hours.  You will be more sensitive to light and vision will be blurry up close.  Mydriatic sunglasses were given if needed.      Optometry Providers       Clinic Locations                                 Telephone Number   Dr. Verna Mccall [phone]     [facility] Hours:                [place] [facility] Hours:       [facility] Hours:   [address]   [address]      [address] [facility]  [place] [address]   [place] [address]    [place] [address]  Phone: [phone]                    Phone: [phone]     Phone: [phone]                      Monday 8:00-7:00                          Monday 8:00-7:00                          Monday 8:00-7:00              Tuesday 8:00-6:00                          Tuesday 8:00-7:00                          Tuesday 8:00-7:00              Wednesday 8:00-6:00                  Wednesday 8:00-7:00                   Wednesday 8:00-7:00      Thursday 8:00-6:00                        Thursday 8:00-7:00                         Thursday 8:00-7:00            Friday 8:00-5:00                              Friday 8:00-5:00                              Friday 8:00-5:00    [facility] Hours:   [address] [facility] [place] [id number] [phone]    Monday 8:00-7:00  Tuesday 8:00-7:00  Wednesday  8:00-7:00  Thursday 8:00-7:00  Friday 8:00-5:00  Please log on to [email or web address] to order your contact lenses.  The link is found on the [facility] page.  As always, Thank you for trusting us with your health care needs!

## 2020-05-15 ENCOUNTER — OFFICE VISIT (OUTPATIENT)
Dept: OPTOMETRY | Facility: CLINIC | Age: 55
End: 2020-05-15
Payer: COMMERCIAL

## 2020-05-15 ENCOUNTER — TELEPHONE (OUTPATIENT)
Dept: OPTOMETRY | Facility: CLINIC | Age: 55
End: 2020-05-15

## 2020-05-15 DIAGNOSIS — T15.01XA FOREIGN BODY OF RIGHT CORNEA, INITIAL ENCOUNTER: Primary | ICD-10-CM

## 2020-05-15 PROCEDURE — 99213 OFFICE O/P EST LOW 20 MIN: CPT | Performed by: OPTOMETRIST

## 2020-05-15 RX ORDER — OFLOXACIN 3 MG/ML
1 SOLUTION/ DROPS OPHTHALMIC
Qty: 1 BOTTLE | Refills: 0 | Status: SHIPPED | OUTPATIENT
Start: 2020-05-15 | End: 2020-07-17

## 2020-05-15 ASSESSMENT — SLIT LAMP EXAM - LIDS: COMMENTS: NORMAL

## 2020-05-15 ASSESSMENT — VISUAL ACUITY
CORRECTION_TYPE: GLASSES
OD_CC: 20/40
OD_PH_CC: 20/25
OS_CC+: -1
METHOD: SNELLEN - LINEAR
OS_CC: 20/20

## 2020-05-15 ASSESSMENT — EXTERNAL EXAM - LEFT EYE: OS_EXAM: BROW PTOSIS

## 2020-05-15 ASSESSMENT — CONF VISUAL FIELD
OD_SUPERIOR_TEMPORAL_RESTRICTION: 1
OS_SUPERIOR_NASAL_RESTRICTION: 1

## 2020-05-15 ASSESSMENT — EXTERNAL EXAM - RIGHT EYE: OD_EXAM: BROW PTOSIS

## 2020-05-15 NOTE — TELEPHONE ENCOUNTER
Patient called - yesterday, he potentially got a piece of metal in his right eye.  Discomfort is intermittent, but worse with each blink.    I explained to him that I would check with the Doctor on Call and let him know how to proceed.

## 2020-05-15 NOTE — PATIENT INSTRUCTIONS
Metallic foreign body with rust ring removed from right cornea in-office.   Begin ofloxacin eyedrops. Use 1 drop 6x in the right eye today.   Starting tomorrow, use the drops 4x daily in the right eye for 9 more days, then discontinue.     Return to clinic as needed.       Nayan Araujo O.D.  [address] [place]  [address]    [phone]

## 2020-05-15 NOTE — PROGRESS NOTES
Chief Complaint   Patient presents with     Eye Problem Right Eye     Possible FB right eye         Do you wear contact lenses? No contacts, does wear glasses        Sofiyaaditi Epstein Optometric Assistant      See Review Of Systems       Medical, surgical and family histories reviewed and updated 5/15/2020.         OBJECTIVE: See Ophthalmology exam    ASSESSMENT:    ICD-10-CM    1. Foreign body of right cornea, initial encounter  T15.01XA ofloxacin (OCUFLOX) 0.3 % ophthalmic solution      PLAN:    Patient Instructions   Metallic foreign body with rust ring removed from right cornea in-office.   Begin ofloxacin eyedrops. Use 1 drop 6x in the right eye today.   Starting tomorrow, use the drops 4x daily in the right eye for 9 more days, then discontinue.     Return to clinic as needed.       Nayan Araujo O.D.  [address]  [place]  [address]    [phone]

## 2020-05-15 NOTE — LETTER
5/15/2020         RE: Raphael Lizarraga  [address]  [place] [address]        Dear Colleague,    Thank you for referring your patient, Raphael Lizarraga, to the [facility]. Please see a copy of my visit note below.    Chief Complaint   Patient presents with     Eye Problem Right Eye     Possible FB right eye         Do you wear contact lenses? No contacts, does wear glasses        Sofiya Apple Optometric Assistant      See Review Of Systems       Medical, surgical and family histories reviewed and updated 5/15/2020.         OBJECTIVE: See Ophthalmology exam    ASSESSMENT:    ICD-10-CM    1. Foreign body of right cornea, initial encounter  T15.01XA ofloxacin (OCUFLOX) 0.3 % ophthalmic solution      PLAN:    Patient Instructions   Metallic foreign body with rust ring removed from right cornea in-office.   Begin ofloxacin eyedrops. Use 1 drop 6x in the right eye today.   Starting tomorrow, use the drops 4x daily in the right eye for 9 more days, then discontinue.     Return to clinic as needed.       Nayan Araujo O.D.  [facility]  [address] [place] [address]. [place]  [address]    [phone]           Again, thank you for allowing me to participate in the care of your patient.        Sincerely,        Nayan Araujo, RILEY

## 2020-05-22 DIAGNOSIS — F33.0 MAJOR DEPRESSIVE DISORDER, RECURRENT EPISODE, MILD (H): ICD-10-CM

## 2020-05-22 DIAGNOSIS — F41.9 ANXIETY: ICD-10-CM

## 2020-05-27 RX ORDER — BUPROPION HYDROCHLORIDE 150 MG/1
TABLET, EXTENDED RELEASE ORAL
Qty: 30 TABLET | Refills: 0 | Status: SHIPPED | OUTPATIENT
Start: 2020-05-27 | End: 2020-07-06

## 2020-05-27 NOTE — TELEPHONE ENCOUNTER
Protocol green so filled for 30 as patient will need appointment and PHQ-9 at that time. Noted on RX.    Becky Calhoun RN, [facility] Triage

## 2020-06-23 DIAGNOSIS — F41.9 ANXIETY: ICD-10-CM

## 2020-06-23 DIAGNOSIS — F33.0 MAJOR DEPRESSIVE DISORDER, RECURRENT EPISODE, MILD (H): ICD-10-CM

## 2020-06-24 NOTE — TELEPHONE ENCOUNTER
Team to please call patient and schedule medication recheck appointment. Please find out if patient has enough medication to last until appointment once scheduled then route back to refill pool. Thank you.        Yovani Rea RN, BSN, PHN

## 2020-07-01 NOTE — TELEPHONE ENCOUNTER
This writer attempted to contact patient on 07/01/20      Reason for call need appt and left detailed message.      If patient calls back:   1st floor [place] Care Team (MA/TC) called. Inform patient that someone from the team will contact them, document that pt called and route to care team.     Message sent via my chart.    Kaitlin Coelho MA

## 2020-07-02 ENCOUNTER — TELEPHONE (OUTPATIENT)
Dept: NEUROLOGY | Facility: CLINIC | Age: 55
End: 2020-07-02

## 2020-07-03 DIAGNOSIS — F41.9 ANXIETY: ICD-10-CM

## 2020-07-03 DIAGNOSIS — F33.0 MAJOR DEPRESSIVE DISORDER, RECURRENT EPISODE, MILD (H): ICD-10-CM

## 2020-07-03 RX ORDER — BUPROPION HYDROCHLORIDE 150 MG/1
TABLET, EXTENDED RELEASE ORAL
Qty: 30 TABLET | Refills: 0 | OUTPATIENT
Start: 2020-07-03

## 2020-07-03 RX ORDER — BUPROPION HYDROCHLORIDE 150 MG/1
TABLET, EXTENDED RELEASE ORAL
Qty: 30 TABLET | Refills: 0 | Status: CANCELLED | OUTPATIENT
Start: 2020-07-03

## 2020-07-05 ENCOUNTER — MYC REFILL (OUTPATIENT)
Dept: FAMILY MEDICINE | Facility: CLINIC | Age: 55
End: 2020-07-05

## 2020-07-05 ENCOUNTER — MYC MEDICAL ADVICE (OUTPATIENT)
Dept: FAMILY MEDICINE | Facility: CLINIC | Age: 55
End: 2020-07-05

## 2020-07-05 DIAGNOSIS — F33.0 MAJOR DEPRESSIVE DISORDER, RECURRENT EPISODE, MILD (H): ICD-10-CM

## 2020-07-05 DIAGNOSIS — F41.9 ANXIETY: ICD-10-CM

## 2020-07-05 RX ORDER — BUPROPION HYDROCHLORIDE 150 MG/1
TABLET, EXTENDED RELEASE ORAL
Qty: 30 TABLET | Refills: 0 | Status: CANCELLED | OUTPATIENT
Start: 2020-07-05

## 2020-07-06 RX ORDER — BUPROPION HYDROCHLORIDE 150 MG/1
TABLET, EXTENDED RELEASE ORAL
Qty: 30 TABLET | Refills: 0 | Status: SHIPPED | OUTPATIENT
Start: 2020-07-06 | End: 2020-07-20

## 2020-07-06 NOTE — TELEPHONE ENCOUNTER
Prescription approved per [facility] Refill Protocol for a 30 day supply.      Yovani Rea RN, BSN, PHN

## 2020-07-10 ENCOUNTER — VIRTUAL VISIT (OUTPATIENT)
Dept: NEUROLOGY | Facility: CLINIC | Age: 55
End: 2020-07-10
Payer: COMMERCIAL

## 2020-07-10 DIAGNOSIS — G40.209 LOCALIZATION-RELATED PARTIAL EPILEPSY WITH COMPLEX PARTIAL SEIZURES (H): ICD-10-CM

## 2020-07-10 PROCEDURE — 99212 OFFICE O/P EST SF 10 MIN: CPT | Mod: 95 | Performed by: PSYCHIATRY & NEUROLOGY

## 2020-07-10 RX ORDER — PHENYTOIN SODIUM 100 MG/1
CAPSULE, EXTENDED RELEASE ORAL
Qty: 360 CAPSULE | Refills: 3 | Status: SHIPPED | OUTPATIENT
Start: 2020-07-10 | End: 2021-07-07

## 2020-07-10 NOTE — PROGRESS NOTES
"Raphael Lizarraga is a 55 year old male who is being evaluated via a billable video visit.      The patient has been notified of following:     \"This video visit will be conducted via a call between you and your physician/provider. We have found that certain health care needs can be provided without the need for an in-person physical exam.  This service lets us provide the care you need with a video conversation.  If a prescription is necessary we can send it directly to your pharmacy.  If lab work is needed we can place an order for that and you can then stop by our lab to have the test done at a later time.    Video visits are billed at different rates depending on your insurance coverage.  Please reach out to your insurance provider with any questions.    If during the course of the call the physician/provider feels a video visit is not appropriate, you will not be charged for this service.\"    Patient has given verbal consent for Video visit? Yes  How would you like to obtain your AVS? eDealyaFalcon  Patient would like the video invitation sent by: Text to cell phone: [phone]  Will anyone else be joining your video visit? No       Video visit was changed to phone since the patient was unable to launch the video.        NEUROLOGY PROGRESS NOTE   [facility]    Patient:Raphael Lizarraga  : 1965  Age: 55 year old  Today's Virtual Visit: July 10, 2020    History of present illness:     Raphael is participating in this virtual visit for a follow up on his seizures. He was last seen in 2019. He did not have any seizures since last visit. He is taking Dilantin 200 mg bid. Denies dizziness, at time loses his balance, but didn't fall, it's a chronic issue.       Current Outpatient Medications   Medication Sig Dispense Refill     albuterol (PROAIR HFA/PROVENTIL HFA/VENTOLIN HFA) 108 (90 Base) MCG/ACT inhaler Inhale 2 puffs into the lungs every 4 hours as needed (for asthma symptoms) 1 Inhaler 1     buPROPion (WELLBUTRIN " SR) 150 MG 12 hr tablet TAKE 1 TABLET BY MOUTH EVERY MORNING FOR DEPRESSION AND ANXETY PREVENTION 30 tablet 0     PARoxetine (PAXIL) 20 MG tablet TAKE 1 TABLET(20 MG) BY MOUTH AT BEDTIME FOR DEPRESSION OR ANXIETY PREVENTION 90 tablet 1     phenytoin (DILANTIN) 100 MG capsule Take 2 tablets every morning & 2 tablets every evening for seizure prevention. 360 capsule 1     Cholecalciferol (VITAMIN D) 2000 units tablet Take 2,000 Units by mouth daily       IBUPROFEN PO Take 400-800 mg by mouth as needed        methocarbamol (ROBAXIN) 750 MG tablet Take 1 tablet (750 mg) by mouth 3 times daily as needed for muscle spasms (Patient not taking: Reported on 7/10/2020) 45 tablet 1     naproxen (NAPROSYN) 500 MG tablet Take 1 tablet (500 mg) by mouth 2 times daily as needed for moderate pain (Patient not taking: Reported on 7/10/2020) 30 tablet 1     ofloxacin (OCUFLOX) 0.3 % ophthalmic solution Place 1 drop into the right eye every 2 hours (while awake) (Patient not taking: Reported on 7/10/2020) 1 Bottle 0       Assessment/Plan:    Focal epilepsy, status post left anterior temporal lobectomy in 1993.  Seizures have been controlled for at least past 16 years.  Patient has been on Dilantin at least since his surgery.  Switching to another antiseizure medication with less long-term side effects has been discussed previously, but the patient was not inclined to change and was afraid of recurrence of seizures. He had recurrence of seizures when he discontinued Dilantin in the past. He is taking Vit D 2000 international unit(s) daily. I encouraged him to take calcium too.     - Continue Dilantin 200-200     - Take Ca and vit D, at least 2 hours apart from Dilantin    - RTC in 1 year      As described above, I spoke with the patient for 8 minutes and during this time counseling was greater than 50% of the visit time.  Ml Armstrong MD

## 2020-07-20 ENCOUNTER — VIRTUAL VISIT (OUTPATIENT)
Dept: FAMILY MEDICINE | Facility: CLINIC | Age: 55
End: 2020-07-20
Payer: COMMERCIAL

## 2020-07-20 DIAGNOSIS — J45.20 INTERMITTENT ASTHMA WITHOUT COMPLICATION, UNSPECIFIED ASTHMA SEVERITY: Primary | ICD-10-CM

## 2020-07-20 DIAGNOSIS — F33.0 MAJOR DEPRESSIVE DISORDER, RECURRENT EPISODE, MILD (H): ICD-10-CM

## 2020-07-20 DIAGNOSIS — F41.9 ANXIETY: ICD-10-CM

## 2020-07-20 PROCEDURE — 99214 OFFICE O/P EST MOD 30 MIN: CPT | Mod: 95 | Performed by: FAMILY MEDICINE

## 2020-07-20 RX ORDER — BUPROPION HYDROCHLORIDE 150 MG/1
150 TABLET, EXTENDED RELEASE ORAL EVERY MORNING
Qty: 90 TABLET | Refills: 1 | Status: SHIPPED | OUTPATIENT
Start: 2020-07-20 | End: 2021-01-21

## 2020-07-20 RX ORDER — PAROXETINE 20 MG/1
20 TABLET, FILM COATED ORAL AT BEDTIME
Qty: 90 TABLET | Refills: 1 | Status: SHIPPED | OUTPATIENT
Start: 2020-07-20 | End: 2021-02-18

## 2020-07-20 ASSESSMENT — ANXIETY QUESTIONNAIRES
7. FEELING AFRAID AS IF SOMETHING AWFUL MIGHT HAPPEN: NOT AT ALL
3. WORRYING TOO MUCH ABOUT DIFFERENT THINGS: NOT AT ALL
GAD7 TOTAL SCORE: 2
2. NOT BEING ABLE TO STOP OR CONTROL WORRYING: NOT AT ALL
1. FEELING NERVOUS, ANXIOUS, OR ON EDGE: SEVERAL DAYS
6. BECOMING EASILY ANNOYED OR IRRITABLE: SEVERAL DAYS
5. BEING SO RESTLESS THAT IT IS HARD TO SIT STILL: NOT AT ALL
IF YOU CHECKED OFF ANY PROBLEMS ON THIS QUESTIONNAIRE, HOW DIFFICULT HAVE THESE PROBLEMS MADE IT FOR YOU TO DO YOUR WORK, TAKE CARE OF THINGS AT HOME, OR GET ALONG WITH OTHER PEOPLE: NOT DIFFICULT AT ALL

## 2020-07-20 ASSESSMENT — PATIENT HEALTH QUESTIONNAIRE - PHQ9
5. POOR APPETITE OR OVEREATING: NOT AT ALL
SUM OF ALL RESPONSES TO PHQ QUESTIONS 1-9: 4

## 2020-07-20 NOTE — PROGRESS NOTES
"Raphael Lizarraga is a 55 year old male who is being evaluated via a billable telephone visit.      The patient has been notified of following:     \"This telephone visit will be conducted via a call between you and your physician/provider. We have found that certain health care needs can be provided without the need for a physical exam.  This service lets us provide the care you need with a short phone conversation.  If a prescription is necessary we can send it directly to your pharmacy.  If lab work is needed we can place an order for that and you can then stop by our lab to have the test done at a later time.    Telephone visits are billed at different rates depending on your insurance coverage. During this emergency period, for some insurers they may be billed the same as an in-person visit.  Please reach out to your insurance provider with any questions.    If during the course of the call the physician/provider feels a telephone visit is not appropriate, you will not be charged for this service.\"    Patient has given verbal consent for Telephone visit?  Yes    What phone number would you like to be contacted at? [phone]    How would you like to obtain your AVS? Jay Sandoval     Raphael Lizarraga is a 55 year old male who presents via phone visit today for the following health issues:    HPI    Depression and Anxiety Follow-Up    How are you doing with your depression since your last visit? No change    How are you doing with your anxiety since your last visit?  No change    Are you having other symptoms that might be associated with depression or anxiety? Yes:  sleep problems    Have you had a significant life event? No     Do you have any concerns with your use of alcohol or other drugs? No  Up at 3 AM.    Social History     Tobacco Use     Smoking status: Former Smoker     Last attempt to quit: 1993     Years since quittin.9     Smokeless tobacco: Never Used   Substance Use Topics     Alcohol " use: Yes     Alcohol/week: 0.0 - 4.0 standard drinks     Comment: weekends     Drug use: No     PHQ 9/12/2019 12/5/2019 1/23/2020   PHQ-9 Total Score 3 13 4   Q9: Thoughts of better off dead/self-harm past 2 weeks Not at all Several days Not at all   PHQ-A Total Score - 5 -   PHQ-A Mood affect on daily activities - Somewhat difficult -   PHQ-A Suicide Ideation past 2 weeks - Not at all -     JOSE-7 SCORE 2/22/2019 8/30/2019 9/12/2019   Total Score - - -   Total Score - 1 (minimal anxiety) -   Total Score 1 1 1         Suicide Assessment Five-step Evaluation and Treatment (SAFE-T)    Asthma Follow-Up    Was ACT completed today?  No      Do you have a cough?  YES- mild, has been improving    Are you experiencing any wheezing in your chest?  No    Do you have any shortness of breath?  No     How often are you using a short-acting (rescue) inhaler or nebulizer, such as Albuterol?  A few times a month    How many days per week do you miss taking your asthma controller medication?  0    Please describe any recent triggers for your asthma: None    Have you had any Emergency Room Visits, Urgent Care Visits, or Hospital Admissions since your last office visit?  No      How many servings of fruits and vegetables do you eat daily?  0-1    On average, how many sweetened beverages do you drink each day (Examples: soda, juice, sweet tea, etc.  Do NOT count diet or artificially sweetened beverages)?   0-1    How many days per week do you exercise enough to make your heart beat faster? 3 or less    How many minutes a day do you exercise enough to make your heart beat faster? 9 or less    How many days per week do you miss taking your medication? 0       Seizure disorder -sees neurology regularly.  No seizures for the last 20 years.  Last appointment this month.  Continues on Dilantin as previous.      BP Readings from Last 3 Encounters:   12/05/19 127/73   10/17/19 118/71   09/12/19 120/72    Wt Readings from Last 3 Encounters:    12/05/19 63.5 kg (140 lb)   10/17/19 63.2 kg (139 lb 4.8 oz)   09/12/19 63.5 kg (140 lb)                    Reviewed and updated as needed this visit by Provider  Tobacco  Allergies  Meds  Med Hx  Surg Hx  Fam Hx  Soc Hx        Review of Systems   Constitutional, HEENT, cardiovascular, pulmonary, gi and gu systems are negative, except as otherwise noted.       Objective   Reported vitals:  There were no vitals taken for this visit.   healthy, alert and no distress  PSYCH: Alert and oriented times 3; coherent speech, normal   rate and volume, able to articulate logical thoughts, able   to abstract reason, no tangential thoughts, no hallucinations   or delusions  His affect is normal  RESP: No cough, no audible wheezing, able to talk in full sentences  Remainder of exam unable to be completed due to telephone visits    Diagnostic Test Results:  Labs reviewed in Epic        Assessment/Plan:    1. Major depressive disorder, recurrent episode, mild (H)  2. Anxiety  Symptoms under good control with current treatment.  Has been on current combination of medications since 2016  - buPROPion (WELLBUTRIN SR) 150 MG 12 hr tablet; Take 1 tablet (150 mg) by mouth every morning FOR DEPRESSION AND ANXETY PREVENTION  Dispense: 90 tablet; Refill: 1  - PARoxetine (PAXIL) 20 MG tablet; Take 1 tablet (20 mg) by mouth At Bedtime  Dispense: 90 tablet; Refill: 1    3. Intermittent asthma without complication, unspecified asthma severity  Albuterol use as needed.  Reports used about a month ago for seasonal allergy symptoms.  Has adequate supply at home currently.    4.  Seizure disorder on Dilantin no recent seizures described.  He is on Wellbutrin but has been on this over the last 4 years without seizure triggered      Return in about 6 months (around 1/20/2021) for Physical Exam, Lab Work, chronic health problems.    Patient Instructions   Continue current medication for anxiety and depression.  Refills are updated now.        Phone call duration:  10 minutes    Kim Montero MD

## 2020-07-21 ASSESSMENT — ANXIETY QUESTIONNAIRES: GAD7 TOTAL SCORE: 2

## 2020-07-27 NOTE — TELEPHONE ENCOUNTER
"Requested Prescriptions   Pending Prescriptions Disp Refills     phenytoin (DILANTIN) 100 MG CR capsule 90 capsule 11    Last Written Prescription Date:  17  Last Fill Quantity: 90,  # refills: 11   Last Office Visit with [facility], [facility] or [facility] prescribing provider:  8/3/2018     Future Office Visit:      Si tablet morning & 2 in the evening    Anti-Seizure Meds Protocol  Failed    2018  6:11 PM       Failed - Review Authorizing provider's last note.     Refer to last progress notes: confirm request is for original authorizing provider (cannot be through other providers).         Passed - Recent (12 mo) or future (30 days) visit within the authorizing provider's specialty    Patient had office visit in the last 12 months or has a visit in the next 30 days with authorizing provider or within the authorizing provider's specialty.  See \"Patient Info\" tab in inbasket, or \"Choose Columns\" in Meds & Orders section of the refill encounter.           Passed - Normal CBC on file in past 26 months    Recent Labs   Lab Test  18   0951   WBC  2.6*   RBC  4.65   HGB  14.5   HCT  44.4   PLT  241       For [facility] ONLY: IYKA030 = WBC, HPTJ363 = RBC         Passed - Normal ALT or AST on file in past 26 months    Recent Labs   Lab Test  18   0951   ALT  31     Recent Labs   Lab Test  18   0951   AST  19            Passed - Normal platelet count on file in past 26 months    Recent Labs   Lab Test  18   0951   PLT  241              Passed - Dilantin level within therapeutic range in past 26 months    Recent Labs   Lab Test  18   0951   DILANTIN  18.8       Dilantin level must be checked 2-4 weeks after dosage change.            " Comment: reviewed results with pt.  \\[facility] states he had labs done at  the day of his OV here.  Will contact  for recent cbc and cmp.  If normal, will call pt and send script to [facility] [place].\\nWill mail pt script to repeat labs in 6 weeks. Comment: reviewed results with pt.  \\[facility] states he had labs done at  the day of his OV here.  Will contact  for recent cbc and cmp.  If normal, will call pt and send script to [facility] [place].\\nWill mail pt script to repeat labs in 6 weeks.

## 2020-10-29 ENCOUNTER — OFFICE VISIT (OUTPATIENT)
Dept: FAMILY MEDICINE | Facility: CLINIC | Age: 55
End: 2020-10-29
Payer: COMMERCIAL

## 2020-10-29 VITALS
BODY MASS INDEX: 21.37 KG/M2 | OXYGEN SATURATION: 98 % | WEIGHT: 141 LBS | DIASTOLIC BLOOD PRESSURE: 76 MMHG | HEIGHT: 68 IN | SYSTOLIC BLOOD PRESSURE: 127 MMHG | HEART RATE: 69 BPM

## 2020-10-29 DIAGNOSIS — M25.521 RIGHT ELBOW PAIN: Primary | ICD-10-CM

## 2020-10-29 DIAGNOSIS — J45.20 INTERMITTENT ASTHMA WITHOUT COMPLICATION, UNSPECIFIED ASTHMA SEVERITY: ICD-10-CM

## 2020-10-29 DIAGNOSIS — G47.33 OSA (OBSTRUCTIVE SLEEP APNEA): ICD-10-CM

## 2020-10-29 PROCEDURE — 99214 OFFICE O/P EST MOD 30 MIN: CPT | Performed by: NURSE PRACTITIONER

## 2020-10-29 RX ORDER — IBUPROFEN 800 MG/1
800 TABLET, FILM COATED ORAL EVERY 8 HOURS PRN
Qty: 30 TABLET | Refills: 1 | Status: SHIPPED | OUTPATIENT
Start: 2020-10-29 | End: 2022-08-12

## 2020-10-29 ASSESSMENT — MIFFLIN-ST. JEOR: SCORE: 1459.07

## 2020-10-29 ASSESSMENT — PAIN SCALES - GENERAL: PAINLEVEL: MILD PAIN (2)

## 2020-10-29 NOTE — PATIENT INSTRUCTIONS
At [facility], we strive to deliver an exceptional experience to you, every time we see you. If you receive a survey, please complete it as we do value your feedback.  If you have MyChart, you can expect to receive results automatically within 24 hours of their completion.  Your provider will send a note interpreting your results as well.   If you do not have MyChart, you should receive your results in about a week by mail.    Your care team:                            Family Medicine Internal Medicine   MD Jeff Juárez, MD Carter Denton MD Katya Georgiev PA-C Megan Hill, APRN CNP    Carmelo Pang, MD Pediatrics   Sameer Urrutia, PAAnnitaC  Laurel Donato, CNP MD Virginie Bond APRN CNP   MD Randa Bailey MD Deborah Mielke, MD Yulissa Amaya, APRN CNP  EULALIA Lawrence, CNP  MD Kim Aguilar MD Angela Wermerskirchen, MD      Clinic hours: Monday - Thursday 7 am-7 pm; Fridays 7 am-5 pm.   Urgent care: Monday - Friday 11 am-9 pm; Saturday and Sunday 9 am-5 pm.    Clinic: [phone]       [facility]: Monday - Thursday 8 am - 7 pm; Friday 8 am - 6 pm  [facility]: [phone]     Use www.[email or web address].org for 24/7 diagnosis and treatment of dozens of conditions.    Patient Education     Tendonitis  A tendon is the thick fibrous cord that joins muscle to bone and allows joints to move. When a tendon becomes inflamed, it is called tendonitis. This can occur from overuse, injury, or infection. This usually involves the shoulders, forearm, wrist, hands and feet. Symptoms include pain, swelling and tenderness to the touch. Moving the joint increases the pain.  It takes 4 to 6 weeks or more for tendonitis to heal. It is treated by preventing motion of the tendon, occasionally with a splint or brace, and the use of  anti-inflammatory medicine.  Home care    Some people find relief with ice packs. These can be crushed or cubed ice in a plastic bag or a bag of frozen vegetables wrapped in a thin towel. Other people get better relief with heat. This can include a hot shower, hot bath, or a moist towel warmed in a microwave. Try each and use the method that feels best, for 15 to 20 minutes several times a day.    Rest the inflamed joint and protect it from movement.    You may use over-the-counter ibuprofen or naproxen to treat pain and inflammation, unless another medicine was prescribed. If you can't take these medicines, acetaminophen may help with the pain, but does not treat inflammation. If you have chronic liver or kidney disease or ever had a stomach ulcer or gastrointestinal bleeding, talk with your doctor before using these medicines.    As your symptoms improve, begin gradual motion at the involved joint.  Follow-up care  Follow up with your healthcare provider if you are not improving after 5 to 7 days of treatment.  When to seek medical advice  Call your healthcare provider right away if any of these occur:    Redness over the painful area    Increasing pain or swelling at the joint    Fever lasting 24 to 48 hours or chills, or as advised by your healthcare provider  Shelley last reviewed this educational content on 5/1/2018 2000-2020 [employer] [address] [place] [address], [place] [address]. All rights reserved. This information is not intended as a substitute for professional medical care. Always follow your healthcare professional's instructions.

## 2020-10-29 NOTE — PROGRESS NOTES
Subjective     Raphael Lizarraga is a 55 year old male who presents to clinic today for the following health issues:    HPI         Musculoskeletal problem/pain  Onset/Duration: about 2 weeks  Description  Location: Arm - right  Joint Swelling: no  Redness: no  Pain: YES  Warmth: no  Intensity:  severe  Progression of Symptoms:  worsening  Accompanying signs and symptoms:   Fevers: no  Numbness/tingling/weakness: YES  History  Trauma to the area: no  Recent illness:  no  Previous similar problem: no  Previous evaluation:  no  Precipitating or alleviating factors:  Aggravating factors include: lifting and overuse  Therapies tried and outcome: nothing and Ibuprofen  Patient is a - works on a grinding machine and has had pain in right elbow pain for 2 weeks, but symptoms got worse over the last 2 days. He does a lot of repetitive movements, Pounding with a hammer.  He's tried Ibuprofen 800 this am but notes not significnat improvement.    Asthma Follow-Up    Was ACT completed today?    Yes    ACT Total Scores 9/12/2019   ACT TOTAL SCORE -   ASTHMA ER VISITS -   ASTHMA HOSPITALIZATIONS -   ACT TOTAL SCORE (Goal Greater than or Equal to 20) 25   In the past 12 months, how many times did you visit the emergency room for your asthma without being admitted to the hospital? 0   In the past 12 months, how many times were you hospitalized overnight because of your asthma? 0          How many days per week do you miss taking your asthma controller medication?  I do not have an asthma controller medication    Please describe any recent triggers for your asthma: pollens    Have you had any Emergency Room Visits, Urgent Care Visits, or Hospital Admissions since your last office visit?  No      How many servings of fruits and vegetables do you eat daily?  2-3    On average, how many sweetened beverages do you drink each day (Examples: soda, juice, sweet tea, etc.  Do NOT count diet or artificially sweetened beverages)?    "0    How many days per week do you exercise enough to make your heart beat faster? 3 or less    How many minutes a day do you exercise enough to make your heart beat faster? 9 or less    How many days per week do you miss taking your medication? 0      Review of Systems   Constitutional, HEENT, cardiovascular, pulmonary, gi and gu systems are negative, except as otherwise noted.      Objective    /76 (BP Location: Left arm, Patient Position: Chair, Cuff Size: Adult Regular)   Pulse 69   Ht 1.727 m (5' 8\")   Wt 64 kg (141 lb)   SpO2 98%   BMI 21.44 kg/m    Body mass index is 21.44 kg/m .  Physical Exam   GENERAL: healthy, alert and no distress  NECK: no adenopathy, no asymmetry, masses, or scars and thyroid normal to palpation  RESP: lungs clear to auscultation - no rales, rhonchi or wheezes  CV: regular rate and rhythm, normal S1 S2, no S3 or S4, no murmur, click or rub, no peripheral edema and peripheral pulses strong  MS: no gross musculoskeletal defects noted, no edema  SKIN: no suspicious lesions or rashes  NEURO: Normal strength and tone, mentation intact and speech normal  PSYCH: mentation appears normal, affect normal/bright  LYMPH: normal ant/post cervical, supraclavicular nodes            Assessment & Plan     Right elbow pain  Likely tendonitis due to repetitive motions at work.  Advised rest over the weekend, OK to take Ibuprofen 800 mg TID prn pain/inflammation, consider work restrictions until improved.  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  - ibuprofen (ADVIL/MOTRIN) 800 MG tablet  Dispense: 30 tablet; Refill: 1    NELIA (obstructive sleep apnea)  Stable on CPAP.    Intermittent asthma without complication, unspecified asthma severity  ACT=23, well controlled.          See Patient Instructions    No follow-ups on file.    Mary Carmen Amaya, RAFA [facility]    "

## 2020-10-30 ASSESSMENT — ASTHMA QUESTIONNAIRES: ACT_TOTALSCORE: 23

## 2020-11-29 ENCOUNTER — HEALTH MAINTENANCE LETTER (OUTPATIENT)
Age: 55
End: 2020-11-29

## 2021-01-15 ENCOUNTER — HEALTH MAINTENANCE LETTER (OUTPATIENT)
Age: 56
End: 2021-01-15

## 2021-01-21 DIAGNOSIS — F41.9 ANXIETY: ICD-10-CM

## 2021-01-21 DIAGNOSIS — F33.0 MAJOR DEPRESSIVE DISORDER, RECURRENT EPISODE, MILD (H): ICD-10-CM

## 2021-01-21 RX ORDER — BUPROPION HYDROCHLORIDE 150 MG/1
150 TABLET, EXTENDED RELEASE ORAL EVERY MORNING
Qty: 30 TABLET | Refills: 0 | Status: SHIPPED | OUTPATIENT
Start: 2021-01-21 | End: 2021-02-18

## 2021-01-21 NOTE — TELEPHONE ENCOUNTER
Routing refill request to provider for review/approval because:  PHQ-9 needs to be updated    PHQ-9 score:    PHQ 7/20/2020   PHQ-9 Total Score 4   Q9: Thoughts of better off dead/self-harm past 2 weeks Not at all   PHQ-A Total Score -   PHQ-A Mood affect on daily activities -   PHQ-A Suicide Ideation past 2 weeks -           Evelia Ventura RN, BSN, CMSRN  [facility]

## 2021-01-31 ENCOUNTER — OFFICE VISIT (OUTPATIENT)
Dept: URGENT CARE | Facility: URGENT CARE | Age: 56
End: 2021-01-31
Payer: COMMERCIAL

## 2021-01-31 VITALS
OXYGEN SATURATION: 100 % | DIASTOLIC BLOOD PRESSURE: 75 MMHG | WEIGHT: 153 LBS | TEMPERATURE: 97.3 F | BODY MASS INDEX: 23.26 KG/M2 | SYSTOLIC BLOOD PRESSURE: 147 MMHG | RESPIRATION RATE: 16 BRPM | HEART RATE: 62 BPM

## 2021-01-31 DIAGNOSIS — S05.02XA ABRASION OF LEFT CORNEA, INITIAL ENCOUNTER: Primary | ICD-10-CM

## 2021-01-31 PROCEDURE — 99213 OFFICE O/P EST LOW 20 MIN: CPT | Performed by: PHYSICIAN ASSISTANT

## 2021-01-31 RX ORDER — POLYMYXIN B SULFATE AND TRIMETHOPRIM 1; 10000 MG/ML; [USP'U]/ML
2 SOLUTION OPHTHALMIC EVERY 4 HOURS
Qty: 10 ML | Refills: 0 | Status: SHIPPED | OUTPATIENT
Start: 2021-01-31 | End: 2021-02-18

## 2021-01-31 ASSESSMENT — ENCOUNTER SYMPTOMS
NAUSEA: 0
CONSTITUTIONAL NEGATIVE: 1
VOMITING: 0
CARDIOVASCULAR NEGATIVE: 1
CHILLS: 0
SHORTNESS OF BREATH: 0
FEVER: 0
GASTROINTESTINAL NEGATIVE: 1
HEADACHES: 0
FREQUENCY: 0
PALPITATIONS: 0
MYALGIAS: 0
PHOTOPHOBIA: 0
EYE DISCHARGE: 0
HEMATURIA: 0
SORE THROAT: 0
MUSCULOSKELETAL NEGATIVE: 1
CHEST TIGHTNESS: 0
NEUROLOGICAL NEGATIVE: 1
RESPIRATORY NEGATIVE: 1
COUGH: 0
ABDOMINAL PAIN: 0
EYE REDNESS: 1
WHEEZING: 0
EYE PAIN: 1
EYE ITCHING: 0
ALLERGIC/IMMUNOLOGIC NEGATIVE: 1
DIARRHEA: 0
DYSURIA: 0

## 2021-01-31 NOTE — PATIENT INSTRUCTIONS
Patient Education     Corneal Abrasion    You have a scratch or scrape (abrasion) on your cornea. The cornea is the clear part in the front of the eye. This sensitive area is very painful when injured. You may make tears frequently, and your vision may be blurry until the injury heals. You may be sensitive to light.   This part of the body heals quickly. You can expect the pain to go away within 24 to 48 hours. If the abrasion is large or deep, your doctor may apply an eye patch, although this is not always done. An antibiotic ointment or eye drops may also be used to prevent infection.   Numbing drops may be used to relieve the pain temporarily so that your eyes can be examined. But these drops can t be prescribed for home use because that would prevent healing and lead to more serious problems. Also, if you can t feel your eye, there is a chance of accidentally injuring it further without knowing it.   Home care    A cold pack may be applied over the eye (or eye patch) for 20 minutes at a time, to reduce pain. To make a cold pack, put ice cubes in a plastic bag that seals at the top. Wrap the bag in a clean, thin towel or cloth.    You may use acetaminophen or ibuprofen to control pain, unless another pain medicine was prescribed. If you have chronic liver or kidney disease, talk with your healthcare provider before using these medicines. Also talk with your provider if you have ever had a stomach ulcer or gastrointestinal bleeding.    Rest your eyes and don t read until symptoms are gone.    If you use contact lenses, don t wear them until all symptoms are gone.    If your vision is affected by the corneal abrasion or if an eye patch was applied, don t drive a motor vehicle or operate machinery until all symptoms are gone. You may have trouble judging distances using only one eye.    If your eyes are sensitive to light, try wearing sunglasses, or stay indoors until symptoms go away.    Follow-up care  Follow up  with your healthcare provider, or as advised.    If no patch was put on your eye and the pain continues for more than 48 hours, you should have another exam. Contact your healthcare provider to arrange this.    If your eye was patched and you were asked to remove the patch yourself, see your healthcare provider. Contact your healthcare provider if you still have pain after the patch is removed.    If you were given a return appointment for patch removal and re-examination, be sure to keep the appointment. Leaving the patch in place longer than advised could be harmful.  When to seek medical advice  Call your healthcare provider right away if any of these occur.    Eye pain gets worse or does not get better after 24 hours    Discharge from the eye    Redness of the eye or swelling of the eyelids gets worse    Vision gets worse    Symptoms get worse after the abrasion has healed  Shelley last reviewed this educational content on 2/1/2020 2000-2020 [employer]. [address] [place] [address], [place] [address]. All rights reserved. This information is not intended as a substitute for professional medical care. Always follow your healthcare professional's instructions.

## 2021-01-31 NOTE — PROGRESS NOTES
Chief Complaint:      Chief Complaint   Patient presents with     Eye Problem     Left eye-a piece of metal in eye since Thursday        Medical Decision Making:    Differential Diagnosis:  Eye Problem: Bacterial conjunctivitis  Viral conjunctivitis  Allergic conjunctivitis  Stye (external)  Stye (internal)  Corneal abrasion  Foreign body     ASSESSMENT     1. Abrasion of left cornea, initial encounter         PLAN  Rx for Polytrim drops  Artifical tears for irritation  Warm compresses with baby shampoo for mattering.   If symptoms are not improving follow up with your eye doctor in 1-2 days.  See your eye doctor immediately if symptoms worsen.  Worrisome symptoms discussed with instructions to go to the ED.  Patient verbalized understanding and agreed with this plan.    Labs:    No results found for any visits on 01/31/21.       Current Meds    Current Outpatient Medications:      albuterol (PROAIR HFA/PROVENTIL HFA/VENTOLIN HFA) 108 (90 Base) MCG/ACT inhaler, Inhale 2 puffs into the lungs every 4 hours as needed (for asthma symptoms), Disp: 1 Inhaler, Rfl: 1     buPROPion (WELLBUTRIN SR) 150 MG 12 hr tablet, Take 1 tablet (150 mg) by mouth every morning +++ appointment needed for additional refills +++, Disp: 30 tablet, Rfl: 0     calcium-vitamin D (OSCAL) 250-125 MG-UNIT TABS per tablet, Take 1 tablet by mouth 2 times daily, Disp: , Rfl:      ibuprofen (ADVIL/MOTRIN) 800 MG tablet, Take 1 tablet (800 mg) by mouth every 8 hours as needed for moderate pain, Disp: 30 tablet, Rfl: 1     PARoxetine (PAXIL) 20 MG tablet, Take 1 tablet (20 mg) by mouth At Bedtime, Disp: 90 tablet, Rfl: 1     phenytoin (DILANTIN) 100 MG capsule, Take 2 tablets every morning & 2 tablets every evening for seizure prevention., Disp: 360 capsule, Rfl: 3     trimethoprim-polymyxin b (POLYTRIM) 63898-8.1 UNIT/ML-% ophthalmic solution, Place 2 drops Into the left eye every 4 hours, Disp: 10 mL, Rfl: 0     IBUPROFEN PO, Take 400-800 mg by mouth  as needed , Disp: , Rfl:     Allergies  No Known Allergies      SUBJECTIVE    HPI: Raphael Lizarraga is a 56 year old male presenting with possible foreign body in the L eye  for the past 3 days.     Raphael Lizarraga does not wear contact lenses.    No problems with vision.  Patient has a significant Hx of cataract surgery in both eyes.     No fever, abdominal pain, diarrhea or vomiting.  No cough, chest pain or shortness of breath.    ROS:     Review of Systems   Constitutional: Negative.  Negative for chills and fever.   HENT: Negative.  Negative for sore throat.    Eyes: Positive for pain and redness. Negative for photophobia, discharge, itching and visual disturbance.   Respiratory: Negative.  Negative for cough, chest tightness, shortness of breath and wheezing.    Cardiovascular: Negative.  Negative for chest pain and palpitations.   Gastrointestinal: Negative.  Negative for abdominal pain, diarrhea, nausea and vomiting.   Genitourinary: Negative for dysuria, frequency, hematuria and urgency.   Musculoskeletal: Negative.  Negative for myalgias.   Skin: Negative for rash.   Allergic/Immunologic: Negative.  Negative for immunocompromised state.   Neurological: Negative.  Negative for headaches.           Family History   Family History   Problem Relation Age of Onset     Diabetes Father      Hypertension Father      Cerebrovascular Disease Paternal Grandfather      Asthma No family hx of      C.A.D. No family hx of      Breast Cancer No family hx of      Cancer - colorectal No family hx of      Prostate Cancer No family hx of      Anesthesia Reaction No family hx of      Thrombophilia No family hx of      Bleeding Disorder No family hx of      Glaucoma No family hx of      Macular Degeneration No family hx of      Retinal detachment No family hx of         Problem history  Patient Active Problem List   Diagnosis     Epilepsy (H)     Anxiety     Major depressive disorder, recurrent episode, mild (H)     NELIA (obstructive  sleep apnea)     Intermittent asthma     Osteoporosis/osteopenia increased risk     CARDIOVASCULAR SCREENING; LDL GOAL LESS THAN 160     Strain of thoracic region     Pain in thoracic spine     Corneal rust ring of right eye           Social History  Social History     Socioeconomic History     Marital status:      Spouse name: Rashad     Number of children: 1     Years of education: 12     Highest education level: Not on file   Occupational History     Occupation: [employer]     Employer: OTHER     Comment: 2 years   Social Needs     Financial resource strain: Not on file     Food insecurity     Worry: Not on file     Inability: Not on file     Transportation needs     Medical: Not on file     Non-medical: Not on file   Tobacco Use     Smoking status: Former Smoker     Quit date: 1993     Years since quittin.4     Smokeless tobacco: Never Used   Substance and Sexual Activity     Alcohol use: Yes     Alcohol/week: 0.0 - 4.0 standard drinks     Comment: weekends     Drug use: No     Sexual activity: Yes     Partners: Female   Lifestyle     Physical activity     Days per week: Not on file     Minutes per session: Not on file     Stress: Not on file   Relationships     Social connections     Talks on phone: Not on file     Gets together: Not on file     Attends Christianity service: Not on file     Active member of club or organization: Not on file     Attends meetings of clubs or organizations: Not on file     Relationship status: Not on file     Intimate partner violence     Fear of current or ex partner: Not on file     Emotionally abused: Not on file     Physically abused: Not on file     Forced sexual activity: Not on file   Other Topics Concern     Parent/sibling w/ CABG, MI or angioplasty before 65F 55M? Not Asked   Social History Narrative     Not on file        OBJECTIVE     Vital signs reviewed by Ludwig Roe PA-C  BP (!) 147/75 (BP Location: Left arm, Patient Position: Sitting, Cuff Size:  Adult Regular)   Pulse 62   Temp 97.3  F (36.3  C) (Tympanic)   Resp 16   Wt 69.4 kg (153 lb)   SpO2 100%   BMI 23.26 kg/m       Physical Exam  Vitals signs and nursing note reviewed.   Constitutional:       General: He is not in acute distress.     Appearance: He is well-developed. He is not ill-appearing, toxic-appearing or diaphoretic.   HENT:      Head: Normocephalic and atraumatic.      Right Ear: Hearing, tympanic membrane, ear canal and external ear normal. Tympanic membrane is not perforated, erythematous, retracted or bulging.      Left Ear: Hearing, tympanic membrane, ear canal and external ear normal. Tympanic membrane is not perforated, erythematous, retracted or bulging.      Nose: Nose normal. No mucosal edema, congestion or rhinorrhea.      Mouth/Throat:      Pharynx: No oropharyngeal exudate or posterior oropharyngeal erythema.      Tonsils: No tonsillar exudate or tonsillar abscesses. 0 on the right. 0 on the left.   Eyes:      General:         Left eye: No foreign body, discharge or hordeolum.      Extraocular Movements:      Left eye: Normal extraocular motion.      Conjunctiva/sclera:      Left eye: Left conjunctiva is injected. No chemosis, exudate or hemorrhage.     Pupils: Pupils are equal, round, and reactive to light.      Left eye: Pupil is round and reactive. Corneal abrasion and fluorescein uptake present.     Neck:      Musculoskeletal: Normal range of motion and neck supple.   Cardiovascular:      Rate and Rhythm: Normal rate and regular rhythm.      Heart sounds: Normal heart sounds, S1 normal and S2 normal. Heart sounds not distant. No murmur. No friction rub. No gallop.    Pulmonary:      Effort: Pulmonary effort is normal. No respiratory distress.      Breath sounds: Normal breath sounds. No decreased breath sounds, wheezing, rhonchi or rales.   Abdominal:      General: Bowel sounds are normal. There is no distension.      Palpations: Abdomen is soft.      Tenderness: There is  no abdominal tenderness.   Lymphadenopathy:      Cervical: No cervical adenopathy.   Skin:     General: Skin is warm and dry.      Findings: No rash.   Neurological:      Mental Status: He is alert.      Cranial Nerves: No cranial nerve deficit.   Psychiatric:         Attention and Perception: He is attentive.         Speech: Speech normal.         Behavior: Behavior normal. Behavior is cooperative.         Thought Content: Thought content normal.         Judgment: Judgment normal.                 Ludwig Roe PA-C  1/31/2021, 9:05 AM

## 2021-02-02 ENCOUNTER — OFFICE VISIT (OUTPATIENT)
Dept: OPTOMETRY | Facility: CLINIC | Age: 56
End: 2021-02-02
Payer: COMMERCIAL

## 2021-02-02 DIAGNOSIS — S05.02XD ABRASION OF LEFT CORNEA, SUBSEQUENT ENCOUNTER: Primary | ICD-10-CM

## 2021-02-02 PROCEDURE — 99213 OFFICE O/P EST LOW 20 MIN: CPT | Performed by: OPTOMETRIST

## 2021-02-02 ASSESSMENT — CONF VISUAL FIELD
OS_INFERIOR_NASAL_RESTRICTION: 3
OS_SUPERIOR_NASAL_RESTRICTION: 1
METHOD: COUNTING FINGERS
OD_SUPERIOR_TEMPORAL_RESTRICTION: 1

## 2021-02-02 ASSESSMENT — REFRACTION_WEARINGRX
OS_SPHERE: -1.50
OD_CYLINDER: +2.25
OS_AXIS: 163
OD_AXIS: 017
OD_SPHERE: -1.50
OD_ADD: +2.25
OS_CYLINDER: +1.75
OS_ADD: +2.25

## 2021-02-02 ASSESSMENT — SLIT LAMP EXAM - LIDS: COMMENTS: NORMAL

## 2021-02-02 ASSESSMENT — VISUAL ACUITY
METHOD: SNELLEN - LINEAR
OS_PH_CC: 20/25
OD_CC: 20/20
CORRECTION_TYPE: GLASSES
OS_CC: 20/70

## 2021-02-02 ASSESSMENT — EXTERNAL EXAM - LEFT EYE: OS_EXAM: BROW PTOSIS

## 2021-02-02 ASSESSMENT — EXTERNAL EXAM - RIGHT EYE: OD_EXAM: BROW PTOSIS

## 2021-02-02 NOTE — PATIENT INSTRUCTIONS
Corneal abrasion left eye. No foreign body in eye.     Started PolyTrim eyedrops on 1/31/2021  Continue Polytrim eyedrops- 1 drop 4x daily in the left eye- for 7 more days, or until the eye feels back to normal.     Return to clinic as needed.       Nayan Araujo, OD  [address] [place]  [address]    [phone]

## 2021-02-02 NOTE — LETTER
2/2/2021         RE: Raphael Lizarraga  [address]  [place] [address]        Dear Colleague,    Thank you for referring your patient, Raphael Lizarraga, to the [facility]. Please see a copy of my visit note below.    Chief Complaint   Patient presents with     Foreign Body Left Eye        Do you wear contact lenses? no          Naif Serrato, CO 2/2/2021 11:35 AM       See Review Of Systems    Eyes: eye pain left eye  Constitutional: No fevers, chills, or weight changes.         Medical, surgical and family histories reviewed and updated 2/2/2021.          OBJECTIVE: See Ophthalmology exam     ASSESSMENT:    ICD-10-CM    1. Abrasion of left cornea, subsequent encounter  S05.02XD       PLAN:     Patient Instructions   Corneal abrasion left eye. No foreign body in eye.     Started PolyTrim eyedrops on 1/31/2021  Continue Polytrim eyedrops- 1 drop 4x daily in the left eye- for 7 more days, or until the eye feels back to normal.     Return to clinic as needed.       Nayan Araujo OD  [facility]  [address] [place] [address]. [place]  [place]  [address]    [phone]            Again, thank you for allowing me to participate in the care of your patient.        Sincerely,        Nayan Araujo OD

## 2021-02-02 NOTE — PROGRESS NOTES
Chief Complaint   Patient presents with     Foreign Body Left Eye        Do you wear contact lenses? no          Naif BlancomoisesJIMBO crespo 2/2/2021 11:35 AM       See Review Of Systems    Eyes: eye pain left eye  Constitutional: No fevers, chills, or weight changes.         Medical, surgical and family histories reviewed and updated 2/2/2021.          OBJECTIVE: See Ophthalmology exam     ASSESSMENT:    ICD-10-CM    1. Abrasion of left cornea, subsequent encounter  S05.02XD       PLAN:     Patient Instructions   Corneal abrasion left eye. No foreign body in eye.     Started PolyTrim eyedrops on 1/31/2021  Continue Polytrim eyedrops- 1 drop 4x daily in the left eye- for 7 more days, or until the eye feels back to normal.     Return to clinic as needed.       Nayan Araujo, OD  [address]  [place]  [address]    [phone]

## 2021-02-17 DIAGNOSIS — F41.9 ANXIETY: ICD-10-CM

## 2021-02-17 DIAGNOSIS — F33.0 MAJOR DEPRESSIVE DISORDER, RECURRENT EPISODE, MILD (H): ICD-10-CM

## 2021-02-17 NOTE — TELEPHONE ENCOUNTER
Next 5 appointments (look out 90 days)    Feb 18, 2021  5:20 PM  Jay Reid with Chava Belcher MD  [facility] ([facility] ) [address] [place] [id number]  [phone]        Routing refill request to provider for review/approval because:  Sury given x1 and patient did not follow up, please advise  Jessica Cha BSN, RN

## 2021-02-18 ENCOUNTER — OFFICE VISIT (OUTPATIENT)
Dept: FAMILY MEDICINE | Facility: CLINIC | Age: 56
End: 2021-02-18
Payer: COMMERCIAL

## 2021-02-18 VITALS
HEART RATE: 65 BPM | SYSTOLIC BLOOD PRESSURE: 120 MMHG | OXYGEN SATURATION: 98 % | RESPIRATION RATE: 18 BRPM | HEIGHT: 66 IN | TEMPERATURE: 97.7 F | BODY MASS INDEX: 23.46 KG/M2 | WEIGHT: 146 LBS | DIASTOLIC BLOOD PRESSURE: 75 MMHG

## 2021-02-18 DIAGNOSIS — F33.0 MAJOR DEPRESSIVE DISORDER, RECURRENT EPISODE, MILD (H): Primary | ICD-10-CM

## 2021-02-18 DIAGNOSIS — F41.9 ANXIETY: ICD-10-CM

## 2021-02-18 DIAGNOSIS — J45.20 INTERMITTENT ASTHMA WITHOUT COMPLICATION, UNSPECIFIED ASTHMA SEVERITY: ICD-10-CM

## 2021-02-18 PROCEDURE — 99214 OFFICE O/P EST MOD 30 MIN: CPT | Performed by: FAMILY MEDICINE

## 2021-02-18 RX ORDER — ALBUTEROL SULFATE 90 UG/1
2 AEROSOL, METERED RESPIRATORY (INHALATION) EVERY 4 HOURS PRN
Qty: 1 INHALER | Refills: 1 | Status: SHIPPED | OUTPATIENT
Start: 2021-02-18 | End: 2022-08-18

## 2021-02-18 RX ORDER — PAROXETINE 20 MG/1
20 TABLET, FILM COATED ORAL AT BEDTIME
Qty: 90 TABLET | Refills: 1 | Status: SHIPPED | OUTPATIENT
Start: 2021-02-18 | End: 2021-10-19

## 2021-02-18 RX ORDER — BUPROPION HYDROCHLORIDE 150 MG/1
150 TABLET, EXTENDED RELEASE ORAL EVERY MORNING
Qty: 90 TABLET | Refills: 1 | Status: SHIPPED | OUTPATIENT
Start: 2021-02-18 | End: 2021-08-16

## 2021-02-18 ASSESSMENT — PATIENT HEALTH QUESTIONNAIRE - PHQ9
5. POOR APPETITE OR OVEREATING: NOT AT ALL
SUM OF ALL RESPONSES TO PHQ QUESTIONS 1-9: 7

## 2021-02-18 ASSESSMENT — PAIN SCALES - GENERAL: PAINLEVEL: NO PAIN (0)

## 2021-02-18 ASSESSMENT — ANXIETY QUESTIONNAIRES
3. WORRYING TOO MUCH ABOUT DIFFERENT THINGS: NOT AT ALL
6. BECOMING EASILY ANNOYED OR IRRITABLE: SEVERAL DAYS
7. FEELING AFRAID AS IF SOMETHING AWFUL MIGHT HAPPEN: NOT AT ALL
5. BEING SO RESTLESS THAT IT IS HARD TO SIT STILL: NOT AT ALL
GAD7 TOTAL SCORE: 1
2. NOT BEING ABLE TO STOP OR CONTROL WORRYING: NOT AT ALL
IF YOU CHECKED OFF ANY PROBLEMS ON THIS QUESTIONNAIRE, HOW DIFFICULT HAVE THESE PROBLEMS MADE IT FOR YOU TO DO YOUR WORK, TAKE CARE OF THINGS AT HOME, OR GET ALONG WITH OTHER PEOPLE: NOT DIFFICULT AT ALL
1. FEELING NERVOUS, ANXIOUS, OR ON EDGE: NOT AT ALL

## 2021-02-18 ASSESSMENT — MIFFLIN-ST. JEOR: SCORE: 1445

## 2021-02-18 NOTE — PROGRESS NOTES
Assessment & Plan     Major depressive disorder, recurrent episode, mild (H)  Well controlled, no problems with his current med regimen  - PARoxetine (PAXIL) 20 MG tablet; Take 1 tablet (20 mg) by mouth At Bedtime for depression & anxiety prevention.  - buPROPion (WELLBUTRIN SR) 150 MG 12 hr tablet; Take 1 tablet (150 mg) by mouth every morning for depression & anxiety prevention.  Return in about 6 months (around 2021) for full physical, recheck medications.      Anxiety  Likewise well controlled  - PARoxetine (PAXIL) 20 MG tablet; Take 1 tablet (20 mg) by mouth At Bedtime for depression & anxiety prevention.  - buPROPion (WELLBUTRIN SR) 150 MG 12 hr tablet; Take 1 tablet (150 mg) by mouth every morning for depression & anxiety prevention.  Return in about 6 months (around 2021) for full physical, recheck medications.      Intermittent asthma without complication, unspecified asthma severity  Well controlled, rarely uses his albuterol  - albuterol (PROAIR HFA/PROVENTIL HFA/VENTOLIN HFA) 108 (90 Base) MCG/ACT inhaler; Inhale 2 puffs into the lungs every 4 hours as needed (for asthma symptoms)  - Asthma Action Plan (AAP)     Return in about 6 months (around 2021) for full physical, recheck medications.    Chava Belcher MD  [facility]    Jaime Lim is a 56 year old who presents for the following health issues    HPI       Depression and Anxiety Follow-Up    How are you doing with your depression since your last visit? No change    How are you doing with your anxiety since your last visit?  No change    Are you having other symptoms that might be associated with depression or anxiety? No    Have you had a significant life event? No     Do you have any concerns with your use of alcohol or other drugs? No    Social History     Tobacco Use     Smoking status: Former Smoker     Quit date: 1993     Years since quittin.5     Smokeless tobacco: Never Used    Substance Use Topics     Alcohol use: Yes     Alcohol/week: 0.0 - 4.0 standard drinks     Comment: weekends     Drug use: No     PHQ 1/23/2020 7/20/2020 2/18/2021   PHQ-9 Total Score 4 4 7   Q9: Thoughts of better off dead/self-harm past 2 weeks Not at all Not at all Not at all   PHQ-A Total Score - - -   PHQ-A Mood affect on daily activities - - -   PHQ-A Suicide Ideation past 2 weeks - - -     JOSE-7 SCORE 9/12/2019 7/20/2020 2/18/2021   Total Score - - -   Total Score - - -   Total Score 1 2 1         Suicide Assessment Five-step Evaluation and Treatment (SAFE-T)    Asthma Follow-Up    Was ACT completed today?    Yes    ACT Total Scores 2/18/2021   ACT TOTAL SCORE -   ASTHMA ER VISITS -   ASTHMA HOSPITALIZATIONS -   ACT TOTAL SCORE (Goal Greater than or Equal to 20) 25   In the past 12 months, how many times did you visit the emergency room for your asthma without being admitted to the hospital? 0   In the past 12 months, how many times were you hospitalized overnight because of your asthma? 0          How many days per week do you miss taking your asthma controller medication?  0    Please describe any recent triggers for your asthma: weather change    Have you had any Emergency Room Visits, Urgent Care Visits, or Hospital Admissions since your last office visit?  No      How many servings of fruits and vegetables do you eat daily?  2-3    On average, how many sweetened beverages do you drink each day (Examples: soda, juice, sweet tea, etc.  Do NOT count diet or artificially sweetened beverages)?   0    How many days per week do you exercise enough to make your heart beat faster? 3 or less    How many minutes a day do you exercise enough to make your heart beat faster? 9 or less    How many days per week do you miss taking your medication? 0      Review of Systems   Constitutional, HEENT, cardiovascular, pulmonary, gi and gu systems are negative, except as otherwise noted.      Objective    /75 (BP  "Location: Left arm, Patient Position: Chair, Cuff Size: Adult Regular)   Pulse 65   Temp 97.7  F (36.5  C) (Tympanic)   Resp 18   Ht 1.676 m (5' 6\")   Wt 66.2 kg (146 lb)   SpO2 98%   BMI 23.57 kg/m    Body mass index is 23.57 kg/m .  Physical Exam   GENERAL: healthy, alert and no distress  EYES: Eyes grossly normal to inspection, PERRL, EOMI, sclerae white and conjunctivae normal  MS: no gross musculoskeletal defects noted, no edema  SKIN: no suspicious lesions or rashes to visible skin  NEURO: Normal strength and tone, sensory exam grossly normal, mentation intact, oriented times 3 and cranial nerves 2-12 intact  PSYCH: mentation appears normal, affect normal/bright                 "

## 2021-02-18 NOTE — LETTER
My Asthma Action Plan    Name: Raphael Lizarraga   YOB: 1965  Date: 2/18/2021   My doctor: Chava Belcher MD   My clinic: [facility]        My Rescue Medicine:   Albuterol inhaler (Proair/Ventolin/Proventil HFA)  2-4 puffs EVERY 4 HOURS as needed. Use a spacer if recommended by your provider.   My Asthma Severity:   Intermittent / Exercise Induced  Know your asthma triggers.  weather change          GREEN ZONE   Good Control    I feel good    No cough or wheeze    Can work, sleep and play without asthma symptoms       Take your asthma control medicine every day.     1. If exercise triggers your asthma, take your rescue medication    15 minutes before exercise or sports, and    During exercise if you have asthma symptoms  2. Spacer to use with inhaler: If you have a spacer, make sure to use it with your inhaler             YELLOW ZONE Getting Worse  I have ANY of these:    I do not feel good    Cough or wheeze    Chest feels tight    Wake up at night   1. Keep taking your Green Zone medications  2. Start taking your rescue medicine:    every 20 minutes for up to 1 hour. Then every 4 hours for 24-48 hours.  3. If you stay in the Yellow Zone for more than 12-24 hours, contact your doctor.  4. If you do not return to the Green Zone in 12-24 hours or you get worse, start taking your oral steroid medicine if prescribed by your provider.           RED ZONE Medical Alert - Get Help  I have ANY of these:    I feel awful    Medicine is not helping    Breathing getting harder    Trouble walking or talking    Nose opens wide to breathe       1. Take your rescue medicine NOW  2. If your provider has prescribed an oral steroid medicine, start taking it NOW  3. Call your doctor NOW  4. If you are still in the Red Zone after 20 minutes and you have not reached your doctor:    Take your rescue medicine again and    Call 911 or go to the emergency room right away    See your regular doctor  within 2 weeks of an Emergency Room or Urgent Care visit for follow-up treatment.          Annual Reminders:  Meet with Asthma Educator,  Flu Shot in the Fall, consider Pneumonia Vaccination for patients with asthma (aged 19 and older).    Pharmacy: [id number] [address] [facility] AT [facility] [address] [place]    Electronically signed by Chava Belcher MD   Date: 02/18/21                    Asthma Triggers  How To Control Things That Make Your Asthma Worse    Triggers are things that make your asthma worse.  Look at the list below to help you find your triggers and   what you can do about them. You can help prevent asthma flare-ups by staying away from your triggers.      Trigger                                                          What you can do   Cigarette Smoke  Tobacco smoke can make asthma worse. Do not allow smoking in your home, car or around you.  Be sure no one smokes at a child s day care or school.  If you smoke, ask your health care provider for ways to help you quit.  Ask family members to quit too.  Ask your health care provider for a referral to Quit Plan to help you quit smoking, or call 0-968-153-PLAN.     Colds, Flu, Bronchitis  These are common triggers of asthma. Wash your hands often.  Don t touch your eyes, nose or mouth.  Get a flu shot every year.     Dust Mites  These are tiny bugs that live in cloth or carpet. They are too small to see. Wash sheets and blankets in hot water every week.   Encase pillows and mattress in dust mite proof covers.  Avoid having carpet if you can. If you have carpet, vacuum weekly.   Use a dust mask and HEPA vacuum.   Pollen and Outdoor Mold  Some people are allergic to trees, grass, or weed pollen, or molds. Try to keep your windows closed.  Limit time out doors when pollen count is high.   Ask you health care provider about taking medicine during allergy season.     Animal Dander  Some people are allergic to skin  flakes, urine or saliva from pets with fur or feathers. Keep pets with fur or feathers out of your home.    If you can t keep the pet outdoors, then keep the pet out of your bedroom.  Keep the bedroom door closed.  Keep pets off cloth furniture and away from stuffed toys.     Mice, Rats, and Cockroaches  Some people are allergic to the waste from these pests.   Cover food and garbage.  Clean up spills and food crumbs.  Store grease in the refrigerator.   Keep food out of the bedroom.   Indoor Mold  This can be a trigger if your home has high moisture. Fix leaking faucets, pipes, or other sources of water.   Clean moldy surfaces.  Dehumidify basement if it is damp and smelly.   Smoke, Strong Odors, and Sprays  These can reduce air quality. Stay away from strong odors and sprays, such as perfume, powder, hair spray, paints, smoke incense, paint, cleaning products, candles and new carpet.   Exercise or Sports  Some people with asthma have this trigger. Be active!  Ask your doctor about taking medicine before sports or exercise to prevent symptoms.    Warm up for 5-10 minutes before and after sports or exercise.     Other Triggers of Asthma  Cold air:  Cover your nose and mouth with a scarf.  Sometimes laughing or crying can be a trigger.  Some medicines and food can trigger asthma.

## 2021-02-18 NOTE — PATIENT INSTRUCTIONS
At [facility], we strive to deliver an exceptional experience to you, every time we see you. If you receive a survey, please complete it as we do value your feedback.  If you have MyChart, you can expect to receive results automatically within 24 hours of their completion.  Your provider will send a note interpreting your results as well.   If you do not have MyChart, you should receive your results in about a week by mail.    Your care team:                            Family Medicine Internal Medicine   MD Jeff Juárez MD Shantel Branch-Fleming, MD Srinivasa Vaka, MD Katya Belousova, PAJAMES Gallego, APRN CNP    Carmelo Pang, MD Pediatrics   Sameer Urrutia, PAJAMES Donato, CNP MD Virginie Bond APRN CNP   MD Randa Bailey MD Deborah Mielke, MD Yulissa Amaya, APRN [facility]      Clinic hours: Monday - Thursday 7 am-6 pm; Fridays 7 am-5 pm.   Urgent care: Monday - Friday 11 am-9 pm; Saturday and Sunday 9 am-5 pm.    Clinic: [phone]       [facility]: Monday - Thursday 8 am - 7 pm; Friday 8 am - 6 pm  [facility]: [phone]     Use [email or web address] for 24/7 diagnosis and treatment of dozens of conditions.

## 2021-02-19 ASSESSMENT — ANXIETY QUESTIONNAIRES: GAD7 TOTAL SCORE: 1

## 2021-02-19 ASSESSMENT — ASTHMA QUESTIONNAIRES: ACT_TOTALSCORE: 25

## 2021-02-21 RX ORDER — BUPROPION HYDROCHLORIDE 150 MG/1
TABLET, EXTENDED RELEASE ORAL
Qty: 30 TABLET | Refills: 0 | OUTPATIENT
Start: 2021-02-21

## 2021-03-25 ENCOUNTER — TELEPHONE (OUTPATIENT)
Dept: FAMILY MEDICINE | Facility: CLINIC | Age: 56
End: 2021-03-25

## 2021-03-25 NOTE — TELEPHONE ENCOUNTER
From: Chava Belcher MD   Sent: 3/25/2021   To: [facility]   Subject: depression                                       Please update PHQ-9

## 2021-03-27 ENCOUNTER — IMMUNIZATION (OUTPATIENT)
Dept: NURSING | Facility: CLINIC | Age: 56
End: 2021-03-27
Payer: COMMERCIAL

## 2021-03-27 PROCEDURE — 91301 PR COVID VAC MODERNA 100 MCG/0.5 ML IM: CPT

## 2021-03-27 PROCEDURE — 0011A PR COVID VAC MODERNA 100 MCG/0.5 ML IM: CPT

## 2021-04-24 ENCOUNTER — IMMUNIZATION (OUTPATIENT)
Dept: NURSING | Facility: CLINIC | Age: 56
End: 2021-04-24
Attending: INTERNAL MEDICINE
Payer: COMMERCIAL

## 2021-04-24 PROCEDURE — 91301 PR COVID VAC MODERNA 100 MCG/0.5 ML IM: CPT

## 2021-04-24 PROCEDURE — 0012A PR COVID VAC MODERNA 100 MCG/0.5 ML IM: CPT

## 2021-06-24 ENCOUNTER — OFFICE VISIT (OUTPATIENT)
Dept: FAMILY MEDICINE | Facility: CLINIC | Age: 56
End: 2021-06-24
Payer: COMMERCIAL

## 2021-06-24 VITALS
BODY MASS INDEX: 23.95 KG/M2 | WEIGHT: 148.4 LBS | DIASTOLIC BLOOD PRESSURE: 75 MMHG | RESPIRATION RATE: 20 BRPM | TEMPERATURE: 97.8 F | SYSTOLIC BLOOD PRESSURE: 119 MMHG | HEART RATE: 55 BPM | OXYGEN SATURATION: 99 %

## 2021-06-24 DIAGNOSIS — H81.11 BENIGN PAROXYSMAL POSITIONAL VERTIGO OF RIGHT EAR: Primary | ICD-10-CM

## 2021-06-24 PROCEDURE — 99213 OFFICE O/P EST LOW 20 MIN: CPT | Performed by: INTERNAL MEDICINE

## 2021-06-24 RX ORDER — MECLIZINE HYDROCHLORIDE 25 MG/1
25 TABLET ORAL 4 TIMES DAILY PRN
Qty: 30 TABLET | Refills: 0 | Status: SHIPPED | OUTPATIENT
Start: 2021-06-24 | End: 2021-11-05

## 2021-06-24 ASSESSMENT — PAIN SCALES - GENERAL: PAINLEVEL: NO PAIN (0)

## 2021-06-24 NOTE — PATIENT INSTRUCTIONS
At [facility], we strive to deliver an exceptional experience to you, every time we see you. If you receive a survey, please complete it as we do value your feedback.  If you have MyChart, you can expect to receive results automatically within 24 hours of their completion.  Your provider will send a note interpreting your results as well.   If you do not have MyChart, you should receive your results in about a week by mail.    Your care team:                            Family Medicine Internal Medicine   MD Jeff Juárez MD Shantel Branch-Fleming, MD Srinivasa Vaka, MD Katya Belousova, PAJAMES Gallego, APRN CNP    Carmelo Pang, MD Pediatrics   Sameer Urrutia, PAJAMES Donato, CNP MD Virginie Bond APRN CNP   MD Randa Bailey MD Deborah Mielke, MD Yulissa Amaya, APRN [facility]      Clinic hours: Monday - Thursday 7 am-6 pm; Fridays 7 am-5 pm.   Urgent care: Monday - Friday 10 am- 8 pm; Saturday and Sunday 9 am-5 pm.    Clinic: [phone]       [facility]: Monday - Thursday 8 am - 7 pm; Friday 8 am - 6 pm  [facility]: [phone]     Use [email or web address] for 24/7 diagnosis and treatment of dozens of conditions.    Patient Education     Benign Paroxysmal Positional Vertigo     Your health care provider may move your head in certain ways to treat your BPPV.   Benign paroxysmal positional vertigo (BPPV) is a problem with the inner ear. The inner ear contains the vestibular system. This system is what helps you keep your balance. BPPV causes a feeling of spinning. It is a common problem of the vestibular system.   Understanding the vestibular system  The vestibular system of the ear is made up of very tiny parts. They include the utricle, saccule, and semicircular canals. The utricle is a tiny organ that contains calcium crystals. In some people, the crystals can move  into the semicircular canals. When this happens, the system no longer works as it should. This causes BPPV. Benign means it is not life threatening. Paroxysmal means it happens suddenly. Positional means that it happens when you move your head. Vertigo is a feeling of spinning.   What causes BPPV?  Causes include injury to your head or neck. Other problems with the vestibular system may cause BPPV. In many people, the cause of BPPV is not known.   Symptoms of BPPV  You may have repeated feelings of spinning (vertigo). The vertigo usually lasts less than 1 minute. Some movements, such as rolling over in bed, can bring on vertigo.   Diagnosing BPPV  Your primary healthcare provider may diagnose and treat your BPPV. Or you may see an ear, nose, and throat healthcare provider (otolaryngologist). In some cases, you may see a healthcare provider who focuses on the nervous system (neurologist).   The healthcare provider will ask about your symptoms and your medical history. He or she will examine you. You may have hearing and balance tests. As part of the exam, your healthcare provider may have you move your head and body in certain ways. If you have BPPV, the movements can bring on vertigo. Your provider will also look for abnormal movements of your eyes. You may have other tests to check your vestibular or nervous systems.   Treatment for BPPV  Your healthcare provider may try to move the calcium crystals. This is done by having you move your head and neck in certain ways. This treatment is safe and often works well. You may also be told to do these movements at home. You may still have vertigo for a few weeks. Your healthcare provider will recheck your symptoms, usually in about a month. Special physical therapy may also be part of treatment. In rare cases, surgery may be needed for BPPV that does not go away. Talk with your healthcare provider about whether it is safe for you to drive.   When to call the healthcare  provider  Call your healthcare provider right away if you have any of these:    Symptoms that do not go away with treatment    Symptoms that get worse    New symptoms  Shelley last reviewed this educational content on 2/1/2020 2000-2021 [employer]. All rights reserved. This information is not intended as a substitute for professional medical care. Always follow your healthcare professional's instructions.

## 2021-06-24 NOTE — PROGRESS NOTES
Assessment & Plan     Benign paroxysmal positional vertigo of right ear  Came on suddenly yesterday morning  Worse on looking to the right  Happened few years ago  No nystagmus on examination  He had some relief with the Epley maneuver that I did in the office here today  Told him to start meclizine and practice Epley maneuver at home  Showed him videos on YouTube  If no better by Monday he needs to contact me again  - meclizine (ANTIVERT) 25 MG tablet; Take 1 tablet (25 mg) by mouth 4 times daily as needed for dizziness      25 minutes spent on the date of the encounter doing chart review, history and exam, documentation and further activities per the note           Return if symptoms worsen or fail to improve.    Carter Bates MD  [facility]    Jaime Lim is a 56 year old who presents for the following health issues   HPI     Dizziness  Onset/Duration: Yesterday 6/23/2021 around 8AM   Description:   Do you feel faint: no  Does it feel like the surroundings (bed, room) are moving: YES  Unsteady/off balance: YES  Have you passed out or fallen: no  Intensity: moderate  Progression of Symptoms: worsening, constant and intermittent ( when he sleeps, it makes it feel better )   Accompanying Signs & Symptoms:  Heart palpitations or chest pain: no  Nausea, vomiting: YES  Weakness or lack of coordination in arms or legs: Hasn't noticed   Vision or speech changes: YES- vision change when dizzy   Numbness or tingling: no  Ringing in ears (Tinnitus): no  Hearing Loss: no  History:   Head trauma/concussion history: no  Previous similar symptoms: YES-  10+years   Recent bleeding history: no  Any new medications (BP?): no  Precipitating factors:   Worse with activity: YES  Worse with head movement: YES  Alleviating factors:   Does staying in a fixed position give relief: YES  Therapies tried and outcome: None  Patient states that his siblings has the same dizziness problems.          Review of Systems   Constitutional, HEENT, cardiovascular, pulmonary, gi and gu systems are negative, except as otherwise noted.      Objective    /75 (BP Location: Left arm, Patient Position: Sitting, Cuff Size: Adult Regular)   Pulse 55   Temp 97.8  F (36.6  C) (Tympanic)   Resp 20   Wt 67.3 kg (148 lb 6.4 oz)   SpO2 99%   BMI 23.95 kg/m    Body mass index is 23.95 kg/m .  Physical Exam   GENERAL: healthy, alert and no distress  EYES: Eyes grossly normal to inspection, PERRL and conjunctivae and sclerae normal  RESP: lungs clear to auscultation - no rales, rhonchi or wheezes  CV: regular rate and rhythm, normal S1 S2, no S3 or S4, no murmur, click or rub, no peripheral edema and peripheral pulses strong  MS: no gross musculoskeletal defects noted, no edema  SKIN: no suspicious lesions or rashes  NEURO: Normal strength and tone, mentation intact and speech normal  NEURO: Nystagmus on looking to the right

## 2021-07-07 ENCOUNTER — MYC MEDICAL ADVICE (OUTPATIENT)
Dept: FAMILY MEDICINE | Facility: CLINIC | Age: 56
End: 2021-07-07
Payer: COMMERCIAL

## 2021-07-16 ENCOUNTER — TRANSFERRED RECORDS (OUTPATIENT)
Dept: HEALTH INFORMATION MANAGEMENT | Facility: CLINIC | Age: 56
End: 2021-07-16

## 2021-07-30 ENCOUNTER — TRANSFERRED RECORDS (OUTPATIENT)
Dept: HEALTH INFORMATION MANAGEMENT | Facility: CLINIC | Age: 56
End: 2021-07-30

## 2021-08-01 DIAGNOSIS — G40.209 LOCALIZATION-RELATED PARTIAL EPILEPSY WITH COMPLEX PARTIAL SEIZURES (H): ICD-10-CM

## 2021-08-04 RX ORDER — PHENYTOIN SODIUM 100 MG/1
200 CAPSULE, EXTENDED RELEASE ORAL 2 TIMES DAILY
Qty: 120 CAPSULE | Refills: 0 | Status: SHIPPED | OUTPATIENT
Start: 2021-08-04 | End: 2021-08-13

## 2021-08-04 NOTE — TELEPHONE ENCOUNTER
Last Clinic Visit: 7/10/20 recommended 1 year follow up, NV 8/13/21.  Last AED 12/5/19  Filling per [facility] medication refill protocols - seizure medications.  Not all labs required.

## 2021-08-13 ENCOUNTER — VIRTUAL VISIT (OUTPATIENT)
Dept: NEUROLOGY | Facility: CLINIC | Age: 56
End: 2021-08-13
Payer: COMMERCIAL

## 2021-08-13 DIAGNOSIS — G40.209 LOCALIZATION-RELATED PARTIAL EPILEPSY WITH COMPLEX PARTIAL SEIZURES (H): ICD-10-CM

## 2021-08-13 PROCEDURE — 99212 OFFICE O/P EST SF 10 MIN: CPT | Mod: 95 | Performed by: PSYCHIATRY & NEUROLOGY

## 2021-08-13 RX ORDER — PHENYTOIN SODIUM 100 MG/1
200 CAPSULE, EXTENDED RELEASE ORAL 2 TIMES DAILY
Qty: 360 CAPSULE | Refills: 3 | Status: SHIPPED | OUTPATIENT
Start: 2021-08-13 | End: 2022-07-12

## 2021-08-13 NOTE — LETTER
2021         RE: Raphael Lizarraga  [address]  [place] [address]        Dear Colleague,    Thank you for referring your patient, Raphael Lizarraga, to the [facility] NEUROLOG[facility]. Please see a copy of my visit note below.    Raphael is a 56 year old who is being evaluated via a billable video visit.      How would you like to obtain your AVS? MyChart  If the video visit is dropped, the invitation should be resent by: Text to cell phone: [phone]  Will anyone else be joining your video visit? No      Video Start Time: 9:08  Video-Visit Details    Type of service:  Video Visit  Video End Time: 9:15  Originating Location (pt. Location): Home  Distant Location (provider location):  [facility] NEUROLOG[facility]   Platform used for Video Visit: [employer]      NEUROLOGY PROGRESS NOTE   [facility]    Patient:Raphael Lizarraga  : 1965  Age: 56 year old  Today's Virtual Visit: 2021    History of present illness:     Mr. Lizarraga is participating in this virtual visit for a follow up on his epilepsy.  He did not have any seizures since last visit last year.  He is taking Dilantin 200 mg bid. He is taking Ca/Vit D. Denies dizziness/imbalance.     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      Current Outpatient Medications   Medication Sig Dispense Refill     albuterol (PROAIR HFA/PROVENTIL HFA/VENTOLIN HFA) 108 (90 Base) MCG/ACT inhaler Inhale 2 puffs into the lungs every 4 hours as needed (for asthma symptoms) 1 Inhaler 1     buPROPion (WELLBUTRIN SR) 150 MG 12 hr tablet Take 1 tablet (150 mg) by mouth every morning for depression & anxiety prevention. 90 tablet 1     calcium-vitamin D (OSCAL) 250-125 MG-UNIT TABS per tablet Take 1 tablet by mouth 2 times daily       ibuprofen (ADVIL/MOTRIN) 800 MG tablet Take 1 tablet (800 mg) by mouth every 8 hours as needed for moderate pain 30 tablet 1     PARoxetine (PAXIL) 20 MG tablet Take 1 tablet (20 mg) by mouth At  Bedtime for depression & anxiety prevention. 90 tablet 1     phenytoin (DILANTIN) 100 MG capsule Take 2 capsules (200 mg) by mouth 2 times daily Please keep appointment 8/13/21 120 capsule 0     meclizine (ANTIVERT) 25 MG tablet Take 1 tablet (25 mg) by mouth 4 times daily as needed for dizziness 30 tablet 0     Exam:    Wt Readings from Last 5 Encounters:   06/24/21 67.3 kg (148 lb 6.4 oz)   02/18/21 66.2 kg (146 lb)   01/31/21 69.4 kg (153 lb)   10/29/20 64 kg (141 lb)   12/05/19 63.5 kg (140 lb)     Alert, awake, NAD, no aphasia or dysarthria, EOMI, face is symmetric, moves upper extremities against gravity, no dysmetria or tremors.    Assessment/Plan:     Focal epilepsy, status post left anterior temporal lobectomy in 1993.  Seizures have been controlled for at least past 16 years.  Patient has been on Dilantin at least since his surgery.  Switching to another antiseizure medication with less long-term side effects has been discussed previously, but the patient was not inclined and was afraid of recurrence of seizures. He had recurrence of seizures when he discontinued Dilantin in the past.    -Continue Dilantin 200 mg twice daily.    -Continue calcium and vitamin D daily.    -Follow-up in 1 year.      As described above, I met with the patient for 7 minutes and during this time counseling was within 50% of the visit time.  Ml Armstrong MD

## 2021-08-13 NOTE — PROGRESS NOTES
Raphael is a 56 year old who is being evaluated via a billable video visit.      How would you like to obtain your AVS? MyChart  If the video visit is dropped, the invitation should be resent by: Text to cell phone: [phone]  Will anyone else be joining your video visit? No      Video Start Time: 9:08  Video-Visit Details    Type of service:  Video Visit  Video End Time: 9:15  Originating Location (pt. Location): Home  Distant Location (provider location):  [facility]UROLOGY [facility]IC [place]   Platform used for Video Visit: [facility]      NEUROLOGY PROGRESS NOTE   [facility]    Patient:Raphael Lizarraga  : 1965  Age: 56 year old  Today's Virtual Visit: 2021    History of present illness:     Mr. Lizarraga is participating in this virtual visit for a follow up on his epilepsy.  He did not have any seizures since last visit last year.  He is taking Dilantin 200 mg bid. He is taking Ca/Vit D. Denies dizziness/imbalance.     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      Current Outpatient Medications   Medication Sig Dispense Refill     albuterol (PROAIR HFA/PROVENTIL HFA/VENTOLIN HFA) 108 (90 Base) MCG/ACT inhaler Inhale 2 puffs into the lungs every 4 hours as needed (for asthma symptoms) 1 Inhaler 1     buPROPion (WELLBUTRIN SR) 150 MG 12 hr tablet Take 1 tablet (150 mg) by mouth every morning for depression & anxiety prevention. 90 tablet 1     calcium-vitamin D (OSCAL) 250-125 MG-UNIT TABS per tablet Take 1 tablet by mouth 2 times daily       ibuprofen (ADVIL/MOTRIN) 800 MG tablet Take 1 tablet (800 mg) by mouth every 8 hours as needed for moderate pain 30 tablet 1     PARoxetine (PAXIL) 20 MG tablet Take 1 tablet (20 mg) by mouth At Bedtime for depression & anxiety prevention. 90 tablet 1     phenytoin (DILANTIN) 100 MG capsule Take 2 capsules (200 mg) by mouth 2 times daily Please keep appointment 21 120 capsule 0     meclizine (ANTIVERT) 25 MG tablet Take 1 tablet (25 mg) by mouth 4  times daily as needed for dizziness 30 tablet 0     Exam:    Wt Readings from Last 5 Encounters:   06/24/21 67.3 kg (148 lb 6.4 oz)   02/18/21 66.2 kg (146 lb)   01/31/21 69.4 kg (153 lb)   10/29/20 64 kg (141 lb)   12/05/19 63.5 kg (140 lb)     Alert, awake, NAD, no aphasia or dysarthria, EOMI, face is symmetric, moves upper extremities against gravity, no dysmetria or tremors.    Assessment/Plan:     Focal epilepsy, status post left anterior temporal lobectomy in 1993.  Seizures have been controlled for at least past 16 years.  Patient has been on Dilantin at least since his surgery.  Switching to another antiseizure medication with less long-term side effects has been discussed previously, but the patient was not inclined and was afraid of recurrence of seizures. He had recurrence of seizures when he discontinued Dilantin in the past.    -Continue Dilantin 200 mg twice daily.    -Continue calcium and vitamin D daily.    -Follow-up in 1 year.      As described above, I met with the patient for 7 minutes and during this time counseling was within 50% of the visit time.  Ml Armstrong MD

## 2021-08-14 DIAGNOSIS — F33.0 MAJOR DEPRESSIVE DISORDER, RECURRENT EPISODE, MILD (H): ICD-10-CM

## 2021-08-14 DIAGNOSIS — F41.9 ANXIETY: ICD-10-CM

## 2021-08-16 RX ORDER — BUPROPION HYDROCHLORIDE 150 MG/1
TABLET, EXTENDED RELEASE ORAL
Qty: 90 TABLET | Refills: 0 | Status: SHIPPED | OUTPATIENT
Start: 2021-08-16 | End: 2021-08-23

## 2021-08-22 DIAGNOSIS — F33.0 MAJOR DEPRESSIVE DISORDER, RECURRENT EPISODE, MILD (H): ICD-10-CM

## 2021-08-22 DIAGNOSIS — F41.9 ANXIETY: ICD-10-CM

## 2021-08-23 RX ORDER — BUPROPION HYDROCHLORIDE 150 MG/1
TABLET, EXTENDED RELEASE ORAL
Qty: 90 TABLET | Refills: 0 | Status: SHIPPED | OUTPATIENT
Start: 2021-08-23 | End: 2021-11-05

## 2021-09-07 NOTE — TELEPHONE ENCOUNTER
Patient contacted and informed. He states already picked up supply and plans to schedule neurology appointment  Vitaly Galicia CMA     patient performed their own hair removal

## 2021-09-19 ENCOUNTER — HEALTH MAINTENANCE LETTER (OUTPATIENT)
Age: 56
End: 2021-09-19

## 2021-09-29 DIAGNOSIS — F33.0 MAJOR DEPRESSIVE DISORDER, RECURRENT EPISODE, MILD (H): ICD-10-CM

## 2021-09-29 DIAGNOSIS — F41.9 ANXIETY: ICD-10-CM

## 2021-09-29 NOTE — TELEPHONE ENCOUNTER
Routing refill request to provider for review/approval because:  Patient needs current PHQ-9 on file, please advise.   Greta Gonzales RN, BSN   [facility]

## 2021-10-19 RX ORDER — PAROXETINE 20 MG/1
20 TABLET, FILM COATED ORAL AT BEDTIME
Qty: 30 TABLET | Refills: 0 | Status: SHIPPED | OUTPATIENT
Start: 2021-10-19 | End: 2021-11-05

## 2021-11-04 NOTE — PROGRESS NOTES
Assessment & Plan     (F33.0) Major depressive disorder, recurrent episode, mild (H)  (primary encounter diagnosis)  Comment: well controlled  Plan: buPROPion (WELLBUTRIN SR) 150 MG 12 hr tablet,         PARoxetine (PAXIL) 20 MG tablet        Return in about 6 months (around 2022) for full physical, recheck medications.      (F41.9) Anxiety  Comment: stable, well controlled  Plan: buPROPion (WELLBUTRIN SR) 150 MG 12 hr tablet,         PARoxetine (PAXIL) 20 MG tablet        Return in about 6 months (around 2022) for full physical, recheck medications.      (J45.20) Intermittent asthma without complication, unspecified asthma severity  Comment: well controlled. Pt has albuterol refill on file  Plan: f/u 6 mo    (Z23) Need for vaccination  Comment: since he is asthmatic.  I prefer he receives the COVID-19 booster, and pt would like to receive it as well  Plan: COVID-19,PF,MODERNA (18+ YRS BOOSTER .25ML), PNEUMOVAX            Return in about 6 months (around 2022) for full physical, recheck medications.    Chava Belcher MD  [facility]    Jaime Lim is a 56 year old who presents for the following health issues     HPI     Depression and Anxiety Follow-Up    How are you doing with your depression since your last visit? No change    How are you doing with your anxiety since your last visit?  No change    Are you having other symptoms that might be associated with depression or anxiety? No    Have you had a significant life event? No     Do you have any concerns with your use of alcohol or other drugs? No    Social History     Tobacco Use     Smoking status: Former Smoker     Quit date: 1993     Years since quittin.2     Smokeless tobacco: Never Used   Substance Use Topics     Alcohol use: Yes     Alcohol/week: 0.0 - 4.0 standard drinks     Comment: weekends     Drug use: No     PHQ 3/29/2021 3/30/2021 2021   PHQ-9 Total Score 3 2 2   Q9: Thoughts of  better off dead/self-harm past 2 weeks Not at all Not at all Not at all   PHQ-A Total Score - - -   PHQ-A Mood affect on daily activities - - -   PHQ-A Suicide Ideation past 2 weeks - - -     JOSE-7 SCORE 9/12/2019 7/20/2020 2/18/2021   Total Score - - -   Total Score - - -   Total Score 1 2 1       Asthma Follow-Up    Was ACT completed today?    Yes    ACT Total Scores 11/5/2021   ACT TOTAL SCORE -   ASTHMA ER VISITS -   ASTHMA HOSPITALIZATIONS -   ACT TOTAL SCORE (Goal Greater than or Equal to 20) 25   In the past 12 months, how many times did you visit the emergency room for your asthma without being admitted to the hospital? 0   In the past 12 months, how many times were you hospitalized overnight because of your asthma? 0          How many days per week do you miss taking your asthma controller medication?  I do not have an asthma controller medication    Please describe any recent triggers for your asthma: None    Have you had any Emergency Room Visits, Urgent Care Visits, or Hospital Admissions since your last office visit?  No      Review of Systems   Constitutional, HEENT, cardiovascular, pulmonary, gi and gu systems are negative, except as otherwise noted.      Objective    /71 (BP Location: Left arm, Patient Position: Sitting, Cuff Size: Adult Regular)   Pulse 66   Temp 98.1  F (36.7  C) (Tympanic)   Wt 67.7 kg (149 lb 3.2 oz)   SpO2 97%   BMI 24.08 kg/m    Body mass index is 24.08 kg/m .  Physical Exam   GENERAL: healthy, alert and no distress  EYES: Eyes grossly normal to inspection, PERRL, EOMI, sclerae white and conjunctivae normal  RESP: lungs clear to auscultation - no crackles or wheezes, no areas of dullness, no tachypnea  CV: Heart regular rate and rhythm without murmur, click or rub. No peripheral edema and peripheral pulses strong  MS: no gross musculoskeletal defects noted, no edema  SKIN: no suspicious lesions or rashes to visible skin  NEURO: Normal strength and tone, sensory exam  grossly normal, mentation intact, oriented times 3 and cranial nerves 2-12 intact  PSYCH: mentation appears normal, affect normal/bright                 Answers for HPI/ROS submitted by the patient on 11/5/2021  If you checked off any problems, how difficult have these problems made it for you to do your work, take care of things at home, or get along with other people?: Not difficult at all  PHQ9 TOTAL SCORE: 2

## 2021-11-04 NOTE — PATIENT INSTRUCTIONS
At [facility], we strive to deliver an exceptional experience to you, every time we see you. If you receive a survey, please complete it as we do value your feedback.  If you have MyChart, you can expect to receive results automatically within 24 hours of their completion.  Your provider will send a note interpreting your results as well.   If you do not have MyChart, you should receive your results in about a week by mail.    Your care team:                            Family Medicine Internal Medicine   MD Jeff Juárez MD Shantel Branch-Fleming, MD Srinivasa Vaka, MD Katya Belousova, PAJAMES Gallego, APRN CNP    Carmelo Pang, MD Pediatrics   Sameer Urrutia, PAJAMES Donato, CNP MD Virginie Bond APRN CNP   MD Randa Bailey MD Deborah Mielke, MD Yulissa Amaya, APRN [facility]      Clinic hours: Monday - Thursday 7 am-6 pm; Fridays 7 am-5 pm.   Urgent care: Monday - Friday 10 am- 8 pm; Saturday and Sunday 9 am-5 pm.    Clinic: [phone]       [facility]: Monday - Thursday 8 am - 7 pm; Friday 8 am - 6 pm  [facility]: [phone]     Use [email or web address] for 24/7 diagnosis and treatment of dozens of conditions.

## 2021-11-05 ENCOUNTER — OFFICE VISIT (OUTPATIENT)
Dept: FAMILY MEDICINE | Facility: CLINIC | Age: 56
End: 2021-11-05
Payer: COMMERCIAL

## 2021-11-05 VITALS
WEIGHT: 149.2 LBS | OXYGEN SATURATION: 97 % | HEART RATE: 66 BPM | TEMPERATURE: 98.1 F | BODY MASS INDEX: 24.08 KG/M2 | DIASTOLIC BLOOD PRESSURE: 71 MMHG | SYSTOLIC BLOOD PRESSURE: 120 MMHG

## 2021-11-05 DIAGNOSIS — F41.9 ANXIETY: ICD-10-CM

## 2021-11-05 DIAGNOSIS — F33.0 MAJOR DEPRESSIVE DISORDER, RECURRENT EPISODE, MILD (H): Primary | ICD-10-CM

## 2021-11-05 DIAGNOSIS — J45.20 INTERMITTENT ASTHMA WITHOUT COMPLICATION, UNSPECIFIED ASTHMA SEVERITY: ICD-10-CM

## 2021-11-05 DIAGNOSIS — Z23 NEED FOR VACCINATION: ICD-10-CM

## 2021-11-05 PROCEDURE — 0064A PR ADMIN COVID VAC MODERNA, 0.25ML BOOSTER: CPT | Performed by: FAMILY MEDICINE

## 2021-11-05 PROCEDURE — 90732 PPSV23 VACC 2 YRS+ SUBQ/IM: CPT | Performed by: FAMILY MEDICINE

## 2021-11-05 PROCEDURE — 90471 IMMUNIZATION ADMIN: CPT | Performed by: FAMILY MEDICINE

## 2021-11-05 PROCEDURE — 99214 OFFICE O/P EST MOD 30 MIN: CPT | Mod: 25 | Performed by: FAMILY MEDICINE

## 2021-11-05 PROCEDURE — 96127 BRIEF EMOTIONAL/BEHAV ASSMT: CPT | Performed by: FAMILY MEDICINE

## 2021-11-05 PROCEDURE — 91306 COVID-19,PF,MODERNA (18+ YRS BOOSTER .25ML): CPT | Performed by: FAMILY MEDICINE

## 2021-11-05 RX ORDER — PAROXETINE 20 MG/1
20 TABLET, FILM COATED ORAL AT BEDTIME
Qty: 90 TABLET | Refills: 1 | Status: SHIPPED | OUTPATIENT
Start: 2021-11-05 | End: 2022-08-04

## 2021-11-05 RX ORDER — BUPROPION HYDROCHLORIDE 150 MG/1
150 TABLET, EXTENDED RELEASE ORAL EVERY MORNING
Qty: 90 TABLET | Refills: 1 | Status: SHIPPED | OUTPATIENT
Start: 2021-11-05 | End: 2022-08-04

## 2021-11-05 ASSESSMENT — PATIENT HEALTH QUESTIONNAIRE - PHQ9
10. IF YOU CHECKED OFF ANY PROBLEMS, HOW DIFFICULT HAVE THESE PROBLEMS MADE IT FOR YOU TO DO YOUR WORK, TAKE CARE OF THINGS AT HOME, OR GET ALONG WITH OTHER PEOPLE: NOT DIFFICULT AT ALL
SUM OF ALL RESPONSES TO PHQ QUESTIONS 1-9: 2
SUM OF ALL RESPONSES TO PHQ QUESTIONS 1-9: 2

## 2021-11-06 ASSESSMENT — ASTHMA QUESTIONNAIRES: ACT_TOTALSCORE: 25

## 2021-11-06 ASSESSMENT — PATIENT HEALTH QUESTIONNAIRE - PHQ9: SUM OF ALL RESPONSES TO PHQ QUESTIONS 1-9: 2

## 2022-02-16 ENCOUNTER — OFFICE VISIT (OUTPATIENT)
Dept: OPTOMETRY | Facility: CLINIC | Age: 57
End: 2022-02-16
Payer: COMMERCIAL

## 2022-02-16 DIAGNOSIS — Z96.1 PSEUDOPHAKIA: Primary | ICD-10-CM

## 2022-02-16 DIAGNOSIS — H26.491 RIGHT POSTERIOR CAPSULAR OPACIFICATION: ICD-10-CM

## 2022-02-16 DIAGNOSIS — H52.223 REGULAR ASTIGMATISM OF BOTH EYES: ICD-10-CM

## 2022-02-16 DIAGNOSIS — Z86.69 H/O VISUAL FIELD DEFECT: ICD-10-CM

## 2022-02-16 PROCEDURE — 99214 OFFICE O/P EST MOD 30 MIN: CPT | Performed by: OPTOMETRIST

## 2022-02-16 PROCEDURE — 92015 DETERMINE REFRACTIVE STATE: CPT | Performed by: OPTOMETRIST

## 2022-02-16 ASSESSMENT — TONOMETRY
IOP_METHOD: APPLANATION
OD_IOP_MMHG: 14
OS_IOP_MMHG: 14

## 2022-02-16 ASSESSMENT — REFRACTION_WEARINGRX
OS_ADD: +2.25
OS_SPHERE: -1.50
OS_AXIS: 163
OD_AXIS: 017
OD_CYLINDER: +2.25
OS_CYLINDER: +1.75
SPECS_TYPE: PAL
OD_SPHERE: -1.50
OD_ADD: +2.25

## 2022-02-16 ASSESSMENT — VISUAL ACUITY
OS_CC: 20/20
METHOD: SNELLEN - LINEAR
OD_CC: 20/70-1
OD_SC: 20/150
OS_SC: 20/60
OS_CC: 20/30
OD_CC: 20/40
CORRECTION_TYPE: GLASSES

## 2022-02-16 ASSESSMENT — REFRACTION_MANIFEST
OD_CYLINDER: +3.00
OD_AXIS: 015
OD_SPHERE: -1.50
OS_AXIS: 165
OS_CYLINDER: +2.00
OD_AXIS: 013
OD_CYLINDER: +2.25
OS_AXIS: 168
METHOD_AUTOREFRACTION: 1
OD_SPHERE: -1.50
OS_SPHERE: -1.25
OS_SPHERE: -0.75
OS_CYLINDER: +2.00

## 2022-02-16 ASSESSMENT — KERATOMETRY
OD_AXISANGLE2_DEGREES: 101
OD_AXISANGLE_DEGREES: 011
OS_AXISANGLE2_DEGREES: 074
OS_K2POWER_DIOPTERS: 43.50
OD_K1POWER_DIOPTERS: 41.50
OS_AXISANGLE_DEGREES: 164
OD_K2POWER_DIOPTERS: 43.75
OS_K1POWER_DIOPTERS: 42.00

## 2022-02-16 ASSESSMENT — CUP TO DISC RATIO
OD_RATIO: 0.2
OS_RATIO: 0.2

## 2022-02-16 ASSESSMENT — SLIT LAMP EXAM - LIDS: COMMENTS: NORMAL

## 2022-02-16 ASSESSMENT — EXTERNAL EXAM - LEFT EYE: OS_EXAM: BROW PTOSIS

## 2022-02-16 ASSESSMENT — EXTERNAL EXAM - RIGHT EYE: OD_EXAM: BROW PTOSIS

## 2022-02-16 NOTE — LETTER
2/16/2022         RE: Raphael Lizarraga  [address]  [place] [address]        Dear Colleague,    Thank you for referring your patient, Raphael Lizarraga, to the [facility]. Please see a copy of my visit note below.    Chief Complaint   Patient presents with     Annual Eye Exam      Accompanied by self  Last Eye Exam: 5-3-2019  Dilated Previously: Yes    What are you currently using to see?  glasses       Distance Vision Acuity: Noticed gradual change in both eyes    Near Vision Acuity: Not satisfied     Eye Comfort: good  Do you use eye drops? : Yes: tc unknown   Occupation or Hobbies: grinding metal     Ginny Suero Optometric Assistant, A.B.O.C.          Medical, surgical and family histories reviewed and updated 2/16/2022.       OBJECTIVE: See Ophthalmology exam    ASSESSMENT:    ICD-10-CM    1. Pseudophakia - Both Eyes  Z96.1 EYE EXAM (SIMPLE-NONBILLABLE)     REFRACTION   2. 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 Regular astigmatism of both eyes - Both Eyes  H52.223 EYE EXAM (SIMPLE-NONBILLABLE)     REFRACTION   4. H/O visual field defect  Z86.69 Adult Eye Referral      PLAN:     Patient Instructions   A referral for a YAG laser consult to address the decrease in vision in the right eye.    The diagnosis is posterior capsular opacification and is very common after having cataract surgery.    Dr. Noland is at the [facility] located at [address] [place] [address] in [place]. You can call [phone] for the first available appointment.    A Visual Field may be helpful to map out the peripheral vision. It was ordered by Dr. Wayne in 2019 and may benefit you. That instrument is located at [facility] as well.    Eyeglass prescription given.    The recommendation would be to see about the results of the consult before ordering new eyewear.    The affects of the dilating drops last for 4- 6  hours.  You will be more sensitive to light and vision will be blurry up close.  Do not drive if you do not feel comfortable.  Mydriatic sunglasses were given if needed.    Annual eye examinations are recommended.    Trinh Esquivel O.D.  [address] [address]    [phone]             Again, thank you for allowing me to participate in the care of your patient.        Sincerely,        Trinh Esquivel, OD

## 2022-02-16 NOTE — PROGRESS NOTES
Chief Complaint   Patient presents with     Annual Eye Exam      Accompanied by self  Last Eye Exam: 5-3-2019  Dilated Previously: Yes    What are you currently using to see?  glasses       Distance Vision Acuity: Noticed gradual change in both eyes    Near Vision Acuity: Not satisfied     Eye Comfort: good  Do you use eye drops? : Yes: tc unknown   Occupation or Hobbies: grinding metal     Ginny Suero Optometric Assistant, [email or web address].          Medical, surgical and family histories reviewed and updated 2/16/2022.       OBJECTIVE: See Ophthalmology exam    ASSESSMENT:    ICD-10-CM    1. Pseudophakia - Both Eyes  Z96.1 EYE EXAM (SIMPLE-NONBILLABLE)     REFRACTION   2. 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 Regular astigmatism of both eyes - Both Eyes  H52.223 EYE EXAM (SIMPLE-NONBILLABLE)     REFRACTION   4. H/O visual field defect  Z86.69 Adult Eye Referral      PLAN:     Patient Instructions   A referral for a YAG laser consult to address the decrease in vision in the right eye.    The diagnosis is posterior capsular opacification and is very common after having cataract surgery.    Dr. Noland is at the [facility] located at [address] [place] [address] in [place]. You can call [phone] for the first available appointment.    A Visual Field may be helpful to map out the peripheral vision. It was ordered by Dr. Wayne in 2019 and may benefit you. That instrument is located at [facility] as well.    Eyeglass prescription given.    The recommendation would be to see about the results of the consult before ordering new eyewear.    The affects of the dilating drops last for 4- 6 hours.  You will be more sensitive to light and vision will be blurry up close.  Do not drive if you do not feel comfortable.  Mydriatic sunglasses were given if needed.    Annual eye examinations are recommended.    JO Douglas  [place] [address] [place] [address]    [phone]

## 2022-02-16 NOTE — PATIENT INSTRUCTIONS
A referral for a YAG laser consult to address the decrease in vision in the right eye.    The diagnosis is posterior capsular opacification and is very common after having cataract surgery.    Dr. Noland is at the [facility] located at [address] [place] [address] in [place]. You can call [phone] for the first available appointment.    A Visual Field may be helpful to map out the peripheral vision. It was ordered by Dr. Wayne in 2019 and may benefit you. That instrument is located at [facility] as well.    Eyeglass prescription given.    The recommendation would be to see about the results of the consult before ordering new eyewear.    The affects of the dilating drops last for 4- 6 hours.  You will be more sensitive to light and vision will be blurry up close.  Do not drive if you do not feel comfortable.  Mydriatic sunglasses were given if needed.    Annual eye examinations are recommended.    Trinh Esquivel O.D.  [address] [address]    [phone]

## 2022-02-17 ASSESSMENT — REFRACTION_MANIFEST
OD_ADD: +3.00
OS_ADD: +3.00

## 2022-02-17 ASSESSMENT — SLIT LAMP EXAM - LIDS: COMMENTS: NORMAL

## 2022-02-28 ENCOUNTER — ANCILLARY PROCEDURE (OUTPATIENT)
Dept: GENERAL RADIOLOGY | Facility: CLINIC | Age: 57
End: 2022-02-28
Attending: NURSE PRACTITIONER
Payer: COMMERCIAL

## 2022-02-28 ENCOUNTER — OFFICE VISIT (OUTPATIENT)
Dept: FAMILY MEDICINE | Facility: CLINIC | Age: 57
End: 2022-02-28
Payer: COMMERCIAL

## 2022-02-28 VITALS
OXYGEN SATURATION: 100 % | SYSTOLIC BLOOD PRESSURE: 129 MMHG | BODY MASS INDEX: 24.91 KG/M2 | RESPIRATION RATE: 16 BRPM | HEIGHT: 66 IN | DIASTOLIC BLOOD PRESSURE: 79 MMHG | WEIGHT: 155 LBS | TEMPERATURE: 98 F | HEART RATE: 60 BPM

## 2022-02-28 DIAGNOSIS — S22.41XA CLOSED FRACTURE OF MULTIPLE RIBS OF RIGHT SIDE, INITIAL ENCOUNTER: ICD-10-CM

## 2022-02-28 DIAGNOSIS — S20.211A CHEST WALL CONTUSION, RIGHT, INITIAL ENCOUNTER: Primary | ICD-10-CM

## 2022-02-28 PROCEDURE — 99213 OFFICE O/P EST LOW 20 MIN: CPT | Performed by: NURSE PRACTITIONER

## 2022-02-28 PROCEDURE — 71101 X-RAY EXAM UNILAT RIBS/CHEST: CPT | Mod: RT | Performed by: RADIOLOGY

## 2022-02-28 RX ORDER — SENNOSIDES 8.6 MG
1300 CAPSULE ORAL EVERY 8 HOURS PRN
Qty: 90 TABLET | Refills: 0 | Status: SHIPPED | OUTPATIENT
Start: 2022-02-28 | End: 2022-08-12

## 2022-02-28 RX ORDER — IBUPROFEN 800 MG/1
800 TABLET, FILM COATED ORAL EVERY 8 HOURS PRN
Qty: 90 TABLET | Refills: 0 | Status: SHIPPED | OUTPATIENT
Start: 2022-02-28 | End: 2022-08-12

## 2022-02-28 ASSESSMENT — PAIN SCALES - GENERAL: PAINLEVEL: WORST PAIN (10)

## 2022-02-28 NOTE — PATIENT INSTRUCTIONS
Patient Education     Rib Contusion or Minor Fracture    A rib contusion is a bruise to one or more rib bones. It may cause pain, tenderness, swelling, and a purplish color to the skin. There may be a sharp pain with each breath. A rib contusion takes anywhere from a few days to a few weeks to heal. A minor rib fracture or break may cause the same symptoms as a rib contusion. The small crack may not be seen on a regular chest X-ray. Treatment for both problems is basically the same.  Home care    You may use over-the-counter pain medicine to control pain, unless another pain medicine was prescribed. If you have chronic liver or kidney disease or ever had a stomach ulcer or GI (gastrointestinal) bleeding, talk with your healthcare provider before using these medicines.    Rest. Don't lift anything heavy or do any activity that causes pain.    Apply an ice pack over the injured area for 15 to 20 minutes every 1 to 2 hours. You should do this for the first 24 to 48 hours. To make an ice pack, put ice cubes in a plastic bag that seals at the top. Wrap the bag in a clean, thin towel or cloth. Never put ice or an ice pack directly on the skin. Continue with ice packs as needed for the relief of pain and swelling.    The first 3 to 4 weeks of healing will be the most painful. If your pain is not under control with the treatment given, call your healthcare provider. Sometimes a stronger pain medicine may be needed. A nerve block can be done in case of severe pain. It will numb the nerve between the ribs.  Follow-up care  Follow up with your healthcare provider, or as advised.  If X-rays were taken, you will be told of any new findings that may affect your care.  Call 911  Call 911 if you have:    Dizziness, weakness or fainting    Shortness of breath with or without chest discomfort    New or worsening pain  When to seek medical advice  Call your healthcare provider right away if any of these occur:    Fever of 100.4 F  (38 C) or higher, or as directed by your healthcare provider    Chills    Stomach pain, vomiting  Shelley last reviewed this educational content on 6/1/2018 2000-2021 [employer]. All rights reserved. This information is not intended as a substitute for professional medical care. Always follow your healthcare professional's instructions.

## 2022-02-28 NOTE — LETTER
February 28, 2022      Raphael Lizarraga  [address]  [facility] [address]        To Whom It May Concern:    Raphael Lizarraga was seen on 2/28/2022.  Please excuse him until 3/7/2022 due to injury.    Sincerely,        RAFA Torres CNP

## 2022-02-28 NOTE — PROGRESS NOTES
Assessment & Plan     Chest wall contusion, right, initial encounter  - XR Ribs & Chest Right G/E 3 Views  - acetaminophen (ACETAMINOPHEN 8 HOUR) 650 MG CR tablet; Take 2 tablets (1,300 mg) by mouth every 8 hours as needed for pain  - ibuprofen (ADVIL/MOTRIN) 800 MG tablet; Take 1 tablet (800 mg) by mouth every 8 hours as needed for moderate pain    Closed fracture of multiple ribs of right side, initial encounter  Xray - Reviewed and interpreted by me, lungs normal, 2 possible rib fractures, awaiting final radiology read. Work note done for this week, he will send [place] if he is unable to work next week. Counseled on self-care measures including: ice, rest; and warning signs of when to seek urgent medical care including: new numbness/tingling/weakness, difficulty breathing, worsening symptoms.  - acetaminophen (ACETAMINOPHEN 8 HOUR) 650 MG CR tablet; Take 2 tablets (1,300 mg) by mouth every 8 hours as needed for pain  - ibuprofen (ADVIL/MOTRIN) 800 MG tablet; Take 1 tablet (800 mg) by mouth every 8 hours as needed for moderate pain    Review of the result(s) of each unique test - xray  Ordering of each unique test  Prescription drug management    See Patient Instructions    Return in about 4 weeks (around 3/28/2022), or if symptoms worsen or fail to improve.    RAFA Torres CNP  [facility] TEMI Lim is a 57 year old who presents for the following health issues     History of Present Illness       Back Pain:  He presents for follow up of back pain. Patient's back pain is a new problem.    Original cause of back pain: a fall  First noticed back pain: in the last week  Patient feels back pain: constantlyLocation of back pain:  Right middle of back  Description of back pain: sharp  Back pain spreads: nowhere    Since patient first noticed back pain, pain is: gradually worsening  Does back pain interfere with his job:  Yes  On a scale of 1-10 (10 being the worst),  "patient describes pain as:  10  What makes back pain worse: bending, coughing, certain positions, lying down, sitting and twisting  Acupuncture: not tried  Acetaminophen: helpful  Activity or exercise: not tried  Chiropractor:  Not tried  Cold: not tried  Heat: not helpful  Massage: not tried  Muscle relaxants: not tried  NSAIDS: not helpful  Physical Therapy: not tried  Rest: helpful  Steroid Injection: not tried  Stretching: not tried  Surgery: not tried  TENS unit: not tried  Topical pain relievers: not tried  Other healthcare providers patient is seeing for back pain: None    He eats 0-1 servings of fruits and vegetables daily.He consumes 1 sweetened beverage(s) daily.He exercises with enough effort to increase his heart rate 9 or less minutes per day.  He exercises with enough effort to increase his heart rate 3 or less days per week.   He is taking medications regularly.     Right lateral to lateral-back lower rib pain. Fell while skiing and jammed pole into side/back.    Review of Systems   Constitutional, HEENT, cardiovascular, pulmonary, gi and gu systems are negative, except as otherwise noted.      Objective    /79   Pulse 60   Temp 98  F (36.7  C) (Oral)   Resp 16   Ht 1.676 m (5' 6\")   Wt 70.3 kg (155 lb)   SpO2 100%   BMI 25.02 kg/m    Body mass index is 25.02 kg/m .  Physical Exam   GENERAL: healthy, alert and no distress  EYES: Eyes grossly normal to inspection, PERRL and conjunctivae and sclerae normal  RESP: lungs clear to auscultation - no rales, rhonchi or wheezes  CV: regular rate and rhythm, normal S1 S2, no S3 or S4, no murmur, click or rub, no peripheral edema and peripheral pulses strong  ABDOMEN: soft, nontender, no hepatosplenomegaly, no masses and bowel sounds normal  MS: tenderness to palpation right lateral ribs  SKIN: no suspicious lesions or rashes  NEURO:Gait and movement limited by pain, mentation intact and speech normal  PSYCH: mentation appears normal, affect " normal/bright    Xray - Reviewed and interpreted by me. Lungs normal. 2 possible rib fractures, awaiting final radiology read.

## 2022-03-06 ENCOUNTER — HEALTH MAINTENANCE LETTER (OUTPATIENT)
Age: 57
End: 2022-03-06

## 2022-03-24 ENCOUNTER — OFFICE VISIT (OUTPATIENT)
Dept: OPHTHALMOLOGY | Facility: CLINIC | Age: 57
End: 2022-03-24
Attending: OPTOMETRIST
Payer: COMMERCIAL

## 2022-03-24 DIAGNOSIS — H26.491 RIGHT POSTERIOR CAPSULAR OPACIFICATION: ICD-10-CM

## 2022-03-24 DIAGNOSIS — Z86.69 H/O VISUAL FIELD DEFECT: ICD-10-CM

## 2022-03-24 PROCEDURE — 92012 INTRM OPH EXAM EST PATIENT: CPT | Mod: 57 | Performed by: STUDENT IN AN ORGANIZED HEALTH CARE EDUCATION/TRAINING PROGRAM

## 2022-03-24 PROCEDURE — 66821 AFTER CATARACT LASER SURGERY: CPT | Mod: RT | Performed by: STUDENT IN AN ORGANIZED HEALTH CARE EDUCATION/TRAINING PROGRAM

## 2022-03-24 ASSESSMENT — VISUAL ACUITY
OD_PH_CC+: -1
OD_CC: 20/60
OD_PH_CC: 20/20
METHOD: SNELLEN - LINEAR
CORRECTION_TYPE: GLASSES
OS_CC+: +3
OS_CC: 20/25

## 2022-03-24 ASSESSMENT — EXTERNAL EXAM - LEFT EYE: OS_EXAM: BROW PTOSIS

## 2022-03-24 ASSESSMENT — CUP TO DISC RATIO
OS_RATIO: 0.2
OD_RATIO: 0.2

## 2022-03-24 ASSESSMENT — SLIT LAMP EXAM - LIDS
COMMENTS: NORMAL
COMMENTS: NORMAL

## 2022-03-24 ASSESSMENT — EXTERNAL EXAM - RIGHT EYE: OD_EXAM: BROW PTOSIS

## 2022-03-24 NOTE — LETTER
3/24/2022         RE: Raphael Lizarraga  [address]  [place] [address]        Dear Colleague,    Thank you for referring your patient, Raphael Lizarraga, to the [facility]. Please see a copy of my visit note below.     Current Eye Medications:  systane as needed      Subjective:  Patient was referred for a Yag Capsulotomy evaluation right eye by Dr. Esquivel. His cataract surgery was with Dr. Mcdowell in 2019. He states his right eye is getting blurred.    Loss of field of vision right eye secondary to brain surgery-epilepsy-30 years ago- feels no change.     Objective:  See Ophthalmology Exam.      Assessment:  Raphael Lizarraga is a 57 year old male who presents with:   Encounter Diagnoses   Name Primary?     Right posterior capsular opacification - Right Eye YAG capsulotomy right eye performed today without complication.     H/O visual field defect - Right Eye      Right posterior capsular opacification        Plan:  You may notice more floaters for the next few days.  Return in 3 weeks for Intraocular pressure check and refraction.    Jorge Wyman MD  [phone]            Again, thank you for allowing me to participate in the care of your patient.        Sincerely,        Jorge Wyman MD

## 2022-03-24 NOTE — PATIENT INSTRUCTIONS
You may notice more floaters for the next few days.  Return in 3 weeks for Intraocular pressure check and refraction.    Jorge Wyman MD  [phone]

## 2022-03-24 NOTE — PROGRESS NOTES
Current Eye Medications:  systane as needed      Subjective:  Patient was referred for a Yag Capsulotomy evaluation right eye by Dr. Esquivel. His cataract surgery was with Dr. Mcdowell in 2019. He states his right eye is getting blurred.    Loss of field of vision right eye secondary to brain surgery-epilepsy-30 years ago- feels no change.     Objective:  See Ophthalmology Exam.      Assessment:  Raphael Lizarraga is a 57 year old male who presents with:   Encounter Diagnoses   Name Primary?     Right posterior capsular opacification - Right Eye YAG capsulotomy right eye performed today without complication.     H/O visual field defect - Right Eye      Right posterior capsular opacification        Plan:  You may notice more floaters for the next few days.  Return in 3 weeks for Intraocular pressure check and refraction.    Jorge Wyman MD  [phone]

## 2022-03-25 ASSESSMENT — TONOMETRY
IOP_METHOD: APPLANATION
OS_IOP_MMHG: 17
OD_IOP_MMHG: 15
OD_IOP_MMHG: 17
IOP_METHOD: APPLANATION

## 2022-04-15 ENCOUNTER — OFFICE VISIT (OUTPATIENT)
Dept: OPHTHALMOLOGY | Facility: CLINIC | Age: 57
End: 2022-04-15
Payer: COMMERCIAL

## 2022-04-15 DIAGNOSIS — Z96.1 PSEUDOPHAKIA: Primary | ICD-10-CM

## 2022-04-15 PROCEDURE — 99024 POSTOP FOLLOW-UP VISIT: CPT | Performed by: STUDENT IN AN ORGANIZED HEALTH CARE EDUCATION/TRAINING PROGRAM

## 2022-04-15 ASSESSMENT — REFRACTION_WEARINGRX
OS_CYLINDER: +1.75
OS_ADD: +2.25
OD_AXIS: 017
OS_AXIS: 163
OD_CYLINDER: +2.25
OD_ADD: +2.25
OD_SPHERE: -1.50
SPECS_TYPE: PAL
OS_SPHERE: -1.50

## 2022-04-15 ASSESSMENT — TONOMETRY
OD_IOP_MMHG: 14
OS_IOP_MMHG: 14
IOP_METHOD: APPLANATION

## 2022-04-15 ASSESSMENT — REFRACTION_MANIFEST
OS_SPHERE: -1.00
OD_CYLINDER: +2.50
OS_CYLINDER: +1.75
OS_ADD: +2.50
OD_AXIS: 020
OD_ADD: +2.50
OS_AXIS: 160
OD_SPHERE: -1.25

## 2022-04-15 ASSESSMENT — VISUAL ACUITY
METHOD: SNELLEN - LINEAR
OD_CC: 20/20
OS_CC: 20/25
CORRECTION_TYPE: GLASSES
OD_CC+: -1
OS_CC+: -2

## 2022-04-15 ASSESSMENT — EXTERNAL EXAM - RIGHT EYE: OD_EXAM: BROW PTOSIS

## 2022-04-15 ASSESSMENT — SLIT LAMP EXAM - LIDS
COMMENTS: NORMAL
COMMENTS: NORMAL

## 2022-04-15 ASSESSMENT — EXTERNAL EXAM - LEFT EYE: OS_EXAM: BROW PTOSIS

## 2022-04-15 NOTE — PATIENT INSTRUCTIONS
Continue artificial tears up to four times a day as needed    Glasses prescription given - optional to update    Recommend annual eye exams with Dr. Caitlin Wyman MD  [phone]

## 2022-04-15 NOTE — LETTER
4/15/2022         RE: Raphael Lizarraga  [address]  [place] [address]        Dear Colleague,    Thank you for referring your patient, Raphael Lizarraga, to the [facility]. Please see a copy of my visit note below.     Current Eye Medications:  Systane as needed.     Subjective:  Follow up s/p yag cap right eye. States right eye vision is good. States he is seeing less floaters.      Objective:  See Ophthalmology Exam.       Assessment:  Raphael Lizarraga is a 57 year old male who presents with:   Encounter Diagnosis   Name Primary?     Pseudophakia s/p YAG cap OD Yes    Doing well s/p YAG cap right eye.        Plan:  Continue artificial tears up to four times a day as needed    Glasses prescription given - optional to update    Recommend annual eye exams with Dr. Caitlin Wyman MD  [phone]          Again, thank you for allowing me to participate in the care of your patient.        Sincerely,        Jorge Wyman MD

## 2022-04-15 NOTE — PROGRESS NOTES
Current Eye Medications:  Systane as needed.     Subjective:  Follow up s/p yag cap right eye. States right eye vision is good. States he is seeing less floaters.      Objective:  See Ophthalmology Exam.       Assessment:  Raphael Lizarraga is a 57 year old male who presents with:   Encounter Diagnosis   Name Primary?     Pseudophakia s/p YAG cap OD Yes    Doing well s/p YAG cap right eye.        Plan:  Continue artificial tears up to four times a day as needed    Glasses prescription given - optional to update    Recommend annual eye exams with Dr. Caitlin Wyman MD  [phone]

## 2022-06-20 ENCOUNTER — VIRTUAL VISIT (OUTPATIENT)
Dept: FAMILY MEDICINE | Facility: CLINIC | Age: 57
End: 2022-06-20
Payer: COMMERCIAL

## 2022-06-20 DIAGNOSIS — G47.33 OSA (OBSTRUCTIVE SLEEP APNEA): Primary | ICD-10-CM

## 2022-06-20 PROCEDURE — 99213 OFFICE O/P EST LOW 20 MIN: CPT | Mod: 95 | Performed by: PHYSICIAN ASSISTANT

## 2022-06-20 NOTE — PATIENT INSTRUCTIONS
At [facility], we strive to deliver an exceptional experience to you, every time we see you. If you receive a survey, please complete it as we do value your feedback.  If you have MyChart, you can expect to receive results automatically within 24 hours of their completion.  Your provider will send a note interpreting your results as well.   If you do not have MyChart, you should receive your results in about a week by mail.    Your care team:                            Family Medicine Internal Medicine   MD Jeff Juárez MD Shantel Branch-Fleming, MD Srinivasa Vaka, MD Katya Belousova, RAFA Goldberg CNP, MD ([facility]) Pediatrics   Sameer Urrutia, MD Jessica Longo MD Amelia Massimini APRN CNP Kim Thein, APRN CNP Bethany Templen, MD             Clinic hours: Monday - Thursday 7 am-6 pm; Fridays 7 am-5 pm.   Urgent care: Monday - Friday 10 am- 8 pm; Saturday and Sunday 9 am-5 pm.    Clinic: [phone]       [facility]: Monday - Thursday 8 am - 7 pm; Friday 8 am - 6 pm  [facility]: [phone]

## 2022-06-20 NOTE — PROGRESS NOTES
"Raphael is a 57 year old who is being evaluated via a billable video visit.      How would you like to obtain your AVS? MyChart  If the video visit is dropped, the invitation should be resent by:   Will anyone else be joining your video visit? No        Assessment & Plan   Problem List Items Addressed This Visit        Respiratory    NELIA (obstructive sleep apnea) - Primary    Relevant Orders    Adult Sleep Eval & Management  Referral             12 minutes spent on the date of the encounter doing chart review, history and exam, documentation and further activities per the note       BMI:   Estimated body mass index is 25.02 kg/m  as calculated from the following:    Height as of 2/28/22: 1.676 m (5' 6\").    Weight as of 2/28/22: 70.3 kg (155 lb).   Weight management plan: Discussed healthy diet and exercise guidelines        No follow-ups on file.    Meka Lazcano PA-C  [facility]    Jaime Lim is a 57 year old, presenting for the following health issues:  No chief complaint on file.      HPI      Concern - NELIA-patient needs a new CPAP machine   Onset: over 20years  Description: the current machine is 20 years old and does not warm up the water   Intensity: moderate  Progression of Symptoms:  same  Accompanying Signs & Symptoms: none  Previous history of similar problem: yes  Precipitating factors:        Worsened by:   Alleviating factors:        Improved by:   Therapies tried and outcome: CPAP        Review of Systems         Objective           Vitals:  No vitals were obtained today due to virtual visit.    Physical Exam   Physical Exam   healthy, alert and no distress  PSYCH: Alert and oriented times 3; coherent speech, normal   rate and volume, able to articulate logical thoughts, able   to abstract reason, no tangential thoughts, no hallucinations   or delusions  His affect is normal  RESP: No cough, no audible wheezing, able to talk in full " sentences  Remainder of exam unable to be completed due to telephone visits                  Video-Visit Details        Type of service:  phone visit -10 minutes

## 2022-07-08 DIAGNOSIS — G40.209 LOCALIZATION-RELATED PARTIAL EPILEPSY WITH COMPLEX PARTIAL SEIZURES (H): ICD-10-CM

## 2022-07-12 RX ORDER — PHENYTOIN SODIUM 100 MG/1
200 CAPSULE, EXTENDED RELEASE ORAL 2 TIMES DAILY
Qty: 120 CAPSULE | Refills: 0 | Status: SHIPPED | OUTPATIENT
Start: 2022-07-12 | End: 2022-10-19

## 2022-07-12 NOTE — TELEPHONE ENCOUNTER
phenytoin (DILANTIN) 100 MG capsule      Last Written Prescription Date:  8/13/21  Last Fill Quantity: 360,   # refills: 3  Last Office Visit : 8/13/21 recommended 1 year follow up  Future Office visit:  None scheduled    Routing refill request to provider for review/approval because:  Overdue for AED  Last phenytoin level 12/5/19  Nothing more recent in care everywhere nor media

## 2022-07-26 DIAGNOSIS — F41.9 ANXIETY: ICD-10-CM

## 2022-07-26 DIAGNOSIS — F33.0 MAJOR DEPRESSIVE DISORDER, RECURRENT EPISODE, MILD (H): ICD-10-CM

## 2022-07-26 NOTE — TELEPHONE ENCOUNTER
Routing refill request to provider for review/approval because:  No PHQ-9 score less than 5 in past 6 months

## 2022-07-29 ENCOUNTER — TRANSFERRED RECORDS (OUTPATIENT)
Dept: FAMILY MEDICINE | Facility: CLINIC | Age: 57
End: 2022-07-29

## 2022-08-04 RX ORDER — BUPROPION HYDROCHLORIDE 150 MG/1
150 TABLET, EXTENDED RELEASE ORAL EVERY MORNING
Qty: 90 TABLET | Refills: 1 | Status: SHIPPED | OUTPATIENT
Start: 2022-08-04 | End: 2023-02-10

## 2022-08-04 RX ORDER — PAROXETINE 20 MG/1
20 TABLET, FILM COATED ORAL AT BEDTIME
Qty: 90 TABLET | Refills: 1 | Status: SHIPPED | OUTPATIENT
Start: 2022-08-04 | End: 2022-11-10

## 2022-08-12 ENCOUNTER — OFFICE VISIT (OUTPATIENT)
Dept: FAMILY MEDICINE | Facility: CLINIC | Age: 57
End: 2022-08-12
Payer: COMMERCIAL

## 2022-08-12 VITALS
HEIGHT: 66 IN | RESPIRATION RATE: 14 BRPM | OXYGEN SATURATION: 98 % | BODY MASS INDEX: 23.78 KG/M2 | SYSTOLIC BLOOD PRESSURE: 118 MMHG | TEMPERATURE: 98.4 F | HEART RATE: 70 BPM | DIASTOLIC BLOOD PRESSURE: 76 MMHG | WEIGHT: 148 LBS

## 2022-08-12 DIAGNOSIS — G47.33 OSA (OBSTRUCTIVE SLEEP APNEA): ICD-10-CM

## 2022-08-12 DIAGNOSIS — M54.50 CHRONIC MIDLINE LOW BACK PAIN WITHOUT SCIATICA: ICD-10-CM

## 2022-08-12 DIAGNOSIS — J45.20 INTERMITTENT ASTHMA WITHOUT COMPLICATION, UNSPECIFIED ASTHMA SEVERITY: ICD-10-CM

## 2022-08-12 DIAGNOSIS — G89.29 CHRONIC MIDLINE LOW BACK PAIN WITHOUT SCIATICA: ICD-10-CM

## 2022-08-12 DIAGNOSIS — G40.209 PARTIAL SYMPTOMATIC EPILEPSY WITH COMPLEX PARTIAL SEIZURES, NOT INTRACTABLE, WITHOUT STATUS EPILEPTICUS (H): ICD-10-CM

## 2022-08-12 DIAGNOSIS — Z01.818 PRE-OPERATIVE GENERAL PHYSICAL EXAMINATION: Primary | ICD-10-CM

## 2022-08-12 DIAGNOSIS — F33.0 MAJOR DEPRESSIVE DISORDER, RECURRENT EPISODE, MILD (H): ICD-10-CM

## 2022-08-12 LAB
CREAT SERPL-MCNC: 0.86 MG/DL (ref 0.66–1.25)
GFR SERPL CREATININE-BSD FRML MDRD: >90 ML/MIN/1.73M2
HGB BLD-MCNC: 14 G/DL (ref 13.3–17.7)
HOLD SPECIMEN: NORMAL
POTASSIUM BLD-SCNC: 4.7 MMOL/L (ref 3.4–5.3)

## 2022-08-12 PROCEDURE — 36415 COLL VENOUS BLD VENIPUNCTURE: CPT | Performed by: NURSE PRACTITIONER

## 2022-08-12 PROCEDURE — 85018 HEMOGLOBIN: CPT | Performed by: NURSE PRACTITIONER

## 2022-08-12 PROCEDURE — 82565 ASSAY OF CREATININE: CPT | Performed by: NURSE PRACTITIONER

## 2022-08-12 PROCEDURE — 84132 ASSAY OF SERUM POTASSIUM: CPT | Performed by: NURSE PRACTITIONER

## 2022-08-12 PROCEDURE — 99214 OFFICE O/P EST MOD 30 MIN: CPT | Performed by: NURSE PRACTITIONER

## 2022-08-12 ASSESSMENT — PATIENT HEALTH QUESTIONNAIRE - PHQ9
SUM OF ALL RESPONSES TO PHQ QUESTIONS 1-9: 6
10. IF YOU CHECKED OFF ANY PROBLEMS, HOW DIFFICULT HAVE THESE PROBLEMS MADE IT FOR YOU TO DO YOUR WORK, TAKE CARE OF THINGS AT HOME, OR GET ALONG WITH OTHER PEOPLE: NOT DIFFICULT AT ALL
SUM OF ALL RESPONSES TO PHQ QUESTIONS 1-9: 6

## 2022-08-12 ASSESSMENT — ASTHMA QUESTIONNAIRES: ACT_TOTALSCORE: 25

## 2022-08-12 NOTE — PATIENT INSTRUCTIONS
Preparing for Your Surgery  Getting started  A nurse will call you to review your health history and instructions. They will give you an arrival time based on your scheduled surgery time. Please be ready to share:  Your doctor's clinic name and phone number  Your medical, surgical and anesthesia history  A list of allergies and sensitivities  A list of medicines, including herbal treatments and over-the-counter drugs  Whether the patient has a legal guardian (ask how to send us the papers in advance)  Please tell us if you're pregnant--or if there's any chance you might be pregnant. Some surgeries may injure a fetus (unborn baby), so they require a pregnancy test. Surgeries that are safe for a fetus don't always need a test, and you can choose whether to have one.   If you have a child who's having surgery, please ask for a copy of Preparing for Your Child's Surgery.    Preparing for surgery  Within 30 days of surgery: Have a pre-op exam (sometimes called an H&P, or History and Physical). This can be done at a clinic or pre-operative center.  If you're having a , you may not need this exam. Talk to your care team.  At your pre-op exam, talk to your care team about all medicines you take. If you need to stop any medicines before surgery, ask when to start taking them again.  We do this for your safety. Many medicines can make you bleed too much during surgery. Some change how well surgery (anesthesia) drugs work.  Call your insurance company to let them know you're having surgery. (If you don't have insurance, call [phone].)  Call your clinic if there's any change in your health. This includes signs of a cold or flu (sore throat, runny nose, cough, rash, fever). It also includes a scrape or scratch near the surgery site.  If you have questions on the day of surgery, call your hospital or surgery center.  COVID testing  You may need to be tested for COVID-19 before having surgery. If so, we will give  you instructions.  Eating and drinking guidelines  For your safety: Unless your surgeon tells you otherwise, follow the guidelines below.  Eat and drink as usual until 8 hours before surgery. After that, no food or milk.  Drink clear liquids until 2 hours before surgery. These are liquids you can see through, like water, Gatorade and Propel Water. You may also have black coffee and tea (no cream or milk).  Nothing by mouth within 2 hours of surgery. This includes gum, candy and breath mints.  If you drink alcohol: Stop drinking it the night before surgery.  If your care team tells you to take medicine on the morning of surgery, it's okay to take it with a sip of water.  Preventing infection  Shower or bathe the night before and morning of your surgery. Follow the instructions your clinic gave you. (If no instructions, use regular soap.)  Don't shave or clip hair near your surgery site. We'll remove the hair if needed.  Don't smoke or vape the morning of surgery. You may chew nicotine gum up to 2 hours before surgery. A nicotine patch is okay.  Note: Some surgeries require you to completely quit smoking and nicotine. Check with your surgeon.  Your care team will make every effort to keep you safe from infection. We will:  Clean our hands often with soap and water (or an alcohol-based hand rub).  Clean the skin at your surgery site with a special soap that kills germs.  Give you a special gown to keep you warm. (Cold raises the risk of infection.)  Wear special hair covers, masks, gowns and gloves during surgery.  Give antibiotic medicine, if prescribed. Not all surgeries need antibiotics.  What to bring on the day of surgery  Photo ID and insurance card  Copy of your health care directive, if you have one  Glasses and hearing aides (bring cases)  You can't wear contacts during surgery  Inhaler and eye drops, if you use them (tell us about these when you arrive)  CPAP machine or breathing device, if you use them  A  few personal items, if spending the night  If you have . . .  A pacemaker, ICD (cardiac defibrillator) or other implant: Bring the ID card.  An implanted stimulator: Bring the remote control.  A legal guardian: Bring a copy of the certified (court-stamped) guardianship papers.  Please remove any jewelry, including body piercings. Leave jewelry and other valuables at home.  If you're going home the day of surgery  You must have a responsible adult drive you home. They should stay with you overnight as well.  If you don't have someone to stay with you, and you aren't safe to go home alone, we may keep you overnight. Insurance often won't pay for this.  After surgery  If it's hard to control your pain or you need more pain medicine, please call your surgeon's office.  Questions?   If you have any questions for your care team, list them here: _________________________________________________________________________________________________________________________________________________________________________ ____________________________________ ____________________________________ ____________________________________  For informational purposes only. Not to replace the advice of your health care provider. Copyright   2003, 2019 [employer]. All rights reserved. Clinically reviewed by Sierra Higgins MD. [employer] [id number] - REV 07/21.    How to Take Your Medication Before Surgery  - Take all of your medications before surgery as usual

## 2022-08-12 NOTE — PROGRESS NOTES
[facility]  [address] [place] [address]  [place] [address]  Phone: [phone] for HPI/ROS submitted by the patient on 8/12/2022  If you checked off any problems, how difficult have these problems made it for you to do your work, take care of things at home, or get along with other people?: Not difficult at all  PHQ9 TOTAL SCORE: 6      Primary Provider: Chava Belcher  Pre-op Performing Provider: ALICE SPIVEY    PREOPERATIVE EVALUATION:  Today's date: 8/12/2022    Raphael Lizarraga is a 57 year old male who presents for a preoperative evaluation.    Surgical Information:  Surgery/Procedure: Right Decompression with Foraminotomy and Discectomy L4-L5  Surgery Location: [facility]   Surgeon: REAGAN DHILLON  Surgery Date: 8/17/2022  Time of Surgery:  10:00 AM  Where patient plans to recover: At home with family  Fax number for surgical facility: [phone]    Type of Anesthesia Anticipated: to be determined    Assessment & Plan     The proposed surgical procedure is considered INTERMEDIATE risk.    1. Pre-operative general physical examination  - Potassium; Future  - Creatinine; Future  - Hemoglobin w/Reflex to Iron Studies; Future    2. Chronic midline low back pain without sciatica    3. Major depressive disorder, recurrent episode, mild (H)  Well controlled with medications without side effects.    4. Partial symptomatic epilepsy with complex partial seizures, not intractable, without status epilepticus (H)  Well controlled with medications without side effects.    5. NELIA (obstructive sleep apnea)  Well controlled with CPAP without side effects.    6. Intermittent asthma without complication, unspecified asthma severity  Well controlled with medications without side effects.    Risks and Recommendations:  The patient has the following additional risks and recommendations for perioperative complications:   - No identified additional risk factors other than previously  addressed    Medication Instructions:  Patient is to take all scheduled medications on the day of surgery EXCEPT for modifications listed below:   - Antiepileptics: Continue without modification.   - SSRIs, SNRIs, TCAs, Antipsychotics: Continue without modification.    - rescue Inhaler: Continue PRN. Bring to hospital on the day of surgery.    RECOMMENDATION:  APPROVAL GIVEN to proceed with proposed procedure pending review of diagnostic evaluation.    Subjective     HPI related to upcoming procedure: States that he has chronic low back pain, scheduled for procedure.     Preop Questions 8/11/2022   1. Have you ever had a heart attack or stroke? No   2. Have you ever had surgery on your heart or blood vessels, such as a stent placement, a coronary artery bypass, or surgery on an artery in your head, neck, heart, or legs? No   3. Do you have chest pain with activity? No   4. Do you have a history of  heart failure? No   5. Do you currently have a cold, bronchitis or symptoms of other infection? No   6. Do you have a cough, shortness of breath, or wheezing? No   7. Do you or anyone in your family have previous history of blood clots? No   8. Do you or does anyone in your family have a serious bleeding problem such as prolonged bleeding following surgeries or cuts? No   9. Have you ever had problems with anemia or been told to take iron pills? No   10. Have you had any abnormal blood loss such as black, tarry or bloody stools? No   11. Have you ever had a blood transfusion? No   12. Are you willing to have a blood transfusion if it is medically needed before, during, or after your surgery? Yes   13. Have you or any of your relatives ever had problems with anesthesia? No   14. Do you have sleep apnea, excessive snoring or daytime drowsiness? YES - has NELIA, uses CPAP   14a. Do you have a CPAP machine? Yes   15. Do you have any artifical heart valves or other implanted medical devices like a pacemaker, defibrillator, or  continuous glucose monitor? No   16. Do you have artificial joints? No   17. Are you allergic to latex? No       Health Care Directive:  Patient does not have a Health Care Directive or Living Will: Discussed advance care planning with patient; however, patient declined at this time.    Preoperative Review of :   reviewed - no record of controlled substances prescribed.  PDMP Review       Value Time User    State PDMP site checked  Yes 8/12/2022 10:14 AM Loli King APRN CNP        Status of Chronic Conditions:  ASTHMA - Patient has a longstanding history of moderate-severe Asthma . Patient has been doing well overall noting NO SYMPTOMS and continues on medication regimen consisting of albuterol inhaler as needed without adverse reactions or side effects.     DEPRESSION - Patient has a long history of Depression of moderate severity requiring medication for control with recent symptoms being stable..Current symptoms of depression include none.     Seizure - States that he has not had a seizure for many years. Takes his medications without side effects or problems.     Review of Systems  Constitutional, neuro, ENT, endocrine, pulmonary, cardiac, gastrointestinal, genitourinary, musculoskeletal, integument and psychiatric systems are negative, except as otherwise noted.    Patient Active Problem List    Diagnosis Date Noted     Corneal rust ring of right eye 08/24/2019     Priority: Medium     Pain in thoracic spine 06/02/2014     Priority: Medium     Strain of thoracic region 05/19/2014     Priority: Medium     CARDIOVASCULAR SCREENING; LDL GOAL LESS THAN 160 10/31/2010     Priority: Medium     Osteoporosis/osteopenia increased risk      Priority: Medium     due to long-term Dilantin use       Epilepsy (H)      Priority: Medium     Anxiety      Priority: Medium     NELIA (obstructive sleep apnea)      Priority: Medium     Intermittent asthma      Priority: Medium     Major depressive disorder,  recurrent episode, mild (H) 03/09/2009     Priority: Medium     Relapse May 2011        Past Medical History:   Diagnosis Date     Anxiety      Diverticulosis      Epilepsy (H)      Intermittent asthma      Major depressive disorder, recurrent episode, unspecified 03/09/2009    Never saw psychiatrist     Nonsenile cataract      NELAI (obstructive sleep apnea)     Uses CPAP machine     Osteoporosis/osteopenia increased risk     due to long-term Dilantin use     Past Surgical History:   Procedure Laterality Date     CATARACT IOL, RT/LT Bilateral     July 2019     COLONOSCOPY WITH CO2 INSUFFLATION N/A 5/4/2018    Procedure: COLONOSCOPY WITH CO2 INSUFFLATION;  Colonoscopy, Dr. Belcher, Screen for colon cancer, BMI 22.11, [facility] [address];  Surgeon: Duane, William Charles, MD;  Location: [facility] OR     LOBECTOMY  1994    LT temporal for epilepsy     PHACOEMULSIFICATION WITH STANDARD INTRAOCULAR LENS IMPLANT Left 6/27/2019    Procedure: PHACOEMULSIFICATION, CATARACT, WITH STANDARD IOL INSERTION, LEFT;  Surgeon: Gutierrez Mcdowell MD;  Location: [facility] OR     PHACOEMULSIFICATION WITH STANDARD INTRAOCULAR LENS IMPLANT Right 7/11/2019    Procedure: PHACOEMULSIFICATION, CATARACT, WITH STANDARD IOL INSERTION, RIGHT;  Surgeon: Gutierrez Mcdowell MD;  Location: [facility] OR     VASECTOMY       ZZC LAP,PARTIAL NEPHRECTOMY  8/4/08    LT for benign mass, Dr. Donald     Current Outpatient Medications   Medication Sig Dispense Refill     albuterol (PROAIR HFA/PROVENTIL HFA/VENTOLIN HFA) 108 (90 Base) MCG/ACT inhaler Inhale 2 puffs into the lungs every 4 hours as needed (for asthma symptoms) 1 Inhaler 1     buPROPion (WELLBUTRIN SR) 150 MG 12 hr tablet Take 1 tablet (150 mg) by mouth every morning for depression & anxiety prevention. 90 tablet 1     calcium-vitamin D (OSCAL) 250-125 MG-UNIT TABS per tablet Take 1 tablet by mouth 2 times daily       PARoxetine (PAXIL) 20 MG tablet Take 1 tablet (20 mg) by mouth At Bedtime for  "depression & anxiety prevention. 90 tablet 1     phenytoin (DILANTIN) 100 MG capsule Take 2 capsules (200 mg) by mouth 2 times daily For additional refills, please schedule a follow-up appointment at [phone], overdue for lab 120 capsule 0       No Known Allergies     Social History     Tobacco Use     Smoking status: Former Smoker     Quit date: 1993     Years since quittin.9     Smokeless tobacco: Never Used   Substance Use Topics     Alcohol use: Yes     Alcohol/week: 0.0 - 4.0 standard drinks     Comment: beer - occ     History   Drug Use No         Objective     /76   Pulse 70   Temp 98.4  F (36.9  C) (Oral)   Resp 14   Ht 1.676 m (5' 6\")   Wt 67.1 kg (148 lb)   SpO2 98%   BMI 23.89 kg/m      Physical Exam    GENERAL APPEARANCE: healthy, alert and no distress     EYES: EOMI,  PERRL     HENT: ear canals and TM's normal and nose and mouth without ulcers or lesions     NECK: no adenopathy, no asymmetry, masses, or scars and thyroid normal to palpation     RESP: lungs clear to auscultation - no rales, rhonchi or wheezes     CV: regular rates and rhythm, normal S1 S2, no S3 or S4 and no murmur, click or rub     ABDOMEN:  soft, nontender, no HSM or masses and bowel sounds normal     MS: extremities normal- no gross deformities noted, no evidence of inflammation in joints, FROM in all extremities.     SKIN: no suspicious lesions or rashes     NEURO: Normal strength and tone, sensory exam grossly normal, mentation intact and speech normal     PSYCH: mentation appears normal. and affect normal/bright     LYMPHATICS: No cervical adenopathy    No results for input(s): HGB, PLT, INR, NA, POTASSIUM, CR, A1C in the last 40627 hours.     Diagnostics:  Labs pending at this time.  Results will be reviewed when available.   No EKG required, no history of coronary heart disease, significant arrhythmia, peripheral arterial disease or other structural heart disease.    Revised Cardiac Risk Index " (RCRI):  The patient has the following serious cardiovascular risks for perioperative complications:   - No serious cardiac risks = 0 points     RCRI Interpretation: 0 points: Class I (very low risk - 0.4% complication rate)         Signed Electronically by: RAFA Torres CNP  Copy of this evaluation report is provided to requesting physician.

## 2022-08-15 NOTE — RESULT ENCOUNTER NOTE
Dear Raphael,     Here are your recent results which are within the expected range. Please continue with your current plan of care and let us know if you have any questions or concerns.    Regards,  RAFA Torres CNP

## 2022-08-16 DIAGNOSIS — J45.20 INTERMITTENT ASTHMA WITHOUT COMPLICATION, UNSPECIFIED ASTHMA SEVERITY: ICD-10-CM

## 2022-08-18 RX ORDER — ALBUTEROL SULFATE 90 UG/1
2 AEROSOL, METERED RESPIRATORY (INHALATION) EVERY 4 HOURS PRN
Qty: 8.5 G | Refills: 1 | Status: SHIPPED | OUTPATIENT
Start: 2022-08-18 | End: 2023-02-23

## 2022-08-18 NOTE — TELEPHONE ENCOUNTER
Prescription approved per [facility] Refill Protocol.    Ni Damon, RN, BSN

## 2022-08-29 ENCOUNTER — TRANSFERRED RECORDS (OUTPATIENT)
Dept: HEALTH INFORMATION MANAGEMENT | Facility: CLINIC | Age: 57
End: 2022-08-29

## 2022-10-07 ASSESSMENT — SLEEP AND FATIGUE QUESTIONNAIRES
HOW LIKELY ARE YOU TO NOD OFF OR FALL ASLEEP IN A CAR, WHILE STOPPED FOR A FEW MINUTES IN TRAFFIC: WOULD NEVER DOZE
HOW LIKELY ARE YOU TO NOD OFF OR FALL ASLEEP WHILE LYING DOWN TO REST IN THE AFTERNOON WHEN CIRCUMSTANCES PERMIT: HIGH CHANCE OF DOZING
HOW LIKELY ARE YOU TO NOD OFF OR FALL ASLEEP WHILE SITTING AND TALKING TO SOMEONE: WOULD NEVER DOZE
HOW LIKELY ARE YOU TO NOD OFF OR FALL ASLEEP WHILE SITTING AND READING: HIGH CHANCE OF DOZING
HOW LIKELY ARE YOU TO NOD OFF OR FALL ASLEEP WHILE SITTING INACTIVE IN A PUBLIC PLACE: SLIGHT CHANCE OF DOZING
HOW LIKELY ARE YOU TO NOD OFF OR FALL ASLEEP WHEN YOU ARE A PASSENGER IN A CAR FOR AN HOUR WITHOUT A BREAK: SLIGHT CHANCE OF DOZING
HOW LIKELY ARE YOU TO NOD OFF OR FALL ASLEEP WHILE WATCHING TV: SLIGHT CHANCE OF DOZING
HOW LIKELY ARE YOU TO NOD OFF OR FALL ASLEEP WHILE SITTING QUIETLY AFTER LUNCH WITHOUT ALCOHOL: SLIGHT CHANCE OF DOZING

## 2022-10-09 PROBLEM — H18.891 CORNEAL RUST RING OF RIGHT EYE: Status: RESOLVED | Noted: 2019-08-24 | Resolved: 2022-10-09

## 2022-10-10 ENCOUNTER — VIRTUAL VISIT (OUTPATIENT)
Dept: SLEEP MEDICINE | Facility: CLINIC | Age: 57
End: 2022-10-10
Attending: PHYSICIAN ASSISTANT
Payer: COMMERCIAL

## 2022-10-10 ENCOUNTER — TELEPHONE (OUTPATIENT)
Dept: SLEEP MEDICINE | Facility: CLINIC | Age: 57
End: 2022-10-10

## 2022-10-10 VITALS — WEIGHT: 150 LBS | BODY MASS INDEX: 22.73 KG/M2 | HEIGHT: 68 IN

## 2022-10-10 DIAGNOSIS — Z72.820 LACK OF ADEQUATE SLEEP: ICD-10-CM

## 2022-10-10 DIAGNOSIS — R53.81 MALAISE AND FATIGUE: ICD-10-CM

## 2022-10-10 DIAGNOSIS — R06.89 DYSPNEA AND RESPIRATORY ABNORMALITY: Primary | ICD-10-CM

## 2022-10-10 DIAGNOSIS — R06.00 DYSPNEA AND RESPIRATORY ABNORMALITY: Primary | ICD-10-CM

## 2022-10-10 DIAGNOSIS — G47.33 OSA (OBSTRUCTIVE SLEEP APNEA): ICD-10-CM

## 2022-10-10 DIAGNOSIS — R53.83 MALAISE AND FATIGUE: ICD-10-CM

## 2022-10-10 DIAGNOSIS — G47.30 SLEEP APNEA, UNSPECIFIED TYPE: ICD-10-CM

## 2022-10-10 PROCEDURE — 99204 OFFICE O/P NEW MOD 45 MIN: CPT | Mod: 95 | Performed by: PHYSICIAN ASSISTANT

## 2022-10-10 NOTE — PROGRESS NOTES
Raphael is a 57 year old who is being evaluated via a billable video visit.      How would you like to obtain your AVS? MyChart  If the video visit is dropped, the invitation should be resent by: Text to cell phone: [phone]  Will anyone else be joining your video visit? Nadia Wallace      Video-Visit Details    Video Start Time: 7:50 AM    Type of service:  Video Visit    Video End Time:8:13 AM    Originating Location (pt. Location): Home    Distant Location (provider location):  [facility]     Platform used for Video Visit: [facility]       Outpatient Sleep Medicine Consultation:      Name: Raphael Lizarraga MRN# [id number]   Age: 57 year old YOB: 1965     Date of Consultation: October 10, 2022  Consultation is requested by: Meka Lazcano PA-C  [address] [place] [address] Meka Lazcano  Primary care provider: Chava Belcher       Reason for Sleep Consult:     Raphael Lizarraga is sent by Meka Lazcano for a sleep consultation regarding obstructive sleep apnea.    Patient s Reason for visit  Raphael Lizarraga main reason for visit: Cpap  Patient states problem(s) started: Need new one and new sleep study  Raphael Lizarraag's goals for this visit: Sleep better           Assessment and Plan:     Summary Sleep Diagnoses & Recommendations:  History of obstructive sleep apnea, severity unclear-  Tolerating CPAP very well. Daytime symptoms are improved.   He needs a new CPAP. He needs a diagnostic sleep study to qualify for new CPAP.  Patient will go without CPAP for about 3 nights leading to the night of the sleep study.    Comorbid Diagnoses:    Summary Recommendations:  Orders Placed This Encounter   Procedures     Comprehensive Sleep Study         Summary Counseling:    Sleep Testing Reviewed  Obstructive Sleep Apnea Reviewed  Complications of Untreated Sleep Apnea Reviewed      Medical Decision-making:   Educational materials provided in  instructions    Total time spent reviewing medical records, history and physical examination, review of previous testing and interpretation as well as documentation on this date:45 minutes    CC: Meka Lazcano          History of Present Illness:     Past Sleep Evaluations:    Raphael Lizarraga is a 57 years old male with history remarkable for epilepsy, depression, anxiety and NELIA.     He states that he was initially diagnosed with sleep apnea in ~2002. We don't have records of his diagnostic sleep study. Presenting concerns of snoring and witnessed apnea.     He did undergo a titration study in 2008 (138#) and CPAP 10 cm/H20 was effective    He states that he was diagnosed with severe NELIA and has been on CPAP since 2002. Compliance data is not available today.  Raphael is tolerating their CPAP very well. His original complaints have improved with treatment.  PAP Mask is a nasal and was last replaced 2 months ago. DME is [facility] in [place].   Overall, the patient rates his experience with PAP as 10 (0 poor, 10 great). The mask is comfortable. The mask is not leaking.  He is not snoring with the mask on. He is not having gasp arousals. He is not having any oral or nasal dryness. The pressure settings are comfortable.    Raphael has gained 0 pounds since their last sleep study.    He wants to obtain a new machine as his current machine is about 10 years old and worn. The humidifier does not work.  He was informed that he would need a new study to obtain a new CPAP.          SLEEP-WAKE SCHEDULE:     Work/School Days: Patient goes to school/work: Yes   Usually gets into bed at 8  Takes patient about Half hour to fall asleep  Has trouble falling asleep 2 nights per week  Wakes up in the middle of the night ? times.  Wakes up due to Uncertain  He has trouble falling back asleep 0 times a week.   It usually takes   to get back to sleep  Patient is usually up at 4:00 am  Uses alarm: Yes    Weekends/Non-work Days/All  Other Days:  Usually gets into bed at 10   Takes patient about   to fall asleep  Patient is usually up at 7  Uses alarm: No    Sleep Need  Patient gets  6-7 sleep on average   Patient thinks he needs about   sleep    Raphael Lizarraga prefers to sleep in this position(s): Back   Patient states they do the following activities in bed: Use phone, computer, or tablet    Naps  Patient takes a purposeful nap 0 times a week and naps are usually   in duration  He feels better after a nap:    He dozes off unintentionally   days per week  Patient has had a driving accident or near-miss due to sleepiness/drowsiness: No      SLEEP DISRUPTIONS:    Breathing/Snoring  Patient snores:Yes  Other people complain about his snoring: Yes  Patient has been told he stops breathing in his sleep:Yes  He has issues with the following: Stuffy nose when you wake up    Movement:  Patient gets pain, discomfort, with an urge to move:  No  It happens when he is resting:  No  It happens more at night:     Patient has been told he kicks his legs at night:  Yes     Behaviors in Sleep:  Raphael Lizarraga has experienced the following behaviors while sleeping:    He has experienced sudden muscle weakness during the day: No      Is there anything else you would like your sleep provider to know:        CAFFEINE AND OTHER SUBSTANCES:    Patient consumes caffeinated beverages per day:  2  Last caffeine use is usually: 8  List of any prescribed or over the counter stimulants that patient takes:    List of any prescribed or over the counter sleep medication patient takes:    List of previous sleep medications that patient has tried:    Patient drinks alcohol to help them sleep: No  Patient drinks alcohol near bedtime: No    Family History:  Patient has a family member been diagnosed with a sleep disorder: No            SCALES:    EPWORTH SLEEPINESS SCALE      [place] Sleepiness Scale ( MARLENI Clark  1990-1997[facility]/English - Final version - 21 Nov 07 - [facility]  Research [place].) 10/7/2022   Sitting and reading High chance of dozing   Watching TV Slight chance of dozing   Sitting, inactive in a public place (e.g. a theatre or a meeting) Slight chance of dozing   As a passenger in a car for an hour without a break Slight chance of dozing   Lying down to rest in the afternoon when circumstances permit High chance of dozing   Sitting and talking to someone Would never doze   Sitting quietly after a lunch without alcohol Slight chance of dozing   In a car, while stopped for a few minutes in traffic Would never doze   [place] Score (MC) 10   [place] Score (Sleep) 10         INSOMNIA SEVERITY INDEX (SETH)      Insomnia Severity Index (SETH) 10/7/2022   Difficulty falling asleep 1   Difficulty staying asleep 1   Problems waking up too early 0   How SATISFIED/DISSATISFIED are you with your CURRENT sleep pattern? 1   How NOTICEABLE to others do you think your sleep problem is in terms of impairing the quality of your life? 0   How WORRIED/DISTRESSED are you about your current sleep problem? 0   To what extent do you consider your sleep problem to INTERFERE with your daily functioning (e.g. daytime fatigue, mood, ability to function at work/daily chores, concentration, memory, mood, etc.) CURRENTLY? 0   SETH Total Score 3       Guidelines for Scoring/Interpretation:  Total score categories:  0-7 = No clinically significant insomnia   8-14 = Subthreshold insomnia   15-21 = Clinical insomnia (moderate severity)  22-28 = Clinical insomnia (severe)  Used via courtesy of www.[employer].va.gov with permission from Alex Garland PhD., [facility]      STOP BANG     STOP BANG Questionnaire (  2008, the American Society of Anesthesiologists, Inc. [employer] 10/10/2022   1. Snoring - Do you snore loudly (louder than talking or loud enough to be heard through closed doors)? -   2. Tired - Do you often feel tired, fatigued, or sleepy during daytime? -   3.  "Observed - Has anyone observed you stop breathing during your sleep? -   4. Blood pressure - Do you have or are you being treated for high blood pressure? -   5. BMI - BMI more than 35 kg/m2? -   6. Age - Age over 50 yr old? -   7. Neck circumference - Neck circumference greater than 40 cm? -   8. Gender - Gender male? -   STOP BANG Score (MC): -   B/P Clinic: -   BMI Clinic: 22.81         GAD7    JOSE-7  2/18/2021   1. Feeling nervous, anxious, or on edge 0   2. Not being able to stop or control worrying 0   3. Worrying too much about different things 0   4. Trouble relaxing 0   5. Being so restless that it is hard to sit still 0   6. Becoming easily annoyed or irritable 1   7. Feeling afraid, as if something awful might happen 0   JOSE-7 Total Score 1   If you checked any problems, how difficult have they made it for you to do your work, take care of things at home, or get along with other people? Not difficult at all         CAGE-AID    No flowsheet data found.    CAGE-AID reprinted with permission from the [place] [employer], YARIEL Magdaleno. and LINDEN Madrigal, \"Conjoint screening questionnaires for alcohol and drug abuse\" [place] Medical Journal 94: 135-140, 1995.      PATIENT HEALTH QUESTIONNAIRE-9 (PHQ - 9)    PHQ-9 ([employer]) 8/12/2022   No Interest In Doing Things -   Feeling Depressed -   Trouble Sleeping -   Tired / No Energy -   No appetite or Over-Eating -   Feeling Bad about Self -   Trouble Concentrating -   Moving Slow or Restless -   Suicidal Thoughts -   Total Score -   1.  Little interest or pleasure in doing things 1   2.  Feeling down, depressed, or hopeless 1   3.  Trouble falling or staying asleep, or sleeping too much 1   4.  Feeling tired or having little energy 0   5.  Poor appetite or overeating 0   6.  Feeling bad about yourself 0   7.  Trouble concentrating 3   8.  Moving slowly or restless 0   9.  Suicidal or self-harm thoughts 0   PHQ-9 Total Score 6   Difficulty at work, home, or with " people -   1.  Little interest or pleasure in doing things Several days   2.  Feeling down, depressed, or hopeless Several days   3.  Trouble falling or staying asleep, or sleeping too much Several days   4.  Feeling tired or having little energy Not at all   5.  Poor appetite or overeating Not at all   6.  Feeling bad about yourself Not at all   7.  Trouble concentrating Nearly every day   8.  Moving slowly or restless Not at all   9.  Suicidal or self-harm thoughts Not at all   PHQ-9 via [facility] TOTAL SCORE-----> 6 (Mild depression)   Difficulty at work, home, or with people Not difficult at all   1. Little interest or pleasure in doing things? -   2. Feeling down, depressed, irritable, or hopeless? -   3. Trouble falling, staying asleep, or sleeping too much? -   4. Feeling tired, or having little energy? -   5. Poor appetite, weight loss, or overeating? -   6. Feeling bad about yourself - or that you are a failure, or have let yourself or your family down? -   7. Trouble concentrating on things like school work, reading, or watching TV? -   8.  Moving slowly or restless -   9. Thoughts that you would be better off dead, or of hurting yourself in some way? -   PHQ-A Total Score -   Difficulty doing work, at home, or with people -       Developed by Jacquie Greenwood, Carie Vaughn, Pedro Arechiga and colleagues, with an educational noy from [employer]. No permission required to reproduce, translate, display or distribute.        Allergies:    No Known Allergies    Medications:    Current Outpatient Medications   Medication Sig Dispense Refill     albuterol (PROAIR HFA/PROVENTIL HFA/VENTOLIN HFA) 108 (90 Base) MCG/ACT inhaler Inhale 2 puffs into the lungs every 4 hours as needed (for asthma symptoms) 8.5 g 1     buPROPion (WELLBUTRIN SR) 150 MG 12 hr tablet Take 1 tablet (150 mg) by mouth every morning for depression & anxiety prevention. 90 tablet 1     calcium-vitamin D (OSCAL) 250-125 MG-UNIT TABS  per tablet Take 1 tablet by mouth 2 times daily       PARoxetine (PAXIL) 20 MG tablet Take 1 tablet (20 mg) by mouth At Bedtime for depression & anxiety prevention. 90 tablet 1     phenytoin (DILANTIN) 100 MG capsule Take 2 capsules (200 mg) by mouth 2 times daily For additional refills, please schedule a follow-up appointment at [phone], overdue for lab 120 capsule 0       Problem List:  Patient Active Problem List    Diagnosis Date Noted     Chronic midline low back pain without sciatica 08/12/2022     Priority: Medium     Pain in thoracic spine 06/02/2014     Priority: Medium     CARDIOVASCULAR SCREENING; LDL GOAL LESS THAN 160 10/31/2010     Priority: Medium     Osteoporosis/osteopenia increased risk      Priority: Medium     due to long-term Dilantin use       Epilepsy (H)      Priority: Medium     Anxiety      Priority: Medium     NELIA (obstructive sleep apnea)      Priority: Medium     He states that he was initially diagnosed with sleep apnea in ~2002. We don't have records of his diagnostic sleep study.     He underwent a titration study in 2008 (138#) and CPAP 10 cm/H20 was effective         Intermittent asthma      Priority: Medium     Major depressive disorder, recurrent episode, mild (H) 03/09/2009     Priority: Medium     Relapse May 2011          Past Medical/Surgical History:  Past Medical History:   Diagnosis Date     Anxiety      Corneal rust ring of right eye 8/24/2019     Diverticulosis      Epilepsy (H)      Intermittent asthma      Major depressive disorder, recurrent episode, unspecified 03/09/2009    Never saw psychiatrist     Nonsenile cataract      NELIA (obstructive sleep apnea)     Uses CPAP machine     Osteoporosis/osteopenia increased risk     due to long-term Dilantin use     Strain of thoracic region 5/19/2014     Past Surgical History:   Procedure Laterality Date     CATARACT IOL, RT/LT Bilateral     July 2019     COLONOSCOPY WITH CO2 INSUFFLATION N/A 5/4/2018    Procedure:  COLONOSCOPY WITH CO2 INSUFFLATION;  Colonoscopy, Dr. Belcher, Screen for colon cancer, BMI 22.11, Sofiya Walker Meng Calderon;  Surgeon: Duane, William Charles, MD;  Location: [facility] OR     LOBECTOMY  1994    LT temporal for epilepsy     PHACOEMULSIFICATION WITH STANDARD INTRAOCULAR LENS IMPLANT Left 2019    Procedure: PHACOEMULSIFICATION, CATARACT, WITH STANDARD IOL INSERTION, LEFT;  Surgeon: Gutierrez Mcdowell MD;  Location: [facility] OR     PHACOEMULSIFICATION WITH STANDARD INTRAOCULAR LENS IMPLANT Right 2019    Procedure: PHACOEMULSIFICATION, CATARACT, WITH STANDARD IOL INSERTION, RIGHT;  Surgeon: Gutierrez Mcdowell MD;  Location: [facility] OR     VASECTOMY       ZZC LAP,PARTIAL NEPHRECTOMY  08    LT for benign mass, Dr. Donald       Social History:  Social History     Socioeconomic History     Marital status:      Spouse name: Rashad     Number of children: 1     Years of education: 12     Highest education level: Not on file   Occupational History     Occupation: [employer]     Employer: OTHER     Comment: 2 years   Tobacco Use     Smoking status: Former     Types: Cigarettes     Quit date: 1993     Years since quittin.1     Smokeless tobacco: Never   Substance and Sexual Activity     Alcohol use: Yes     Alcohol/week: 0.0 - 4.0 standard drinks     Comment: beer - occ     Drug use: No     Sexual activity: Yes     Partners: Female     Birth control/protection: None   Other Topics Concern     Parent/sibling w/ CABG, MI or angioplasty before 65F 55M? Not Asked   Social History Narrative     Not on file     Social Determinants of Health     Financial Resource Strain: Not on file   Food Insecurity: Not on file   Transportation Needs: Not on file   Physical Activity: Not on file   Stress: Not on file   Social Connections: Not on file   Intimate Partner Violence: Not on file   Housing Stability: Not on file       Family History:  Family History   Problem Relation Age of Onset     Diabetes  "Father      Hypertension Father      Cerebrovascular Disease Paternal Grandfather      Asthma No family hx of      C.A.D. No family hx of      Breast Cancer No family hx of      Cancer - colorectal No family hx of      Prostate Cancer No family hx of      Anesthesia Reaction No family hx of      Thrombophilia No family hx of      Bleeding Disorder No family hx of      Glaucoma No family hx of      Macular Degeneration No family hx of      Retinal detachment No family hx of        Review of Systems:  A complete review of systems reviewed by me is negative with the exeption of what has been mentioned in the history of present illness.  In the last TWO WEEKS have you experienced any of the following symptoms?  Fevers: No  Night Sweats: No  Weight Gain: No  Pain at Night: No  Changes in Vision: No  Difficulty Breathing through Nose: No  Sore Throat in Morning: No  Dry Mouth in the Morning: Yes  Shortness of Breath Lying Flat: No  Shortness of Breath With Activity: No  Awakening with Shortness of Breath: No  Increased Cough: No  Heart Racing at Night: No  Swelling in Feet or Legs: No  Diarrhea at Night: No  Heartburn at Night: No  Urinating More than Once at Night: No  Losing Control of Urine at Night: No  Joint Pains at Night: No  Headaches in Morning: No  Weakness in Arms or Legs: No  Depressed Mood: No     Physical Examination:  Vitals: Ht 1.727 m (5' 8\")   Wt 68 kg (150 lb)   BMI 22.81 kg/m    BMI= Body mass index is 22.81 kg/m .           GENERAL APPEARANCE: alert and no distress  EYES: Eyes grossly normal to inspection  HENT: oropharynx crowded  NECK: no asymmetry, masses, or scars  RESP: breathing is non-labored.No coughing  NEURO: mentation intact and speech normal  PSYCH: affect normal/bright         Data: All pertinent previous laboratory data reviewed     Recent Labs   Lab Test 08/12/22  1038 12/05/19  1850 07/29/16  0000   NA  --  140  --    POTASSIUM 4.7 4.4 5.2   CHLORIDE  --  103  --    CO2  --  34*  --  "   ANIONGAP  --  3  --    GLC  --  77 88   BUN  --  15  --    CR 0.86 0.91 0.85   RADHA  --  9.1  --        Recent Labs   Lab Test 08/12/22  1038 12/05/19  1850   WBC  --  4.2   RBC  --  4.04*   HGB 14.0 13.0*   HCT  --  39.3*   MCV  --  97   MCH  --  32.2   MCHC  --  33.1   RDW  --  12.7   PLT  --  261       Recent Labs   Lab Test 12/05/19  1850   PROTTOTAL 7.8   ALBUMIN 4.3   BILITOTAL 0.2   ALKPHOS 58   AST 22   ALT 35       Chest x-ray: XR Ribs & Chest Right G/E 3 Views 02/28/2022    Narrative  RIGHT RIBS WITH CHEST THREE VIEWS     2/28/2022 3:10 PM    HISTORY: Right chest wall pain after injury.    COMPARISON: 3/2/2015 x-ray.    Impression  IMPRESSION: No pneumothorax or pleural fluid. Minimally displaced  acute appearing posterolateral 10th rib fracture. Old healed  anterolateral seventh rib fracture.    JUANITA SILVERIO MD      SYSTEM ID:  [id number]      Chest CT: No results found for this or any previous visit from the past 365 days.        Shannan Rice PA-C 10/10/2022

## 2022-10-10 NOTE — PATIENT INSTRUCTIONS
MY CONTACT NUMBERS ARE: [phone]  DOCTOR : ESME Morales  SLEEP CENTER :   CPAP EQUIPMENT :    IF I HAVE SLEEP APNEA.....  WHERE CAN I FIND MORE INFORMATION?    American Academy of Sleep Medicine Patient information on sleep disorders:  http://yoursleep.aasmnet.org    THINGS TO REMEMBER  In most situations, sleep apnea is a lifelong disease that must be managed with daily therapy. Untreated disease, when severe, may result in an increased risk for an array of problems from heart disease to mood changes, car accidents and shorter lifespan.    CPAP -  WHY AND HOW?  Continuous positive airway pressure, or CPAP, is the most effective treatment for obstructive sleep apnea. A decision to use CPAP is a major step forward in the pursuit of a healthier life. The successful use of CPAP will help you breathe easier, sleep better and live healthier. Using CPAP can be a positive experience if you keep these rascon points in mind:  Commitment  CPAP is not a quick fix for your problem. It involves a long-term commitment to improve your sleep and your health.    Communication  Stay in close communication with both your sleep doctor and your CPAP supplier. Ask lots of questions and seek help when you need it.    Consistency  Use CPAP all night, every night and for every nap. You will receive the maximum health benefits from CPAP when you use it every time that you sleep. This will also make it easier for your body to adjust to the treatment.    Correction  The first machine and mask that you try may not be the best ones for you. Work with your sleep doctor and your CPAP supplier to make corrections to your equipment selection. Ask about trying a different type of machine or mask if you have ongoing problems. Make sure that your mask is a good fit and learn to use your equipment properly.    Challenge  Tell a family member or close friend to ask you each morning if you used your CPAP the previous night. Have someone to  "challenge you to give it your best effort.    Connection   Your adjustment to CPAP will be easier if you are able to connect with others who use the same treatment. Ask your sleep doctor if there is a support group in your area for people who have sleep apnea, or look for one on the Internet.    Comfort   Increase your level of comfort by using a saline spray, decongestant or heated humidifier if CPAP irritates your nose, mouth or throat. Use your unit's \"ramp\" setting to slowly get used to the air pressure level. There may be soft pads you can buy that will fit over your mask straps. Look on www.CPAP.com for accessories such as these straps, a pillow contoured for side-sleeping with CPAP, longer hoses, hose covers to reduce condensation, or stands to keep the hose out of your way.                                 Cleaning   Clean your mask, tubing and headgear on a regular basis. Put this time in your schedule so that you don't forget to do it. Check and replace the filters for your CPAP unit and humidifier.    Completion   Although you are never finished with CPAP therapy, you should reward yourself by celebrating the completion of your first month of treatment. Expect this first month to be your hardest period of adjustment. It will involve some trial and error as you find the machine, mask and pressure settings that are right for you.    Continuation  After your first month of treatment, continue to make a daily commitment to use your CPAP all night, every night and for every nap.    CPAP-Tips to starting with success:  Begin using your CPAP for short periods of time during the day while you watch TV or read.    Use CPAP every night and for every nap. Using it less often reduces the health benefits and makes it harder for your body to get used to it.    Newer CPAP models are virtually silent; however, if you find the sound of your CPAP machine to be bothersome, place the unit under your bed to dampen the sound. "     Make small adjustments to your mask, tubing, straps and headgear until you get the right fit. Tightening the mask may actually worsen the leak.  If it leaves significant marks on your face or irritates the bridge of your nose, it may not be the best mask for you.  Speak with the person who supplied the mask and consider trying other masks.    Use a saline nasal spray to ease mild nasal congestion. Neti-Pot or saline nasal rinses may also help. Nasal gel sprays can help reduce nasal dryness.  Biotene mouthwash can be helpful to protect your teeth if you experience frequent dry mouth.  Dry mouth may be a sign of air escaping out of your mouth or out of the mask in the case of a full face mask.  Speak with your provider if you expect that is the case.     Take a nasal decongestant to relieve more severe nasal or sinus congestion.  Do not use Afrin (oxymetazoline) nasal spray more than 3 days in a row.  Speak with your sleep doctor if your nasal congestion is chronic.    Use a heated humidifier that fits your CPAP model to enhance your breathing comfort. Adjust the heat setting up if you get a dry nose or throat, down if you get condensation in the hose or mask.  Position the CPAP lower than you so that any condensation in the hose drains back into the machine rather than towards the mask.    Try a system that uses nasal pillows if traditional masks give you problems.    Clean your mask, tubing and headgear once a week. Make sure the equipment dries fully.    Regularly check and replace the filters for your CPAP unit and humidifier.    Work closely with your sleep doctor and your CPAP supplier to make sure that you have the machine, mask and air pressure setting that works best for you.    BESIDES CPAP, WHAT OTHER THERAPIES ARE THERE?  Postioning devices if you only have the problem on your back  Dental devices if your condition is mild  Nasal valves may be effective though experience is limited  Tongue Retaining  Device if missing teeth precludes the use of a dental device  Weight loss if you are overweight  Surgery in limited cases where devices are not acceptable or there are problems with structures in the nose and throat  If treated with one of these alternative options, further evaluation is necessary to ensure that the therapy is effective. This may require some form of testing.     Healthy Lifestyle:  Healthy diet, exercise and Limit alcohol: Not only will excessive alcohol increase your weight over time, but it irritates the throat tissues and make them swell, shrinking the airway and causing snoring. Drinking alcohol should be limited and stopped within 3-4 hours before going to bed.   Stop smoking: (Red swollen throat, heat, nicotine), also irritates and swells the airway, among numerous other negative health consequences.    Positioning Device  This example shows a pillow that straps around the waist. It may be appropriate for those whose sleep study shows milder sleep apnea that occurs primarily when lying flat on one's back. Preliminary studies have shown benefit but effectiveness at home should be verified.    Nasal Valves              Nasal valves may not be effective if you have frequent nasal congestion or have difficulty breathing through your nose. They may be an option for mild apnea if other options are not well tolerated. The efficacy of these devices is generally less than CPAP or oral appliances.  Oral Appliance  These are examples of two of many custom-made devices that are more likely to work in mild sleep apnea  Oral appliances are dental mouth pieces that fit very much like a sports mouth guards or removable orthodontic retainers. They are used to treat snoring  And obstructive sleep apnea . The device prevents the airway from collapsing by either holding the tongue or supporting the jaw in a forward position. Since oral appliances are non-invasive and easy to use, they may be considered as an  early treatment option. Oral appliance therapy (OAT) involves the customization, selection, fabrication, fitting, adjustments and long-term follow-up care of specially designed oral devices, worn during sleep, which reposition the lower jaw and tongue base forward to maintain an open airway.  Custom made oral appliances are proven to be more effective than over-the-counter devices. Therefore, the over-the-counter devices are recommended not to be used as a screening tool nor as a therapeutic option.  Who gets a dental device?  Oral appliance therapy can be used as an alternative to  CPAP therapy for the treatment of mild to moderate sleep apnea and for those patients who prefer OAT to CPAP. Oral appliance therapy is a first line therapy for the treatment of primary snoring. Additionally, OAT is an option for those that cannot tolerate CPAP as therapy or who have experienced insufficient surgical results.    Possible side effects?  Frequent but minor side effects include: excessive salivation, dry mouth, discomfort of teeth and jaw and temporary changes in the patient s bite.  Potential complications include: jaw pain, permanent occlusal changes and TMJ symptoms.  The above mentioned side effects and complications can be recognized and managed by dentists trained in dental sleep medicine.    Finding a dentist that practices dental sleep medicine  Specific training is available through the American Academy of Dental Sleep Medicine for dentists interested in working in the field of sleep. To find a dentist who is educated in the field of sleep and the use of oral appliances, near you, visit the Web site of the American Academy of Dental Sleep Medicine; also see http://www.accpstorage.org/newOrganization/patients/oralAppliances.pdf   To search for a dentist certified in these practices:  Http://aadsm.org/FindADentist.aspx?1  Http://www.accpstorage.org/newOrganization/patients/oralAppliances.pdf    Tongue Retaining  Device               Tongue Retaining Devices are devices that generally  suction cup  onto the tongue preventing it from falling back into the back of the throat during sleep.  They may be an option for people missing teeth, but can be uncomfortable. This particular device can be purchased online, but similar devices made by dentists fit more precisely and may be tolerated better. In general, they are rarely effective and often not very well tolerated.    Weight Loss:  Some patients may experience reduction or elimination of sleep apnea with weight loss.  Though there are significant health benefits from weight loss, long-term weight loss is very difficult to achieve- studies show success with dietary management in less that 10% of people.     If you are interested in dietary weight loss, you should review the options discussed at the National Institutes of Health patient information site:     Http:/www.health.nih.gov/topic/WeightLossDieting    Bariatric programs offer counseling in all methods of weight loss:    Http:/www.[facility].org/Specialties/WeightLossSurgeryandMedicalMgmt/htm    Surgery:  There are a number of surgeries that have been attempted to treat apnea. In general, surgical options are usually reserved for cases in which there is a physical abnormality contributing to obstruction or other treatment options are ineffective or not tolerated. Most surgical options are either unreliable or quite invasive. One of the more common procedures is:  Uvulopalatopharyngoplasty: In this procedure, the uvula (the finger-like tissue that hangs in the back of the throat), part of the soft palate (the tissue that the uvula is attached to), and sometimes the tonsils or adenoids are removed. The efficacy of this surgery is around 30-50% .  After surgery, complications may include:  Sleepiness and sleep apnea related to post-surgery medication   Swelling, infection and bleeding   A sore throat and/or difficulty  "swallowing   Drainage of secretions into the nose and a nasal quality to the voice. English language speech does not seem to be affected by this surgery.   Narrowing of the airway in the nose and throat (hence constricting breathing) snoring and even iatrogenically caused sleep apnea. By cutting the tissues, excess scar tissue can \"tighten\" the airway and make it even smaller than it was before UPPP.  Patients who have had the uvula removed will become unable to correctly speak Serbian or any other language that has a uvular 'r' phoneme.    Surgeries to help resolve nasal congestion may help reduce the severity of apnea slightly. Nasal congestion does not cause apnea on its own, so these surgeries are usually not performed just for NELIA.  They may be worth considering if the nasal congestion is significantly bothersome independent of apnea.   "

## 2022-10-14 DIAGNOSIS — G40.209 LOCALIZATION-RELATED PARTIAL EPILEPSY WITH COMPLEX PARTIAL SEIZURES (H): ICD-10-CM

## 2022-10-19 RX ORDER — PHENYTOIN SODIUM 100 MG/1
200 CAPSULE, EXTENDED RELEASE ORAL 2 TIMES DAILY
Qty: 360 CAPSULE | Refills: 1 | Status: SHIPPED | OUTPATIENT
Start: 2022-10-19 | End: 2023-04-19

## 2022-10-19 NOTE — TELEPHONE ENCOUNTER
PHENYTOIN SODIUM 100MG EXT CAPSULES      Last Written Prescription Date:  7-12-22  Last Fill Quantity: 120,   # refills: 0  Pre pharm last disp 10-13-22, 120 tb  Last Office Visit : 8-13-21  Future Office visit:  None    Last AED 12-5-19    Routing refill request to provider for review/approval because:  Last appt>14 M  Overdue lab: AED,   FYI to clinic    Scheduling has been notified to contact the pt for appointment.

## 2022-10-20 ENCOUNTER — IMMUNIZATION (OUTPATIENT)
Dept: NURSING | Facility: CLINIC | Age: 57
End: 2022-10-20
Payer: COMMERCIAL

## 2022-10-20 PROCEDURE — 0124A COVID-19,PF,PFIZER BOOSTER BIVALENT: CPT

## 2022-10-20 PROCEDURE — 90471 IMMUNIZATION ADMIN: CPT

## 2022-10-20 PROCEDURE — 90682 RIV4 VACC RECOMBINANT DNA IM: CPT

## 2022-10-20 PROCEDURE — 91312 COVID-19,PF,PFIZER BOOSTER BIVALENT: CPT

## 2022-10-24 ENCOUNTER — THERAPY VISIT (OUTPATIENT)
Dept: SLEEP MEDICINE | Facility: CLINIC | Age: 57
End: 2022-10-24
Payer: COMMERCIAL

## 2022-10-24 DIAGNOSIS — G47.33 OSA (OBSTRUCTIVE SLEEP APNEA): ICD-10-CM

## 2022-10-24 DIAGNOSIS — G47.30 SLEEP APNEA, UNSPECIFIED TYPE: ICD-10-CM

## 2022-10-24 DIAGNOSIS — Z72.820 LACK OF ADEQUATE SLEEP: ICD-10-CM

## 2022-10-24 DIAGNOSIS — R06.00 DYSPNEA AND RESPIRATORY ABNORMALITY: ICD-10-CM

## 2022-10-24 DIAGNOSIS — R53.81 MALAISE AND FATIGUE: ICD-10-CM

## 2022-10-24 DIAGNOSIS — R06.89 DYSPNEA AND RESPIRATORY ABNORMALITY: ICD-10-CM

## 2022-10-24 DIAGNOSIS — R53.83 MALAISE AND FATIGUE: ICD-10-CM

## 2022-10-24 PROCEDURE — 95810 POLYSOM 6/> YRS 4/> PARAM: CPT | Performed by: INTERNAL MEDICINE

## 2022-10-31 ENCOUNTER — TRANSFERRED RECORDS (OUTPATIENT)
Dept: HEALTH INFORMATION MANAGEMENT | Facility: CLINIC | Age: 57
End: 2022-10-31

## 2022-11-04 DIAGNOSIS — F41.9 ANXIETY: ICD-10-CM

## 2022-11-04 DIAGNOSIS — F33.0 MAJOR DEPRESSIVE DISORDER, RECURRENT EPISODE, MILD (H): ICD-10-CM

## 2022-11-07 PROBLEM — K57.90 DIVERTICULOSIS: Status: ACTIVE | Noted: 2022-11-07

## 2022-11-07 NOTE — PROCEDURES
" SLEEP STUDY INTERPRETATION  DIAGNOSTIC POLYSOMNOGRAPHY REPORT      Patient: ROSIE OTT  YOB: 1965  Study Date: 10/24/2022  MRN: [id number]  Referring Provider: Self  Ordering Provider: Shannan Rice PA-C    Indications for Polysomnography: The patient is a 57 year old Male who is 5' 8\" and weighs 150.0 lbs. His BMI is 23.0, McGuffey sleepiness scale 10/24 and neck circumference is 37 cm. A diagnostic polysomnogram was performed to evaluate for history of obstructive sleep apnea, severity unclear- Tolerating CPAP very well. Daytime symptoms are improved. He needs a diagnostic sleep study to qualify for new CPAP. Patient reported to have withheld CPAP for 3 nights prior to study.     Polysomnogram Data: A full night polysomnogram recorded the standard physiologic parameters including EEG, EOG, EMG, ECG, nasal and oral airflow. Respiratory parameters of chest and abdominal movements were recorded with respiratory inductance plethysmography. Oxygen saturation was recorded by pulse oximetry. Hypopnea scoring rule used: 1B 4%.    Sleep Architecture:   The total recording time of the polysomnogram was 523.6 minutes. The total sleep time was 489.5 minutes. Sleep latency was decreased at 9.5 minutes without the use of a sleep aid. REM latency was 164.5 minutes. Arousal index was increased at 50.0 arousals per hour. Sleep efficiency was normal at 93.5%. Wake after sleep onset was 24.5 minutes. The patient spent 9.1% of total sleep time in Stage N1, 55.9% in Stage N2, 19.0% in Stage N3, and 16.0% in REM. Time in REM supine was 78.5 minutes.    Respiration: It was occasionally difficult to discern RERAs from PLMS with arousals    Events ? The polysomnogram revealed a presence of 53 obstructive, 121 central, and 10 mixed apneas resulting in an apnea index of 22.6 events per hour. There were 11 obstructive hypopneas and 0 central hypopneas resulting in an obstructive hypopnea index of 1.3 and central hypopnea index " of 0 events per hour. The combined apnea/hypopnea index was 23.9 events per hour (central apnea/hypopnea index was 14.8 events per hour). The REM AHI was 33.6 events per hour. The supine AHI was 27.0 events per hour. The non-supine (non-REM) AHI was 2.8 (63 minutes sleep time non-supine) The RERA index was 3.7 events per hour.  The RDI was 27.6 events per hour.    Snoring - was reported as moderate    Respiratory rate and pattern - was notable for normal respiratory rate and pattern.    Sustained Sleep Associated Hypoventilation - Transcutaneous carbon dioxide monitoring was not used, however significant hypoventilation was not suggested by oximetry     Sleep Associated Hypoxemia - (Greater than 5 minutes O2 sat at or below 88%) was not present. Baseline oxygen saturation was 96.6%. Lowest oxygen saturation was 84.0%. Time spent less than or equal to 88% was 0.5 minutes. Time spent less than or equal to 89% was 0.9 minutes.    Movement Activity:     Periodic Limb Activity - There were 357 PLMs during the entire study. The PLM index was 43.8 movements per hour. The PLM Arousal Index was 8.7 per hour.    REM EMG Activity - Excessive transient/sustained muscle activity was not present.    Nocturnal Behavior - Abnormal sleep related behaviors were not noted     Bruxism - None apparent.        Cardiac Summary:   The average pulse rate was 57.1 bpm. The minimum pulse rate was 48.0 bpm while the maximum pulse rate was 90.0 bpm.  Arrhythmias were not noted.      Assessment:     Moderate sleep apnea with mixed central and obstructive events    Central evens were predominantly but not exclusively post-arousal/transitional in nature    Events were almost exclusively supine-positional     No non-supine REM observed    Periodic limb movements of sleep     Recommendations:    Obtain download from current machine to assess for ongoing events on PAP    Consider treatment could be empirically initiated with CPAP or Auto?titrating  PAP therapy with clinical follow up with including download.    Consider repeat polysomnography with full night titration of positive airway pressure therapy for the control of sleep disordered breathing.    Patient may be a candidate for dental appliance through referral to Sleep Dentistry for the treatment of obstructive sleep apnea and/or socially disruptive snoring.    Advice regarding the risks of drowsy driving.    Suggest optimizing sleep schedule and avoiding sleep deprivation.    Pharmacologic therapy should be used for management of restless legs syndrome only if present and clinically indicated and not based on the presence of periodic limb movements alone.    Diagnostic Codes:   Obstructive Sleep Apnea G47.33  Central Sleep Apnea G47.31        _____________________________________   Electronically Signed By: Joe Freitas MD 11/7/22           Range(%) Time in range (min)   0.0 - 89.0 0.9   0.0 - 88.0 0.5         Stage Min(mm Hg) Max(mm Hg)   Wake - -   NREM(1+2+3) - -   REM - -       Range(mmHg) Time in range (min)   55.0 - 100.0 -   Excluded data <20.0 & >65.0 524.0

## 2022-11-08 LAB — SLPCOMP: NORMAL

## 2022-11-10 RX ORDER — PAROXETINE 20 MG/1
TABLET, FILM COATED ORAL
Qty: 90 TABLET | Refills: 1 | Status: SHIPPED | OUTPATIENT
Start: 2022-11-10 | End: 2023-02-17

## 2022-11-21 ENCOUNTER — MYC MEDICAL ADVICE (OUTPATIENT)
Dept: SLEEP MEDICINE | Facility: CLINIC | Age: 57
End: 2022-11-21

## 2022-11-23 ENCOUNTER — OFFICE VISIT (OUTPATIENT)
Dept: URGENT CARE | Facility: URGENT CARE | Age: 57
End: 2022-11-23
Payer: COMMERCIAL

## 2022-11-23 VITALS
WEIGHT: 153.2 LBS | HEART RATE: 68 BPM | HEIGHT: 66 IN | DIASTOLIC BLOOD PRESSURE: 76 MMHG | OXYGEN SATURATION: 97 % | SYSTOLIC BLOOD PRESSURE: 142 MMHG | TEMPERATURE: 97.3 F | BODY MASS INDEX: 24.62 KG/M2

## 2022-11-23 DIAGNOSIS — R06.2 WHEEZING: ICD-10-CM

## 2022-11-23 DIAGNOSIS — J45.21 MILD INTERMITTENT ASTHMA WITH ACUTE EXACERBATION: Primary | ICD-10-CM

## 2022-11-23 DIAGNOSIS — R09.82 PND (POST-NASAL DRIP): ICD-10-CM

## 2022-11-23 DIAGNOSIS — J06.9 VIRAL UPPER RESPIRATORY TRACT INFECTION WITH COUGH: ICD-10-CM

## 2022-11-23 PROCEDURE — 99214 OFFICE O/P EST MOD 30 MIN: CPT | Performed by: PHYSICIAN ASSISTANT

## 2022-11-23 RX ORDER — PREDNISONE 20 MG/1
40 TABLET ORAL DAILY
Qty: 10 TABLET | Refills: 0 | Status: SHIPPED | OUTPATIENT
Start: 2022-11-23 | End: 2022-11-28

## 2022-11-23 RX ORDER — FLUTICASONE PROPIONATE 50 MCG
2 SPRAY, SUSPENSION (ML) NASAL DAILY
Qty: 15.8 ML | Refills: 0 | Status: SHIPPED | OUTPATIENT
Start: 2022-11-23 | End: 2023-01-15

## 2022-11-23 ASSESSMENT — ENCOUNTER SYMPTOMS
SORE THROAT: 0
SHORTNESS OF BREATH: 0
VOMITING: 0
NAUSEA: 0
CARDIOVASCULAR NEGATIVE: 1
COUGH: 1
CHILLS: 0
CONSTITUTIONAL NEGATIVE: 1
MYALGIAS: 0
DIARRHEA: 0
SINUS PAIN: 0
ABDOMINAL PAIN: 0
FREQUENCY: 0
WHEEZING: 0
NEUROLOGICAL NEGATIVE: 1
MUSCULOSKELETAL NEGATIVE: 1
GASTROINTESTINAL NEGATIVE: 1
HEADACHES: 0
FEVER: 0
PALPITATIONS: 0
ALLERGIC/IMMUNOLOGIC NEGATIVE: 1
DYSURIA: 0
HEMATURIA: 0
SINUS PRESSURE: 0
CHEST TIGHTNESS: 0

## 2022-11-24 NOTE — PROGRESS NOTES
"Chief Complaint:     Chief Complaint   Patient presents with     URI     Cold like symptoms for 5 days now. Oxygen level last night was between 89-90.      Cough     Post Nasal Drip       No results found for any visits on 11/23/22.    Medical Decision Making:    Vital signs reviewed by Ludwig Roe PA-C  BP (!) 142/76 (BP Location: Left arm, Patient Position: Sitting, Cuff Size: Adult Regular)   Pulse 68   Temp 97.3  F (36.3  C) (Tympanic)   Ht 1.676 m (5' 6\")   Wt 69.5 kg (153 lb 3.2 oz)   SpO2 97%   BMI 24.73 kg/m      Differential Diagnosis:  URI Adult/Peds:  Bronchitis-viral, Influenza, Pneumonia, Viral syndrome and Viral upper respiratory illness        ASSESSMENT    1. Mild intermittent asthma with acute exacerbation    2. Viral upper respiratory tract infection with cough    3. Wheezing    4. PND (post-nasal drip)        PLAN    Patient is in no acute distress.    Temp is 97.3 in clinic today, lung sounds have wheezing throughout, and O2 sats at 97% on RA.    Asthma is currently not controlled. ACT is up to date.  Patient declined refill of asthma medication.  Rx for Prednisone sent in.  Rx for Flonase for PND.  Rest, Push fluids, vaporizer, elevation of head of bed.  Ibuprofen and or Tylenol for any fever or body aches.  Over the counter cough suppressant- PRN- as discussed.   If symptoms worsen, recheck immediately otherwise follow up with your PCP in 1 week if symptoms are not improving.  Worrisome symptoms discussed with instructions to go to the ED.  Patient verbalized understanding and agreed with this plan.    Labs:    No results found for any visits on 11/23/22.     Vital signs reviewed by Ludwig Roe PA-C  BP (!) 142/76 (BP Location: Left arm, Patient Position: Sitting, Cuff Size: Adult Regular)   Pulse 68   Temp 97.3  F (36.3  C) (Tympanic)   Ht 1.676 m (5' 6\")   Wt 69.5 kg (153 lb 3.2 oz)   SpO2 97%   BMI 24.73 kg/m      Current Meds      Current Outpatient Medications:      " albuterol (PROAIR HFA/PROVENTIL HFA/VENTOLIN HFA) 108 (90 Base) MCG/ACT inhaler, Inhale 2 puffs into the lungs every 4 hours as needed (for asthma symptoms), Disp: 8.5 g, Rfl: 1     buPROPion (WELLBUTRIN SR) 150 MG 12 hr tablet, Take 1 tablet (150 mg) by mouth every morning for depression & anxiety prevention., Disp: 90 tablet, Rfl: 1     calcium-vitamin D (OSCAL) 250-125 MG-UNIT TABS per tablet, Take 1 tablet by mouth 2 times daily, Disp: , Rfl:      fluticasone (FLONASE) 50 MCG/ACT nasal spray, Spray 2 sprays into both nostrils daily, Disp: 15.8 mL, Rfl: 0     PARoxetine (PAXIL) 20 MG tablet, TAKE 1 TABLET(20 MG) BY MOUTH AT BEDTIME FOR DEPRESSION OR ANXIETY PREVENTION, Disp: 90 tablet, Rfl: 1     phenytoin (DILANTIN) 100 MG ER capsule, Take 2 capsules (200 mg) by mouth 2 times daily For additional refills, please schedule a follow-up appointment, lab due, Disp: 360 capsule, Rfl: 1     predniSONE (DELTASONE) 20 MG tablet, Take 2 tablets (40 mg) by mouth daily for 5 days, Disp: 10 tablet, Rfl: 0      Respiratory History    occasional episodes of bronchitis and asthma      SUBJECTIVE    HPI: Raphael Lizarraga is an 57 year old male who presents with chest congestion, cough nonproductive, occasional and nasal discharge clear.  Symptoms began 5  days ago and has unchanged.  There is no shortness of breath, wheezing and chest pain.  Patient is eating and drinking well.  No fever, nausea, vomiting, or diarrhea.    Patient denies any recent travel or exposure to known COVID positive tested individual.      ROS:     Review of Systems   Constitutional: Negative.  Negative for chills and fever.   HENT: Positive for congestion. Negative for ear discharge, ear pain, sinus pressure, sinus pain and sore throat.    Respiratory: Positive for cough. Negative for chest tightness, shortness of breath and wheezing.    Cardiovascular: Negative.  Negative for chest pain and palpitations.   Gastrointestinal: Negative.  Negative for  abdominal pain, diarrhea, nausea and vomiting.   Genitourinary: Negative for dysuria, frequency, hematuria and urgency.   Musculoskeletal: Negative.  Negative for myalgias.   Skin: Negative for rash.   Allergic/Immunologic: Negative.  Negative for immunocompromised state.   Neurological: Negative.  Negative for headaches.         Family History   Family History   Problem Relation Age of Onset     Diabetes Father      Hypertension Father      Cerebrovascular Disease Paternal Grandfather      Asthma No family hx of      C.A.D. No family hx of      Breast Cancer No family hx of      Cancer - colorectal No family hx of      Prostate Cancer No family hx of      Anesthesia Reaction No family hx of      Thrombophilia No family hx of      Bleeding Disorder No family hx of      Glaucoma No family hx of      Macular Degeneration No family hx of      Retinal detachment No family hx of         Problem history  Patient Active Problem List   Diagnosis     Epilepsy (H)     Anxiety     Major depressive disorder, recurrent episode, mild (H)     NELIA (obstructive sleep apnea)- moderate, mixed (AHI 23)     Intermittent asthma     Osteoporosis/osteopenia increased risk     CARDIOVASCULAR SCREENING; LDL GOAL LESS THAN 160     Pain in thoracic spine     Chronic midline low back pain without sciatica     Diverticulosis        Allergies  No Known Allergies     Social History  Social History     Socioeconomic History     Marital status:      Spouse name: Rashad     Number of children: 1     Years of education: 12     Highest education level: Not on file   Occupational History     Occupation: [employer]     Employer: OTHER     Comment: 2 years   Tobacco Use     Smoking status: Former     Types: Cigarettes     Quit date: 1993     Years since quittin.2     Smokeless tobacco: Never   Substance and Sexual Activity     Alcohol use: Yes     Alcohol/week: 0.0 - 4.0 standard drinks     Comment: beer - occ     Drug use: No     Sexual  "activity: Yes     Partners: Female     Birth control/protection: None   Other Topics Concern     Parent/sibling w/ CABG, MI or angioplasty before 65F 55M? Not Asked   Social History Narrative     Not on file     Social Determinants of Health     Financial Resource Strain: Not on file   Food Insecurity: Not on file   Transportation Needs: Not on file   Physical Activity: Not on file   Stress: Not on file   Social Connections: Not on file   Intimate Partner Violence: Not on file   Housing Stability: Not on file        OBJECTIVE     Vital signs reviewed by Ludwig Roe PA-C  BP (!) 142/76 (BP Location: Left arm, Patient Position: Sitting, Cuff Size: Adult Regular)   Pulse 68   Temp 97.3  F (36.3  C) (Tympanic)   Ht 1.676 m (5' 6\")   Wt 69.5 kg (153 lb 3.2 oz)   SpO2 97%   BMI 24.73 kg/m       Physical Exam  Vitals reviewed.   Constitutional:       General: He is not in acute distress.     Appearance: He is well-developed. He is not ill-appearing, toxic-appearing or diaphoretic.   HENT:      Head: Normocephalic and atraumatic.      Right Ear: Hearing, tympanic membrane, ear canal and external ear normal. No drainage, swelling or tenderness. Tympanic membrane is not perforated, erythematous, retracted or bulging.      Left Ear: Hearing, tympanic membrane, ear canal and external ear normal. No drainage, swelling or tenderness. Tympanic membrane is not perforated, erythematous, retracted or bulging.      Nose: Congestion and rhinorrhea present. No nasal tenderness or mucosal edema.      Right Turbinates: Not enlarged or swollen.      Left Turbinates: Not enlarged or swollen.      Right Sinus: No maxillary sinus tenderness or frontal sinus tenderness.      Left Sinus: No maxillary sinus tenderness or frontal sinus tenderness.      Mouth/Throat:      Pharynx: No pharyngeal swelling, oropharyngeal exudate, posterior oropharyngeal erythema or uvula swelling.      Tonsils: No tonsillar exudate. 0 on the right. 0 on " the left.   Eyes:      General: Lids are normal.         Right eye: No discharge.         Left eye: No discharge.      Conjunctiva/sclera: Conjunctivae normal.      Right eye: Right conjunctiva is not injected. No exudate.     Left eye: Left conjunctiva is not injected. No exudate.     Pupils: Pupils are equal, round, and reactive to light.   Cardiovascular:      Rate and Rhythm: Normal rate and regular rhythm.      Heart sounds: Normal heart sounds. No murmur heard.    No friction rub. No gallop.   Pulmonary:      Effort: Pulmonary effort is normal. No accessory muscle usage, respiratory distress or retractions.      Breath sounds: Normal air entry. No stridor, decreased air movement or transmitted upper airway sounds. Wheezing present. No decreased breath sounds, rhonchi or rales.   Chest:      Chest wall: No tenderness.   Abdominal:      General: Bowel sounds are normal. There is no distension.      Palpations: Abdomen is soft. Abdomen is not rigid. There is no mass.      Tenderness: There is no abdominal tenderness. There is no guarding or rebound.   Musculoskeletal:         General: Normal range of motion.      Cervical back: Normal range of motion and neck supple.   Lymphadenopathy:      Head:      Right side of head: No submental, submandibular, tonsillar, preauricular or posterior auricular adenopathy.      Left side of head: No submental, submandibular, tonsillar, preauricular or posterior auricular adenopathy.      Cervical:      Right cervical: No superficial or posterior cervical adenopathy.     Left cervical: No superficial or posterior cervical adenopathy.   Skin:     General: Skin is warm.      Capillary Refill: Capillary refill takes less than 2 seconds.   Neurological:      Mental Status: He is alert and oriented to person, place, and time.      Cranial Nerves: No cranial nerve deficit.      Sensory: No sensory deficit.      Motor: No abnormal muscle tone.      Coordination: Coordination normal.       Deep Tendon Reflexes: Reflexes normal.   Psychiatric:         Behavior: Behavior normal. Behavior is cooperative.         Thought Content: Thought content normal.         Judgment: Judgment normal.           Ludwig Roe PA-C  11/23/2022, 6:38 PM

## 2022-12-13 NOTE — MR AVS SNAPSHOT
After Visit Summary   3/20/2018    Raphael Lizarraga    MRN: [id number]           Patient Information     Date Of Birth          1965        Visit Information        Provider Department      3/20/2018 9:20 AM Chava Belcher MD [facility]        Today's Diagnoses     Sinus congestion    -  1    Nasal drainage          Care Instructions    At [facility], we strive to deliver an exceptional experience to you, every time we see you.  If you receive a survey in the mail, please send us back your thoughts. We really do value your feedback.    Based on your medical history, these are the current health maintenance/preventive care services that you are due for (some may have been done at this visit.)  There are no preventive care reminders to display for this patient.      Suggested websites for health information:  Www.[employer] : Up to date and easily searchable information on multiple topics.  Www.[employer].gov : medication info, interactive tutorials, watch real surgeries online  Www.familydoctor.org : good info from the Academy of Family Physicians  Www.cdc.gov : public health info, travel advisories, epidemics (H1N1)  Www.aap.org : children's health info, normal development, vaccinations  Www.health.[facility].[place].us : [place] dept of health, public health issues in [place], N1N1    Your care team:                            Family Medicine Internal Medicine   MD Jeff Juárez MD Shantel Branch-Fleming, MD Katya Georgiev PA-C Megan Hill, APRDREW Pang MD Pediatrics   EULALIA Kitchen, MARIA ELENA Foote APRN CNP   MD Randa Bailey MD Deborah Mielke, MD Kim Thein, APRN [facility]      Clinic hours: Monday - Thursday 7 am-7 pm; Fridays 7 am-5 pm.   Urgent care: Monday - Friday 11 am-9 pm; Saturday and Sunday 9 am-5 pm.  Pharmacy : Monday -Thursday 8 am-8 pm; Friday 8 am-6 pm; Saturday and Sunday 9 am-5 pm.  "    Clinic: [phone]   Pharmacy: [phone]      Follow up around 7/26/18 for recheck depression and asthma.     Please call the Specialty Scheduling Line [phone] or [facility] [phone] to schedule your colonoscopy.             Follow-ups after your visit        Follow-up notes from your care team     Return in about 10 days (around 3/30/2018) for recheck if symptoms fail to resolve by then.      Who to contact     If you have questions or need follow up information about today's clinic visit or your schedule please contact [facility] directly at [phone].  Normal or non-critical lab and imaging results will be communicated to you by MyChart, letter or phone within 4 business days after the clinic has received the results. If you do not hear from us within 7 days, please contact the clinic through "[employer]hart or phone. If you have a critical or abnormal lab result, we will notify you by phone as soon as possible.  Submit refill requests through [employer] or call your pharmacy and they will forward the refill request to us. Please allow 3 business days for your refill to be completed.          Additional Information About Your Visit        "[employer]hart Information     [employer] gives you secure access to your electronic health record. If you see a primary care provider, you can also send messages to your care team and make appointments. If you have questions, please call your primary care clinic.  If you do not have a primary care provider, please call [phone] and they will assist you.        Care EveryWhere ID     This is your Care EveryWhere ID. This could be used by other organizations to access your [facility] medical records  [id number]        Your Vitals Were     Pulse Temperature Height Pulse Oximetry BMI (Body Mass Index)       75 97  F (36.1  C) (Oral) 5' 8\" (1.727 m) 100% 22.2 kg/m2        Blood Pressure from Last 3 Encounters:   03/20/18 121/69   01/26/18 " 109/66   09/08/17 (!) 138/92    Weight from Last 3 Encounters:   03/20/18 146 lb (66.2 kg)   01/26/18 145 lb 6.4 oz (66 kg)   09/08/17 143 lb 6.4 oz (65 kg)              Today, you had the following     No orders found for display         Today's Medication Changes          These changes are accurate as of 3/20/18  9:45 AM.  If you have any questions, ask your nurse or doctor.               Start taking these medicines.        Dose/Directions    amoxicillin 500 MG capsule   Commonly known as:  AMOXIL   Used for:  Sinus congestion, Nasal drainage   Started by:  Chava Belcher MD        Dose:  500 mg   Take 1 capsule (500 mg) by mouth 3 times daily for 7 days for possible sinus infection.   Quantity:  21 capsule   Refills:  0       fluticasone 50 MCG/ACT spray   Commonly known as:  FLONASE   Used for:  Nasal drainage, Sinus congestion   Started by:  Chava Belcher MD        Dose:  1-2 spray   Spray 1-2 sprays into both nostrils daily for allergy prevention.   Quantity:  1 Bottle   Refills:  0            Where to get your medicines      These medications were sent to [facility] - [place], [place] - [address]  [address], [facility] [address]     Phone:  [phone]     amoxicillin 500 MG capsule    fluticasone 50 MCG/ACT spray                Primary Care Provider Office Phone # Fax #    Chava Belcher [phone]       [address]  [facility] [address]        Equal Access to Services     [facility] AH: Hadii aad ku hadasho Soomaali, waaxda luqadaha, qaybta kaalmada adeegyada, waxay deseanin hayanastacia escobar . So [facility] [phone].    ATENCIÓN: Si habla español, tiene a stern disposición servicios gratuitos de asistencia lingüística. Llame al [phone].    We comply with applicable federal civil rights laws and [place] laws. We do not discriminate on the basis of race, color, national origin, age, disability, sex, sexual orientation, or  gender identity.            Thank you!     [facility]. Our goal is always to provide you with excellent care. Hearing back from our patients is one way we can continue to improve our services. Please take a few minutes to complete the written survey that you may receive in the mail after your visit with us. Thank you!             Your Updated Medication List - Protect others around you: Learn how to safely use, store and throw away your medicines at www.disposemymeds.org.          This list is accurate as of 3/20/18  9:45 AM.  Always use your most recent med list.                   Brand Name Dispense Instructions for use Diagnosis    albuterol 108 (90 BASE) MCG/ACT Inhaler    PROAIR HFA/PROVENTIL HFA/VENTOLIN HFA    1 Inhaler    Inhale 2 puffs into the lungs every 4 hours as needed (for asthma symptoms)    Intermittent asthma without complication, unspecified asthma severity       amoxicillin 500 MG capsule    AMOXIL    21 capsule    Take 1 capsule (500 mg) by mouth 3 times daily for 7 days for possible sinus infection.    Sinus congestion, Nasal drainage       buPROPion 100 MG 12 hr tablet    WELLBUTRIN SR    90 tablet    Take 1 tablet (100 mg) by mouth every morning for depression & anxiety prevention.    Major depressive disorder, recurrent episode, mild (H), Anxiety       fluticasone 50 MCG/ACT spray    FLONASE    1 Bottle    Spray 1-2 sprays into both nostrils daily for allergy prevention.    Nasal drainage, Sinus congestion       PARoxetine 20 MG tablet    PAXIL    90 tablet    Take 1 tablet (20 mg) by mouth At Bedtime for depression & anxiety prevention.    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

## 2023-01-09 ENCOUNTER — TELEPHONE (OUTPATIENT)
Dept: OPHTHALMOLOGY | Facility: CLINIC | Age: 58
End: 2023-01-09

## 2023-01-09 NOTE — CONFIDENTIAL NOTE
Spoke with patient. No eye pain. No eye redness. No discharge. Patient will come in to see Dr. Quevedo 1/13/2023

## 2023-01-09 NOTE — TELEPHONE ENCOUNTER
[facility] Call Center    Phone Message    May a detailed message be left on voicemail: yes     Reason for Call: Symptoms or Concerns     If patient has red-flag symptoms, warm transfer to triage line    Current symptom or concern: Pt reports about 3-4 weeks ago getting something in his Lt eye at work. He states that he has no pain or redness but it looks like he's looking through a fog in the Lt eye.    Symptoms have been present for:  3-4 week(s)    Has patient previously been seen for this? No    By : Dr. Wyman    Date: 4/15/22    Are there any new or worsening symptoms? No      Action Taken: Message routed to:  Other: [facility]    Travel Screening: Not Applicable

## 2023-01-13 ENCOUNTER — OFFICE VISIT (OUTPATIENT)
Dept: OPHTHALMOLOGY | Facility: CLINIC | Age: 58
End: 2023-01-13
Payer: COMMERCIAL

## 2023-01-13 DIAGNOSIS — Z96.1 PSEUDOPHAKIA: Primary | ICD-10-CM

## 2023-01-13 DIAGNOSIS — H26.492 POSTERIOR CAPSULAR OPACIFICATION VISUALLY SIGNIFICANT, LEFT EYE: ICD-10-CM

## 2023-01-13 DIAGNOSIS — H43.812 POSTERIOR VITREOUS DETACHMENT, LEFT EYE: ICD-10-CM

## 2023-01-13 PROCEDURE — 92012 INTRM OPH EXAM EST PATIENT: CPT | Mod: 57 | Performed by: OPHTHALMOLOGY

## 2023-01-13 PROCEDURE — 66821 AFTER CATARACT LASER SURGERY: CPT | Mod: LT | Performed by: OPHTHALMOLOGY

## 2023-01-13 ASSESSMENT — SLIT LAMP EXAM - LIDS: COMMENTS: MEIBOMIAN GLAND DYSFUNCTION

## 2023-01-13 ASSESSMENT — REFRACTION_WEARINGRX
OS_AXIS: 164
OD_AXIS: 017
SPECS_TYPE: PAL
OS_ADD: +2.25
OS_CYLINDER: +1.75
OS_SPHERE: -1.50
OD_CYLINDER: +2.00
OD_ADD: +2.25
OD_SPHERE: -1.25

## 2023-01-13 ASSESSMENT — REFRACTION_MANIFEST
OS_AXIS: 171
OS_CYLINDER: +2.25
OS_SPHERE: -0.75
OS_ADD: +2.50

## 2023-01-13 ASSESSMENT — TONOMETRY
IOP_METHOD: ICARE
OS_IOP_MMHG: 12
OD_IOP_MMHG: 12

## 2023-01-13 ASSESSMENT — VISUAL ACUITY
OS_CC: 20/50
METHOD: SNELLEN - LINEAR
OD_CC: 20/40
CORRECTION_TYPE: GLASSES

## 2023-01-13 ASSESSMENT — EXTERNAL EXAM - LEFT EYE: OS_EXAM: BROW PTOSIS

## 2023-01-13 ASSESSMENT — EXTERNAL EXAM - RIGHT EYE: OD_EXAM: BROW PTOSIS

## 2023-01-13 NOTE — PROGRESS NOTES
Current Eye Medications:  Artificial tears both eyes      Subjective:  MD check, patient was hit with metal in his left eye one month ago. He had pain for about a minute, then was fine.  The next day he complained of blurred vision, but was not seen by an eye doctor. He figured that his vision would just gradually get better, but it has not improved.  (see ROS)     Objective:  See Ophthalmology Exam.       Assessment:  No findings related to possible foreign body; blurred vision left eye due to visually significant posterior capsule opacity.      Plan: Yag Capsulotomy performed without problems left eye under Proparacaine anesthetic.  Well tolerated.  Number of applications: 24  Power of applications: 1.3 mJ  Iopidine given.    Quinn Quevedo M.D.

## 2023-01-13 NOTE — LETTER
1/13/2023         RE: Raphael Lizarraga  [address]  [place] [address]        Dear Colleague,    Thank you for referring your patient, Raphael Lizarraga, to the [facility]. Please see a copy of my visit note below.     Current Eye Medications:  Artificial tears both eyes      Subjective:  MD check, patient was hit with metal in his left eye one month ago. He had pain for about a minute, then was fine.  The next day he complained of blurred vision, but was not seen by an eye doctor. He figured that his vision would just gradually get better, but it has not improved.  (see ROS)     Objective:  See Ophthalmology Exam.       Assessment:  No findings related to possible foreign body; blurred vision left eye due to visually significant posterior capsule opacity.      Plan: Yag Capsulotomy performed without problems left eye under Proparacaine anesthetic.  Well tolerated.  Number of applications: 24  Power of applications: 1.3 mJ  Iopidine given.    Quinn Quevedo M.D.              Again, thank you for allowing me to participate in the care of your patient.        Sincerely,        Quinn Quevedo MD

## 2023-01-13 NOTE — PATIENT INSTRUCTIONS
Your eye may be slightly red, sore, and blurry for a few days.  You may notice some floaters for a few days.  Keep scheduled return visit for a intraocular pressure check and refraction in about one week.    Quinn Quevedo M.D.  [phone]

## 2023-01-15 PROBLEM — Z96.1 PSEUDOPHAKIA: Status: ACTIVE | Noted: 2023-01-15

## 2023-01-15 PROBLEM — H43.812 POSTERIOR VITREOUS DETACHMENT, LEFT EYE: Status: ACTIVE | Noted: 2023-01-15

## 2023-01-15 ASSESSMENT — CUP TO DISC RATIO: OS_RATIO: 0.2

## 2023-01-23 ENCOUNTER — OFFICE VISIT (OUTPATIENT)
Dept: OPHTHALMOLOGY | Facility: CLINIC | Age: 58
End: 2023-01-23
Payer: COMMERCIAL

## 2023-01-23 DIAGNOSIS — Z96.1 PSEUDOPHAKIA: Primary | ICD-10-CM

## 2023-01-23 PROCEDURE — 99024 POSTOP FOLLOW-UP VISIT: CPT | Performed by: OPHTHALMOLOGY

## 2023-01-23 ASSESSMENT — TONOMETRY
IOP_METHOD: APPLANATION
OD_IOP_MMHG: 13
OS_IOP_MMHG: 13

## 2023-01-23 ASSESSMENT — VISUAL ACUITY
OS_CC: 20/20
OD_CC+: -2
CORRECTION_TYPE: GLASSES
OS_CC+: -3
OD_CC: 20/25
METHOD: SNELLEN - LINEAR

## 2023-01-23 ASSESSMENT — SLIT LAMP EXAM - LIDS: COMMENTS: MEIBOMIAN GLAND DYSFUNCTION

## 2023-01-23 ASSESSMENT — EXTERNAL EXAM - LEFT EYE: OS_EXAM: BROW PTOSIS

## 2023-01-23 ASSESSMENT — REFRACTION_WEARINGRX
OD_AXIS: 017
OS_CYLINDER: +1.75
SPECS_TYPE: PAL
OD_CYLINDER: +2.00
OS_ADD: +2.25
OS_SPHERE: -1.50
OD_ADD: +2.25
OD_SPHERE: -1.25
OS_AXIS: 164

## 2023-01-23 ASSESSMENT — REFRACTION_MANIFEST
OS_CYLINDER: +1.75
OD_AXIS: 015
OS_ADD: +2.75
OS_SPHERE: -1.25
OS_AXIS: 160
OD_CYLINDER: +2.75
OD_ADD: +2.75
OD_SPHERE: -1.25

## 2023-01-23 ASSESSMENT — EXTERNAL EXAM - RIGHT EYE: OD_EXAM: BROW PTOSIS

## 2023-01-23 NOTE — PROGRESS NOTES
" Current Eye Medications:  Artificial tears as needed both eyes      Subjective:  S/P Yag Capsulotomy left eye 1/13/23. Vision is much improved left eye. No eye pain or discomfort in either eye.      Objective:  See Ophthalmology Exam.       Assessment:  Doing well s/p Yag Caps left eye.      Plan:  Glasses prescription given - optional  May use artificial tears up to four times a day (like Refresh Optive, Systane Balance, TheraTears, or generic artificial tears are ok. Avoid \"get the red out\" drops).   Call in September 2023 for an appointment in January 2024 for Complete Exam    Dr. Quevedo [phone]         "

## 2023-01-23 NOTE — LETTER
"    1/23/2023         RE: Raphael Lizarraga  [address]  [place] [address]        Dear Colleague,    Thank you for referring your patient, Raphael Lizarraga, to the [facility]. Please see a copy of my visit note below.     Current Eye Medications:  Artificial tears as needed both eyes      Subjective:  S/P Yag Capsulotomy left eye 1/13/23. Vision is much improved left eye. No eye pain or discomfort in either eye.      Objective:  See Ophthalmology Exam.       Assessment:  Doing well s/p Yag Caps left eye.      Plan:  Glasses prescription given - optional  May use artificial tears up to four times a day (like Refresh Optive, Systane Balance, TheraTears, or generic artificial tears are ok. Avoid \"get the red out\" drops).   Call in September 2023 for an appointment in January 2024 for Complete Exam    Dr. Quevedo [phone]             Again, thank you for allowing me to participate in the care of your patient.        Sincerely,        Quinn Quevedo MD    "

## 2023-01-23 NOTE — PATIENT INSTRUCTIONS
"Glasses prescription given - optional  May use artificial tears up to four times a day (like Refresh Optive, Systane Balance, TheraTears, or generic artificial tears are ok. Avoid \"get the red out\" drops).   Call in September 2023 for an appointment in January 2024 for Complete Exam    Dr. Quevedo [phone]    "

## 2023-01-26 ASSESSMENT — SLEEP AND FATIGUE QUESTIONNAIRES
HOW LIKELY ARE YOU TO NOD OFF OR FALL ASLEEP WHILE SITTING QUIETLY AFTER LUNCH WITHOUT ALCOHOL: MODERATE CHANCE OF DOZING
HOW LIKELY ARE YOU TO NOD OFF OR FALL ASLEEP WHILE SITTING AND READING: HIGH CHANCE OF DOZING
HOW LIKELY ARE YOU TO NOD OFF OR FALL ASLEEP WHILE SITTING INACTIVE IN A PUBLIC PLACE: HIGH CHANCE OF DOZING
HOW LIKELY ARE YOU TO NOD OFF OR FALL ASLEEP IN A CAR, WHILE STOPPED FOR A FEW MINUTES IN TRAFFIC: SLIGHT CHANCE OF DOZING
HOW LIKELY ARE YOU TO NOD OFF OR FALL ASLEEP WHILE WATCHING TV: MODERATE CHANCE OF DOZING
HOW LIKELY ARE YOU TO NOD OFF OR FALL ASLEEP WHILE LYING DOWN TO REST IN THE AFTERNOON WHEN CIRCUMSTANCES PERMIT: MODERATE CHANCE OF DOZING
HOW LIKELY ARE YOU TO NOD OFF OR FALL ASLEEP WHEN YOU ARE A PASSENGER IN A CAR FOR AN HOUR WITHOUT A BREAK: HIGH CHANCE OF DOZING
HOW LIKELY ARE YOU TO NOD OFF OR FALL ASLEEP WHILE SITTING AND TALKING TO SOMEONE: SLIGHT CHANCE OF DOZING

## 2023-01-27 ENCOUNTER — OFFICE VISIT (OUTPATIENT)
Dept: FAMILY MEDICINE | Facility: CLINIC | Age: 58
End: 2023-01-27
Payer: COMMERCIAL

## 2023-01-27 VITALS
HEART RATE: 70 BPM | TEMPERATURE: 98 F | DIASTOLIC BLOOD PRESSURE: 79 MMHG | WEIGHT: 151.6 LBS | OXYGEN SATURATION: 98 % | HEIGHT: 66 IN | SYSTOLIC BLOOD PRESSURE: 131 MMHG | RESPIRATION RATE: 11 BRPM | BODY MASS INDEX: 24.36 KG/M2

## 2023-01-27 DIAGNOSIS — G40.209 PARTIAL SYMPTOMATIC EPILEPSY WITH COMPLEX PARTIAL SEIZURES, NOT INTRACTABLE, WITHOUT STATUS EPILEPTICUS (H): Chronic | ICD-10-CM

## 2023-01-27 DIAGNOSIS — G47.33 OSA (OBSTRUCTIVE SLEEP APNEA): ICD-10-CM

## 2023-01-27 DIAGNOSIS — Z79.899 HIGH RISK MEDICATION USE: Primary | ICD-10-CM

## 2023-01-27 PROCEDURE — 99213 OFFICE O/P EST LOW 20 MIN: CPT | Performed by: PREVENTIVE MEDICINE

## 2023-01-27 ASSESSMENT — PATIENT HEALTH QUESTIONNAIRE - PHQ9
10. IF YOU CHECKED OFF ANY PROBLEMS, HOW DIFFICULT HAVE THESE PROBLEMS MADE IT FOR YOU TO DO YOUR WORK, TAKE CARE OF THINGS AT HOME, OR GET ALONG WITH OTHER PEOPLE: VERY DIFFICULT
SUM OF ALL RESPONSES TO PHQ QUESTIONS 1-9: 14
SUM OF ALL RESPONSES TO PHQ QUESTIONS 1-9: 14

## 2023-01-27 ASSESSMENT — PAIN SCALES - GENERAL: PAINLEVEL: NO PAIN (0)

## 2023-01-27 NOTE — PROGRESS NOTES
"  Assessment & Plan     High risk medication use  -on Dilantin for epilepsy   - DX Hip/Pelvis/Spine  -last DEXA in 2016     Partial symptomatic epilepsy with complex partial seizures, not intractable, without status epilepticus (H)  -per Neurology   - DX Hip/Pelvis/Spine    NELIA (obstructive sleep apnea)  -consistent use of CPAP     12 minutes spent on the date of the encounter doing chart review, history and exam, documentation and further activities per the note           Return in about 1 year (around 1/27/2024) for Routine preventive, with any available provider, in person.    Delia Brunson MD MPH    [facility]    Jaime Lim is a 58 year old presenting for the following health issues:  Osteoporosis      History of Present Illness       Reason for visit:  Bone check    He eats 0-1 servings of fruits and vegetables daily.He consumes 0 sweetened beverage(s) daily.He exercises with enough effort to increase his heart rate 9 or less minutes per day.  He exercises with enough effort to increase his heart rate 3 or less days per week.   He is taking medications regularly.    Today's PHQ-9         PHQ-9 Total Score: 14    PHQ-9 Q9 Thoughts of better off dead/self-harm past 2 weeks :   Not at all    How difficult have these problems made it for you to do your work, take care of things at home, or get along with other people: Very difficult     Is on Dilantin for seizures  Followed by Neurology  Last DEXA in 2016  No recent fractures  On Calcium and Vitamin       Review of Systems   Constitutional, HEENT, cardiovascular, pulmonary, gi and gu systems are negative, except as otherwise noted.      Objective    /79 (BP Location: Left arm, Patient Position: Sitting, Cuff Size: Adult Regular)   Pulse 70   Temp 98  F (36.7  C) (Oral)   Resp 11   Ht 1.676 m (5' 5.98\")   Wt 68.8 kg (151 lb 9.6 oz)   SpO2 98%   BMI 24.48 kg/m    Body mass index is 24.48 kg/m .  Physical Exam   GENERAL " APPEARANCE: healthy, alert and no distress  EYES: Eyes grossly normal to inspection and conjunctivae and sclerae normal  RESP: lungs clear to auscultation - no rales, rhonchi or wheezes  CV: regular rates and rhythm, normal S1 S2  SKIN: no suspicious lesions or rashes  NEURO: Normal strength and tone, mentation intact and speech normal  PSYCH: mentation appears normal    No results found for this or any previous visit (from the past 24 hour(s)).

## 2023-01-27 NOTE — PATIENT INSTRUCTIONS
To schedule dexa please call [phone].      At [facility], we strive to deliver an exceptional experience to you, every time we see you. If you receive a survey, please complete it as we do value your feedback.  If you have MyChart, you can expect to receive results automatically within 24 hours of their completion.  Your provider will send a note interpreting your results as well.   If you do not have MyChart, you should receive your results in about a week by mail.    Your care team:                            Family Medicine Internal Medicine   MD Jeff Juárez MD Shantel Branch-Fleming, MD Srinivasa Vaka, MD Katya Belousova, PA-C Megan Sheeley, APRN CNP Nam Ho, MD ([facility]   EULALIA Kitchen MD Paula Brito, MD Amelia Massimini APRN RAFA Mckenzie CNP, MD Charanya Pasupathi, MD          Clinic hours: Monday - Thursday 7 am-6 pm; Fridays 7 am-5 pm.   Urgent care: Monday - Friday 10 am- 8 pm; Saturday and Sunday 9 am-5 pm.    Clinic: [phone]       [facility]: Monday - Thursday 8 am - 7 pm; Friday 8 am - 6 pm  [facility]: [phone]

## 2023-02-02 ENCOUNTER — OFFICE VISIT (OUTPATIENT)
Dept: SLEEP MEDICINE | Facility: CLINIC | Age: 58
End: 2023-02-02
Payer: COMMERCIAL

## 2023-02-02 VITALS
SYSTOLIC BLOOD PRESSURE: 136 MMHG | HEART RATE: 70 BPM | WEIGHT: 148 LBS | DIASTOLIC BLOOD PRESSURE: 81 MMHG | OXYGEN SATURATION: 100 % | BODY MASS INDEX: 23.78 KG/M2 | HEIGHT: 66 IN

## 2023-02-02 DIAGNOSIS — G47.33 OSA (OBSTRUCTIVE SLEEP APNEA): Primary | ICD-10-CM

## 2023-02-02 PROCEDURE — 99214 OFFICE O/P EST MOD 30 MIN: CPT | Performed by: PHYSICIAN ASSISTANT

## 2023-02-02 NOTE — PROGRESS NOTES
Sleep Study Follow-Up Visit:    Date on this visit: 2/2/2023    Raphael Lizarraga comes in today for follow-up of his sleep study done on 10/24/2022 at the [facility] [place]. A diagnostic polysomnogram was performed to evaluate for history of obstructive sleep apnea, severity unclear- Tolerating CPAP very well. Daytime symptoms are improved. He needs a diagnostic sleep study to qualify for new CPAP. Patient reported to have withheld CPAP for 3 nights prior to study.      Polysomnogram as reported by Dr. Freitsa  Sleep Architecture:   The total recording time of the polysomnogram was 523.6 minutes. The total sleep time was 489.5 minutes. Sleep latency was decreased at 9.5 minutes without the use of a sleep aid. REM latency was 164.5 minutes. Arousal index was increased at 50.0 arousals per hour. Sleep efficiency was normal at 93.5%. Wake after sleep onset was 24.5 minutes. The patient spent 9.1% of total sleep time in Stage N1, 55.9% in Stage N2, 19.0% in Stage N3, and 16.0% in REM. Time in REM supine was 78.5 minutes.     Respiration: It was occasionally difficult to discern RERAs from PLMS with arousals    Events ? The polysomnogram revealed a presence of 53 obstructive, 121 central, and 10 mixed apneas resulting in an apnea index of 22.6 events per hour. There were 11 obstructive hypopneas and 0 central hypopneas resulting in an obstructive hypopnea index of 1.3 and central hypopnea index of 0 events per hour. The combined apnea/hypopnea index was 23.9 events per hour (central apnea/hypopnea index was 14.8 events per hour). The REM AHI was 33.6 events per hour. The supine AHI was 27.0 events per hour. The non-supine (non-REM) AHI was 2.8 (63 minutes sleep time non-supine) The RERA index was 3.7 events per hour.  The RDI was 27.6 events per hour.    Snoring - was reported as moderate    Respiratory rate and pattern - was notable for normal respiratory rate and pattern.    Sustained Sleep  Associated Hypoventilation - Transcutaneous carbon dioxide monitoring was not used, however significant hypoventilation was not suggested by oximetry     Sleep Associated Hypoxemia - (Greater than 5 minutes O2 sat at or below 88%) was not present. Baseline oxygen saturation was 96.6%. Lowest oxygen saturation was 84.0%. Time spent less than or equal to 88% was 0.5 minutes. Time spent less than or equal to 89% was 0.9 minutes.     Movement Activity:     Periodic Limb Activity - There were 357 PLMs during the entire study. The PLM index was 43.8 movements per hour. The PLM Arousal Index was 8.7 per hour.    REM EMG Activity - Excessive transient/sustained muscle activity was not present.    Nocturnal Behavior - Abnormal sleep related behaviors were not noted     Bruxism - None apparent.           Cardiac Summary:   The average pulse rate was 57.1 bpm. The minimum pulse rate was 48.0 bpm while the maximum pulse rate was 90.0 bpm.  Arrhythmias were not noted.    Past medical/surgical history, family history, social history, medications and allergies were reviewed.      Problem List:  Patient Active Problem List    Diagnosis Date Noted     Pseudophakia, Yag Caps, ou 01/15/2023     Priority: Medium     Posterior vitreous detachment, left eye 01/15/2023     Priority: Medium     Diverticulosis 11/07/2022     Priority: Medium     Chronic midline low back pain without sciatica 08/12/2022     Priority: Medium     Pain in thoracic spine 06/02/2014     Priority: Medium     CARDIOVASCULAR SCREENING; LDL GOAL LESS THAN 160 10/31/2010     Priority: Medium     Osteoporosis/osteopenia increased risk      Priority: Medium     due to long-term Dilantin use       Epilepsy (H)      Priority: Medium     Anxiety      Priority: Medium     NELIA (obstructive sleep apnea)- moderate, mixed (AHI 23)      Priority: Medium     He states that he was initially diagnosed with sleep apnea in ~2002. We don't have records of his diagnostic sleep study.    He underwent a titration study in 2008 (138#) and CPAP 10 cm/H20 was effective  10/24/2022 [facility] (150.0 lbs) - AHI 23.9 (central apnea/hypopnea index was 14.8 events per hour), RDI 27.6, Supine AHI 27.0, non-supine (non-REM) AHI was 2.8 (63 minutes sleep time non-supine) REM AHI 33.6, Low O2 84.0%, Time Spent ?88% 0.5 minutes / Time Spent ?89% 0.9 minutes. PLM index was 43.8 movements per hour. The PLM Arousal Index was 8.7 per hour.         Intermittent asthma      Priority: Medium     Major depressive disorder, recurrent episode, mild (H) 03/09/2009     Priority: Medium     Relapse May 2011          Impression/Plan:  Moderate sleep apnea with mixed central and obstructive sleep apnea   Central evens were predominantly but not exclusively post-arousal/transitional in nature  Events were almost exclusively supine-positional   No non-supine REM observed  Periodic limb movements of sleep     Polysomnogram was reviewed in detail today    Treatment options discussed today including auto-CPA, oral appliance therapy or polysomnography with full night PAP titration.  Elected treatment with auto-CPAP 8-14 cm/H20. If he continues to have residual events on auto CPAP, will bring him into the lab for titration.     He will follow up with me in about 2 months after picking up his new CPAP    30 minutes spent on day of encounter doing chart review,  history and exam, counseling, coordinating plan of care, documentation and further activities as noted above.      Shannan Rice PA-C  Sleep Medicine    CC: Chava Belcher

## 2023-02-02 NOTE — NURSING NOTE
"Chief Complaint   Patient presents with     Study Results       Initial /81   Pulse 70   Ht 1.676 m (5' 5.98\")   Wt 67.1 kg (148 lb)   SpO2 100%   BMI 23.90 kg/m   Estimated body mass index is 23.9 kg/m  as calculated from the following:    Height as of this encounter: 1.676 m (5' 5.98\").    Weight as of this encounter: 67.1 kg (148 lb).    Medication Reconciliation: complete    Lilly Mayer CMA on 2/2/2023 at 1:41 PM   "

## 2023-02-02 NOTE — PATIENT INSTRUCTIONS
Your Body mass index is 23.9 kg/m .  Weight management is a personal decision.  If you are interested in exploring weight loss strategies, the following discussion covers the approaches that may be successful. Body mass index (BMI) is one way to tell whether you are at a healthy weight, overweight, or obese. It measures your weight in relation to your height.  A BMI of 18.5 to 24.9 is in the healthy range. A person with a BMI of 25 to 29.9 is considered overweight, and someone with a BMI of 30 or greater is considered obese. More than two-thirds of American adults are considered overweight or obese.  Being overweight or obese increases the risk for further weight gain. Excess weight may lead to heart disease and diabetes.  Creating and following plans for healthy eating and physical activity may help you improve your health.  Weight control is part of healthy lifestyle and includes exercise, emotional health, and healthy eating habits. Careful eating habits lifelong are the mainstay of weight control. Though there are significant health benefits from weight loss, long-term weight loss with diet alone may be very difficult to achieve- studies show long-term success with dietary management in less than 10% of people. Attaining a healthy weight may be especially difficult to achieve in those with severe obesity. In some cases, medications, devices and surgical management might be considered.  What can you do?  If you are overweight or obese and are interested in methods for weight loss, you should discuss this with your provider.     Consider reducing daily calorie intake by 500 calories.     Keep a food journal.     Avoiding skipping meals, consider cutting portions instead.    Diet combined with exercise helps maintain muscle while optimizing fat loss. Strength training is particularly important for building and maintaining muscle mass. Exercise helps reduce stress, increase energy, and improves fitness.  Increasing exercise without diet control, however, may not burn enough calories to loose weight.       Start walking three days a week 10-20 minutes at a time    Work towards walking thirty minutes five days a week     Eventually, increase the speed of your walking for 1-2 minutes at time    In addition, we recommend that you review healthy lifestyles and methods for weight loss available through the National Institutes of Health patient information sites:  http://win.niddk.nih.gov/publications/index.htm    And look into health and wellness programs that may be available through your health insurance provider, employer, local community center, or [employer].

## 2023-02-06 ENCOUNTER — TRANSFERRED RECORDS (OUTPATIENT)
Dept: HEALTH INFORMATION MANAGEMENT | Facility: CLINIC | Age: 58
End: 2023-02-06

## 2023-02-07 DIAGNOSIS — F41.9 ANXIETY: ICD-10-CM

## 2023-02-07 DIAGNOSIS — F33.0 MAJOR DEPRESSIVE DISORDER, RECURRENT EPISODE, MILD (H): ICD-10-CM

## 2023-02-08 ENCOUNTER — MEDICAL CORRESPONDENCE (OUTPATIENT)
Dept: HEALTH INFORMATION MANAGEMENT | Facility: CLINIC | Age: 58
End: 2023-02-08

## 2023-02-10 ENCOUNTER — ANCILLARY PROCEDURE (OUTPATIENT)
Dept: BONE DENSITY | Facility: CLINIC | Age: 58
End: 2023-02-10
Attending: PREVENTIVE MEDICINE
Payer: COMMERCIAL

## 2023-02-10 DIAGNOSIS — Z79.899 HIGH RISK MEDICATION USE: ICD-10-CM

## 2023-02-10 DIAGNOSIS — G40.209 PARTIAL SYMPTOMATIC EPILEPSY WITH COMPLEX PARTIAL SEIZURES, NOT INTRACTABLE, WITHOUT STATUS EPILEPTICUS (H): ICD-10-CM

## 2023-02-10 PROCEDURE — 77080 DXA BONE DENSITY AXIAL: CPT | Performed by: INTERNAL MEDICINE

## 2023-02-10 RX ORDER — BUPROPION HYDROCHLORIDE 150 MG/1
150 TABLET, EXTENDED RELEASE ORAL EVERY MORNING
Qty: 90 TABLET | Refills: 0 | Status: SHIPPED | OUTPATIENT
Start: 2023-02-10 | End: 2023-04-07

## 2023-02-10 ASSESSMENT — ASTHMA QUESTIONNAIRES
ACT_TOTALSCORE: 25
QUESTION_3 LAST FOUR WEEKS HOW OFTEN DID YOUR ASTHMA SYMPTOMS (WHEEZING, COUGHING, SHORTNESS OF BREATH, CHEST TIGHTNESS OR PAIN) WAKE YOU UP AT NIGHT OR EARLIER THAN USUAL IN THE MORNING: NOT AT ALL
QUESTION_4 LAST FOUR WEEKS HOW OFTEN HAVE YOU USED YOUR RESCUE INHALER OR NEBULIZER MEDICATION (SUCH AS ALBUTEROL): NOT AT ALL
QUESTION_2 LAST FOUR WEEKS HOW OFTEN HAVE YOU HAD SHORTNESS OF BREATH: NOT AT ALL
QUESTION_5 LAST FOUR WEEKS HOW WOULD YOU RATE YOUR ASTHMA CONTROL: COMPLETELY CONTROLLED
QUESTION_1 LAST FOUR WEEKS HOW MUCH OF THE TIME DID YOUR ASTHMA KEEP YOU FROM GETTING AS MUCH DONE AT WORK, SCHOOL OR AT HOME: NONE OF THE TIME
ACT_TOTALSCORE: 25

## 2023-02-10 ASSESSMENT — ANXIETY QUESTIONNAIRES
6. BECOMING EASILY ANNOYED OR IRRITABLE: MORE THAN HALF THE DAYS
5. BEING SO RESTLESS THAT IT IS HARD TO SIT STILL: NOT AT ALL
GAD7 TOTAL SCORE: 2
3. WORRYING TOO MUCH ABOUT DIFFERENT THINGS: NOT AT ALL
IF YOU CHECKED OFF ANY PROBLEMS ON THIS QUESTIONNAIRE, HOW DIFFICULT HAVE THESE PROBLEMS MADE IT FOR YOU TO DO YOUR WORK, TAKE CARE OF THINGS AT HOME, OR GET ALONG WITH OTHER PEOPLE: SOMEWHAT DIFFICULT
GAD7 TOTAL SCORE: 2
1. FEELING NERVOUS, ANXIOUS, OR ON EDGE: NOT AT ALL
2. NOT BEING ABLE TO STOP OR CONTROL WORRYING: NOT AT ALL
4. TROUBLE RELAXING: NOT AT ALL
7. FEELING AFRAID AS IF SOMETHING AWFUL MIGHT HAPPEN: NOT AT ALL
7. FEELING AFRAID AS IF SOMETHING AWFUL MIGHT HAPPEN: NOT AT ALL
8. IF YOU CHECKED OFF ANY PROBLEMS, HOW DIFFICULT HAVE THESE MADE IT FOR YOU TO DO YOUR WORK, TAKE CARE OF THINGS AT HOME, OR GET ALONG WITH OTHER PEOPLE?: SOMEWHAT DIFFICULT
GAD7 TOTAL SCORE: 2

## 2023-02-10 ASSESSMENT — PATIENT HEALTH QUESTIONNAIRE - PHQ9
SUM OF ALL RESPONSES TO PHQ QUESTIONS 1-9: 11
SUM OF ALL RESPONSES TO PHQ QUESTIONS 1-9: 11
10. IF YOU CHECKED OFF ANY PROBLEMS, HOW DIFFICULT HAVE THESE PROBLEMS MADE IT FOR YOU TO DO YOUR WORK, TAKE CARE OF THINGS AT HOME, OR GET ALONG WITH OTHER PEOPLE: SOMEWHAT DIFFICULT

## 2023-02-10 NOTE — TELEPHONE ENCOUNTER
I have not seeen this pt since 2021. He is failing the PHQ-9 and needs a f/u appointment within 4 weeks.

## 2023-02-13 ENCOUNTER — TRANSFERRED RECORDS (OUTPATIENT)
Dept: HEALTH INFORMATION MANAGEMENT | Facility: CLINIC | Age: 58
End: 2023-02-13

## 2023-02-14 NOTE — RESULT ENCOUNTER NOTE
Raphael,     The bone density test shows osteopenia. This is an intermediate category that is in between normal and osteoporosis.  People with osteopenia should work on taking in 8165-2449 mg of calcium with vitamin D daily. They should also be getting weight bearing exercise at least 2 times per week.   Please do not hesitate to call us at [phone] if you have any questions or concerns.    Thank you,    Delia Brunson MD MPH

## 2023-02-17 ENCOUNTER — OFFICE VISIT (OUTPATIENT)
Dept: FAMILY MEDICINE | Facility: CLINIC | Age: 58
End: 2023-02-17
Payer: COMMERCIAL

## 2023-02-17 VITALS
HEIGHT: 66 IN | DIASTOLIC BLOOD PRESSURE: 79 MMHG | HEART RATE: 65 BPM | BODY MASS INDEX: 24.4 KG/M2 | TEMPERATURE: 98.6 F | OXYGEN SATURATION: 100 % | SYSTOLIC BLOOD PRESSURE: 132 MMHG | WEIGHT: 151.8 LBS | RESPIRATION RATE: 18 BRPM

## 2023-02-17 DIAGNOSIS — Z11.59 NEED FOR HEPATITIS C SCREENING TEST: ICD-10-CM

## 2023-02-17 DIAGNOSIS — F33.0 MAJOR DEPRESSIVE DISORDER, RECURRENT EPISODE, MILD (H): Primary | Chronic | ICD-10-CM

## 2023-02-17 DIAGNOSIS — R93.2 ABNORMAL MRI, LIVER: ICD-10-CM

## 2023-02-17 DIAGNOSIS — F41.9 ANXIETY: Chronic | ICD-10-CM

## 2023-02-17 LAB
ALBUMIN SERPL-MCNC: 4 G/DL (ref 3.4–5)
ALP SERPL-CCNC: 69 U/L (ref 40–150)
ALT SERPL W P-5'-P-CCNC: 29 U/L (ref 0–70)
AST SERPL W P-5'-P-CCNC: 15 U/L (ref 0–45)
BILIRUB DIRECT SERPL-MCNC: <0.1 MG/DL (ref 0–0.2)
BILIRUB SERPL-MCNC: 0.1 MG/DL (ref 0.2–1.3)
PROT SERPL-MCNC: 7.4 G/DL (ref 6.8–8.8)

## 2023-02-17 PROCEDURE — 99214 OFFICE O/P EST MOD 30 MIN: CPT | Performed by: FAMILY MEDICINE

## 2023-02-17 PROCEDURE — 80076 HEPATIC FUNCTION PANEL: CPT | Performed by: FAMILY MEDICINE

## 2023-02-17 PROCEDURE — 86803 HEPATITIS C AB TEST: CPT | Performed by: FAMILY MEDICINE

## 2023-02-17 PROCEDURE — 96127 BRIEF EMOTIONAL/BEHAV ASSMT: CPT | Performed by: FAMILY MEDICINE

## 2023-02-17 PROCEDURE — 36415 COLL VENOUS BLD VENIPUNCTURE: CPT | Performed by: FAMILY MEDICINE

## 2023-02-17 RX ORDER — PAROXETINE 30 MG/1
30 TABLET, FILM COATED ORAL AT BEDTIME
Qty: 90 TABLET | Refills: 0 | Status: SHIPPED | OUTPATIENT
Start: 2023-02-17 | End: 2023-04-07

## 2023-02-17 ASSESSMENT — ANXIETY QUESTIONNAIRES: GAD7 TOTAL SCORE: 2

## 2023-02-17 ASSESSMENT — PAIN SCALES - GENERAL: PAINLEVEL: NO PAIN (0)

## 2023-02-17 ASSESSMENT — PATIENT HEALTH QUESTIONNAIRE - PHQ9
10. IF YOU CHECKED OFF ANY PROBLEMS, HOW DIFFICULT HAVE THESE PROBLEMS MADE IT FOR YOU TO DO YOUR WORK, TAKE CARE OF THINGS AT HOME, OR GET ALONG WITH OTHER PEOPLE: SOMEWHAT DIFFICULT
SUM OF ALL RESPONSES TO PHQ QUESTIONS 1-9: 11

## 2023-02-17 NOTE — PROGRESS NOTES
"  Assessment & Plan     (F33.0) Major depressive disorder, recurrent episode, mild (H)  (primary encounter diagnosis)  (F41.9) Anxiety  Comment: Symptoms are worse than last year, although still technically mild. He is not suicidal. He confirms that his symptoms are worse, but there is no specific explanation for that.  Plan: PARoxetine (PAXIL) 30 MG tablet        Continue bupropion. Increase paroxetine to 30 mg. Return in about 6 weeks (around 3/31/2023) for full physical, recheck medications, recheck depression.      (R93.2) Abnormal MRI, liver  Comment: BABS Dario Walker 2/10/23, \"nonspecific T2 hyperintense lesion within the liver seen on sagittal imaging which is incompletely characterized\".  Plan: MR Liver wo & w Contrast, Hepatic panel         (Albumin, ALT, AST, Bili, Alk Phos, TP)        Further follow up will depend on results of the dedicated MRI. We can discuss this at his next appointment.    (Z11.59) Need for hepatitis C screening test  Comment: indications for screening discussed with the patient   Plan: Hepatitis C Screen Reflex to HCV RNA Quant and         Genotype                Chava Belcher MD  [facility] DARIO Lim is a 58 year old, presenting for the following health issues:  Depression      History of Present Illness       Mental Health Follow-up:  Patient presents to follow-up on Depression & Anxiety.Patient's depression since last visit has been:  Good  The patient is not having other symptoms associated with depression.  Patient's anxiety since last visit has been:  Good  The patient is not having other symptoms associated with anxiety.  Any significant life events: No  Patient is not feeling anxious or having panic attacks.  Patient has no concerns about alcohol or drug use.    He eats 0-1 servings of fruits and vegetables daily.He consumes 1 sweetened beverage(s) daily.He exercises with enough effort to increase his heart rate 9 or less minutes " "per day.  He exercises with enough effort to increase his heart rate 3 or less days per week.   He is taking medications regularly.    Today's PHQ-9         PHQ-9 Total Score: 11    PHQ-9 Q9 Thoughts of better off dead/self-harm past 2 weeks :   Not at all    How difficult have these problems made it for you to do your work, take care of things at home, or get along with other people: Somewhat difficult  Today's JOSE-7 Score: 2     The patient reports that he has been on the same dose of his depression medications for a little over 3 years. He was previously on fluoxetine from 1133-1842. He notes that seasonal changes tend to not affect his depression. The patient does add however that people have been noticing mood changes recently.      Review of Systems         Objective    /79 (BP Location: Left arm, Patient Position: Sitting, Cuff Size: Adult Regular)   Pulse 65   Temp 98.6  F (37  C) (Tympanic)   Resp 18   Ht 1.676 m (5' 6\")   Wt 68.9 kg (151 lb 12.8 oz)   SpO2 100%   BMI 24.50 kg/m    Body mass index is 24.5 kg/m .  Physical Exam   GENERAL: healthy, alert and no distress  EYES: Eyes grossly normal to inspection, PERRL, EOMI, sclerae white and conjunctivae normal  MS: no gross musculoskeletal defects noted, no edema  SKIN: no suspicious lesions or rashes to visible skin  NEURO: Normal strength and tone, sensory exam grossly normal, mentation intact, oriented times 3 and cranial nerves 2-12 intact  PSYCH: mentation appears normal, affect normal/bright                   This document serves as a record of the services and decisions personally performed and made by Dr. Belcher. It was created on his behalf by Shen Fischer, a trained medical scribe. The creation of this document is based the provider's statements to the medical scribe.  Shen Fischer,  5:12 PM    "

## 2023-02-17 NOTE — PATIENT INSTRUCTIONS
Please call [facility]: [phone] ([place] or [place]) or [facility]: [phone] ([place], [facility]) to schedule your MRI of liver.

## 2023-02-20 LAB — HCV AB SERPL QL IA: NONREACTIVE

## 2023-02-23 DIAGNOSIS — J45.20 INTERMITTENT ASTHMA WITHOUT COMPLICATION, UNSPECIFIED ASTHMA SEVERITY: ICD-10-CM

## 2023-02-23 RX ORDER — ALBUTEROL SULFATE 90 UG/1
AEROSOL, METERED RESPIRATORY (INHALATION)
Qty: 8.5 G | Refills: 1 | Status: SHIPPED | OUTPATIENT
Start: 2023-02-23 | End: 2024-05-13

## 2023-02-27 ENCOUNTER — ANCILLARY PROCEDURE (OUTPATIENT)
Dept: MRI IMAGING | Facility: CLINIC | Age: 58
End: 2023-02-27
Attending: FAMILY MEDICINE
Payer: COMMERCIAL

## 2023-02-27 DIAGNOSIS — R93.2 ABNORMAL MRI, LIVER: ICD-10-CM

## 2023-02-27 PROCEDURE — A9585 GADOBUTROL INJECTION: HCPCS | Performed by: FAMILY MEDICINE

## 2023-02-27 PROCEDURE — 74183 MRI ABD W/O CNTR FLWD CNTR: CPT | Mod: TC | Performed by: RADIOLOGY

## 2023-02-27 RX ORDER — GADOBUTROL 604.72 MG/ML
7 INJECTION INTRAVENOUS ONCE
Status: COMPLETED | OUTPATIENT
Start: 2023-02-27 | End: 2023-02-27

## 2023-02-27 RX ADMIN — GADOBUTROL 7 ML: 604.72 INJECTION INTRAVENOUS at 08:40

## 2023-03-21 ENCOUNTER — MYC MEDICAL ADVICE (OUTPATIENT)
Dept: SLEEP MEDICINE | Facility: CLINIC | Age: 58
End: 2023-03-21
Payer: COMMERCIAL

## 2023-03-21 DIAGNOSIS — G47.33 OSA (OBSTRUCTIVE SLEEP APNEA): Primary | ICD-10-CM

## 2023-03-31 ASSESSMENT — PATIENT HEALTH QUESTIONNAIRE - PHQ9: SUM OF ALL RESPONSES TO PHQ QUESTIONS 1-9: 9

## 2023-04-07 ENCOUNTER — OFFICE VISIT (OUTPATIENT)
Dept: FAMILY MEDICINE | Facility: CLINIC | Age: 58
End: 2023-04-07
Payer: COMMERCIAL

## 2023-04-07 VITALS
HEART RATE: 63 BPM | TEMPERATURE: 97.7 F | HEIGHT: 66 IN | DIASTOLIC BLOOD PRESSURE: 71 MMHG | SYSTOLIC BLOOD PRESSURE: 118 MMHG | OXYGEN SATURATION: 99 % | BODY MASS INDEX: 23.95 KG/M2 | WEIGHT: 149 LBS | RESPIRATION RATE: 16 BRPM

## 2023-04-07 DIAGNOSIS — F33.0 MAJOR DEPRESSIVE DISORDER, RECURRENT EPISODE, MILD (H): Chronic | ICD-10-CM

## 2023-04-07 DIAGNOSIS — F41.9 ANXIETY: Chronic | ICD-10-CM

## 2023-04-07 DIAGNOSIS — Z13.1 SCREENING FOR DIABETES MELLITUS: ICD-10-CM

## 2023-04-07 DIAGNOSIS — Z23 NEED FOR VACCINATION: ICD-10-CM

## 2023-04-07 DIAGNOSIS — Z13.220 SCREENING FOR LIPID DISORDERS: ICD-10-CM

## 2023-04-07 DIAGNOSIS — R93.2 ABNORMAL FINDINGS ON DIAGNOSTIC IMAGING OF LIVER: ICD-10-CM

## 2023-04-07 DIAGNOSIS — Z00.00 ROUTINE GENERAL MEDICAL EXAMINATION AT A HEALTH CARE FACILITY: Primary | ICD-10-CM

## 2023-04-07 DIAGNOSIS — Z12.5 SCREENING FOR PROSTATE CANCER: ICD-10-CM

## 2023-04-07 DIAGNOSIS — J45.20 INTERMITTENT ASTHMA WITHOUT COMPLICATION, UNSPECIFIED ASTHMA SEVERITY: Chronic | ICD-10-CM

## 2023-04-07 LAB
CHOLEST SERPL-MCNC: 221 MG/DL
FASTING STATUS PATIENT QL REPORTED: YES
FASTING STATUS PATIENT QL REPORTED: YES
GLUCOSE BLD-MCNC: 98 MG/DL (ref 70–99)
HDLC SERPL-MCNC: 81 MG/DL
LDLC SERPL CALC-MCNC: 112 MG/DL
NONHDLC SERPL-MCNC: 140 MG/DL
PSA SERPL-MCNC: 0.2 UG/L (ref 0–4)
TRIGL SERPL-MCNC: 140 MG/DL

## 2023-04-07 PROCEDURE — 99214 OFFICE O/P EST MOD 30 MIN: CPT | Mod: 25 | Performed by: FAMILY MEDICINE

## 2023-04-07 PROCEDURE — 90471 IMMUNIZATION ADMIN: CPT | Performed by: FAMILY MEDICINE

## 2023-04-07 PROCEDURE — 90746 HEPB VACCINE 3 DOSE ADULT IM: CPT | Performed by: FAMILY MEDICINE

## 2023-04-07 PROCEDURE — 36415 COLL VENOUS BLD VENIPUNCTURE: CPT | Performed by: FAMILY MEDICINE

## 2023-04-07 PROCEDURE — G0103 PSA SCREENING: HCPCS | Performed by: FAMILY MEDICINE

## 2023-04-07 PROCEDURE — 80061 LIPID PANEL: CPT | Performed by: FAMILY MEDICINE

## 2023-04-07 PROCEDURE — 96127 BRIEF EMOTIONAL/BEHAV ASSMT: CPT | Performed by: FAMILY MEDICINE

## 2023-04-07 PROCEDURE — 82947 ASSAY GLUCOSE BLOOD QUANT: CPT | Performed by: FAMILY MEDICINE

## 2023-04-07 PROCEDURE — 99396 PREV VISIT EST AGE 40-64: CPT | Mod: 25 | Performed by: FAMILY MEDICINE

## 2023-04-07 RX ORDER — PAROXETINE 30 MG/1
30 TABLET, FILM COATED ORAL AT BEDTIME
Qty: 90 TABLET | Refills: 1 | Status: SHIPPED | OUTPATIENT
Start: 2023-04-07 | End: 2023-08-04

## 2023-04-07 RX ORDER — BUPROPION HYDROCHLORIDE 150 MG/1
150 TABLET, EXTENDED RELEASE ORAL EVERY MORNING
Qty: 90 TABLET | Refills: 1 | Status: SHIPPED | OUTPATIENT
Start: 2023-04-07 | End: 2023-08-04 | Stop reason: DRUGHIGH

## 2023-04-07 ASSESSMENT — PAIN SCALES - GENERAL: PAINLEVEL: MILD PAIN (3)

## 2023-04-07 ASSESSMENT — PATIENT HEALTH QUESTIONNAIRE - PHQ9: SUM OF ALL RESPONSES TO PHQ QUESTIONS 1-9: 9

## 2023-04-07 NOTE — LETTER
My Asthma Action Plan    Name: Raphael Lizarraga   YOB: 1965  Date: 4/7/2023   My doctor: Chava Belcher MD   My clinic: [facility]        My Rescue Medicine:   Albuterol inhaler (Proair/Ventolin/Proventil HFA)  2-4 puffs EVERY 4 HOURS as needed. Use a spacer if recommended by your provider.   My Asthma Severity:   Intermittent / Exercise Induced  Know your asthma triggers.  None          GREEN ZONE   Good Control  I feel good  No cough or wheeze  Can work, sleep and play without asthma symptoms       Take your asthma control medicine every day.     If exercise triggers your asthma, take your rescue medication  15 minutes before exercise or sports, and  During exercise if you have asthma symptoms  Spacer to use with inhaler: If you have a spacer, make sure to use it with your inhaler             YELLOW ZONE Getting Worse  I have ANY of these:  I do not feel good  Cough or wheeze  Chest feels tight  Wake up at night   Keep taking your Green Zone medications  Start taking your rescue medicine:  every 20 minutes for up to 1 hour. Then every 4 hours for 24-48 hours.  If you stay in the Yellow Zone for more than 12-24 hours, contact your doctor.  If you do not return to the Green Zone in 12-24 hours or you get worse, start taking your oral steroid medicine if prescribed by your provider.           RED ZONE Medical Alert - Get Help  I have ANY of these:  I feel awful  Medicine is not helping  Breathing getting harder  Trouble walking or talking  Nose opens wide to breathe       Take your rescue medicine NOW  If your provider has prescribed an oral steroid medicine, start taking it NOW  Call your doctor NOW  If you are still in the Red Zone after 20 minutes and you have not reached your doctor:  Take your rescue medicine again and  Call 911 or go to the emergency room right away    See your regular doctor within 2 weeks of an Emergency Room or Urgent Care visit for follow-up  treatment.          Annual Reminders:  Meet with Asthma Educator,  Flu Shot in the Fall, consider Pneumonia Vaccination for patients with asthma (aged 19 and older).    Pharmacy: [id number] [address] [facility] [place]    Electronically signed by Chava Belcher MD   Date: 04/07/23                    Asthma Triggers  How To Control Things That Make Your Asthma Worse    Triggers are things that make your asthma worse.  Look at the list below to help you find your triggers and   what you can do about them. You can help prevent asthma flare-ups by staying away from your triggers.      Trigger                                                          What you can do   Cigarette Smoke  Tobacco smoke can make asthma worse. Do not allow smoking in your home, car or around you.  Be sure no one smokes at a child s day care or school.  If you smoke, ask your health care provider for ways to help you quit.  Ask family members to quit too.  Ask your health care provider for a referral to Quit Plan to help you quit smoking, or call 2-825-347PLAN.     Colds, Flu, Bronchitis  These are common triggers of asthma. Wash your hands often.  Don t touch your eyes, nose or mouth.  Get a flu shot every year.     Dust Mites  These are tiny bugs that live in cloth or carpet. They are too small to see. Wash sheets and blankets in hot water every week.   Encase pillows and mattress in dust mite proof covers.  Avoid having carpet if you can. If you have carpet, vacuum weekly.   Use a dust mask and HEPA vacuum.   Pollen and Outdoor Mold  Some people are allergic to trees, grass, or weed pollen, or molds. Try to keep your windows closed.  Limit time out doors when pollen count is high.   Ask you health care provider about taking medicine during allergy season.     Animal Dander  Some people are allergic to skin flakes, urine or saliva from pets with fur or feathers. Keep pets with fur  or feathers out of your home.    If you can t keep the pet outdoors, then keep the pet out of your bedroom.  Keep the bedroom door closed.  Keep pets off cloth furniture and away from stuffed toys.     Mice, Rats, and Cockroaches  Some people are allergic to the waste from these pests.   Cover food and garbage.  Clean up spills and food crumbs.  Store grease in the refrigerator.   Keep food out of the bedroom.   Indoor Mold  This can be a trigger if your home has high moisture. Fix leaking faucets, pipes, or other sources of water.   Clean moldy surfaces.  Dehumidify basement if it is damp and smelly.   Smoke, Strong Odors, and Sprays  These can reduce air quality. Stay away from strong odors and sprays, such as perfume, powder, hair spray, paints, smoke incense, paint, cleaning products, candles and new carpet.   Exercise or Sports  Some people with asthma have this trigger. Be active!  Ask your doctor about taking medicine before sports or exercise to prevent symptoms.    Warm up for 5-10 minutes before and after sports or exercise.     Other Triggers of Asthma  Cold air:  Cover your nose and mouth with a scarf.  Sometimes laughing or crying can be a trigger.  Some medicines and food can trigger asthma.

## 2023-04-07 NOTE — PATIENT INSTRUCTIONS
Please call [facility]: [phone] to schedule your liver MRI for around 8/27/23.

## 2023-04-07 NOTE — PROGRESS NOTES
SUBJECTIVE:   CC: Raphael is an 58 year old who presents for preventative health visit.       2023     4:04 PM   Additional Questions   Roomed by lopez   Accompanied by self     History of Present Illness       Mental Health Follow-up:  Patient presents to follow-up on Depression.Patient's depression since last visit has been:  Better  The patient is not having other symptoms associated with depression.      Any significant life events: No  Patient is not feeling anxious or having panic attacks.  Patient has no concerns about alcohol or drug use.    He eats 0-1 servings of fruits and vegetables daily.He consumes 0 sweetened beverage(s) daily.He exercises with enough effort to increase his heart rate 9 or less minutes per day.  He exercises with enough effort to increase his heart rate 3 or less days per week.   He is taking medications regularly.    Today's PHQ-9         PHQ-9 Total Score: 9    PHQ-9 Q9 Thoughts of better off dead/self-harm past 2 weeks :   Not at all             2020 2021 3/29/2021 3/30/2021 2021 6/15/2022   PHQ-9 SCORE         PHQ-9 Total Score 4  7  3  2  2  6       2022 2023 2/10/2023 3/31/2023   PHQ-9 SCORE       PHQ-9 Total Score 6  14  11  9    PHQ-9 Total Score    9          Today's PHQ-2 Score:       3/31/2023    10:09 AM   PHQ-2 (  [employer])   PHQ-2 Score Incomplete       Have you ever done Advance Care Planning? (For example, a Health Directive, POLST, or a discussion with a medical provider or your loved ones about your wishes):     Social History     Tobacco Use     Smoking status: Former     Types: Cigarettes     Quit date: 1993     Years since quittin.6     Passive exposure: Never     Smokeless tobacco: Never   Vaping Use     Vaping status: Never Used   Substance Use Topics     Alcohol use: Yes     Alcohol/week: 0.0 - 4.0 standard drinks of alcohol     Comment: beer - occ              View : No data to display.                 Past medical,  "family, and social histories, medications, and allergies are reviewed and updated in [facility].     Review of Systems      OBJECTIVE:   /71 (BP Location: Left arm, Patient Position: Chair, Cuff Size: Adult Regular)   Pulse 63   Temp 97.7  F (36.5  C) (Tympanic)   Resp 16   Ht 1.676 m (5' 6\")   Wt 67.6 kg (149 lb)   SpO2 99%   BMI 24.05 kg/m      Physical Exam  GENERAL: healthy, alert and no distress  EYES: Eyes grossly normal to inspection, PERRL and conjunctivae and sclerae normal  HENT: ear canals and TM's normal, nose and mouth without ulcers or lesions  NECK: no adenopathy, no asymmetry, masses, or scars and thyroid normal to palpation  RESP: lungs clear to auscultation - no rales, rhonchi or wheezes  CV: regular rate and rhythm, normal S1 S2, no S3 or S4, no murmur, click or rub, no peripheral edema and peripheral pulses strong  ABDOMEN: soft, nontender, no hepatosplenomegaly, no masses and bowel sounds normal   (male): normal male genitalia without lesions or urethral discharge, no hernia  MS: no gross musculoskeletal defects noted, no edema  SKIN: no suspicious lesions or rashes  NEURO: Normal strength and tone, mentation intact and speech normal. Reflexes normal and symmetric in the upper and lower extremities.   PSYCH: mentation appears normal, affect normal/bright      Office Visit on 02/17/2023   Component Date Value Ref Range Status     Hepatitis C Antibody 02/17/2023 Nonreactive  Nonreactive Final     Bilirubin Total 02/17/2023 0.1 (L)  0.2 - 1.3 mg/dL Final     Bilirubin Direct 02/17/2023 <0.1  0.0 - 0.2 mg/dL Final     Protein Total 02/17/2023 7.4  6.8 - 8.8 g/dL Final     Albumin 02/17/2023 4.0  3.4 - 5.0 g/dL Final     Alkaline Phosphatase 02/17/2023 69  40 - 150 U/L Final     AST 02/17/2023 15  0 - 45 U/L Final     ALT 02/17/2023 29  0 - 70 U/L Final        ASSESSMENT/PLAN:   (Z00.00) Routine general medical examination at a health care facility  (primary encounter diagnosis)  Comment: " Negative screening exam; up-to-date on preventive services except for this vaccine.  Plan: HEPATITIS B VACCINE ADULT 3 DOSE IM         (ENGERIX-B/RECOMBIVAX HB)        follow up in 1 year    (F41.9) Anxiety  (F33.0) Major depressive disorder, recurrent episode, mild (H)  Comment: mild improvement, and the patient thinks that has mostly occurred in the last week or so, so there is probably more room for improvement even if the medication stays the same.  Plan: buPROPion (WELLBUTRIN SR) 150 MG 12 hr tablet,         PARoxetine (PAXIL) 30 MG tablet        Return in about 4 months (around 8/17/2023) for recheck depression, recheck medications, or sooner if symptoms worsen.      (R93.2) Abnormal findings on diagnostic imaging of liver  Comment: Probable benign cysts, hemangiomas, etc., but he still needs a recheck.  Plan: MR Liver w/o & w Contrast            (J45.20) Intermittent asthma without complication, unspecified asthma severity  Comment: stable  Plan: Asthma Action Plan (AAP)        Recheck ACT in 4 months    (Z12.5) Screening for prostate cancer  Comment: indications for screening discussed with the patient   Plan: Prostate Specific Antigen Screen            (Z13.220) Screening for lipid disorders  Comment:   Plan: Lipid panel reflex to direct LDL Non-fasting            (Z13.1) Screening for diabetes mellitus  Comment: non-fasting  Plan: Glucose            (Z23) Need for vaccination  Comment: Hep-B #1  Plan: HEPATITIS B VACCINE ADULT 3 DOSE IM         (ENGERIX-B/RECOMBIVAX HB)             COUNSELING:   Reviewed preventive health counseling, as reflected in patient instructions  Special attention given to:        Immunizations    Vaccinated for: Hepatitis B             Prostate cancer screening        He reports that he quit smoking about 29 years ago. His smoking use included cigarettes. He has never been exposed to tobacco smoke. He has never used smokeless tobacco.        MD [facility]  [facility]    This document serves as a record of the services and decisions personally performed and made by Dr. Belcher. It was created on his behalf by Shen Fischer, a trained medical scribe. The creation of this document is based the provider's statements to the medical scribe.  Shen Fischer,  5:25 PM

## 2023-04-15 DIAGNOSIS — G40.209 LOCALIZATION-RELATED PARTIAL EPILEPSY WITH COMPLEX PARTIAL SEIZURES (H): ICD-10-CM

## 2023-04-18 NOTE — TELEPHONE ENCOUNTER
PHENYTOIN SODIUM 100MG EXT CAPSULES   Take 2 capsules (200 mg) by mouth 2 times daily   Last Written Prescription Date:  10/19/22  Last Fill Quantity: 360,   # refills: 1  Last Office Visit : 8/13/21  Future Office visit:  1 year    Routing refill request to provider for review/approval because:  Outside of timeframe for clinic visit> recommeneded one year on 8/13/21

## 2023-04-19 DIAGNOSIS — G40.209 LOCALIZATION-RELATED PARTIAL EPILEPSY WITH COMPLEX PARTIAL SEIZURES (H): ICD-10-CM

## 2023-04-19 RX ORDER — PHENYTOIN SODIUM 100 MG/1
CAPSULE, EXTENDED RELEASE ORAL
Qty: 360 CAPSULE | OUTPATIENT
Start: 2023-04-19

## 2023-04-19 RX ORDER — PHENYTOIN SODIUM 100 MG/1
200 CAPSULE, EXTENDED RELEASE ORAL 2 TIMES DAILY
Qty: 120 CAPSULE | Refills: 5 | Status: SHIPPED | OUTPATIENT
Start: 2023-04-19 | End: 2023-05-12

## 2023-05-04 ENCOUNTER — MYC MEDICAL ADVICE (OUTPATIENT)
Dept: FAMILY MEDICINE | Facility: CLINIC | Age: 58
End: 2023-05-04
Payer: COMMERCIAL

## 2023-05-05 ENCOUNTER — TELEPHONE (OUTPATIENT)
Dept: NEUROLOGY | Facility: CLINIC | Age: 58
End: 2023-05-05
Payer: COMMERCIAL

## 2023-05-05 NOTE — TELEPHONE ENCOUNTER
Pt is due for follow-up and has not been seen since 2021. He was informed that he will need to see provider again before any orders are placed. Pt reports that the he has some memory concerns and would like to discuss this as well. Scheduled for follow-up on 5/12/23 at 10am with Dr. Mix.       Lissette Haider, RNCC  Neurology/Neurosurgery

## 2023-05-12 ENCOUNTER — OFFICE VISIT (OUTPATIENT)
Dept: NEUROLOGY | Facility: CLINIC | Age: 58
End: 2023-05-12
Payer: COMMERCIAL

## 2023-05-12 VITALS
OXYGEN SATURATION: 100 % | WEIGHT: 149 LBS | HEART RATE: 60 BPM | BODY MASS INDEX: 23.95 KG/M2 | HEIGHT: 66 IN | SYSTOLIC BLOOD PRESSURE: 122 MMHG | DIASTOLIC BLOOD PRESSURE: 76 MMHG

## 2023-05-12 DIAGNOSIS — F33.1 MODERATE EPISODE OF RECURRENT MAJOR DEPRESSIVE DISORDER (H): Primary | ICD-10-CM

## 2023-05-12 DIAGNOSIS — G40.209 LOCALIZATION-RELATED PARTIAL EPILEPSY WITH COMPLEX PARTIAL SEIZURES (H): ICD-10-CM

## 2023-05-12 DIAGNOSIS — R41.3 MEMORY LOSS: ICD-10-CM

## 2023-05-12 DIAGNOSIS — F41.9 ANXIETY: ICD-10-CM

## 2023-05-12 LAB — PHENYTOIN SERPL-MCNC: 24.7 UG/ML

## 2023-05-12 PROCEDURE — 80185 ASSAY OF PHENYTOIN TOTAL: CPT | Performed by: PSYCHIATRY & NEUROLOGY

## 2023-05-12 PROCEDURE — 80186 ASSAY OF PHENYTOIN FREE: CPT | Mod: 90 | Performed by: PSYCHIATRY & NEUROLOGY

## 2023-05-12 PROCEDURE — 99214 OFFICE O/P EST MOD 30 MIN: CPT | Performed by: PSYCHIATRY & NEUROLOGY

## 2023-05-12 PROCEDURE — 36415 COLL VENOUS BLD VENIPUNCTURE: CPT | Performed by: PSYCHIATRY & NEUROLOGY

## 2023-05-12 PROCEDURE — 99000 SPECIMEN HANDLING OFFICE-LAB: CPT | Performed by: PSYCHIATRY & NEUROLOGY

## 2023-05-12 RX ORDER — ANTICAVITY FLUORIDE 0.2 MG/ML
1 MOUTHWASH ORAL 2 TIMES DAILY
Qty: 180 TABLET | Refills: 3 | Status: SHIPPED | OUTPATIENT
Start: 2023-05-12 | End: 2024-05-03

## 2023-05-12 RX ORDER — PHENYTOIN SODIUM 100 MG/1
200 CAPSULE, EXTENDED RELEASE ORAL 2 TIMES DAILY
Qty: 360 CAPSULE | Refills: 3 | Status: SHIPPED | OUTPATIENT
Start: 2023-05-12 | End: 2023-06-18

## 2023-05-12 NOTE — LETTER
2023         RE: Raphael Lizarraga  [address]  [place] [address]        Dear Colleague,    Thank you for referring your patient, Raphael Lizarraga, to the [facility] [place]. Please see a copy of my visit note below.       NEUROLOGY PROGRESS NOTE   [facility]        Patient: Raphael Lizarraga  : 1965  Age: 58 year old  Today's Office Visit: May 12, 2023    History of present illness:     Mr. Lizarraga is participating in this virtual visit for a follow up on his epilepsy.  He did not have any seizures since last visit 2021.  He is taking Dilantin 200 mg bid. He is taking Ca/Vit D. Denies dizziness. He has chronic imbalance. Denies dizziness, double/blurred vision or other side effects.     Depression and anxiety: he is taking paroxetine 30 mg daily and bupropion 150 mg daily. These are prescribed by his PCP. He feels he is depressed all the time and is irritable. It has been like this for a long time.  He has been getting forgetful.    He feels his short-term memory is progressively getting worse.  Has trouble recalling names.  He denies family history of dementia.        Current Outpatient Medications   Medication Sig Dispense Refill    albuterol (PROAIR HFA/PROVENTIL HFA/VENTOLIN HFA) 108 (90 Base) MCG/ACT inhaler INHALE 2 PUFFS INTO THE LUNGS EVERY 4 HOURS AS NEEDED FOR ASTHMA SYMPTOMS 8.5 g 1    buPROPion (WELLBUTRIN SR) 150 MG 12 hr tablet Take 1 tablet (150 mg) by mouth every morning for depression and anxiety prevention. 90 tablet 1    calcium-vitamin D (OSCAL) 250-125 MG-UNIT TABS per tablet Take 1 tablet by mouth 2 times daily      PARoxetine (PAXIL) 30 MG tablet Take 1 tablet (30 mg) by mouth At Bedtime for depression and anxiety prevention 90 tablet 1    phenytoin (DILANTIN) 100 MG ER capsule Take 2 capsules (200 mg) by mouth 2 times daily 120 capsule 5     Exam:    /76 (BP Location: Right arm, Patient Position: Sitting, Cuff Size: Adult Regular)    "Pulse 60   Ht 1.676 m (5' 6\")   Wt 67.6 kg (149 lb)   SpO2 100%   BMI 24.05 kg/m       Wt Readings from Last 5 Encounters:   05/12/23 67.6 kg (149 lb)   04/07/23 67.6 kg (149 lb)   02/17/23 68.9 kg (151 lb 12.8 oz)   02/02/23 67.1 kg (148 lb)   01/27/23 68.8 kg (151 lb 9.6 oz)     General Appearance:   NEUROLOGICAL EXAM:  Mental status: A & Ox4, didn't recall the president,  until I gave him multiple choice. Recalls 0/4 in 5 min.   Gait:  Steady, slight trouble with tandem gait  Attention Span:  Normal  R upper quadrantanopia, Rt lower quadrant>Rt upper  Language/speech: no aphasia or dysarthria  Extraocular Movements:  Normal  Coordination:  Normal finger-to-nose  Facial Strength:  Normal  Limb Strength: 5/5 biltaerally  Limb Tone:  Normal    Assessment/Plan:     #1 focal epilepsy, status post left anterior temporal lobectomy in 1993.  Seizures have been controlled on Dilantin monotherapy.  He is taking calcium and vitamin D to prevent osteoporosis.     #2  Progressive memory loss: He feels his short-term memory has been progressively getting worse.  He denies history of dementia. I suggested to do a neuropsychological evaluation.      #3 depression and anxiety: The patient was started on paroxetine and buspirone by his primary care physician, still feels his symptoms are not adequately improved. Depression may play a role in worsening his memory to some degree.    - Continue Dilantin as before    -  Obtain Dilantin level     - Neuropsychological evaluation     - brain MRI    - Referral to psychiatry     - Follow up in 6 months      As described above, I met with the patient for 35 minutes and during this time counseling was greater than 50% of the visit time.  Ml Armstrong MD        "

## 2023-05-12 NOTE — PROGRESS NOTES
"   NEUROLOGY PROGRESS NOTE   [facility]        Patient: Raphael Lizarraga  : 1965  Age: 58 year old  Today's Office Visit: May 12, 2023    History of present illness:     Mr. Lizarraga is participating in this virtual visit for a follow up on his epilepsy.  He did not have any seizures since last visit 2021.  He is taking Dilantin 200 mg bid. He is taking Ca/Vit D. Denies dizziness. He has chronic imbalance. Denies dizziness, double/blurred vision or other side effects.     Depression and anxiety: he is taking paroxetine 30 mg daily and bupropion 150 mg daily. These are prescribed by his PCP. He feels he is depressed all the time and is irritable. It has been like this for a long time.  He has been getting forgetful.    He feels his short-term memory is progressively getting worse.  Has trouble recalling names.  He denies family history of dementia.        Current Outpatient Medications   Medication Sig Dispense Refill     albuterol (PROAIR HFA/PROVENTIL HFA/VENTOLIN HFA) 108 (90 Base) MCG/ACT inhaler INHALE 2 PUFFS INTO THE LUNGS EVERY 4 HOURS AS NEEDED FOR ASTHMA SYMPTOMS 8.5 g 1     buPROPion (WELLBUTRIN SR) 150 MG 12 hr tablet Take 1 tablet (150 mg) by mouth every morning for depression and anxiety prevention. 90 tablet 1     calcium-vitamin D (OSCAL) 250-125 MG-UNIT TABS per tablet Take 1 tablet by mouth 2 times daily       PARoxetine (PAXIL) 30 MG tablet Take 1 tablet (30 mg) by mouth At Bedtime for depression and anxiety prevention 90 tablet 1     phenytoin (DILANTIN) 100 MG ER capsule Take 2 capsules (200 mg) by mouth 2 times daily 120 capsule 5     Exam:    /76 (BP Location: Right arm, Patient Position: Sitting, Cuff Size: Adult Regular)   Pulse 60   Ht 1.676 m (5' 6\")   Wt 67.6 kg (149 lb)   SpO2 100%   BMI 24.05 kg/m       Wt Readings from Last 5 Encounters:   23 67.6 kg (149 lb)   23 67.6 kg (149 lb)   23 68.9 kg (151 lb 12.8 oz)   23 67.1 kg (148 lb)   23 " 68.8 kg (151 lb 9.6 oz)     General Appearance:   NEUROLOGICAL EXAM:  Mental status: A & Ox4, didn't recall the president,  until I gave him multiple choice. Recalls 0/4 in 5 min.   Gait:  Steady, slight trouble with tandem gait  Attention Span:  Normal  R upper quadrantanopia, Rt lower quadrant>Rt upper  Language/speech: no aphasia or dysarthria  Extraocular Movements:  Normal  Coordination:  Normal finger-to-nose  Facial Strength:  Normal  Limb Strength: 5/5 biltaerally  Limb Tone:  Normal    Assessment/Plan:     #1 focal epilepsy, status post left anterior temporal lobectomy in 1993.  Seizures have been controlled on Dilantin monotherapy.  He is taking calcium and vitamin D to prevent osteoporosis.     #2  Progressive memory loss: He feels his short-term memory has been progressively getting worse.  He denies history of dementia. I suggested to do a neuropsychological evaluation.      #3 depression and anxiety: The patient was started on paroxetine and buspirone by his primary care physician, still feels his symptoms are not adequately improved. Depression may play a role in worsening his memory to some degree.    - Continue Dilantin as before    -  Obtain Dilantin level     - Neuropsychological evaluation     - brain MRI    - Referral to psychiatry     - Follow up in 6 months      As described above, I met with the patient for 35 minutes and during this time counseling was greater than 50% of the visit time.  Ml Armstrong MD

## 2023-05-13 LAB — PHENYTOIN FREE SERPL-MCNC: 1.7 UG/ML

## 2023-05-19 ENCOUNTER — MYC MEDICAL ADVICE (OUTPATIENT)
Dept: NEUROLOGY | Facility: CLINIC | Age: 58
End: 2023-05-19
Payer: COMMERCIAL

## 2023-05-19 DIAGNOSIS — G40.209 LOCALIZATION-RELATED PARTIAL EPILEPSY WITH COMPLEX PARTIAL SEIZURES (H): ICD-10-CM

## 2023-06-07 ENCOUNTER — HOSPITAL ENCOUNTER (OUTPATIENT)
Dept: MRI IMAGING | Facility: CLINIC | Age: 58
Discharge: HOME | End: 2023-06-07
Attending: PSYCHIATRY & NEUROLOGY | Admitting: PSYCHIATRY & NEUROLOGY
Payer: COMMERCIAL

## 2023-06-07 DIAGNOSIS — G40.209 LOCALIZATION-RELATED PARTIAL EPILEPSY WITH COMPLEX PARTIAL SEIZURES (H): ICD-10-CM

## 2023-06-07 PROCEDURE — 70553 MRI BRAIN STEM W/O & W/DYE: CPT

## 2023-06-07 PROCEDURE — 255N000002 HC RX 255 OP 636: Performed by: PSYCHIATRY & NEUROLOGY

## 2023-06-07 PROCEDURE — A9585 GADOBUTROL INJECTION: HCPCS | Performed by: PSYCHIATRY & NEUROLOGY

## 2023-06-07 PROCEDURE — 70553 MRI BRAIN STEM W/O & W/DYE: CPT | Mod: 26 | Performed by: RADIOLOGY

## 2023-06-07 RX ORDER — GADOBUTROL 604.72 MG/ML
7 INJECTION INTRAVENOUS ONCE
Status: COMPLETED | OUTPATIENT
Start: 2023-06-07 | End: 2023-06-07

## 2023-06-07 RX ADMIN — GADOBUTROL 7 ML: 604.72 INJECTION INTRAVENOUS at 09:28

## 2023-06-18 RX ORDER — PHENYTOIN SODIUM 100 MG/1
CAPSULE, EXTENDED RELEASE ORAL
Qty: 270 CAPSULE | Refills: 3 | Status: SHIPPED | OUTPATIENT
Start: 2023-06-18 | End: 2023-07-18

## 2023-06-25 ENCOUNTER — MYC MEDICAL ADVICE (OUTPATIENT)
Dept: FAMILY MEDICINE | Facility: CLINIC | Age: 58
End: 2023-06-25
Payer: COMMERCIAL

## 2023-06-28 NOTE — TELEPHONE ENCOUNTER
[facility] Call Center    Phone Message    May a detailed message be left on voicemail: yes     Reason for Call: Order(s): Other:   Reason for requested: MRI Brain  Date needed: ASAP  Provider name: Nathaniel    Patient is calling to get order for Brain MRI. Please contact patient back when this has been ordered so he can schedule.   [phone]      Action Taken: Message routed to:  Other:  neurology    Travel Screening: Not Applicable                                                                         [de-identified] : General appearance the patient is unremarkable, well developed, well nourished, well groomed with no deformities.  Patient is alert and oriented.  Patient is able to communicate clearly.  Visible skin on the head, face, right upper extremity, left upper extremity and bilateral lower extremities are normal showing no rashes, lesions or ulcers.  Normal peripheral vascular system.  Normal coordination, mood and affect.  [Left] : left knee [NL (0)] : extension 0 degrees [5___] : hamstring 5[unfilled]/5 [Negative] : negative posterior draw [] : ambulation with cane [[id number]] : flexion 120 degrees

## 2023-06-30 ASSESSMENT — SLEEP AND FATIGUE QUESTIONNAIRES
HOW LIKELY ARE YOU TO NOD OFF OR FALL ASLEEP WHILE LYING DOWN TO REST IN THE AFTERNOON WHEN CIRCUMSTANCES PERMIT: MODERATE CHANCE OF DOZING
HOW LIKELY ARE YOU TO NOD OFF OR FALL ASLEEP WHILE SITTING AND READING: HIGH CHANCE OF DOZING
HOW LIKELY ARE YOU TO NOD OFF OR FALL ASLEEP IN A CAR, WHILE STOPPED FOR A FEW MINUTES IN TRAFFIC: SLIGHT CHANCE OF DOZING
HOW LIKELY ARE YOU TO NOD OFF OR FALL ASLEEP WHILE SITTING INACTIVE IN A PUBLIC PLACE: HIGH CHANCE OF DOZING
HOW LIKELY ARE YOU TO NOD OFF OR FALL ASLEEP WHILE WATCHING TV: MODERATE CHANCE OF DOZING
HOW LIKELY ARE YOU TO NOD OFF OR FALL ASLEEP WHILE SITTING QUIETLY AFTER LUNCH WITHOUT ALCOHOL: WOULD NEVER DOZE
HOW LIKELY ARE YOU TO NOD OFF OR FALL ASLEEP WHILE SITTING AND TALKING TO SOMEONE: WOULD NEVER DOZE
HOW LIKELY ARE YOU TO NOD OFF OR FALL ASLEEP WHEN YOU ARE A PASSENGER IN A CAR FOR AN HOUR WITHOUT A BREAK: WOULD NEVER DOZE

## 2023-07-07 ENCOUNTER — OFFICE VISIT (OUTPATIENT)
Dept: SLEEP MEDICINE | Facility: CLINIC | Age: 58
End: 2023-07-07
Payer: COMMERCIAL

## 2023-07-07 VITALS
WEIGHT: 144 LBS | SYSTOLIC BLOOD PRESSURE: 115 MMHG | HEIGHT: 67 IN | BODY MASS INDEX: 22.6 KG/M2 | HEART RATE: 55 BPM | DIASTOLIC BLOOD PRESSURE: 70 MMHG | OXYGEN SATURATION: 100 %

## 2023-07-07 DIAGNOSIS — G47.33 OSA (OBSTRUCTIVE SLEEP APNEA): Primary | ICD-10-CM

## 2023-07-07 PROCEDURE — 99214 OFFICE O/P EST MOD 30 MIN: CPT | Performed by: PHYSICIAN ASSISTANT

## 2023-07-07 NOTE — NURSING NOTE
"Chief Complaint   Patient presents with     CPAP Follow Up       Initial There were no vitals taken for this visit. Estimated body mass index is 24.05 kg/m  as calculated from the following:    Height as of 5/12/23: 1.676 m (5' 6\").    Weight as of 5/12/23: 67.6 kg (149 lb).    Medication Reconciliation: complete    Neck circumference: 13.7 inches / 35 centimeters.    DME: [facility] in [place]    Lilly Mayer CMA on 7/7/2023 at 2:04 PM     "

## 2023-07-07 NOTE — PATIENT INSTRUCTIONS
Your Body mass index is 22.55 kg/m .  Weight management is a personal decision.  If you are interested in exploring weight loss strategies, the following discussion covers the approaches that may be successful. Body mass index (BMI) is one way to tell whether you are at a healthy weight, overweight, or obese. It measures your weight in relation to your height.  A BMI of 18.5 to 24.9 is in the healthy range. A person with a BMI of 25 to 29.9 is considered overweight, and someone with a BMI of 30 or greater is considered obese. More than two-thirds of American adults are considered overweight or obese.  Being overweight or obese increases the risk for further weight gain. Excess weight may lead to heart disease and diabetes.  Creating and following plans for healthy eating and physical activity may help you improve your health.  Weight control is part of healthy lifestyle and includes exercise, emotional health, and healthy eating habits. Careful eating habits lifelong are the mainstay of weight control. Though there are significant health benefits from weight loss, long-term weight loss with diet alone may be very difficult to achieve- studies show long-term success with dietary management in less than 10% of people. Attaining a healthy weight may be especially difficult to achieve in those with severe obesity. In some cases, medications, devices and surgical management might be considered.  What can you do?  If you are overweight or obese and are interested in methods for weight loss, you should discuss this with your provider.     Consider reducing daily calorie intake by 500 calories.     Keep a food journal.     Avoiding skipping meals, consider cutting portions instead.    Diet combined with exercise helps maintain muscle while optimizing fat loss. Strength training is particularly important for building and maintaining muscle mass. Exercise helps reduce stress, increase energy, and improves fitness.  Increasing exercise without diet control, however, may not burn enough calories to loose weight.       Start walking three days a week 10-20 minutes at a time    Work towards walking thirty minutes five days a week     Eventually, increase the speed of your walking for 1-2 minutes at time    In addition, we recommend that you review healthy lifestyles and methods for weight loss available through the National Institutes of Health patient information sites:  http://win.niddk.nih.gov/publications/index.htm    And look into health and wellness programs that may be available through your health insurance provider, employer, local community center, or [employer]la club.

## 2023-07-07 NOTE — NURSING NOTE
DME orders have been automatically faxed to [facility] on 3/23/2023. 1 year appointment reminder will be sent via My Chart. Per provider order CPAP pressure was changed to 12-16 cmH20 manually an machine.   Beverly Moore, CMA

## 2023-07-07 NOTE — PROGRESS NOTES
Sleep Apnea - Follow-up Visit:    Impression/Plan:    Moderate Sleep apnea.  Tolerating PAP well. Daytime symptoms are improved.   AHI is elevated to 11 events per hour, but mostly central apnea  Will change to auto-CPAP 12-16 as he is maxing at 15 cm/H20.  Comprehensive DME order placed.     Raphael Lizarraga will follow up in about 1 year or sooner if AHI is consistently > 15 events.     30 minutes spent on day of encounter doing chart review,  history and exam, counseling, coordinating plan of care, documentation and further activities as noted above.      Shannan Rice PA-C  Sleep Medicine    History of Present Illness:  Chief Complaint   Patient presents with     CPAP Follow Up       Raphael Lizarraga presents for follow-up of their moderate sleep apnea, managed with CPAP.     He states that he was initially diagnosed with sleep apnea in ~2002. We don't have records of his diagnostic sleep study.     He underwent a titration study in 2008 (138#) and CPAP 10 cm/H20 was effective    10/24/2022 [facility] Sleep Study (150.0 lbs) - AHI 23.9 (central apnea/hypopnea index was 14.8 events per hour), RDI 27.6, Supine AHI 27.0, non-supine (non-REM) AHI was 2.8 (63 minutes sleep time non-supine) REM AHI 33.6, Low O2 84.0%, Time Spent less than 88% 0.5 minutes / Time Spent less than 89% 0.9 minutes. PLM index was 43.8 movements per hour. The PLM Arousal Index was 8.7 per hour.    Do you use a CPAP Machine at home: Yes  Overall, on a scale of 0-10 how would you rate your CPAP (0 poor, 10 great): 10    What type of mask do you use: Full Face Mask  Is your mask comfortable: Yes  If not, why:      Is your mask leaking: No  If yes, where do you feel it:    How many night per week does the mask leak (0-7):      Do you notice snoring with mask on: No  Do you notice gasping arousals with mask on: No  Are you having significant oral or nasal dryness: No  Is the pressure setting comfortable: Yes  If not, why:      What is your  typical bedtime: 8:00 -9pm  How long does it take you to go to sleep on PAP therapy: 1 hour  What time do you typically get out of bed for the day: 4am  How many hours on average per night are you using PAP therapy: 7  How many hours are you sleeping per night: 6  Do you feel well rested in the morning: Yes      ResMed   Auto-PAP 10 - 15 cmH2O 30 day usage data:    100% of days with > 4 hours of use. 0/30 days with no use.   Average use 6 hours and 42 minutes per day.   95%ile Leak 10.2 L/min.   CPAP 95% pressure 14.8 cm.   AHI 11.3 events per hour. Central index 6.3, obstructive index 3, RERA 1.8        EPWORTH SLEEPINESS SCALE         6/30/2023    10:04 AM    Brewster Sleepiness Scale ( MARLENI Clark  [id number]<br>ESS - [place]/English - Final version - 21 Nov 07 - [facility] [place].)   Sitting and reading High chance of dozing   Watching TV Moderate chance of dozing   Sitting, inactive in a public place (e.g. a theatre or a meeting) High chance of dozing   As a passenger in a car for an hour without a break Would never doze   Lying down to rest in the afternoon when circumstances permit Moderate chance of dozing   Sitting and talking to someone Would never doze   Sitting quietly after a lunch without alcohol Would never doze   In a car, while stopped for a few minutes in traffic Slight chance of dozing   Brewster Score (MC) 11   Brewster Score (Sleep) 11       INSOMNIA SEVERITY INDEX (SETH)          6/30/2023     9:59 AM   Insomnia Severity Index (SETH)   Difficulty falling asleep 2   Difficulty staying asleep 2   Problems waking up too early 2   How SATISFIED/DISSATISFIED are you with your CURRENT sleep pattern? 1   How NOTICEABLE to others do you think your sleep problem is in terms of impairing the quality of your life? 1   How WORRIED/DISTRESSED are you about your current sleep problem? 1   To what extent do you consider your sleep problem to INTERFERE with your daily functioning (e.g. daytime fatigue, mood,  ability to function at work/daily chores, concentration, memory, mood, etc.) CURRENTLY? 2   SETH Total Score 11       Guidelines for Scoring/Interpretation:  Total score categories:  0-7 = No clinically significant insomnia   8-14 = Subthreshold insomnia   15-21 = Clinical insomnia (moderate severity)  22-28 = Clinical insomnia (severe)  Used via courtesy of www.myhealth.va.gov with permission from Alex Garland PhD., [facility]        Past medical/surgical history, family history, social history, medications and allergies were reviewed.        Problem List:  Patient Active Problem List    Diagnosis Date Noted     Pseudophakia, Yag Caps, ou 01/15/2023     Priority: Medium     Posterior vitreous detachment, left eye 01/15/2023     Priority: Medium     Diverticulosis 11/07/2022     Priority: Medium     Chronic midline low back pain without sciatica 08/12/2022     Priority: Medium     Pain in thoracic spine 06/02/2014     Priority: Medium     CARDIOVASCULAR SCREENING; LDL GOAL LESS THAN 160 10/31/2010     Priority: Medium     Osteoporosis/osteopenia increased risk      Priority: Medium     due to long-term Dilantin use       Epilepsy (H)      Priority: Medium     Anxiety      Priority: Medium     NELIA (obstructive sleep apnea)- moderate, mixed (AHI 23)      Priority: Medium     He states that he was initially diagnosed with sleep apnea in ~2002. We don't have records of his diagnostic sleep study.   He underwent a titration study in 2008 (138#) and CPAP 10 cm/H20 was effective  10/24/2022 [facility] Sleep Study (150.0 lbs) - AHI 23.9 (central apnea/hypopnea index was 14.8 events per hour), RDI 27.6, Supine AHI 27.0, non-supine (non-REM) AHI was 2.8 (63 minutes sleep time non-supine) REM AHI 33.6, Low O2 84.0%, Time Spent ?88% 0.5 minutes / Time Spent ?89% 0.9 minutes. PLM index was 43.8 movements per hour. The PLM Arousal Index was 8.7 per hour.         Intermittent asthma      Priority: Medium      "Major depressive disorder, recurrent episode, mild (H) 03/09/2009     Priority: Medium     Relapse May 2011          /70   Pulse 55   Ht 1.702 m (5' 7\")   Wt 65.3 kg (144 lb)   SpO2 100%   BMI 22.55 kg/m      "

## 2023-07-28 ASSESSMENT — ANXIETY QUESTIONNAIRES
4. TROUBLE RELAXING: NOT AT ALL
GAD7 TOTAL SCORE: 4
6. BECOMING EASILY ANNOYED OR IRRITABLE: SEVERAL DAYS
5. BEING SO RESTLESS THAT IT IS HARD TO SIT STILL: NOT AT ALL
GAD7 TOTAL SCORE: 4
2. NOT BEING ABLE TO STOP OR CONTROL WORRYING: SEVERAL DAYS
3. WORRYING TOO MUCH ABOUT DIFFERENT THINGS: SEVERAL DAYS
7. FEELING AFRAID AS IF SOMETHING AWFUL MIGHT HAPPEN: SEVERAL DAYS
IF YOU CHECKED OFF ANY PROBLEMS ON THIS QUESTIONNAIRE, HOW DIFFICULT HAVE THESE PROBLEMS MADE IT FOR YOU TO DO YOUR WORK, TAKE CARE OF THINGS AT HOME, OR GET ALONG WITH OTHER PEOPLE: SOMEWHAT DIFFICULT
1. FEELING NERVOUS, ANXIOUS, OR ON EDGE: NOT AT ALL

## 2023-08-03 ASSESSMENT — PATIENT HEALTH QUESTIONNAIRE - PHQ9
SUM OF ALL RESPONSES TO PHQ QUESTIONS 1-9: 8
SUM OF ALL RESPONSES TO PHQ QUESTIONS 1-9: 8
10. IF YOU CHECKED OFF ANY PROBLEMS, HOW DIFFICULT HAVE THESE PROBLEMS MADE IT FOR YOU TO DO YOUR WORK, TAKE CARE OF THINGS AT HOME, OR GET ALONG WITH OTHER PEOPLE: SOMEWHAT DIFFICULT

## 2023-08-04 ENCOUNTER — OFFICE VISIT (OUTPATIENT)
Dept: FAMILY MEDICINE | Facility: CLINIC | Age: 58
End: 2023-08-04
Payer: COMMERCIAL

## 2023-08-04 VITALS
SYSTOLIC BLOOD PRESSURE: 124 MMHG | OXYGEN SATURATION: 99 % | RESPIRATION RATE: 18 BRPM | HEART RATE: 71 BPM | DIASTOLIC BLOOD PRESSURE: 77 MMHG | BODY MASS INDEX: 23.13 KG/M2 | TEMPERATURE: 97.6 F | WEIGHT: 147.4 LBS | HEIGHT: 67 IN

## 2023-08-04 DIAGNOSIS — F33.0 MAJOR DEPRESSIVE DISORDER, RECURRENT EPISODE, MILD (H): Primary | Chronic | ICD-10-CM

## 2023-08-04 DIAGNOSIS — F41.9 ANXIETY: Chronic | ICD-10-CM

## 2023-08-04 DIAGNOSIS — Z23 NEED FOR VACCINATION: ICD-10-CM

## 2023-08-04 PROCEDURE — 96127 BRIEF EMOTIONAL/BEHAV ASSMT: CPT | Performed by: FAMILY MEDICINE

## 2023-08-04 PROCEDURE — 99214 OFFICE O/P EST MOD 30 MIN: CPT | Mod: 25 | Performed by: FAMILY MEDICINE

## 2023-08-04 PROCEDURE — 90471 IMMUNIZATION ADMIN: CPT | Performed by: FAMILY MEDICINE

## 2023-08-04 PROCEDURE — 90746 HEPB VACCINE 3 DOSE ADULT IM: CPT | Performed by: FAMILY MEDICINE

## 2023-08-04 RX ORDER — BUPROPION HYDROCHLORIDE 300 MG/1
300 TABLET ORAL EVERY MORNING
Qty: 90 TABLET | Refills: 1 | Status: SHIPPED | OUTPATIENT
Start: 2023-08-04 | End: 2023-10-04

## 2023-08-04 RX ORDER — PAROXETINE 20 MG/1
20 TABLET, FILM COATED ORAL AT BEDTIME
Qty: 90 TABLET | Refills: 1 | Status: SHIPPED | OUTPATIENT
Start: 2023-08-04 | End: 2023-10-04

## 2023-08-04 ASSESSMENT — ASTHMA QUESTIONNAIRES
QUESTION_3 LAST FOUR WEEKS HOW OFTEN DID YOUR ASTHMA SYMPTOMS (WHEEZING, COUGHING, SHORTNESS OF BREATH, CHEST TIGHTNESS OR PAIN) WAKE YOU UP AT NIGHT OR EARLIER THAN USUAL IN THE MORNING: NOT AT ALL
QUESTION_1 LAST FOUR WEEKS HOW MUCH OF THE TIME DID YOUR ASTHMA KEEP YOU FROM GETTING AS MUCH DONE AT WORK, SCHOOL OR AT HOME: NONE OF THE TIME
ACT_TOTALSCORE: 24
QUESTION_2 LAST FOUR WEEKS HOW OFTEN HAVE YOU HAD SHORTNESS OF BREATH: NOT AT ALL
ACT_TOTALSCORE: 24
QUESTION_4 LAST FOUR WEEKS HOW OFTEN HAVE YOU USED YOUR RESCUE INHALER OR NEBULIZER MEDICATION (SUCH AS ALBUTEROL): ONCE A WEEK OR LESS
QUESTION_5 LAST FOUR WEEKS HOW WOULD YOU RATE YOUR ASTHMA CONTROL: COMPLETELY CONTROLLED

## 2023-08-04 ASSESSMENT — PAIN SCALES - GENERAL: PAINLEVEL: NO PAIN (0)

## 2023-08-04 NOTE — NURSING NOTE
Prior to immunization administration, verified patients identity using patient s name and date of birth. Please see Immunization Activity for additional information.     Screening Questionnaire for Adult Immunization    Are you sick today?   No   Do you have allergies to medications, food, a vaccine component or latex?   No   Have you ever had a serious reaction after receiving a vaccination?   No   Do you have a long-term health problem with heart, lung, kidney, or metabolic disease (e.g., diabetes), asthma, a blood disorder, no spleen, complement component deficiency, a cochlear implant, or a spinal fluid leak?  Are you on long-term aspirin therapy?   No   Do you have cancer, leukemia, HIV/AIDS, or any other immune system problem?   No   Do you have a parent, brother, or sister with an immune system problem?   No   In the past 3 months, have you taken medications that affect  your immune system, such as prednisone, other steroids, or anticancer drugs; drugs for the treatment of rheumatoid arthritis, Crohn s disease, or psoriasis; or have you had radiation treatments?   No   Have you had a seizure, or a brain or other nervous system problem?   No   During the past year, have you received a transfusion of blood or blood    products, or been given immune (gamma) globulin or antiviral drug?   No   For women: Are you pregnant or is there a chance you could become       pregnant during the next month?   No   Have you received any vaccinations in the past 4 weeks?   No     Immunization questionnaire answers were all negative.      Patient instructed to remain in clinic for 15 minutes afterwards, and to report any adverse reactions.     Screening performed by Lucinda Tapia MA on 8/4/2023 at 4:16 PM.

## 2023-08-04 NOTE — PROGRESS NOTES
Assessment & Plan     (F33.0) Major depressive disorder, recurrent episode, mild (H)  (primary encounter diagnosis)  (F41.9) Anxiety  Comment: Patient reports that he is doing better since last visit (he is surprised that his PHQ-9 is not more improved than it is). He does note having develop more erectile dysfunction this year which corresponds with his paroxetine dose increase in January.    Plan: PARoxetine (PAXIL) 20 MG tablet, buPROPion         (WELLBUTRIN XL) 300 MG 24 hr tablet        Decrease paroxetine back to 20 MG. Since there is further room improvement in general, I am increasing his bupropion as well. Return in about 2 months (around 10/4/2023) for recheck depression, using a video visit.      (Z23) Need for vaccination  Comment: #2  Plan: HEPATITIS B VACCINE ADULT 3 DOSE IM         (ENGERIX-B/RECOMBIVAX HB)        #3 on or after 9/29/23    Chava Belcher MD  [facility]    Jaime Lim is a 58 year old, presenting for the following health issues:   Follow Up, Depression, and Anxiety      History of Present Illness       Mental Health Follow-up:  Patient presents to follow-up on Depression & Anxiety.Patient's depression since last visit has been:  No change  The patient is not having other symptoms associated with depression.  Patient's anxiety since last visit has been:  No change  The patient is not having other symptoms associated with anxiety.  Any significant life events: No  Patient is not feeling anxious or having panic attacks.  Patient has no concerns about alcohol or drug use.    He eats 0-1 servings of fruits and vegetables daily.He consumes 1 sweetened beverage(s) daily.He exercises with enough effort to increase his heart rate 30 to 60 minutes per day.  He exercises with enough effort to increase his heart rate 3 or less days per week.   He is taking medications regularly.     Patient reports he has been feeling better and mentions he has been taking  "less naps during the day.     He reports erectile dysfunction that has became more notable this year which he believes is caused by his depression medications.           2/10/2023     5:06 PM 3/31/2023    10:09 AM 8/3/2023     4:50 PM   PHQ   PHQ-9 Total Score 11 9    9 8   Q9: Thoughts of better off dead/self-harm past 2 weeks Not at all Not at all    Not at all Not at all            Review of Systems         Objective    /77 (BP Location: Left arm, Patient Position: Sitting, Cuff Size: Adult Regular)   Pulse 71   Temp 97.6  F (36.4  C) (Tympanic)   Resp 18   Ht 1.702 m (5' 7\")   Wt 66.9 kg (147 lb 6.4 oz)   SpO2 99%   BMI 23.09 kg/m    Body mass index is 23.09 kg/m .  Physical Exam   GENERAL: healthy, alert and no distress  EYES: Eyes grossly normal to inspection, PERRL, EOMI, sclerae white and conjunctivae normal  MS: no gross musculoskeletal defects noted, no edema  SKIN: no suspicious lesions or rashes to visible skin  NEURO: Normal strength and tone, sensory exam grossly normal, mentation intact, oriented times 3 and cranial nerves 2-12 intact  PSYCH: mentation appears normal, affect normal/bright         This document serves as a record of the services and decisions personally performed and made by Dr. Belcher. It was created on his behalf by Luis Farias, a trained medical scribe. The creation of this document is based the provider's statements to the medical scribe.  Luis Farias,  4:22 PM                "

## 2023-08-25 ENCOUNTER — ANCILLARY PROCEDURE (OUTPATIENT)
Dept: MRI IMAGING | Facility: CLINIC | Age: 58
End: 2023-08-25
Attending: FAMILY MEDICINE
Payer: COMMERCIAL

## 2023-08-25 DIAGNOSIS — R93.2 ABNORMAL FINDINGS ON DIAGNOSTIC IMAGING OF LIVER: ICD-10-CM

## 2023-08-25 PROCEDURE — A9581 GADOXETATE DISODIUM INJ: HCPCS | Performed by: RADIOLOGY

## 2023-08-25 PROCEDURE — 74183 MRI ABD W/O CNTR FLWD CNTR: CPT | Mod: GC | Performed by: RADIOLOGY

## 2023-09-19 ENCOUNTER — TELEPHONE (OUTPATIENT)
Dept: NEUROPSYCHOLOGY | Facility: CLINIC | Age: 58
End: 2023-09-19

## 2023-09-19 NOTE — TELEPHONE ENCOUNTER
Due to a change in Dr. Hurley's schedule he will not be available on 11/22/2023. Need to reschedule this appointment.     Left Voicemail (1st Attempt) and Sent Mychart (1st Attempt) for the patient to call back and schedule the following:    Appointment type: Neuropsych Eval  Provider: Xavi (ok to use PAOs, MG or [facility])  Return date: As close to 11/22 as possible  Specialty phone number: [phone]  Additional appointment(s) needed: N/A  Additional Notes: N/A    Austin Coelho on 9/19/2023 at 4:10 PM

## 2023-09-21 NOTE — TELEPHONE ENCOUNTER
[facility] Call Center    Phone Message    May a detailed message be left on voicemail: yes     Reason for Call: Other: Patient was returning a call to schedule an appointment. He did not want to wait till April. Please call back to discuss.      Action Taken: Other: Neuropsychology    Travel Screening: Not Applicable

## 2023-09-22 ENCOUNTER — TELEPHONE (OUTPATIENT)
Dept: NEUROLOGY | Facility: CLINIC | Age: 58
End: 2023-09-22

## 2023-09-22 NOTE — TELEPHONE ENCOUNTER
Pt dropped off paperwork for M[employer] to fill out and sign regarding  license. Pt wants a call after paperwork is filled out so it can be mailed out. 9/22/23

## 2023-09-26 ENCOUNTER — TELEPHONE (OUTPATIENT)
Dept: NEUROPSYCHOLOGY | Facility: CLINIC | Age: 58
End: 2023-09-26

## 2023-09-26 NOTE — TELEPHONE ENCOUNTER
Pt returned phone call.  He states that someone in the clinic is offering him a sooner appointment than 1st available in [place].  1st available right now is May 1st.  Was unable to reach anyone in the clinic at time that Pt called.  Please call Pt back.  Thanks.

## 2023-09-26 NOTE — TELEPHONE ENCOUNTER
Left Voicemail (1st Attempt) and Sent Cecilia (1st Attempt) for the patient to call back and schedule the following:    Appointment type: reschedule Neuropsychology Eval  Provider: Dr. Guzmán   Return date: Around November mike to use [facility]NE or scehdule at  [facility]  Specialty phone number: [phone]  Additional appointment(s) needed: N/A  Additonal Notes:   -After some chart review it looks like an appointment with Dr. Guzmán would be more appropriate given their medical history. Go ahead and schedule with Dr. Vonnie mckeon to use [facility]'s and [facility] to get close to 11/22. If that doesn't work, then go ahead and schedule with any other provider (Everett delacruz) to get close to 11/22.    -LVM, and sent Vero Keller on 9/26/2023 at 9:34 AM

## 2023-09-27 ASSESSMENT — ANXIETY QUESTIONNAIRES
6. BECOMING EASILY ANNOYED OR IRRITABLE: SEVERAL DAYS
7. FEELING AFRAID AS IF SOMETHING AWFUL MIGHT HAPPEN: NOT AT ALL
GAD7 TOTAL SCORE: 1
1. FEELING NERVOUS, ANXIOUS, OR ON EDGE: NOT AT ALL
GAD7 TOTAL SCORE: 1
2. NOT BEING ABLE TO STOP OR CONTROL WORRYING: NOT AT ALL
5. BEING SO RESTLESS THAT IT IS HARD TO SIT STILL: NOT AT ALL
3. WORRYING TOO MUCH ABOUT DIFFERENT THINGS: NOT AT ALL
4. TROUBLE RELAXING: NOT AT ALL
IF YOU CHECKED OFF ANY PROBLEMS ON THIS QUESTIONNAIRE, HOW DIFFICULT HAVE THESE PROBLEMS MADE IT FOR YOU TO DO YOUR WORK, TAKE CARE OF THINGS AT HOME, OR GET ALONG WITH OTHER PEOPLE: NOT DIFFICULT AT ALL

## 2023-10-03 ASSESSMENT — PATIENT HEALTH QUESTIONNAIRE - PHQ9
SUM OF ALL RESPONSES TO PHQ QUESTIONS 1-9: 2
SUM OF ALL RESPONSES TO PHQ QUESTIONS 1-9: 2
10. IF YOU CHECKED OFF ANY PROBLEMS, HOW DIFFICULT HAVE THESE PROBLEMS MADE IT FOR YOU TO DO YOUR WORK, TAKE CARE OF THINGS AT HOME, OR GET ALONG WITH OTHER PEOPLE: NOT DIFFICULT AT ALL

## 2023-10-04 ENCOUNTER — VIRTUAL VISIT (OUTPATIENT)
Dept: FAMILY MEDICINE | Facility: CLINIC | Age: 58
End: 2023-10-04
Payer: COMMERCIAL

## 2023-10-04 DIAGNOSIS — F41.9 ANXIETY: Chronic | ICD-10-CM

## 2023-10-04 DIAGNOSIS — F33.0 MAJOR DEPRESSIVE DISORDER, RECURRENT EPISODE, MILD (H): Primary | Chronic | ICD-10-CM

## 2023-10-04 PROCEDURE — 99213 OFFICE O/P EST LOW 20 MIN: CPT | Mod: VID | Performed by: FAMILY MEDICINE

## 2023-10-04 RX ORDER — BUPROPION HYDROCHLORIDE 300 MG/1
300 TABLET ORAL EVERY MORNING
Qty: 90 TABLET | Refills: 1 | Status: SHIPPED | OUTPATIENT
Start: 2023-10-04 | End: 2024-03-31

## 2023-10-04 RX ORDER — PAROXETINE 20 MG/1
20 TABLET, FILM COATED ORAL AT BEDTIME
Qty: 90 TABLET | Refills: 1 | Status: SHIPPED | OUTPATIENT
Start: 2023-10-04 | End: 2024-05-13

## 2023-10-04 NOTE — PROGRESS NOTES
Answers submitted by the patient for this visit:  Patient Health Questionnaire (Submitted on 10/3/2023)  If you checked off any problems, how difficult have these problems made it for you to do your work, take care of things at home, or get along with other people?: Not difficult at all  PHQ9 TOTAL SCORE: 2  JOSE-7 (Submitted on 9/27/2023)  JOSE 7 TOTAL SCORE: 1  Depression / Anxiety Questionnaire (Submitted on 9/27/2023)  Chief Complaint: Chronic problems general questions HPI Form  Depression/Anxiety: Depression & Anxiety  Depression & Anxiety (Submitted on 9/27/2023)  Chief Complaint: Chronic problems general questions HPI Form  Status since last visit:: good  Anxiety since last: : good  Other associated symptoms of depression:: Yes  Other associated symotome: : No  Significant life event: : No  Anxious:: No  Current substance use:: No  General Questionnaire (Submitted on 9/27/2023)  Chief Complaint: Chronic problems general questions HPI Form  How many servings of fruits and vegetables do you eat daily?: 0-1  On average, how many sweetened beverages do you drink each day (Examples: soda, juice, sweet tea, etc.  Do NOT count diet or artificially sweetened beverages)?: 1  How many minutes a day do you exercise enough to make your heart beat faster?: 30 to 60  How many days a week do you exercise enough to make your heart beat faster?: 3 or less  How many days per week do you miss taking your medication?: 0  Raphael is a 58 year old who is being evaluated via a billable video visit.      How would you like to obtain your AVS? MyChart  If the video visit is dropped, the invitation should be resent by: Text to cell phone: [phone]  Will anyone else be joining your video visit? No          Assessment & Plan     (F33.0) Major depressive disorder, recurrent episode, mild (H24)  (primary encounter diagnosis)  (F41.9) Anxiety  Comment: both of these problems have significantly improved. His ED has improved as well,  although it has not resolved. He says it is not bad enough to need medicine like Viagra (but he can request that at anytime in the future).   Plan: buPROPion (WELLBUTRIN XL) 300 MG 24 hr tablet,         PARoxetine (PAXIL) 20 MG tablet        Continue current doses. I recommend against checking testosterone level at this time. Return in about 6 months (around 4/7/2024) for recheck depression, full physical, recheck medications.        10 minutes spent by me on the date of the encounter doing chart review, review of test results, patient visit, and documentation       Chava Belcher MD  [facility]    Jaime Lim is a 58 year old, presenting for the following health issues:  Depression and Anxiety        8/4/2023     3:36 PM   Additional Questions   Roomed by herve       History of Present Illness       Mental Health Follow-up:  Patient presents to follow-up on Depression & Anxiety.Patient's depression since last visit has been:  Good  The patient is having other symptoms associated with depression.  Patient's anxiety since last visit has been:  Good  The patient is not having other symptoms associated with anxiety.  Any significant life events: No  Patient is not feeling anxious or having panic attacks.  Patient has no concerns about alcohol or drug use.    He eats 0-1 servings of fruits and vegetables daily.He consumes 1 sweetened beverage(s) daily.He exercises with enough effort to increase his heart rate 30 to 60 minutes per day.  He exercises with enough effort to increase his heart rate 3 or less days per week.   He is taking medications regularly.         3/31/2023    10:09 AM 8/3/2023     4:50 PM 10/3/2023     5:56 PM   PHQ   PHQ-9 Total Score 9    9 8 2   Q9: Thoughts of better off dead/self-harm past 2 weeks Not at all    Not at all Not at all Not at all          2/10/2023     5:06 PM 7/28/2023     9:47 AM 9/27/2023    11:21 AM   JOSE-7 SCORE   Total Score 2 (minimal  anxiety) 4 (minimal anxiety) 1 (minimal anxiety)   Total Score 2 4 1       Patient reports his depression and anxiety symptoms have been improving since last visit with the dose changes. On the other hand he notes his erectile dysfunction has stayed the same.     Review of Systems         Objective           Vitals:  No vitals were obtained today due to virtual visit.    Physical Exam   GENERAL: Healthy, alert and no distress  EYES: Eyes grossly normal to inspection.  No discharge or erythema, or obvious scleral/conjunctival abnormalities.  RESP: No audible wheeze, cough, or visible cyanosis.  No visible retractions or increased work of breathing.    SKIN: Visible skin clear. No significant rash, abnormal pigmentation or lesions.  NEURO: Cranial nerves grossly intact.  Mentation and speech appropriate for age.  PSYCH: Mentation appears normal, affect normal/bright, judgement and insight intact, normal speech and appearance well-groomed.                Video-Visit Details    Type of service:  Video Visit     Originating Location (pt. Location): Home  Total video time: 7 minutes    Distant Location (provider location):  On-site  Platform used for Video Visit: [facility]    This document serves as a record of the services and decisions personally performed and made by Dr. Belcher. It was created on his behalf by Luis Farias, a trained medical scribe. The creation of this document is based the provider's statements to the medical scribe.  Luis Farias,  5:09 PM

## 2023-10-13 ENCOUNTER — IMMUNIZATION (OUTPATIENT)
Dept: NURSING | Facility: CLINIC | Age: 58
End: 2023-10-13
Payer: COMMERCIAL

## 2023-10-13 PROCEDURE — 91320 SARSCV2 VAC 30MCG TRS-SUC IM: CPT

## 2023-10-13 PROCEDURE — 90480 ADMN SARSCOV2 VAC 1/ONLY CMP: CPT

## 2023-10-25 ENCOUNTER — DOCUMENTATION ONLY (OUTPATIENT)
Dept: NEUROLOGY | Facility: CLINIC | Age: 58
End: 2023-10-25

## 2023-10-27 ENCOUNTER — TRANSFERRED RECORDS (OUTPATIENT)
Dept: HEALTH INFORMATION MANAGEMENT | Facility: CLINIC | Age: 58
End: 2023-10-27

## 2023-10-27 ENCOUNTER — TELEPHONE (OUTPATIENT)
Dept: SLEEP MEDICINE | Facility: CLINIC | Age: 58
End: 2023-10-27

## 2023-10-27 NOTE — TELEPHONE ENCOUNTER
Received VM from [facility] Rep with pt on line requesting appt. Called pt back and gave him the # for central scheduling.

## 2023-11-04 ENCOUNTER — OFFICE VISIT (OUTPATIENT)
Dept: URGENT CARE | Facility: URGENT CARE | Age: 58
End: 2023-11-04
Payer: COMMERCIAL

## 2023-11-04 VITALS
SYSTOLIC BLOOD PRESSURE: 137 MMHG | BODY MASS INDEX: 24.08 KG/M2 | OXYGEN SATURATION: 99 % | HEIGHT: 67 IN | TEMPERATURE: 97.5 F | RESPIRATION RATE: 16 BRPM | DIASTOLIC BLOOD PRESSURE: 77 MMHG | WEIGHT: 153.4 LBS | HEART RATE: 61 BPM

## 2023-11-04 DIAGNOSIS — J45.21 MILD INTERMITTENT ASTHMATIC BRONCHITIS WITH ACUTE EXACERBATION: Primary | ICD-10-CM

## 2023-11-04 PROCEDURE — 99214 OFFICE O/P EST MOD 30 MIN: CPT | Performed by: PHYSICIAN ASSISTANT

## 2023-11-04 RX ORDER — BENZONATATE 200 MG/1
200 CAPSULE ORAL 3 TIMES DAILY PRN
Qty: 30 CAPSULE | Refills: 0 | Status: SHIPPED | OUTPATIENT
Start: 2023-11-04 | End: 2023-11-14

## 2023-11-04 RX ORDER — AZITHROMYCIN 250 MG/1
TABLET, FILM COATED ORAL
Qty: 6 TABLET | Refills: 0 | Status: SHIPPED | OUTPATIENT
Start: 2023-11-04 | End: 2024-05-13

## 2023-11-04 RX ORDER — PREDNISONE 20 MG/1
40 TABLET ORAL DAILY
Qty: 14 TABLET | Refills: 0 | Status: SHIPPED | OUTPATIENT
Start: 2023-11-04 | End: 2023-11-11

## 2023-11-04 ASSESSMENT — ENCOUNTER SYMPTOMS
CARDIOVASCULAR NEGATIVE: 1
CHILLS: 0
PALPITATIONS: 0
CHEST TIGHTNESS: 0
SINUS PRESSURE: 0
SHORTNESS OF BREATH: 1
FATIGUE: 0
RHINORRHEA: 0
FEVER: 0
WHEEZING: 1
GASTROINTESTINAL NEGATIVE: 1
SORE THROAT: 0
COUGH: 1
SINUS PAIN: 0

## 2023-11-04 NOTE — PROGRESS NOTES
Jaime Lim is a 58 year old, presenting for the following health issues:  Asthma    HPI Acute Illness  Acute illness concerns:   Onset/Duration: 2weeks  Symptoms:  Fever: No  Chills/Sweats: No  Headache (location?): No  Sinus Pressure: No  Conjunctivitis:  No  Ear Pain: no  Rhinorrhea: No  Congestion: No  Sore Throat: No  Cough: YES-productive of yellow sputum, with shortness of breath  Wheeze: YES  Decreased Appetite: No  Nausea: No  Vomiting: No  Diarrhea: No  Dysuria/Freq.: No  Dysuria or Hematuria: No  Fatigue/Achiness: No  Sick/Strep Exposure: No.  Hx of asthma.  Non-smoker.  Therapies tried and outcome: rest,fluids,inhaler with minimal relief    Patient Active Problem List   Diagnosis    Epilepsy (H)    Anxiety    Major depressive disorder, recurrent episode, mild (H24)    NELIA (obstructive sleep apnea)- moderate, mixed (AHI 23)    Intermittent asthma    Osteoporosis/osteopenia increased risk    CARDIOVASCULAR SCREENING; LDL GOAL LESS THAN 160    Pain in thoracic spine    Chronic midline low back pain without sciatica    Diverticulosis    Pseudophakia, Yag Caps, ou    Posterior vitreous detachment, left eye     Current Outpatient Medications   Medication    albuterol (PROAIR HFA/PROVENTIL HFA/VENTOLIN HFA) 108 (90 Base) MCG/ACT inhaler    buPROPion (WELLBUTRIN XL) 300 MG 24 hr tablet    Calcium Carbonate-Vit D-Min (RA CALCIUM/VITAMIN D/MINERALS) 600-400 MG-UNIT TABS    calcium-vitamin D (OSCAL) 250-125 MG-UNIT TABS per tablet    PARoxetine (PAXIL) 20 MG tablet    phenytoin (DILANTIN) 100 MG ER capsule    phenytoin (DILANTIN) 30 MG ER capsule     No current facility-administered medications for this visit.      No Known Allergies    Review of Systems   Constitutional:  Negative for chills, fatigue and fever.   HENT:  Negative for congestion, ear discharge, ear pain, hearing loss, rhinorrhea, sinus pressure, sinus pain and sore throat.    Respiratory:  Positive for cough, shortness of breath and  "wheezing. Negative for chest tightness.    Cardiovascular: Negative.  Negative for chest pain, palpitations and peripheral edema.   Gastrointestinal: Negative.    All other systems reviewed and are negative.           Objective    /77   Pulse 61   Temp 97.5  F (36.4  C) (Tympanic)   Resp 16   Ht 1.702 m (5' 7\")   Wt 69.6 kg (153 lb 6.4 oz)   SpO2 99%   BMI 24.03 kg/m    Body mass index is 24.03 kg/m .  Physical Exam  Vitals and nursing note reviewed.   Constitutional:       General: He is not in acute distress.     Appearance: Normal appearance. He is normal weight. He is not ill-appearing.   HENT:      Head: Normocephalic and atraumatic.      Ears:      Comments: TMs are intact without any erythema or bulging bilaterally.  Airway is patent.     Nose: Nose normal.      Mouth/Throat:      Lips: Pink.      Mouth: Mucous membranes are moist.      Pharynx: Oropharynx is clear. Uvula midline. No pharyngeal swelling, oropharyngeal exudate, posterior oropharyngeal erythema or uvula swelling.      Tonsils: No tonsillar exudate or tonsillar abscesses.   Eyes:      General: No scleral icterus.     Conjunctiva/sclera: Conjunctivae normal.      Pupils: Pupils are equal, round, and reactive to light.   Neck:      Thyroid: No thyromegaly.   Cardiovascular:      Rate and Rhythm: Normal rate and regular rhythm.      Pulses: Normal pulses.      Heart sounds: Normal heart sounds, S1 normal and S2 normal. No murmur heard.     No friction rub. No gallop.   Pulmonary:      Effort: Pulmonary effort is normal. No tachypnea, accessory muscle usage, respiratory distress or retractions.      Breath sounds: Normal breath sounds and air entry. No stridor. No decreased breath sounds, wheezing, rhonchi or rales.   Musculoskeletal:      Cervical back: Normal range of motion and neck supple.   Lymphadenopathy:      Cervical: No cervical adenopathy.   Skin:     General: Skin is warm and dry.      Findings: No rash.   Neurological:      " Mental Status: He is alert and oriented to person, place, and time.   Psychiatric:         Mood and Affect: Mood normal.         Behavior: Behavior normal.         Thought Content: Thought content normal.         Judgment: Judgment normal.         Assessment/Plan:  Mild intermittent asthmatic bronchitis with acute exacerbation:  Will treat with zithromax X5days, [id number], tessalon perles, and continue with his albuterol inh as needed for symptoms.  Recommend treatment with rest, fluids and chicken soup. Tylenol/ibuprofen prn fever/pain.  Recheck in clinic if symptoms worsen or if symptoms do not improve.  To the ER if he develops hemoptysis, chest pain, fevers>102, worsening shortness of breath/wheezing.    -     azithromycin (ZITHROMAX) 250 MG tablet; 2 tablets the first day, then 1 tablet daily for the next 4 days  -     predniSONE (DELTASONE) 20 MG tablet; Take 2 tablets (40 mg) by mouth daily for 7 days  -     benzonatate (TESSALON) 200 MG capsule; Take 1 capsule (200 mg) by mouth 3 times daily as needed for cough        Kaitlin Vargas PA-C

## 2023-11-10 ENCOUNTER — OFFICE VISIT (OUTPATIENT)
Dept: NEUROLOGY | Facility: CLINIC | Age: 58
End: 2023-11-10
Payer: COMMERCIAL

## 2023-11-10 VITALS
HEART RATE: 70 BPM | SYSTOLIC BLOOD PRESSURE: 116 MMHG | HEIGHT: 67 IN | DIASTOLIC BLOOD PRESSURE: 68 MMHG | WEIGHT: 153 LBS | BODY MASS INDEX: 24.01 KG/M2

## 2023-11-10 DIAGNOSIS — G40.109 FOCAL EPILEPSY (H): Primary | ICD-10-CM

## 2023-11-10 DIAGNOSIS — R41.3 MEMORY LOSS: ICD-10-CM

## 2023-11-10 PROCEDURE — 99213 OFFICE O/P EST LOW 20 MIN: CPT | Performed by: PSYCHIATRY & NEUROLOGY

## 2023-11-10 NOTE — LETTER
"    11/10/2023         RE: Raphael Lizarraga  [address]  [place] [address]        Dear Colleague,    Thank you for referring your patient, Raphael Lizarraga, to the [facility] [place]. Please see a copy of my visit note below.     NEUROLOGY PROGRESS NOTE   [facility]    Patient:Raphael Lizarraga  : 1965  Age: 58 year old  Today's Office Visit: November 10, 2023    History of present illness:     Mr. Lizarraga is participating in this virtual visit for a follow up on his epilepsy.  He did not have any seizures since last visit 2021.  He is taking Dilantin 200 mg bid. He is taking Ca/Vit D. Denies dizziness.  He has chronic imbalance. Denies dizziness, double/blurred vision or other side effects.      Depression and anxiety: he is taking paroxetine 20 mg daily and bupropion  mg daily. These are prescribed by his PCP. His mood is better.     His short-term memory worsening, his most difficulty is with names and subjects.  He also says sometimes he is told by people that he \"mumble jumble\" when he talks, even though in his mind his speech is clear.  Neuropsychological evaluation is scheduled for 2023 with Dr. Matti Guzmán.      Social: he works at [employer].    Dilantin level 2023:  24.7/1.7    Brain MRI 2023:  Impression:  1. No acute intracranial pathology.  2. No focal lesion or structural abnormality to explain the patient's symptoms.  3. No abnormal intracranial enhancing lesion.  4. Few bifrontal foci of nonspecific white matter signal abnormality. Differential diagnosis includes sequela of infectious or inflammatory insults, vasculopathy or demyelinating disease.    Current Outpatient Medications   Medication Sig Dispense Refill    albuterol (PROAIR HFA/PROVENTIL HFA/VENTOLIN HFA) 108 (90 Base) MCG/ACT inhaler INHALE 2 PUFFS INTO THE LUNGS EVERY 4 HOURS AS NEEDED FOR ASTHMA SYMPTOMS 8.5 g 1    azithromycin (ZITHROMAX) 250 MG tablet 2 tablets the first day, " then 1 tablet daily for the next 4 days 6 tablet 0    benzonatate (TESSALON) 200 MG capsule Take 1 capsule (200 mg) by mouth 3 times daily as needed for cough 30 capsule 0    buPROPion (WELLBUTRIN XL) 300 MG 24 hr tablet Take 1 tablet (300 mg) by mouth every morning for depression & anxiety prevention 90 tablet 1    Calcium Carbonate-Vit D-Min (RA CALCIUM/VITAMIN D/MINERALS) 600-400 MG-UNIT TABS Take 1 tablet by mouth 2 times daily 180 tablet 3    calcium-vitamin D (OSCAL) 250-125 MG-UNIT TABS per tablet Take 1 tablet by mouth 2 times daily      PARoxetine (PAXIL) 20 MG tablet Take 1 tablet (20 mg) by mouth At Bedtime for depression and anxiety prevention 90 tablet 1    phenytoin (DILANTIN) 100 MG ER capsule Take 2 capsule in the morning and 2 caps in the evening for a total of 400 mg 360 capsule 3    phenytoin (DILANTIN) 30 MG ER capsule Take 1 capsule (30 mg) by mouth every morning Along with 100 mg in the morning (total 130 mg am) 90 capsule 3    predniSONE (DELTASONE) 20 MG tablet Take 2 tablets (40 mg) by mouth daily for 7 days 14 tablet 0     Exam:    There were no vitals taken for this visit.     Wt Readings from Last 5 Encounters:   11/04/23 69.6 kg (153 lb 6.4 oz)   08/04/23 66.9 kg (147 lb 6.4 oz)   07/07/23 65.3 kg (144 lb)   05/12/23 67.6 kg (149 lb)   04/07/23 67.6 kg (149 lb)     General Appearance: Alert, awake, cooperative, pleasant, NAD  Mental status: A & O x4, recalls 3/4.   Gait: steady  Attention Span:  Normal  Language/speech: no aphasia or dysarthria  Extraocular Movements:  Normal  Visual fields: right homonymous hemianopia  Coordination:  Normal finger to nose  Facial Strength:  Normal  Motor Exam: normal tone, bulk and strength 5/5 bilaterally    Assessment/Plan:     #1 focal epilepsy, status post left anterior temporal lobectomy in 1993.  Seizures have been controlled on Dilantin monotherapy.  He is taking calcium and vitamin D to prevent osteoporosis.   Last seizure was 9/2002. DMV form  needs to be filled.      #2  Progressive memory loss: He feels his short-term memory has been progressively getting worse.  He denies history of dementia.  MRI showed signal of left frontotemporal craniotomy and left anterior temporal lobectomy as well as nonspecific T2 hyperintensities in bifrontal white matter.  Neuropsychological evaluation is scheduled on 11/28/2023 with Dr. Matti Guzmán. .       #3 depression and anxiety: The patient is on paroxetine and buspirone by his primary care physician, still feels his symptoms are not adequately improved. Depression may play a role in worsening his memory to some degree. He is scheduled to see a mental health provider.     - Continue Dilantin as before     -  Obtain Dilantin level      - Neuropsychological evaluation      - Follow up in 6 months      As described above, I met with the patient for 25 minutes and during this time counseling was greater than 50% of the visit time.  Ml Armstrong MD

## 2023-11-10 NOTE — NURSING NOTE
"Raphael Lizarraga's goals for this visit include:   Chief Complaint   Patient presents with    RECHECK      return:6 month follow up       He requests these members of his care team be copied on today's visit information: yes    PCP: Chava Belcher    Referring Provider:  No referring provider defined for this encounter.    /68   Pulse 70   Ht 1.702 m (5' 7\")   Wt 69.4 kg (153 lb)   BMI 23.96 kg/m      Do you need any medication refills at today's visit? No  RAUL Garcia, CMA ([facility])      "

## 2023-11-10 NOTE — PROGRESS NOTES
" NEUROLOGY PROGRESS NOTE   [place]    Patient:Raphael Lizarraga  : 1965  Age: 58 year old  Today's Office Visit: November 10, 2023    History of present illness:     Mr. Lizarraga is participating in this virtual visit for a follow up on his epilepsy.  He did not have any seizures since last visit 2021.  He is taking Dilantin 200 mg bid. He is taking Ca/Vit D. Denies dizziness.  He has chronic imbalance. Denies dizziness, double/blurred vision or other side effects.      Depression and anxiety: he is taking paroxetine 20 mg daily and bupropion  mg daily. These are prescribed by his PCP. His mood is better.     His short-term memory worsening, his most difficulty is with names and subjects.  He also says sometimes he is told by people that he \"mumble jumble\" when he talks, even though in his mind his speech is clear.  Neuropsychological evaluation is scheduled for 2023 with Dr. Matti Guzmán.      Social: he works at [employer].    Dilantin level 2023:  24.7/1.7    Brain MRI 2023:  Impression:  1. No acute intracranial pathology.  2. No focal lesion or structural abnormality to explain the patient's symptoms.  3. No abnormal intracranial enhancing lesion.  4. Few bifrontal foci of nonspecific white matter signal abnormality. Differential diagnosis includes sequela of infectious or inflammatory insults, vasculopathy or demyelinating disease.    Current Outpatient Medications   Medication Sig Dispense Refill    albuterol (PROAIR HFA/PROVENTIL HFA/VENTOLIN HFA) 108 (90 Base) MCG/ACT inhaler INHALE 2 PUFFS INTO THE LUNGS EVERY 4 HOURS AS NEEDED FOR ASTHMA SYMPTOMS 8.5 g 1    azithromycin (ZITHROMAX) 250 MG tablet 2 tablets the first day, then 1 tablet daily for the next 4 days 6 tablet 0    benzonatate (TESSALON) 200 MG capsule Take 1 capsule (200 mg) by mouth 3 times daily as needed for cough 30 capsule 0    buPROPion (WELLBUTRIN XL) 300 MG 24 hr tablet Take 1 tablet (300 mg) by mouth every " morning for depression & anxiety prevention 90 tablet 1    Calcium Carbonate-Vit D-Min (RA CALCIUM/VITAMIN D/MINERALS) 600-400 MG-UNIT TABS Take 1 tablet by mouth 2 times daily 180 tablet 3    calcium-vitamin D (OSCAL) 250-125 MG-UNIT TABS per tablet Take 1 tablet by mouth 2 times daily      PARoxetine (PAXIL) 20 MG tablet Take 1 tablet (20 mg) by mouth At Bedtime for depression and anxiety prevention 90 tablet 1    phenytoin (DILANTIN) 100 MG ER capsule Take 2 capsule in the morning and 2 caps in the evening for a total of 400 mg 360 capsule 3    phenytoin (DILANTIN) 30 MG ER capsule Take 1 capsule (30 mg) by mouth every morning Along with 100 mg in the morning (total 130 mg am) 90 capsule 3    predniSONE (DELTASONE) 20 MG tablet Take 2 tablets (40 mg) by mouth daily for 7 days 14 tablet 0     Exam:    There were no vitals taken for this visit.     Wt Readings from Last 5 Encounters:   11/04/23 69.6 kg (153 lb 6.4 oz)   08/04/23 66.9 kg (147 lb 6.4 oz)   07/07/23 65.3 kg (144 lb)   05/12/23 67.6 kg (149 lb)   04/07/23 67.6 kg (149 lb)     General Appearance: Alert, awake, cooperative, pleasant, NAD  Mental status: A & O x4, recalls 3/4.   Gait: steady  Attention Span:  Normal  Language/speech: no aphasia or dysarthria  Extraocular Movements:  Normal  Visual fields: right homonymous hemianopia  Coordination:  Normal finger to nose  Facial Strength:  Normal  Motor Exam: normal tone, bulk and strength 5/5 bilaterally    Assessment/Plan:     #1 focal epilepsy, status post left anterior temporal lobectomy in 1993.  Seizures have been controlled on Dilantin monotherapy.  He is taking calcium and vitamin D to prevent osteoporosis.   Last seizure was 9/2002. DMV form needs to be filled.      #2  Progressive memory loss: He feels his short-term memory has been progressively getting worse.  He denies history of dementia.  MRI showed signal of left frontotemporal craniotomy and left anterior temporal lobectomy as well as  nonspecific T2 hyperintensities in bifrontal white matter.  Neuropsychological evaluation is scheduled on 11/28/2023 with Dr. Matti Guzmán. .       #3 depression and anxiety: The patient is on paroxetine and buspirone by his primary care physician, still feels his symptoms are not adequately improved. Depression may play a role in worsening his memory to some degree. He is scheduled to see a mental health provider.     - Continue Dilantin as before     -  Obtain Dilantin level      - Neuropsychological evaluation      - Follow up in 6 months      As described above, I met with the patient for 25 minutes and during this time counseling was greater than 50% of the visit time.  Ml Armstrong MD

## 2023-11-12 ASSESSMENT — PATIENT HEALTH QUESTIONNAIRE - PHQ9
SUM OF ALL RESPONSES TO PHQ QUESTIONS 1-9: 8
10. IF YOU CHECKED OFF ANY PROBLEMS, HOW DIFFICULT HAVE THESE PROBLEMS MADE IT FOR YOU TO DO YOUR WORK, TAKE CARE OF THINGS AT HOME, OR GET ALONG WITH OTHER PEOPLE: NOT DIFFICULT AT ALL
SUM OF ALL RESPONSES TO PHQ QUESTIONS 1-9: 8

## 2023-11-13 ENCOUNTER — VIRTUAL VISIT (OUTPATIENT)
Dept: PSYCHIATRY | Facility: CLINIC | Age: 58
End: 2023-11-13
Attending: PSYCHIATRY & NEUROLOGY
Payer: COMMERCIAL

## 2023-11-13 DIAGNOSIS — F33.1 MODERATE EPISODE OF RECURRENT MAJOR DEPRESSIVE DISORDER (H): ICD-10-CM

## 2023-11-13 DIAGNOSIS — F33.0 MAJOR DEPRESSIVE DISORDER, RECURRENT EPISODE, MILD (H): Primary | Chronic | ICD-10-CM

## 2023-11-13 DIAGNOSIS — F41.9 ANXIETY: ICD-10-CM

## 2023-11-13 PROCEDURE — 99205 OFFICE O/P NEW HI 60 MIN: CPT | Mod: VID | Performed by: NURSE PRACTITIONER

## 2023-11-13 NOTE — PROGRESS NOTES
"PSYCHIATRIC DIAGNOSTIC ASSESSMENT      Name:  Raphael Lizarraga  : 1965    Raphael Lizarraga is a 58 year old male who is being evaluated via a billable Video visit.      Telemedicine Visit: The patient's condition can be safely assessed and treated via synchronous audio and visual telemedicine encounter.      Reason for Telemedicine Visit: COVID 19 pandemic and the social and physical recommendations by the CDC and MD., Patient has requested telehealth visit, and Patient unable to travel      Originating Site (Patient Location): Patient's home    Distant Site (Provider Location): [facility] Outpatient Setting: [facility]    Consent:  The patient/guardian has verbally consented to: the potential risks and benefits of telemedicine (video visit or phone) versus in person care; bill my insurance or make self-payment for services provided; and responsibility for payment of non-covered services.     Mode of Communication:  Wig[employer] platform     As the provider I attest to compliance with applicable laws and regulations related to telemedicine.                                              CHIEF COMPLAINT     \"To establish ashli term psychiatric care for the medication management of Depression\"  HISTORY OF PRESENT ILLNESS     Patient is a 58 year old,  White Choose not to answer male with a history of depression and anxiety as well as epilepsy who presents for initial psychiatric evaluation following a referral by his neurologist, Ml Armstrong MD total establish long-term psychiatric care for the medication management depression and anxiety.  Patient reports this referral was submitted in about 6 months ago when he was struggling with depression and anxiety, but stating he is now at a better place following medication adjustment by his PCP.  Patient also reported quiting alcohol use since April as this has helped with mood, depression, and anxiety.  Patient reports he would like his PCP to continue " managing his psychiatric medications.  Patient also reports that he would like to remain on current medication regiment which are Wellbutrin 300 mg and Paxil 20 mg daily.  Patient does mention he has been experiencing issues with short-term memory lately as he continues to forget things easily.  Patient reports he will be undergoing neuropsychiatric testing in about 2 weeks from now.  Patient denies history of psychiatric hospitalization.  Patient endorsed history of suicidal ideation several years ago when she was struggling with worsening depression related to sleep deprivation.  Patient denies history of psychosis, tasha, or hypomania.  Patient endorsed history of seizures with most recently in 2002.  Patient reports he is currently being followed by neurologist while on Dilantin therapy.  Patient currently denies history of trauma.   PSYCHIATRIC HISTORY:   History of Psychiatric Hospitalizations:   - Inpatient: No  - IOP/PHP/Day treatment: None  History of Suicidal Ideation: Negative  History of Suicide Attempts:  Negative    History of Self-injurious Behavior: Denies a history of SIB.  Current:  No  History of Violence/Aggression: Negative  History of Commitment? Negative  Electroconvulsive Therapy (ECT):Negative    PSYCHIATRIC REVIEW OF SYSTEMS:   Psychiatric Review of Systems:   Depression:   Denies: depressed mood, suicidal ideation, decreased interest, changes in sleep, changes in appetite, guilt, hopelessness, helplessness, impaired concentration, decreased energy, irritability.  Tasha:   Denies: sleeplessness, increased goal-directed activities, abrupt increase in energy pressured speech  Psychosis:   Denies: visual hallucinations, auditory hallucinations, paranoia  Anxiety:   Denies: excessive worries that are difficult to control, panic attacks  PTSD:   Reports: re-experiencing past trauma, nightmares, increased arousal, avoidance of traumatic stimuli, impaired function.  Denies: re-experiencing past  trauma, nightmares, increased arousal, avoidance of traumatic stimuli, impaired function.  OCD:   Denies: obsessions, checking, symmetry, cleaning, skin picking.  Eating Disorder:   Denies: restriction, binging, purging.    Sleep: Better      MEDICATIONS                                                                                                Current Outpatient Medications   Medication Sig    albuterol (PROAIR HFA/PROVENTIL HFA/VENTOLIN HFA) 108 (90 Base) MCG/ACT inhaler INHALE 2 PUFFS INTO THE LUNGS EVERY 4 HOURS AS NEEDED FOR ASTHMA SYMPTOMS    azithromycin (ZITHROMAX) 250 MG tablet 2 tablets the first day, then 1 tablet daily for the next 4 days    benzonatate (TESSALON) 200 MG capsule Take 1 capsule (200 mg) by mouth 3 times daily as needed for cough    buPROPion (WELLBUTRIN XL) 300 MG 24 hr tablet Take 1 tablet (300 mg) by mouth every morning for depression & anxiety prevention    Calcium Carbonate-Vit D-Min (RA CALCIUM/VITAMIN D/MINERALS) 600-400 MG-UNIT TABS Take 1 tablet by mouth 2 times daily    calcium-vitamin D (OSCAL) 250-125 MG-UNIT TABS per tablet Take 1 tablet by mouth 2 times daily    PARoxetine (PAXIL) 20 MG tablet Take 1 tablet (20 mg) by mouth At Bedtime for depression and anxiety prevention    phenytoin (DILANTIN) 100 MG ER capsule Take 2 capsule in the morning and 2 caps in the evening for a total of 400 mg     No current facility-administered medications for this visit.       DRUG MONITORING:  [place] Prescription Monitoring Program evaluating controlled substances in the last year in [place]:  The [place] Prescription Monitoring Program has been reviewed and there are no current concerns with: diversionary activity, early refill requests, and or obtaining the medication from multiple providers       PAST PSYCHOTROPIC MEDICATIONS:      VITALS   There were no vitals taken for this visit.     BP Readings from Last 1 Encounters:   11/10/23 116/68     Pulse Readings from Last 1 Encounters:  "  11/10/23 70     Wt Readings from Last 1 Encounters:   11/10/23 69.4 kg (153 lb)     Ht Readings from Last 1 Encounters:   11/10/23 1.702 m (5' 7\")     Estimated body mass index is 23.96 kg/m  as calculated from the following:    Height as of 11/10/23: 1.702 m (5' 7\").    Weight as of 11/10/23: 69.4 kg (153 lb).      PERTINENT HISTORY   PAST MEDICAL HISTORY:   Past Medical History:   Diagnosis Date    Anxiety     Corneal rust ring of right eye 08/24/2019    Epilepsy (H)     Intermittent asthma     Major depressive disorder, recurrent episode, unspecified     NELIA (obstructive sleep apnea)     Osteoporosis/osteopenia increased risk     Strain of thoracic region 05/19/2014       PAST SURGICAL HISTORY:   Past Surgical History:   Procedure Laterality Date    CATARACT IOL, RT/LT Bilateral     July 2019    COLONOSCOPY WITH CO2 INSUFFLATION N/A 05/04/2018    Procedure: COLONOSCOPY WITH CO2 INSUFFLATION;  Colonoscopy, Dr. Belcher, Screen for colon cancer, BMI [facility].11[facility];  Surgeon: Duane, William Charles, MD;  Location: [facility] OR    LASER YAG CAPSULOTOMY Left 01/13/2023    LOBECTOMY  1994    LT temporal for epilepsy    PHACOEMULSIFICATION WITH STANDARD INTRAOCULAR LENS IMPLANT Left 06/27/2019    Procedure: PHACOEMULSIFICATION, CATARACT, WITH STANDARD IOL INSERTION, LEFT;  Surgeon: Gutierrez Mcdowell MD;  Location: [facility] OR    PHACOEMULSIFICATION WITH STANDARD INTRAOCULAR LENS IMPLANT Right 07/11/2019    Procedure: PHACOEMULSIFICATION, CATARACT, WITH STANDARD IOL INSERTION, RIGHT;  Surgeon: Gutierrez Mcdowell MD;  Location: [facility] OR    VASECTOMY      ZZC LAP,PARTIAL NEPHRECTOMY  08/04/2008    LT for benign mass, Dr. Donald       FAMILY HISTORY:   Family History   Problem Relation Age of Onset    Pacemaker Mother 78    Diabetes Father     Hypertension Father     Cerebrovascular Disease Paternal Grandfather     Asthma No family hx of     C.A.D. No family hx of     Breast Cancer No family hx of     " Cancer - colorectal No family hx of     Prostate Cancer No family hx of     Anesthesia Reaction No family hx of     Thrombophilia No family hx of     Bleeding Disorder No family hx of     Glaucoma No family hx of     Macular Degeneration No family hx of     Retinal detachment No family hx of        SOCIAL HISTORY:   Social History     Tobacco Use    Smoking status: Former     Types: Cigarettes     Quit date: 1993     Years since quittin.2     Passive exposure: Never    Smokeless tobacco: Never   Substance Use Topics    Alcohol use: Yes     Alcohol/week: 0.0 - 4.0 standard drinks of alcohol     Comment: beer - occ         Seizures or Head Injury: Denies history of head injury. Denies history of seizures.  History of cardiac disease, rheumatic fever, fainting or dizziness, especially with exercise, seizures, chest pain or shortness of breath with exercise, unexplained change in exercise tolerance, palpitations, high blood pressure, or heart murmur?   No    LABS & IMAGING                                                                                                                Personally reviewed  Recent Labs   Lab Test 22  1038 19  1850   WBC  --  4.2   HGB 14.0 13.0*   HCT  --  39.3*   MCV  --  97   PLT  --  261     Recent Labs   Lab Test 23  1712 23  1549 22  1038 19  1850   NA  --   --   --  140   POTASSIUM  --   --  4.7 4.4   CHLORIDE  --   --   --  103   CO2  --   --   --  34*   GLC 98  --   --  77   RADHA  --   --   --  9.1   BUN  --   --   --  15   CR  --   --  0.86 0.91   GFRESTIMATED  --   --  >90 >90   ALBUMIN  --  4.0  --  4.3   PROTTOTAL  --  7.4  --  7.8   AST  --  15  --  22   ALT  --  29  --  35   ALKPHOS  --  69  --  58   BILITOTAL  --  0.1*  --  0.2     Recent Labs   Lab Test 23  1712   CHOL 221*   *   HDL 81   TRIG 140     No lab results found.  25 OH Vit D2   Date Value Ref Range Status   2011 <5 ug/L Final     25 OH Vit D3   Date  Value Ref Range Status   09/29/2011 43 ug/L Final     25 OH Vit D total   Date Value Ref Range Status   09/29/2011  30 - 75 ug/L Final    <48  Season, race, dietary intake, and treatment affect the concentration of   25-hydroxy-Vitamin D. Values may decrease during winter months and increase   during summer months. Values less than 30 ug/L may indicate Vitamin D   deficiency.        ALLERGY & IMMUNIZATIONS     No Known Allergies    FAMILY MEDICAL HISTORY:     Family History       Problem (# of Occurrences) Relation (Name,Age of Onset)    Diabetes (1) Father    Hypertension (1) Father    Cerebrovascular Disease (1) Paternal Grandfather    Pacemaker (1) Mother (78)           Negative family history of: Asthma, C.A.D., Breast Cancer, Cancer - colorectal, Prostate Cancer, Anesthesia Reaction, Thrombophilia, Bleeding Disorder, Glaucoma, Macular Degeneration, Retinal detachment              Family history of sudden or unexplained death or an event requiring resuscitation in children or young adults, cardiac arrhythmias (eg, Epi-Parkinson-White syndrome), long QT syndrome, catecholaminergic paroxysmal ventricular tachycardia, Brugada syndrome, arrhythmogenic right ventricular dysplasia, hypertrophic cardiomyopathy, dilated cardiomyopathy, or Marfan syndrome?  No    FAMILY PSYCHIATRIC HISTORY:   Psychiatry:Negative  Substance use history in family: Negative  Family suicide history:Negative      SIGNIFICANT SOCIAL/FAMILY HISTORY:                                           Born and raised in: [facility]  Relationship status:   Children: 1    Highest education level was:High school diploma   Service:  Employment status:Working [employer]  LEGAL:Denies     SUBSTANCE USE HISTORY    Tobacco use: Denies  Caffeine: One cup Coffee daily  Current alcohol:  Sober since April  Current substance use:CBD gummies for pain  Past use alcohol/substance use:Denies      MEDICAL REVIEW OF SYSTEMS:   Ten system review  "was completed with pertinent positives noted above    MENTAL STATUS EXAM:   Mental Status Examination (limited due to video virtual visit format):  Vital Signs: There were no vitals taken for this virtual visit.  Appearance: adequately groomed, appears stated age, and in no apparent distress.  Attitude: cooperative   Eye Contact: good to the extent that can be determined in a video visit  Muscle Strength and Tone: no gross abnormalities based on remote observation  Psychomotor Behavior:  no evidence of tardive dyskinesia, dystonia, or tics based on remote observation  Gait and Station: normal, no gross abnormalities based on remote observation  Speech: clear, coherent, normal prosody, regular rate, regular rhythm and fluent  Associations: No loosening of associations  Thought Process: coherent and goal directed  Thought Content: no evidence of suicidal ideation or homicidal ideation, no evidence of psychotic thought, no auditory hallucinations present and no visual hallucinations present  Mood: \"good\"  Affect: appropriate and in normal range  Insight: good  Judgment: intact, adequate for safety  Impulse Control: intact  Oriented to: time, place, person and situation  Attention Span and Concentration: normal  Language: Intact  Recent and Remote Memory:  Not formally assessed. No amnesia.  Fund of Knowledge: appropriate        SAFETY   Feels safe in home: Yes   Suicidal ideation: Denies  History of suicide attempts:  No   Hx of impulsivity: No     DSM 5 DIAGNOSIS:   1. Moderate episode of recurrent major depressive disorder (H)    2. Anxiety    ASSESSMENT AND PLAN     Patient is a 58 year old,  White Choose not to answer male with a history of depression and anxiety as well as epilepsy who presents for initial psychiatric evaluation following a referral by his neurologist, Ml Armstrong MD total establish long-term psychiatric care for the medication management depression and anxiety.  Patient who reports " this referral was submitted about 6 months ago when he was struggling with worsening depression and anxiety, but stating he is now at a better place following medication adjustment by his PCP.  Patient with a history of epilepsy who has not had seizures since 2002 would like his PCP to continue managing his depression and anxiety with current medication regimen which Wellbutrin  mg and Paxil 20 mg daily.  This appointment was reportedly made about 6 months ago when he was struggling with worsening depression.  He believes he is currently at a better place and will no longer needing psychiatric follow-up as he would prefer PCP to continue managing current medication.  His main concern currently is short-term memory issue and will be having neuropsychiatric evaluation in November 28, 2023.  He has noticed significant improvement in depression and anxiety after recent medication changes by his PCP while stopping alcohol.  He has not drank alcohol since April 2023.  Sleep has improved significantly while depression, mood, and anxiety are under control.  I made patient aware he is free to come back for follow-up visit if his PCP no longer able to manage psychiatric medication.  Patient verbalized good understanding.  Patient currently denies worsening homicidal ideation.  He also denied both auditory and visual hallucination.  Patient report normal no hypomania.      Plan:  1.Patient will take the medications as prescribed.   Medications: Continue with Paxil 20 mg daily  Continue with Wellbutrin  mg daily   Patient will not stop taking medications or adjust them without consulting with the provider.  2.Patient will call with any problems between visits.  3.Patient will go to the emergency room if not feeling safe , unable to function in the community, or if suicidal, homicidal or hearing voices or having paranoia.  4.Patient will abstain from drugs and alcohol./Pt denies use .  5.Patient will not drive if  sedated on medications or under influence of any substance.   6.Patient will not mix psychiatric medications with drugs and alcohol.   7.Patient will watch his diet and exercise.  8.Patient will see non psychiatric providers for non psychiatric disorders.  9. Patient would like PCP to continue managing psychiatric medication. He is not planing to return for follow up visit.     Risk Assessment:     Raphael has mild risk factors for self-harm, including, single status, anxiety and depression. However, risk is mitigated by ability to volunteer a safety plan and history of seeking help when needed. Additional steps taken to minimize risk include making medication adjustment, asking patient to call 911 and go to the ER if not able to stay safe at home,  Therefore, based on all available evidence including the factors cited above, Raphael does not appear to be an imminent danger to self or others and does not meet criteria 72 hour hold. However, if patient uses substances or is non-adherent with medication, their risk of decompensation and SI/HI will be elevated. This was discussed with the patient as she verbalized good understanding.   CONSULTS/REFERRALS:   Continue therapy  None at this time  Coordinate care with therapist as needed    MEDICAL:   None at this time  Coordinate care with PCP (Chava Belcher) as needed  Follow up with primary care provider as planned or for acute medical concerns.    PSYCHOEDUCATION:  Medication side effects and alternatives reviewed. Health promotion activities recommended and reviewed today. All questions addressed. Education and counseling completed regarding risks and benefits of medications and psychotherapy options.  Consent provided by patient/guardian  Call the psychiatric nurse line with medication questions or concerns at [phone].  [employer] may be used to communicate with your provider, but this is not intended to be used for emergencies.  BLACK BOX WARNING: Discussed the Food  and Drug Administration (FDA) requires that all antidepressants carry a warning that some children, adolescents and young adults may be at increased risk of suicide when taking antidepressants. Anyone taking an antidepressant should be watched closely for worsening depression or unusual behavior especially in the first few weeks after starting an SSRI. Keep in mind, antidepressants are more likely to reduce suicide risk in the long run by improving mood.   SEROTONIN SYNDROME:  Discussed risks of Serotonin syndrome (ie, serotonin toxicity) which is a potentially life-threatening condition associated with increased serotonergic activity in the central nervous system (CNS). It is seen with therapeutic medication use, inadvertent interactions between drugs, and intentional self-poisoning. Serotonin syndrome may involve a spectrum of clinical findings, which often include mental status changes, autonomic hyperactivity, and neuromuscular abnormalities.    BENZODIAZEPINE:  discussion on how benzos work and the need to use them short term due to potential of anxiety getting.  This is a controlled substance with risk for abuse, need to keep in a safe keep place and cannot replace lost scripts.    HYPNOTIC USE: Hypnotic use, risk for CNS depression, sleep-walking, not to mix with ETOH or other CNS depressant, need for six hours of sleep, stop if change in mood.  This is a controlled substance with risk for abuse, need to keep in a safe keep place and cannot replace lost scripts.  FIRST GENERATION ANTIPSYCHOTIC/ SECOND GENERATION ANTIPSYCHOTIC USE:  Atypical need for cardiometabolic monitoring with medication- B/P, weight, blood sugar, cholesterol.  Need to monitor for abnormal movements taught  SAFETY:  We all care about your loved one's safety. To reduce the risk of self-harm, remove access to all:  Firearms, Medicines (both prescribed and over-the-counter), Knives and other sharp objects, Ropes and like materials, and  Alcohol  SLEEP HYGIENE: establish a sleep routine, limit screen time 1 hour prior to bed, use bed for sleep only, take sleep/medications on time (including sleepy time tea, trazadone or herbal treatments such as melatonin), aroma therapy, limit caffeine/sugar, yoga, guided imagery, stretch, meditation, limit naps to 20 minutes, make a temperature change in the room, white noise, be mindful of slowing down breathing, take a warm bath/shower, frequently wash sheets, and journaling.   Medlineplus.gov is information for patients.  It is run by the [employer] and it contains information about all disorders, diseases and all medications.      COMMUNITY RESOURCES:    CRISIS NUMBERS: Provided in AVS 2023  National Suicide Prevention Lifeline: 1-702-989-TALK (102-933-3825)  DossierView/resources for a list of additional resources (SOS)            [facility] - [phone]   Urgent Care Adult Mental Kqljll-[phone] mobile unit/  crisis line  [facility] -[phone]   COPE  [place] Mobile Team -[phone] (adults)/ [phone] (child)  Poison Control Center - 1-888.296.6805    OR  go to nearest ER  Crisis Text Line for any crisis  send this-   To: 759560   [facility] ([facility]  [phone]  National Suicide Prevention Lifeline: 576.588.4923 (TTY: 971.749.5844). Call anytime for help.  (www.suicidepreventionlifeline.org)  National [place] on Mental Illness (www.hakeem.org): 136-348-0970 or 434-325-7312.   Mental Health Association (www.mentalhealth.org): 399.663.8895 or 051-834-8906.  [place] Crisis Text Line: Text [place] to 977537  Suicide LifeLine Chat: suicideShareDesk.org/chat    ADMINISTRATIVE BILLIN min spent interviewing patient, reviewing referral documents, obtaining and reviewing outside records, communication with other health specialists, and preparing this report on this day:  11/13/23    Video/Phone Start Time:  1:02 pm  Video/Phone End Time:   1:25 pm    Greater than 50% of time was spent in counseling and coordination of care regarding above diagnoses and treatment plan.    Patient Status:  Our psychiatry providers act as a specialty service for Primary Care Providers in the [facility] that seek to optimize medications for unstable patients.  Once medications have been optimized, our providers discharge the patient back to the referring Primary Care Provider for ongoing medication management.  This type of system allows our providers to serve a high volume of patients. At this time  Patient will continue to be seen for ongoing consultation and stabilization.    Signed:   Mary Flaherty, MSN, APRN, PMHNP-BC  Long Term Outpatient Psychiatry  Chart documentation done in part with Dragon Voice Recognition software.  Although reviewed after completion, some word and grammatical errors may remain.   Answers submitted by the patient for this visit:  Patient Health Questionnaire (Submitted on 11/12/2023)  If you checked off any problems, how difficult have these problems made it for you to do your work, take care of things at home, or get along with other people?: Not difficult at all  PHQ9 TOTAL SCORE: 8

## 2023-11-13 NOTE — PROGRESS NOTES
Virtual Visit Details    Type of service:  Video Visit     Originating Location (pt. Location): Home    Distant Location (provider location):  On-site  Platform used for Video Visit: Lorena

## 2023-11-13 NOTE — NURSING NOTE
Is the patient currently in the state of [place]? YES    Visit mode:VIDEO    If the visit is dropped, the patient can be reconnected by: VIDEO VISIT: Text to cell phone:   Telephone Information:   Mobile [phone]       Will anyone else be joining the visit? Wife, Rashad, is present  (If patient encounters technical issues they should call [phone] :[phone])    How would you like to obtain your AVS? MyChart    Are changes needed to the allergy or medication list? Pt stated no changes to allergies and Pt stated no med changes    Reason for visit: Consult    Kaylie SCHILLING

## 2023-11-28 ENCOUNTER — OFFICE VISIT (OUTPATIENT)
Dept: NEUROLOGY | Facility: CLINIC | Age: 58
End: 2023-11-28

## 2023-11-28 DIAGNOSIS — G40.109 TEMPORAL LOBE EPILEPSY (H): Primary | ICD-10-CM

## 2023-11-28 DIAGNOSIS — F41.9 ANXIETY: ICD-10-CM

## 2023-11-28 DIAGNOSIS — F33.1 MAJOR DEPRESSIVE DISORDER, RECURRENT EPISODE, MODERATE (H): ICD-10-CM

## 2023-11-28 DIAGNOSIS — R41.3 MEMORY LOSS: ICD-10-CM
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
## 2023-11-28 NOTE — PROGRESS NOTES
Patient was seen for neuropsychological evaluation at the request of Dr. Ml Mix, for the purposes of diagnostic clarification and treatment planning.  2 hrs 28 min of test administration and scoring were provided by this writer, Lorena Solitario.  Please see Dr. Matti Guzmán's report for a full interpretation of the findings.

## 2023-11-30 NOTE — PROGRESS NOTES
Name: Raphael Lizarraga  MRN: [id number]  : 1965 SOTOMAYOR: 2023  Staff: DANNIE Tech:  Age: 58  Sex: Male Hand: Right Educ: 12  Vision:  ?with correction / []without correction    ORIENTATION     Time  -0     Place  0 /2     Personal info         WAIS-IV     WMI: ~92       Raw SSa     Similarities  19 7     Block Design  28 8     Digit Span  26 10 RDS= 8     Arithmetic  10 7     Coding  40 6    HVLT-R Form 1     Trial 1 2 3 Total      3 5 4  12    Raw T     Total Learning (1-3) 12 ?20     Delayed Recall  2 ?20     Percent Retention 40 ?20     Recognition Hits/FP 11/1 94    BVMT-R Form 1     Trial 1 2 3 Total      5 11 12  28    Raw T / %ile     Total Learning (1-3) 28 59     Delayed Recall  12 66     Percent Retention 10 >16th     Recognition Hits/FP 6/0 >16th    WMS-IV  Raw SS / %ile     LM I  8 2     LM II  2 1     LM Recog. 17  ?2    JERONIMO-O    Raw  T %ile     Copy    14.5     ?1     Time to Copy  466     ?1    WRAT5   SS %ile Grade Equiv.     Word Reading  94 34 11.6    COWAT (FAS)   Raw: 28   T: 39    Animals   Raw:  14  T-score:  38    BOSTON NAMING TEST   Raw: 34   %ile 3      COMPLEX IDEATIONAL MATERIAL   Raw:  T: 46    TRAILS  Raw  Err %ile    A 57  0 14-17   B 151  2 10-14    STROOP Raw %ile   Word 87 41-48   Color  40 <3       Color/Word  34 52-65    VALDES FACIAL RECOGNITION   Raw: 40  Interp: Borderline    GROOVED PEGBOARD    Raw  T Drops   RH  70  53 0   LH 96  40 1    BDI-II  Raw:  22  Interpretation: Moderate    SOFIA  Raw:  10  Interpretation: Mild    FLETCHER SENTENCE REPETITION    Raw:  %ile: <1    DCT E-score: 9     President Face Naming   Raw: 0/10
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
## 2024-01-15 ENCOUNTER — TELEPHONE (OUTPATIENT)
Dept: FAMILY MEDICINE | Facility: CLINIC | Age: 59
End: 2024-01-15
Payer: COMMERCIAL

## 2024-01-15 NOTE — TELEPHONE ENCOUNTER
Patient Quality Outreach    Patient is due for the following:   Depression  -  PHQ-9 needed    Next Steps:   Patient was assigned appropriate questionnaire to complete    Type of outreach:    Sent [employer] message.    Next Steps:  Reach out within 90 days via Letter.    Max number of attempts reached: No. Will try again in 90 days if patient still on fail list.    Questions for provider review:    None           Maranda Nielsen MA

## 2024-01-26 ENCOUNTER — DOCUMENTATION ONLY (OUTPATIENT)
Dept: SLEEP MEDICINE | Facility: CLINIC | Age: 59
End: 2024-01-26
Payer: COMMERCIAL

## 2024-01-26 DIAGNOSIS — G47.33 OSA (OBSTRUCTIVE SLEEP APNEA): Primary | ICD-10-CM

## 2024-01-26 NOTE — PROGRESS NOTES
Medication:   Requested Prescriptions     Pending Prescriptions Disp Refills    atorvastatin (LIPITOR) 20 MG tablet 90 tablet 1     Sig: Take 1 tablet by mouth daily     Last Filled:  4.1.19  Last appt: 4/26/2019   Next appt: 8/2/2019    Last Lipid:   Lab Results   Component Value Date    CHOL 189 10/17/2018    TRIG 118 10/17/2018    HDL 45 10/17/2018    LDLCALC 120 10/17/2018 Patient came in to [facility], stating his pressure from CPAP is too high and it would cause leakage. Last office visit was 7/7/2023    Please advise Shannan Rice Pa-C.

## 2024-02-29 ASSESSMENT — ASTHMA QUESTIONNAIRES
QUESTION_5 LAST FOUR WEEKS HOW WOULD YOU RATE YOUR ASTHMA CONTROL: COMPLETELY CONTROLLED
QUESTION_2 LAST FOUR WEEKS HOW OFTEN HAVE YOU HAD SHORTNESS OF BREATH: ONCE OR TWICE A WEEK
QUESTION_4 LAST FOUR WEEKS HOW OFTEN HAVE YOU USED YOUR RESCUE INHALER OR NEBULIZER MEDICATION (SUCH AS ALBUTEROL): ONCE A WEEK OR LESS
QUESTION_1 LAST FOUR WEEKS HOW MUCH OF THE TIME DID YOUR ASTHMA KEEP YOU FROM GETTING AS MUCH DONE AT WORK, SCHOOL OR AT HOME: NONE OF THE TIME
QUESTION_3 LAST FOUR WEEKS HOW OFTEN DID YOUR ASTHMA SYMPTOMS (WHEEZING, COUGHING, SHORTNESS OF BREATH, CHEST TIGHTNESS OR PAIN) WAKE YOU UP AT NIGHT OR EARLIER THAN USUAL IN THE MORNING: NOT AT ALL
ACT_TOTALSCORE: 23
ACT_TOTALSCORE: 23

## 2024-03-01 ENCOUNTER — OFFICE VISIT (OUTPATIENT)
Dept: FAMILY MEDICINE | Facility: CLINIC | Age: 59
End: 2024-03-01
Payer: COMMERCIAL

## 2024-03-01 VITALS
OXYGEN SATURATION: 100 % | SYSTOLIC BLOOD PRESSURE: 110 MMHG | DIASTOLIC BLOOD PRESSURE: 64 MMHG | WEIGHT: 150.6 LBS | BODY MASS INDEX: 23.64 KG/M2 | RESPIRATION RATE: 18 BRPM | TEMPERATURE: 97.5 F | HEART RATE: 80 BPM | HEIGHT: 67 IN

## 2024-03-01 DIAGNOSIS — G56.02 CARPAL TUNNEL SYNDROME OF LEFT WRIST: Primary | ICD-10-CM

## 2024-03-01 PROCEDURE — 99213 OFFICE O/P EST LOW 20 MIN: CPT | Performed by: PHYSICIAN ASSISTANT

## 2024-03-01 ASSESSMENT — PAIN SCALES - GENERAL: PAINLEVEL: EXTREME PAIN (8)

## 2024-03-01 ASSESSMENT — ENCOUNTER SYMPTOMS
WOUND: 0
FEVER: 0
COLOR CHANGE: 0
NUMBNESS: 1
ARTHRALGIAS: 1
WEAKNESS: 0

## 2024-03-01 NOTE — PATIENT INSTRUCTIONS
Your exam is concerning for carpal tunnel syndrome of your left hand/wrist.  For treatment you have been provided with a Velcro splint which you can wear during repetitive activities, while at work, and at night.  Additionally, start the home exercises at the end of this packet.  I also placed a referral to physical therapy and the scheduling team will call you to set up your appointments.  For additional pain relief you can use Tylenol/ibuprofen and ice.  If your symptoms do not improve with this treatment plan over the next 4-6 weeks, please send a [facility] message or call and I will place a referral to orthopedics.  Reach out with questions or concerns.

## 2024-03-01 NOTE — PROGRESS NOTES
Assessment & Plan     Carpal tunnel syndrome of left wrist  Patient is a 59-year-old male who presents to clinic due to intermittent left wrist pain ongoing for a long time that has worsened in the last month.  Vital signs normal.  Exam findings are consistent with carpal tunnel syndrome.  Recommended home exercise program, hand therapy, splinting during repetitive activities, at work, and at night, ice, and ibuprofen.  Discussed expected course of recovery over the next 4-6 weeks.  If symptoms worsen, or do not improve with our current treatment plan, will place referral to orthopedics.  - Occupational/Hand Therapy  Referral; Future      See Patient Instructions    Subjective   Raphael is a 59 year old, presenting for the following health issues:  Cts      3/1/2024     8:39 AM   Additional Questions   Roomed by Dana HURT MA   Accompanied by No one         3/1/2024     8:39 AM   Patient Reported Additional Medications   Patient reports taking the following new medications None     History of Present Illness       Reason for visit:  Left carpal tunnel  Symptom onset:  More than a month  Symptoms include:  3 fingers go numb and pain  Symptom intensity:  Moderate  Symptom progression:  Staying the same  Had these symptoms before:  No  What makes it better:  Hang hand down    He eats 0-1 servings of fruits and vegetables daily.He consumes 0 sweetened beverage(s) daily.He exercises with enough effort to increase his heart rate 9 or less minutes per day.  He exercises with enough effort to increase his heart rate 3 or less days per week.   He is taking medications regularly.       Patient notes left hand, intermittent numbness, tingling, and pain in the 1st 3 fingers. Symptoms have worsened in the last month but have been going on for a long time. He treats this with Ibuprofen and a stick on hand brace which helps some.  Patient works as a  and is constantly moving wrist forward/back and  "side-to-side.    Review of Systems   Constitutional:  Negative for fever.   Musculoskeletal:  Positive for arthralgias.   Skin:  Negative for color change, rash and wound.   Neurological:  Positive for numbness. Negative for weakness.   All other systems reviewed and are negative.          Objective    /64   Pulse 80   Temp 97.5  F (36.4  C) (Temporal)   Resp 18   Ht 1.7 m (5' 6.93\")   Wt 68.3 kg (150 lb 9.6 oz)   SpO2 100%   BMI 23.64 kg/m    Body mass index is 23.64 kg/m .  Physical Exam  Vitals and nursing note reviewed.   Constitutional:       General: He is not in acute distress.     Appearance: Normal appearance.   HENT:      Head: Normocephalic and atraumatic.   Eyes:      Extraocular Movements: Extraocular movements intact.      Pupils: Pupils are equal, round, and reactive to light.   Cardiovascular:      Rate and Rhythm: Normal rate.   Pulmonary:      Effort: Pulmonary effort is normal.   Musculoskeletal:         General: Normal range of motion.      Cervical back: Normal range of motion.      Comments: Left wrist/hand: Sensation intact, radial pulse 2+, normal range of motion, negative Tinel's, positive Phalen's,  strength 5/5, interosseous strength 5/5, no thenar/hypothenar atrophy   Skin:     General: Skin is warm and dry.   Neurological:      General: No focal deficit present.      Mental Status: He is alert.   Psychiatric:         Mood and Affect: Mood normal.         Behavior: Behavior normal.                    Signed Electronically by: Pamela Morocho PA-C    "

## 2024-03-31 DIAGNOSIS — F33.0 MAJOR DEPRESSIVE DISORDER, RECURRENT EPISODE, MILD (H): Chronic | ICD-10-CM

## 2024-03-31 DIAGNOSIS — F41.9 ANXIETY: Chronic | ICD-10-CM

## 2024-04-01 RX ORDER — BUPROPION HYDROCHLORIDE 300 MG/1
300 TABLET ORAL EVERY MORNING
Qty: 90 TABLET | Refills: 0 | Status: SHIPPED | OUTPATIENT
Start: 2024-04-01 | End: 2024-05-13 | Stop reason: DRUGHIGH

## 2024-04-03 ENCOUNTER — THERAPY VISIT (OUTPATIENT)
Dept: OCCUPATIONAL THERAPY | Facility: CLINIC | Age: 59
End: 2024-04-03
Attending: PHYSICIAN ASSISTANT
Payer: COMMERCIAL

## 2024-04-03 DIAGNOSIS — G56.02 CARPAL TUNNEL SYNDROME OF LEFT WRIST: ICD-10-CM

## 2024-04-03 PROCEDURE — 97165 OT EVAL LOW COMPLEX 30 MIN: CPT | Mod: GO

## 2024-04-03 PROCEDURE — 97530 THERAPEUTIC ACTIVITIES: CPT | Mod: GO

## 2024-04-03 PROCEDURE — 97110 THERAPEUTIC EXERCISES: CPT | Mod: GO

## 2024-04-03 NOTE — PROGRESS NOTES
OCCUPATIONAL THERAPY EVALUATION  Type of Visit: Evaluation    See electronic medical record for Abuse and Falls Screening details.    Subjective      Presenting condition or subjective complaint:  Left wrist  Date of onset: 01/03/24    Past Medical History:   Diagnosis Date    Anxiety     Corneal rust ring of right eye 08/24/2019    Epilepsy (H)     Intermittent asthma     Major depressive disorder, recurrent episode, unspecified     NELIA (obstructive sleep apnea)     Osteoporosis/osteopenia increased risk     Strain of thoracic region 05/19/2014     Past Surgical History:   Procedure Laterality Date    CATARACT IOL, RT/LT Bilateral     July 2019    COLONOSCOPY WITH CO2 INSUFFLATION N/A 05/04/2018    Procedure: COLONOSCOPY WITH CO2 INSUFFLATION;  Colonoscopy, Dr. Belcher, Screen for colon cancer, BMI 22.11[facility];  Surgeon: Duane, William Charles, MD;  Location: [facility] OR    LASER YAG CAPSULOTOMY Left 01/13/2023    LOBECTOMY  1994    LT temporal for epilepsy    PHACOEMULSIFICATION WITH STANDARD INTRAOCULAR LENS IMPLANT Left 06/27/2019    Procedure: PHACOEMULSIFICATION, CATARACT, WITH STANDARD IOL INSERTION, LEFT;  Surgeon: Gutierrez Mcdowell MD;  Location: [facility] OR    PHACOEMULSIFICATION WITH STANDARD INTRAOCULAR LENS IMPLANT Right 07/11/2019    Procedure: PHACOEMULSIFICATION, CATARACT, WITH STANDARD IOL INSERTION, RIGHT;  Surgeon: Gutierrez Mcdowell MD;  Location: [facility] OR    VASECTOMY      ZZC LAP,PARTIAL NEPHRECTOMY  08/04/2008    LT for benign mass, Dr. Donald     Prior diagnostic imaging/testing results:     None  Prior therapy history for the same diagnosis, illness or injury: No      Prior Level of Function  Transfers: Independent  Ambulation: Independent  ADL: Independent  IADL:  Independent    Living Environment  Social support: With a significant other or spouse   Type of home: House   Stairs to enter the home: Yes 3 Is there a railing: No   Ramp: No   Stairs inside the home: Yes 20  Is there a railing: Yes   Help at home: None  Equipment owned:   None    Employment: Yes      Pain assessment: Pain present  See objective evaluation for additional pain details     Objective   ADDITIONAL HISTORY:  Right hand dominant  Patient reports symptoms of pain, numbness, and tingling   Transportation: drives  Currently working in normal job without restrictions    Functional Outcome Measure:   Upper Extremity Functional Index Score:  SCORE:   Column Totals: /80: 70   (A lower score indicates greater disability.)    PAIN:  Pain Level at Rest: 0/10  Pain Level with Use: 8/10  Pain Location: L volar wrist  Pain Quality: Numb, Sharp, Shooting, and Tingling  Pain Frequency: intermittent or worse with use  Pain is Worst: daytime, nighttime, or nighttime is better with wearing the brace  Pain is Exacerbated By: gripping  Pain is Relieved By: otc medications, rest, and OTC brace  Pain Progression: Improved    POSTURE: Rounded Forward Shoulders     SENSATION: Decreased Median Nerve distribution per pt report     ROM:   Wrist ROM  Left AROM Right AROM    Extension 60 56   Flexion 55 68   Radial Deviation (RD) 15 14   Ulnar Deviation (UD) 35 35   Supination 79 74   Pronation 64 82     SPECIAL TESTS:   CTS Special Tests  Pain Report Left   Median Nerve Compression at Pronator -   Carpal Compression Test-Durkan Test (30 sec) + @ 15 secs   Hall Test for Lumbrical Incursion (fist x30 sec) -   Tinel's at Carpal Tunnel -   Phalen's Sign -     NEURAL TENSION TESTING: MNT: Median Neurodynamic Test (based on DS Epi's ULNT)   4/3/2024   0-5 Scale 2/5   Position:   0/5: Arm across abdomen in coronal plane  1/5: Depress shoulder, ER to neutral ABD shoulder to 45 degrees  2/5: ER shoulder to end range, keep elbow at 90 degrees  3/5: Extend elbow to 0 degrees  4/5: Fully supinate forearm  5/5: Extend wrist, fingers and thumb  Notes:  (+) indicates beyond grade level but less than detention to next level  (-) indicates over  MCC to level  S1 onset/change of patient's symptoms  S2 definite stop point based on patient's discomfort level    STRENGTH:     Measured in pounds 4/3/2024 4/3/2024    Left Right   Trial 1 82 85     Lateral Pinch  Measured in pounds 4/3/2024 4/3/2024    Left Right   Trial 1 12 16     3 Point Pinch  Measured in pounds 4/3/2024 4/3/2024    Left Right   Trial 1 9 9     Assessment & Plan   CLINICAL IMPRESSIONS  Medical Diagnosis: L CTS    Treatment Diagnosis: L CTS    Impression/Assessment: Pt is a 59 year old male presenting to Occupational Therapy due to L CTS.  The following significant findings have been identified: Impaired ROM, Impaired sensation, and Pain.  These identified deficits interfere with their ability to perform work tasks, recreational activities, household chores, and driving  as compared to previous level of function.   Patient's limitations or Problem List includes: Pain, Decreased ROM/motion, Increased edema, Sensory disturbance, Tightness in musculature, and Adherence in connective tissue of the left wrist which interferes with the patient's ability to perform Work Tasks, Sleep Patterns, Recreational Activities, Household Chores, and Driving  as compared to previous level of function.    Clinical Decision Making (Complexity):  Assessment of Occupational Performance: 3-5 Performance Deficits  Occupational Performance Limitations: driving and community mobility, home establishment and management, sleep, work, and leisure activities  Clinical Decision Making (Complexity): Low complexity    PLAN OF CARE  Treatment Interventions:  Modalities:  US and Paraffin  Therapeutic Exercise:  AROM, AAROM, PROM, Tendon Gliding, Blocking, Reverse Blocking, Place and Hold, Contract Relax, Extensor Tracking, Isotonics, Isometrics, and Stabilization  Neuromuscular re-education:  Nerve Gliding and Kinesiotaping  Manual Techniques:  Friction massage, Myofascial release, and Manual edema mobilization  Orthotic  Fabrication:  Static and Forearm based  Self Care:  Self Care Tasks, Ergonomic Considerations, and Work Tasks    Long Term Goals   OT Goal 1  Goal Identifier: Sleeping  Goal Description: Pt will report no difficulty with sleeping through the night  Rationale: In order to maximize safety and independence with ADL/IADLs  Goal Progress: Moderate difficulty  Target Date: 05/15/24      Frequency of Treatment: 1x weekly  Duration of Treatment: 6 weeks     Education Assessment: Learner/Method: Patient;Demonstration;Pictures/Video  Education Comments: PTRX via printout     Risks and benefits of evaluation/treatment have been explained.   Patient/Family/caregiver agrees with Plan of Care.     Evaluation Time:    OT Fred Low Complexity Minutes (59310): 20  Signing Clinician: Leigh Ann Noyola OT

## 2024-04-10 ENCOUNTER — THERAPY VISIT (OUTPATIENT)
Dept: OCCUPATIONAL THERAPY | Facility: CLINIC | Age: 59
End: 2024-04-10
Attending: PHYSICIAN ASSISTANT
Payer: COMMERCIAL

## 2024-04-10 DIAGNOSIS — G56.02 CARPAL TUNNEL SYNDROME OF LEFT WRIST: Primary | ICD-10-CM

## 2024-04-10 PROCEDURE — 97112 NEUROMUSCULAR REEDUCATION: CPT | Mod: GO

## 2024-04-10 PROCEDURE — 97035 APP MDLTY 1+ULTRASOUND EA 15: CPT | Mod: GO

## 2024-04-10 PROCEDURE — 97140 MANUAL THERAPY 1/> REGIONS: CPT | Mod: GO

## 2024-04-17 ENCOUNTER — THERAPY VISIT (OUTPATIENT)
Dept: OCCUPATIONAL THERAPY | Facility: CLINIC | Age: 59
End: 2024-04-17
Attending: PHYSICIAN ASSISTANT
Payer: COMMERCIAL

## 2024-04-17 DIAGNOSIS — G56.02 CARPAL TUNNEL SYNDROME OF LEFT WRIST: Primary | ICD-10-CM

## 2024-04-17 PROCEDURE — 97140 MANUAL THERAPY 1/> REGIONS: CPT | Mod: GO

## 2024-04-17 PROCEDURE — 97035 APP MDLTY 1+ULTRASOUND EA 15: CPT | Mod: GO

## 2024-04-17 PROCEDURE — 97112 NEUROMUSCULAR REEDUCATION: CPT | Mod: GO

## 2024-04-26 ENCOUNTER — THERAPY VISIT (OUTPATIENT)
Dept: OCCUPATIONAL THERAPY | Facility: CLINIC | Age: 59
End: 2024-04-26
Payer: COMMERCIAL

## 2024-04-26 DIAGNOSIS — G56.02 CARPAL TUNNEL SYNDROME OF LEFT WRIST: Primary | ICD-10-CM

## 2024-04-26 PROCEDURE — 97140 MANUAL THERAPY 1/> REGIONS: CPT | Mod: GO

## 2024-04-26 PROCEDURE — 97112 NEUROMUSCULAR REEDUCATION: CPT | Mod: GO

## 2024-05-03 ENCOUNTER — THERAPY VISIT (OUTPATIENT)
Dept: OCCUPATIONAL THERAPY | Facility: CLINIC | Age: 59
End: 2024-05-03
Payer: COMMERCIAL

## 2024-05-03 ENCOUNTER — OFFICE VISIT (OUTPATIENT)
Dept: NEUROLOGY | Facility: CLINIC | Age: 59
End: 2024-05-03
Payer: COMMERCIAL

## 2024-05-03 VITALS
HEART RATE: 66 BPM | WEIGHT: 145 LBS | HEIGHT: 68 IN | SYSTOLIC BLOOD PRESSURE: 122 MMHG | DIASTOLIC BLOOD PRESSURE: 71 MMHG | BODY MASS INDEX: 21.98 KG/M2

## 2024-05-03 DIAGNOSIS — G40.109 TEMPORAL LOBE EPILEPSY (H): Primary | ICD-10-CM

## 2024-05-03 DIAGNOSIS — G40.209 LOCALIZATION-RELATED PARTIAL EPILEPSY WITH COMPLEX PARTIAL SEIZURES (H): ICD-10-CM

## 2024-05-03 DIAGNOSIS — G56.02 CARPAL TUNNEL SYNDROME OF LEFT WRIST: Primary | ICD-10-CM

## 2024-05-03 PROCEDURE — 36415 COLL VENOUS BLD VENIPUNCTURE: CPT | Performed by: PSYCHIATRY & NEUROLOGY

## 2024-05-03 PROCEDURE — 80186 ASSAY OF PHENYTOIN FREE: CPT | Mod: 90 | Performed by: PSYCHIATRY & NEUROLOGY

## 2024-05-03 PROCEDURE — 80185 ASSAY OF PHENYTOIN TOTAL: CPT | Performed by: PSYCHIATRY & NEUROLOGY

## 2024-05-03 PROCEDURE — 97112 NEUROMUSCULAR REEDUCATION: CPT | Mod: GO

## 2024-05-03 PROCEDURE — 99214 OFFICE O/P EST MOD 30 MIN: CPT | Performed by: PSYCHIATRY & NEUROLOGY

## 2024-05-03 PROCEDURE — 99000 SPECIMEN HANDLING OFFICE-LAB: CPT | Performed by: PSYCHIATRY & NEUROLOGY

## 2024-05-03 RX ORDER — PHENYTOIN SODIUM 100 MG/1
CAPSULE, EXTENDED RELEASE ORAL
Qty: 360 CAPSULE | Refills: 3 | Status: SHIPPED | OUTPATIENT
Start: 2024-05-03

## 2024-05-03 RX ORDER — ANTICAVITY FLUORIDE 0.2 MG/ML
1 MOUTHWASH ORAL 2 TIMES DAILY
Qty: 180 TABLET | Refills: 3 | Status: SHIPPED | OUTPATIENT
Start: 2024-05-03

## 2024-05-03 RX ORDER — PAROXETINE 20 MG/1
20 TABLET, FILM COATED ORAL EVERY EVENING
COMMUNITY
Start: 2024-05-03 | End: 2024-05-13

## 2024-05-03 ASSESSMENT — PAIN SCALES - GENERAL: PAINLEVEL: NO PAIN (0)

## 2024-05-03 NOTE — PROGRESS NOTES
" NEUROLOGY PROGRESS NOTE   [facility]    Patient:Raphael Lizarraga  : 1965  Age: 59 year old  Today's Office Visit: May 3, 2024    History of present illness:     Mr. Raphael Lizarraga was last seen 2023. He did not have any seizures since last visit. He has been maintained on phenytoin 200 mg bid.      Phenytoin level  23:  24.7/1.7. occasionally he feels dizzy, and sometimes has imbalance.     Neuropsychological evaluation was performed by Dr. Matti Guzmán on 2023:  Mr. Lizarraga demonstrated deficits that suggest multifocal brain dysfunction. The most prominent deficits are consistent with dysfunction of both the lateral and mesial left temporal lobe. There is also suggestion of less severe dysfunction of the right lateral temporal lobe and bilateral frontal cortices. These findings are compatible with his significant seizure history and left anterior temporal lobectomy. It is possible that there are also contributions from his past head injury and/or an early life hypoxic/anoxic event. On evaluation, Mr. Lizarraga demonstrated significant impairments in verbal learning/retrieval, naming, sentence repetition, visual problem solving/executive functioning, and visuoperception for faces. On verbal memory tasks, he sometimes benefitted from provision of recognition cues. He showed a more subtle weaknesses in verbal reasoning and verbal fluency. He also demonstrated mildly variability on tasks of working memory, processing speed, and executive functioning. Of note, learning and retrieval of visual information was a strength. The remainder of his cognitive abilities were normal and performed in keeping with his average cognitive baseline. He also reported moderate symptoms of depression and mild symptoms of anxiety, as well as chronic back pain and sleep difficulties. These factors may exacerbate day-to-day cognitive difficulties, though we cannot attribute all of his cognitive impairments to these factors.\" " "    Current Outpatient Medications   Medication Sig Dispense Refill    albuterol (PROAIR HFA/PROVENTIL HFA/VENTOLIN HFA) 108 (90 Base) MCG/ACT inhaler INHALE 2 PUFFS INTO THE LUNGS EVERY 4 HOURS AS NEEDED FOR ASTHMA SYMPTOMS 8.5 g 1    buPROPion (WELLBUTRIN XL) 300 MG 24 hr tablet Take 1 tablet (300 mg) by mouth every morning for depression & anxiety prevention 90 tablet 0    Calcium Carbonate-Vit D-Min (RA CALCIUM/VITAMIN D/MINERALS) 600-400 MG-UNIT TABS Take 1 tablet by mouth 2 times daily 180 tablet 3    calcium-vitamin D (OSCAL) 250-125 MG-UNIT TABS per tablet Take 1 tablet by mouth 2 times daily      PARoxetine (PAXIL) 20 MG tablet Take 1 tablet (20 mg) by mouth At Bedtime for depression and anxiety prevention 90 tablet 1    phenytoin (DILANTIN) 100 MG ER capsule Take 2 capsule in the morning and 2 caps in the evening for a total of 400 mg 360 capsule 3    azithromycin (ZITHROMAX) 250 MG tablet 2 tablets the first day, then 1 tablet daily for the next 4 days (Patient not taking: Reported on 3/1/2024) 6 tablet 0       Exam:    /71   Pulse 66   Ht 5' 8\" (172.7 cm)   Wt 145 lb (65.8 kg)   BMI 22.05 kg/m       Wt Readings from Last 5 Encounters:   05/03/24 145 lb (65.8 kg)   03/01/24 150 lb 9.6 oz (68.3 kg)   11/10/23 153 lb (69.4 kg)   11/04/23 153 lb 6.4 oz (69.6 kg)   08/04/23 147 lb 6.4 oz (66.9 kg)       General Appearance: Alert, awake, cooperative, pleasant, NAD  Gait: steady  Attention Span:  Normal  Language/speech: no aphasia or dysarthria  Extraocular Movements:  Normal  Coordination:  Normal finger to nose  Facial Strength:  Normal  Motor Exam: normal tone, bulk and strength 5/5 bilaterally    Assessment/Plan:     #1 focal epilepsy, status post left anterior temporal lobectomy in 1993.  Seizures have been controlled on Dilantin monotherapy.  He is taking calcium and vitamin D to prevent osteoporosis.   Last seizure was 9/2002.      #2  Progressive memory loss: He feels his short-term memory " has been progressively getting worse. MRI showed signal of left frontotemporal craniotomy and left anterior temporal lobectomy as well as nonspecific T2 hyperintensities in bifrontal white matter.  Neuropsychological evaluation was performed on 11/28/2023 by Dr. Matti Guzmán. It showed multifocal brain dysfunction. The most prominent deficits are consistent with dysfunction of both the lateral and mesial left temporal lobe. There is also suggestion of less severe dysfunction of the right lateral temporal lobe and bilateral frontal cortices. These findings are compatible with his significant seizure history and left anterior temporal lobectomy. It is possible that there are also contributions from his past head injury and/or an early life hypoxic/anoxic event.      #3 depression and anxiety: patient has a history of depression and anxiety and is taking paroxetine and bupropion.  His  neuropsychological evaluation also showed moderate symptoms of depression and anxiety.  Bupropion may decrease seizure-threshold. I suggested he discuss this with his primary care provider to consider switching to another medication if possible.       As described above, I met with the patient for 25 minutes and during this time counseling was greater than 50% of the visit time. I spent an additional 20 min on chart review and documentation.   Ml Armstrong MD

## 2024-05-03 NOTE — LETTER
5/3/2024         RE: Raphael Lizarraga  [address]  [place] [address]        Dear Colleague,    Thank you for referring your patient, Raphael Lizarraga, to the [facility] [place]. Please see a copy of my visit note below.     NEUROLOGY PROGRESS NOTE   [facility]    Patient:Raphael Lizarraga  : 1965  Age: 59 year old  Today's Office Visit: May 3, 2024    History of present illness:     Mr. Raphael Lizarraga was last seen 2023. He did not have any seizures since last visit. He has been maintained on phenytoin 200 mg bid.      Phenytoin level  23:  24.7/1.7. occasionally he feels dizzy, and sometimes has imbalance.     Neuropsychological evaluation was performed by Dr. Matti Guzmán on 2023:  Mr. Lizarraga demonstrated deficits that suggest multifocal brain dysfunction. The most prominent deficits are consistent with dysfunction of both the lateral and mesial left temporal lobe. There is also suggestion of less severe dysfunction of the right lateral temporal lobe and bilateral frontal cortices. These findings are compatible with his significant seizure history and left anterior temporal lobectomy. It is possible that there are also contributions from his past head injury and/or an early life hypoxic/anoxic event. On evaluation, Mr. Lizarraga demonstrated significant impairments in verbal learning/retrieval, naming, sentence repetition, visual problem solving/executive functioning, and visuoperception for faces. On verbal memory tasks, he sometimes benefitted from provision of recognition cues. He showed a more subtle weaknesses in verbal reasoning and verbal fluency. He also demonstrated mildly variability on tasks of working memory, processing speed, and executive functioning. Of note, learning and retrieval of visual information was a strength. The remainder of his cognitive abilities were normal and performed in keeping with his average cognitive baseline. He also  "reported moderate symptoms of depression and mild symptoms of anxiety, as well as chronic back pain and sleep difficulties. These factors may exacerbate day-to-day cognitive difficulties, though we cannot attribute all of his cognitive impairments to these factors.\"     Current Outpatient Medications   Medication Sig Dispense Refill    albuterol (PROAIR HFA/PROVENTIL HFA/VENTOLIN HFA) 108 (90 Base) MCG/ACT inhaler INHALE 2 PUFFS INTO THE LUNGS EVERY 4 HOURS AS NEEDED FOR ASTHMA SYMPTOMS 8.5 g 1    buPROPion (WELLBUTRIN XL) 300 MG 24 hr tablet Take 1 tablet (300 mg) by mouth every morning for depression & anxiety prevention 90 tablet 0    Calcium Carbonate-Vit D-Min (RA CALCIUM/VITAMIN D/MINERALS) 600-400 MG-UNIT TABS Take 1 tablet by mouth 2 times daily 180 tablet 3    calcium-vitamin D (OSCAL) 250-125 MG-UNIT TABS per tablet Take 1 tablet by mouth 2 times daily      PARoxetine (PAXIL) 20 MG tablet Take 1 tablet (20 mg) by mouth At Bedtime for depression and anxiety prevention 90 tablet 1    phenytoin (DILANTIN) 100 MG ER capsule Take 2 capsule in the morning and 2 caps in the evening for a total of 400 mg 360 capsule 3    azithromycin (ZITHROMAX) 250 MG tablet 2 tablets the first day, then 1 tablet daily for the next 4 days (Patient not taking: Reported on 3/1/2024) 6 tablet 0       Exam:    /71   Pulse 66   Ht 5' 8\" (172.7 cm)   Wt 145 lb (65.8 kg)   BMI 22.05 kg/m       Wt Readings from Last 5 Encounters:   05/03/24 145 lb (65.8 kg)   03/01/24 150 lb 9.6 oz (68.3 kg)   11/10/23 153 lb (69.4 kg)   11/04/23 153 lb 6.4 oz (69.6 kg)   08/04/23 147 lb 6.4 oz (66.9 kg)       General Appearance: Alert, awake, cooperative, pleasant, NAD  Gait: steady  Attention Span:  Normal  Language/speech: no aphasia or dysarthria  Extraocular Movements:  Normal  Coordination:  Normal finger to nose  Facial Strength:  Normal  Motor Exam: normal tone, bulk and strength 5/5 bilaterally    Assessment/Plan:     #1 focal " epilepsy, status post left anterior temporal lobectomy in 1993.  Seizures have been controlled on Dilantin monotherapy.  He is taking calcium and vitamin D to prevent osteoporosis.   Last seizure was 9/2002.      #2  Progressive memory loss: He feels his short-term memory has been progressively getting worse. MRI showed signal of left frontotemporal craniotomy and left anterior temporal lobectomy as well as nonspecific T2 hyperintensities in bifrontal white matter.  Neuropsychological evaluation was performed on 11/28/2023 by Dr. Matti Guzmán. It showed multifocal brain dysfunction. The most prominent deficits are consistent with dysfunction of both the lateral and mesial left temporal lobe. There is also suggestion of less severe dysfunction of the right lateral temporal lobe and bilateral frontal cortices. These findings are compatible with his significant seizure history and left anterior temporal lobectomy. It is possible that there are also contributions from his past head injury and/or an early life hypoxic/anoxic event.      #3 depression and anxiety: patient has a history of depression and anxiety and is taking paroxetine and bupropion.  His  neuropsychological evaluation also showed moderate symptoms of depression and anxiety.  Bupropion may decrease seizure-threshold. I suggested he discuss this with his primary care provider to consider switching to another medication if possible.       As described above, I met with the patient for 25 minutes and during this time counseling was greater than 50% of the visit time. I spent an additional 20 min on chart review and documentation.   Ml Armstrong MD

## 2024-05-04 LAB — PHENYTOIN SERPL-MCNC: 25.4 UG/ML

## 2024-05-05 LAB — PHENYTOIN FREE SERPL-MCNC: 2.3 UG/ML

## 2024-05-06 SDOH — HEALTH STABILITY: PHYSICAL HEALTH: ON AVERAGE, HOW MANY DAYS PER WEEK DO YOU ENGAGE IN MODERATE TO STRENUOUS EXERCISE (LIKE A BRISK WALK)?: 0 DAYS

## 2024-05-06 SDOH — HEALTH STABILITY: PHYSICAL HEALTH: ON AVERAGE, HOW MANY MINUTES DO YOU ENGAGE IN EXERCISE AT THIS LEVEL?: 0 MIN

## 2024-05-06 ASSESSMENT — SOCIAL DETERMINANTS OF HEALTH (SDOH): HOW OFTEN DO YOU GET TOGETHER WITH FRIENDS OR RELATIVES?: NEVER

## 2024-05-13 ENCOUNTER — OFFICE VISIT (OUTPATIENT)
Dept: FAMILY MEDICINE | Facility: CLINIC | Age: 59
End: 2024-05-13
Payer: COMMERCIAL

## 2024-05-13 VITALS
WEIGHT: 148.4 LBS | SYSTOLIC BLOOD PRESSURE: 121 MMHG | OXYGEN SATURATION: 98 % | DIASTOLIC BLOOD PRESSURE: 70 MMHG | HEART RATE: 72 BPM | TEMPERATURE: 98.5 F | RESPIRATION RATE: 14 BRPM | HEIGHT: 66 IN | BODY MASS INDEX: 23.85 KG/M2

## 2024-05-13 DIAGNOSIS — Z12.5 SCREENING FOR PROSTATE CANCER: ICD-10-CM

## 2024-05-13 DIAGNOSIS — Z00.00 ROUTINE GENERAL MEDICAL EXAMINATION AT A HEALTH CARE FACILITY: Primary | ICD-10-CM

## 2024-05-13 DIAGNOSIS — J45.20 INTERMITTENT ASTHMA WITHOUT COMPLICATION, UNSPECIFIED ASTHMA SEVERITY: Chronic | ICD-10-CM

## 2024-05-13 DIAGNOSIS — F41.9 ANXIETY: Chronic | ICD-10-CM

## 2024-05-13 DIAGNOSIS — Z23 NEED FOR VACCINATION: ICD-10-CM

## 2024-05-13 DIAGNOSIS — F33.0 MAJOR DEPRESSIVE DISORDER, RECURRENT EPISODE, MILD (H): Chronic | ICD-10-CM

## 2024-05-13 DIAGNOSIS — Z13.220 SCREENING FOR LIPID DISORDERS: ICD-10-CM

## 2024-05-13 DIAGNOSIS — Z13.1 SCREENING FOR DIABETES MELLITUS: ICD-10-CM

## 2024-05-13 PROCEDURE — 90472 IMMUNIZATION ADMIN EACH ADD: CPT | Performed by: FAMILY MEDICINE

## 2024-05-13 PROCEDURE — G0103 PSA SCREENING: HCPCS | Performed by: FAMILY MEDICINE

## 2024-05-13 PROCEDURE — 96127 BRIEF EMOTIONAL/BEHAV ASSMT: CPT | Performed by: FAMILY MEDICINE

## 2024-05-13 PROCEDURE — 90746 HEPB VACCINE 3 DOSE ADULT IM: CPT | Performed by: FAMILY MEDICINE

## 2024-05-13 PROCEDURE — 99396 PREV VISIT EST AGE 40-64: CPT | Mod: 25 | Performed by: FAMILY MEDICINE

## 2024-05-13 PROCEDURE — 82947 ASSAY GLUCOSE BLOOD QUANT: CPT | Performed by: FAMILY MEDICINE

## 2024-05-13 PROCEDURE — 90677 PCV20 VACCINE IM: CPT | Performed by: FAMILY MEDICINE

## 2024-05-13 PROCEDURE — 90471 IMMUNIZATION ADMIN: CPT | Performed by: FAMILY MEDICINE

## 2024-05-13 PROCEDURE — 80061 LIPID PANEL: CPT | Performed by: FAMILY MEDICINE

## 2024-05-13 PROCEDURE — 36415 COLL VENOUS BLD VENIPUNCTURE: CPT | Performed by: FAMILY MEDICINE

## 2024-05-13 PROCEDURE — 99214 OFFICE O/P EST MOD 30 MIN: CPT | Mod: 25 | Performed by: FAMILY MEDICINE

## 2024-05-13 RX ORDER — PAROXETINE 20 MG/1
20 TABLET, FILM COATED ORAL AT BEDTIME
Qty: 90 TABLET | Refills: 1 | Status: SHIPPED | OUTPATIENT
Start: 2024-05-13 | End: 2024-08-16

## 2024-05-13 RX ORDER — BUPROPION HYDROCHLORIDE 100 MG/1
100 TABLET, EXTENDED RELEASE ORAL EVERY MORNING
Qty: 30 TABLET | Refills: 0 | Status: SHIPPED | OUTPATIENT
Start: 2024-05-13 | End: 2024-05-27

## 2024-05-13 RX ORDER — MIRTAZAPINE 7.5 MG/1
7.5 TABLET, FILM COATED ORAL AT BEDTIME
Qty: 90 TABLET | Refills: 1 | Status: SHIPPED | OUTPATIENT
Start: 2024-05-13 | End: 2024-08-16

## 2024-05-13 RX ORDER — ALBUTEROL SULFATE 90 UG/1
2 AEROSOL, METERED RESPIRATORY (INHALATION) EVERY 4 HOURS PRN
Qty: 8.5 G | Refills: 1 | Status: SHIPPED | OUTPATIENT
Start: 2024-05-13

## 2024-05-13 ASSESSMENT — PATIENT HEALTH QUESTIONNAIRE - PHQ9
SUM OF ALL RESPONSES TO PHQ QUESTIONS 1-9: 7
SUM OF ALL RESPONSES TO PHQ QUESTIONS 1-9: 7
10. IF YOU CHECKED OFF ANY PROBLEMS, HOW DIFFICULT HAVE THESE PROBLEMS MADE IT FOR YOU TO DO YOUR WORK, TAKE CARE OF THINGS AT HOME, OR GET ALONG WITH OTHER PEOPLE: NOT DIFFICULT AT ALL

## 2024-05-13 ASSESSMENT — ANXIETY QUESTIONNAIRES
5. BEING SO RESTLESS THAT IT IS HARD TO SIT STILL: NOT AT ALL
4. TROUBLE RELAXING: NOT AT ALL
7. FEELING AFRAID AS IF SOMETHING AWFUL MIGHT HAPPEN: NOT AT ALL
GAD7 TOTAL SCORE: 0
8. IF YOU CHECKED OFF ANY PROBLEMS, HOW DIFFICULT HAVE THESE MADE IT FOR YOU TO DO YOUR WORK, TAKE CARE OF THINGS AT HOME, OR GET ALONG WITH OTHER PEOPLE?: NOT DIFFICULT AT ALL
GAD7 TOTAL SCORE: 0
1. FEELING NERVOUS, ANXIOUS, OR ON EDGE: NOT AT ALL
GAD7 TOTAL SCORE: 0
2. NOT BEING ABLE TO STOP OR CONTROL WORRYING: NOT AT ALL
IF YOU CHECKED OFF ANY PROBLEMS ON THIS QUESTIONNAIRE, HOW DIFFICULT HAVE THESE PROBLEMS MADE IT FOR YOU TO DO YOUR WORK, TAKE CARE OF THINGS AT HOME, OR GET ALONG WITH OTHER PEOPLE: NOT DIFFICULT AT ALL
3. WORRYING TOO MUCH ABOUT DIFFERENT THINGS: NOT AT ALL
7. FEELING AFRAID AS IF SOMETHING AWFUL MIGHT HAPPEN: NOT AT ALL
6. BECOMING EASILY ANNOYED OR IRRITABLE: NOT AT ALL

## 2024-05-13 ASSESSMENT — PAIN SCALES - GENERAL: PAINLEVEL: NO PAIN (0)

## 2024-05-13 NOTE — PROGRESS NOTES
Preventive Care Visit  [facility]  Chava Belcher MD, Family Medicine  May 13, 2024      Assessment & Plan     (Z00.00) Routine general medical examination at a health care facility  (primary encounter diagnosis)  Comment: Negative screening exam; up-to-date on preventive services.   Plan: Pneumococcal 20 Valent Conjugate (Prevnar 20),         HEPATITIS B, ADULT 20+ (ENGERIX-B/RECOMBIVAX         HB), Lipid panel reflex to direct LDL         Non-fasting, Glucose, Prostate Specific Antigen        Screen        Follow up in 1 year.     (F33.0) Major depressive disorder, recurrent episode, mild (H24)  Comment: See medication history below.  Plan: PARoxetine (PAXIL) 20 MG tablet, buPROPion         (WELLBUTRIN SR) 100 MG 12 hr tablet,         mirtazapine (REMERON) 7.5 MG tablet        To decrease seizure risk, we will wean him of bupropion: decrease to 100 mg daily for 2-4 weeks before stopping. When he stops, I want him to start mirtazapine, then follow up about 5 weeks later.     (F41.9) Anxiety  Comment: well controlled.   Plan: PARoxetine (PAXIL) 20 MG tablet, buPROPion         (WELLBUTRIN SR) 100 MG 12 hr tablet        Taper off of bupropion as above.     (J45.20) Intermittent asthma without complication, unspecified asthma severity  Comment: well controlled. I updated his albuterol prescription today.   Plan: Asthma Action Plan (AAP), albuterol (PROAIR         HFA/PROVENTIL HFA/VENTOLIN HFA) 108 (90 Base)         MCG/ACT inhaler            (Z13.220) Screening for lipid disorders  Comment:   Plan: Lipid panel reflex to direct LDL Non-fasting            (Z13.1) Screening for diabetes mellitus  Comment:   Plan: Glucose            (Z12.5) Screening for prostate cancer  Comment:   Plan: Prostate Specific Antigen Screen            (Z23) Need for vaccination  Comment:   Plan: Pneumococcal 20 Valent Conjugate (Prevnar 20),         HEPATITIS B, ADULT 20+ (ENGERIX-B/RECOMBIVAX         HB)                   Counseling  Appropriate preventive services were discussed with this patient, including applicable screening as appropriate for fall prevention, nutrition, physical activity, Tobacco-use cessation, weight loss and cognition.  Checklist reviewing preventive services available has been given to the patient.  Reviewed patient's diet, addressing concerns and/or questions.   Patient is at risk for social isolation and has been provided with information about the benefit of social connection.   He is at risk for psychosocial distress and has been provided with information to reduce risk.   The patient's PHQ-9 score is consistent with mild depression. He was provided with information regarding depression.         Jaime Lim is a 59 year old, presenting for the following:  Physical        5/13/2024     4:17 PM   Additional Questions   Roomed by Seema         5/13/2024     4:17 PM   Patient Reported Additional Medications   Patient reports taking the following new medications none        Health Care Directive  Patient does not have a Health Care Directive or Living Will: Discussed advance care planning with patient; information given to patient to review.    HPI        5/6/2024   General Health   How would you rate your overall physical health? Good   Feel stress (tense, anxious, or unable to sleep) Only a little   (!) STRESS CONCERN      5/6/2024   Nutrition   Three or more servings of calcium each day? (!) I DON'T KNOW   Diet: Regular (no restrictions)   How many servings of fruit and vegetables per day? (!) 0-1   How many sweetened beverages each day? 0-1         5/6/2024   Exercise   Days per week of moderate/strenous exercise 0 days   Average minutes spent exercising at this level 0 min   (!) EXERCISE CONCERN      5/6/2024   Social Factors   Frequency of gathering with friends or relatives Never   Worry food won't last until get money to buy more No   Food not last or not have enough money for food?  No   Do you have housing?  Yes   Are you worried about losing your housing? No   Lack of transportation? No   Unable to get utilities (heat,electricity)? No   (!) SOCIAL CONNECTIONS CONCERN      2024   Fall Risk   Gait Speed Test (Document in seconds) 3.72   Gait Speed Test Interpretation Less than or equal to 5.00 seconds - PASS          2024   Dental   Dentist two times every year? Yes         2024   TB Screening   Were you born outside of the [place]? No       Today's PHQ-9 Score:       2024     4:13 PM   PHQ-9 SCORE   PHQ-9 Total Score MyChart 7 (Mild depression)   PHQ-9 Total Score 7     ** the patient has been treated for depression for many years. He was on sertraline 150 mg until  when he was switched to fluoxetine 30 mg. This was tapered, then restarted, the increased to 40 mg in  until it was switched to paroxetine in  (possibly for sexual side effects). bupropion 100 mg was also added on 16, increased to 150 mg in , and increased to 300 mg in 2023. **        2024   Substance Use   Alcohol more than 3/day or more than 7/wk No   Do you use any other substances recreationally? No     Social History     Tobacco Use    Smoking status: Former     Current packs/day: 0.00     Types: Cigarettes     Quit date: 1993     Years since quittin.7     Passive exposure: Never    Smokeless tobacco: Never   Vaping Use    Vaping status: Never Used   Substance Use Topics    Alcohol use: Not Currently     Alcohol/week: 0.0 - 4.0 standard drinks of alcohol     Comment: beer - occ    Drug use: No           2024   STI Screening   New sexual partner(s) since last STI/HIV test? No   Last PSA:   Prostate Specific Antigen Screen   Date Value Ref Range Status   2023 0.20 0.00 - 4.00 ug/L Final     ASCVD Risk   The 10-year ASCVD risk score (Janet ALVARENGA, et al., 2019) is: 4.6%    Values used to calculate the score:      Age: 59 years      Sex: Male      Is  "Non- : No      Diabetic: No      Tobacco smoker: No      Systolic Blood Pressure: 121 mmHg      Is BP treated: No      HDL Cholesterol: 81 mg/dL      Total Cholesterol: 221 mg/dL               Review of Systems      Objective    Exam  /70 (BP Location: Left arm, Patient Position: Sitting, Cuff Size: Adult Regular)   Pulse 72   Temp 98.5  F (36.9  C) (Oral)   Resp 14   Ht 1.674 m (5' 5.91\")   Wt 67.3 kg (148 lb 6.4 oz)   SpO2 98%   BMI 24.02 kg/m     Estimated body mass index is 24.02 kg/m  as calculated from the following:    Height as of this encounter: 1.674 m (5' 5.91\").    Weight as of this encounter: 67.3 kg (148 lb 6.4 oz).    Physical Exam  GENERAL: alert and no distress  EYES: Eyes grossly normal to inspection, PERRL and conjunctivae and sclerae normal  HENT: ear canals and TM's normal, nose and mouth without ulcers or lesions  NECK: no adenopathy, no asymmetry, masses, or scars  RESP: lungs clear to auscultation - no rales, rhonchi or wheezes  CV: regular rate and rhythm, normal S1 S2, no S3 or S4, no murmur, click or rub, no peripheral edema  ABDOMEN: soft, nontender, no hepatosplenomegaly, no masses and bowel sounds normal   (male): normal male genitalia without lesions or urethral discharge, no hernia  MS: no gross musculoskeletal defects noted, no edema  SKIN: no suspicious lesions or rashes  NEURO: Normal strength and tone, mentation intact and speech normal  PSYCH: mentation appears normal, affect normal/bright        Signed Electronically by: Chava Belcher MD      The information in this document, created by the medical scribe for me, accurately reflects the services I personally performed and the decisions made by me. I have reviewed and approved this document for accuracy prior to signature.  Luis Farias        Answers submitted by the patient for this visit:  Patient Health Questionnaire (Submitted on 5/13/2024)  If you checked off any problems, how " difficult have these problems made it for you to do your work, take care of things at home, or get along with other people?: Not difficult at all  PHQ9 TOTAL SCORE: 7  JOSE-7 (Submitted on 5/13/2024)  JOSE 7 TOTAL SCORE: 0

## 2024-05-13 NOTE — NURSING NOTE
Prior to immunization administration, verified patients identity using patient s name and date of birth. Please see Immunization Activity for additional information.     Screening Questionnaire for Adult Immunization    Are you sick today?   No   Do you have allergies to medications, food, a vaccine component or latex?   No   Have you ever had a serious reaction after receiving a vaccination?   No   Do you have a long-term health problem with heart, lung, kidney, or metabolic disease (e.g., diabetes), asthma, a blood disorder, no spleen, complement component deficiency, a cochlear implant, or a spinal fluid leak?  Are you on long-term aspirin therapy?   No   Do you have cancer, leukemia, HIV/AIDS, or any other immune system problem?   No   Do you have a parent, brother, or sister with an immune system problem?   No   In the past 3 months, have you taken medications that affect  your immune system, such as prednisone, other steroids, or anticancer drugs; drugs for the treatment of rheumatoid arthritis, Crohn s disease, or psoriasis; or have you had radiation treatments?   No   Have you had a seizure, or a brain or other nervous system problem?   No   During the past year, have you received a transfusion of blood or blood    products, or been given immune (gamma) globulin or antiviral drug?   No   For women: Are you pregnant or is there a chance you could become       pregnant during the next month?   No   Have you received any vaccinations in the past 4 weeks?   No     Immunization questionnaire answers were all negative.      Patient instructed to remain in clinic for 15 minutes afterwards, and to report any adverse reactions.     Screening performed by Niki Haider MA on 5/13/2024 at 5:41 PM.

## 2024-05-13 NOTE — LETTER
My Asthma Action Plan    Name: Raphael Lizarraga   YOB: 1965  Date: 5/13/2024   My doctor: Chava Belcher MD   My clinic: [facility]        My Rescue Medicine:   Albuterol inhaler (Proair/Ventolin/Proventil HFA)  2-4 puffs EVERY 4 HOURS as needed. Use a spacer if recommended by your provider.   My Asthma Severity:   Intermittent / Exercise Induced  Know your asthma triggers.   None          GREEN ZONE   Good Control  I feel good  No cough or wheeze  Can work, sleep and play without asthma symptoms       Take your asthma control medicine every day.     If exercise triggers your asthma, take your rescue medication  15 minutes before exercise or sports, and  During exercise if you have asthma symptoms  Spacer to use with inhaler: If you have a spacer, make sure to use it with your inhaler             YELLOW ZONE Getting Worse  I have ANY of these:  I do not feel good  Cough or wheeze  Chest feels tight  Wake up at night   Keep taking your Green Zone medications  Start taking your rescue medicine:  every 20 minutes for up to 1 hour. Then every 4 hours for 24-48 hours.  If you stay in the Yellow Zone for more than 12-24 hours, contact your doctor.  If you do not return to the Green Zone in 12-24 hours or you get worse, start taking your oral steroid medicine if prescribed by your provider.           RED ZONE Medical Alert - Get Help  I have ANY of these:  I feel awful  Medicine is not helping  Breathing getting harder  Trouble walking or talking  Nose opens wide to breathe       Take your rescue medicine NOW  If your provider has prescribed an oral steroid medicine, start taking it NOW  Call your doctor NOW  If you are still in the Red Zone after 20 minutes and you have not reached your doctor:  Take your rescue medicine again and  Call 911 or go to the emergency room right away    See your regular doctor within 2 weeks of an Emergency Room or Urgent Care visit for follow-up  treatment.          Annual Reminders:  Meet with Asthma Educator,  Flu Shot in the Fall, consider Pneumonia Vaccination for patients with asthma (aged 19 and older).    Pharm[facility]: [id number] [address] AT [facility] [address] [place]    Electronically signed by Chava Belcher MD   Date: 05/13/24                    Asthma Triggers  How To Control Things That Make Your Asthma Worse    Triggers are things that make your asthma worse.  Look at the list below to help you find your triggers and   what you can do about them. You can help prevent asthma flare-ups by staying away from your triggers.      Trigger                                                          What you can do   Cigarette Smoke  Tobacco smoke can make asthma worse. Do not allow smoking in your home, car or around you.  Be sure no one smokes at a child s day care or school.  If you smoke, ask your health care provider for ways to help you quit.  Ask family members to quit too.  Ask your health care provider for a referral to Quit Plan to help you quit smoking, or call 7-602-385PLAN.     Colds, Flu, Bronchitis  These are common triggers of asthma. Wash your hands often.  Don t touch your eyes, nose or mouth.  Get a flu shot every year.     Dust Mites  These are tiny bugs that live in cloth or carpet. They are too small to see. Wash sheets and blankets in hot water every week.   Encase pillows and mattress in dust mite proof covers.  Avoid having carpet if you can. If you have carpet, vacuum weekly.   Use a dust mask and HEPA vacuum.   Pollen and Outdoor Mold  Some people are allergic to trees, grass, or weed pollen, or molds. Try to keep your windows closed.  Limit time out doors when pollen count is high.   Ask you health care provider about taking medicine during allergy season.     Animal Dander  Some people are allergic to skin flakes, urine or saliva from pets with fur or feathers. Keep pets with fur  or feathers out of your home.    If you can t keep the pet outdoors, then keep the pet out of your bedroom.  Keep the bedroom door closed.  Keep pets off cloth furniture and away from stuffed toys.     Mice, Rats, and Cockroaches  Some people are allergic to the waste from these pests.   Cover food and garbage.  Clean up spills and food crumbs.  Store grease in the refrigerator.   Keep food out of the bedroom.   Indoor Mold  This can be a trigger if your home has high moisture. Fix leaking faucets, pipes, or other sources of water.   Clean moldy surfaces.  Dehumidify basement if it is damp and smelly.   Smoke, Strong Odors, and Sprays  These can reduce air quality. Stay away from strong odors and sprays, such as perfume, powder, hair spray, paints, smoke incense, paint, cleaning products, candles and new carpet.   Exercise or Sports  Some people with asthma have this trigger. Be active!  Ask your doctor about taking medicine before sports or exercise to prevent symptoms.    Warm up for 5-10 minutes before and after sports or exercise.     Other Triggers of Asthma  Cold air:  Cover your nose and mouth with a scarf.  Sometimes laughing or crying can be a trigger.  Some medicines and food can trigger asthma.

## 2024-05-13 NOTE — PATIENT INSTRUCTIONS
"Preventive Care Advice   This is general advice we often give to help people stay healthy. Your care team may have specific advice just for you. Please talk to your care team about your own preventive care needs.  Lifestyle  Exercise at least 150 minutes each week (30 minutes a day, 5 days a week).  Do muscle strengthening activities 2 days a week. These help control your weight and prevent disease.  No smoking.  Wear sunscreen to prevent skin cancer.  Have your home tested for radon every 2 to 5 years. Radon is a colorless, odorless gas that can harm your lungs. To learn more, go to www.health.[facility].mn. and search for \"Radon in Homes.\"  Keep guns unloaded and locked up in a safe place like a safe or gun vault, or, use a gun lock and hide the keys. Always lock away bullets separately. To learn more, visit [place].gov and search for \"safe gun storage.\"  Nutrition  Eat 5 or more servings of fruits and vegetables each day.  Try wheat bread, brown rice and whole grain pasta (instead of white bread, rice, and pasta).  Get enough calcium and vitamin D. Check the label on foods and aim for 100% of the RDA (recommended daily allowance).  Regular exams  Have a dental exam and cleaning every 6 months.  See your health care team every year to talk about:  Any changes in your health.  Any medicines your care team has prescribed.  Preventive care, family planning, and ways to prevent chronic diseases.  Shots (vaccines)   HPV shots (up to age 26), if you've never had them before.  Hepatitis B shots (up to age 59), if you've never had them before.  COVID-19 shot: Get this shot when it's due.  Flu shot: Get a flu shot every year.  Tetanus shot: Get a tetanus shot every 10 years.  Pneumococcal, hepatitis A, and RSV shots: Ask your care team if you need these based on your risk.  Shingles shot (for age 50 and up).  General health tests  Diabetes screening:  Starting at age 35, Get screened for diabetes at least every 3 years.  If " you are younger than age 35, ask your care team if you should be screened for diabetes.  Cholesterol test: At age 39, start having a cholesterol test every 5 years, or more often if advised.  Bone density scan (DEXA): At age 50, ask your care team if you should have this scan for osteoporosis (brittle bones).  Hepatitis C: Get tested at least once in your life.  Abdominal aortic aneurysm screening: Talk to your doctor about having this screening if you:  Have ever smoked; and  Are biologically male; and  Are between the ages of 65 and 75.  STIs (sexually transmitted infections)  Before age 24: Ask your care team if you should be screened for STIs.  After age 24: Get screened for STIs if you're at risk. You are at risk for STIs (including HIV) if:  You are sexually active with more than one person.  You don't use condoms every time.  You or a partner was diagnosed with a sexually transmitted infection.  If you are at risk for HIV, ask about PrEP medicine to prevent HIV.  Get tested for HIV at least once in your life, whether you are at risk for HIV or not.  Cancer screening tests  Cervical cancer screening: If you have a cervix, begin getting regular cervical cancer screening tests at age 21. Most people who have regular screenings with normal results can stop after age 65. Talk about this with your provider.  Breast cancer scan (mammogram): If you've ever had breasts, begin having regular mammograms starting at age 40. This is a scan to check for breast cancer.  Colon cancer screening: It is important to start screening for colon cancer at age 45.  Have a colonoscopy test every 10 years (or more often if you're at risk) Or, ask your provider about stool tests like a FIT test every year or Cologuard test every 3 years.  To learn more about your testing options, visit: www.[employer]/243692.pdf.  For help making a decision, visit: [email or web address].  Prostate cancer screening test: If you have a prostate and are age 55  to 69, ask your provider if you would benefit from a yearly prostate cancer screening test.  Lung cancer screening: If you are a current or former smoker age 50 to 80, ask your care team if ongoing lung cancer screenings are right for you.  For informational purposes only. Not to replace the advice of your health care provider. Copyright   2023 [employer]. All rights reserved. Clinically reviewed by the [facility] Transitions Program. [employer] 889171 - REV 04/24.    Preventing Falls: Care Instructions  Injuries and health problems such as trouble walking or poor eyesight can increase your risk of falling. So can some medicines. But there are things you can do to help prevent falls. You can exercise to get stronger. You can also arrange your home to make it safer.    Talk to your doctor about the medicines you take. Ask if any of them increase the risk of falls and whether they can be changed or stopped.   Try to exercise regularly. It can help improve your strength and balance. This can help lower your risk of falling.     Practice fall safety and prevention.    Wear low-heeled shoes that fit well and give your feet good support. Talk to your doctor if you have foot problems that make this hard.  Carry a cellphone or wear a medical alert device that you can use to call for help.  Use stepladders instead of chairs to reach high objects. Don't climb if you're at risk for falls. Ask for help, if needed.  Wear the correct eyeglasses, if you need them.    Make your home safer.    Remove rugs, cords, clutter, and furniture from walkways.  Keep your house well lit. Use night-lights in hallways and bathrooms.  Install and use sturdy handrails on stairways.  Wear nonskid footwear, even inside. Don't walk barefoot or in socks without shoes.    Be safe outside.    Use handrails, curb cuts, and ramps whenever possible.  Keep your hands free by using a shoulder bag or backpack.  Try to walk in well-lit  "areas. Watch out for uneven ground, changes in pavement, and debris.  Be careful in the winter. Walk on the grass or gravel when sidewalks are slippery. Use de-icer on steps and walkways. Add non-slip devices to shoes.    Put grab bars and nonskid mats in your shower or tub and near the toilet. Try to use a shower chair or bath bench when bathing.   Get into a tub or shower by putting in your weaker leg first. Get out with your strong side first. Have a phone or medical alert device in the bathroom with you.   Where can you learn more?  Go to https://www.[employer].net/patiented  Enter G117 in the search box to learn more about \"Preventing Falls: Care Instructions.\"  Current as of: July 17, 2023               Content Version: 14.0    7616-1662 [employer].   Care instructions adapted under license by your healthcare professional. If you have questions about a medical condition or this instruction, always ask your healthcare professional. [employer] disclaims any warranty or liability for your use of this information.      Relationships for Good Health  Relationships are important for our health and happiness. Social isolation, loneliness and lack of support are bad for your health. Studies show that loneliness can harm health and limit your life span as much as high blood pressure and smoking.   Take some time to reflect on your relationships. Then answer these questions:  Are there people in your life that cause you stress or drain your energy? What can you do to set limits?  ________________________________________________________________________________________________________________________________________________________________________________________________________________________________________________________________________________________________________________________________________________  Who do you enjoy spending time with? Who can you go to for " support?  ________________________________________________________________________________________________________________________________________________________________________________________________________________________________________________________________________________________________________________________________________________  What can you do to improve your relationships with others?  __________________________________________________________________________________________________________________________________________________________________________________________________________________  ______________________________________________________________________________________________________________________________  What do you like most about your relationships with others?  ________________________________________________________________________________________________________________________________________________________________________________________________________________________________________________________________________________________________________________________________________________  My goal: ______________________________________________________________________  I will ______________________________________________________________________________________________________________________________________________________________________________________________    For informational purposes only. Not to replace the advice of your health care provider. Copyright   2018 [facility] [place]. All rights reserved. Clinically reviewed by [facility]  Team. [employer] 930608 - Rev 04/21.    Learning About Stress  What is stress?     Stress is your body's response to a hard situation. Your body can have a physical, emotional, or mental response. Stress is a fact of life for most people, and it affects everyone differently. What causes stress for you may not be  stressful for someone else.  A lot of things can cause stress. You may feel stress when you go on a job interview, take a test, or run a race. This kind of short-term stress is normal and even useful. It can help you if you need to work hard or react quickly. For example, stress can help you finish an important job on time.  Long-term stress is caused by ongoing stressful situations or events. Examples of long-term stress include long-term health problems, ongoing problems at work, or conflicts in your family. Long-term stress can harm your health.  How does stress affect your health?  When you are stressed, your body responds as though you are in danger. It makes hormones that speed up your heart, make you breathe faster, and give you a burst of energy. This is called the fight-or-flight stress response. If the stress is over quickly, your body goes back to normal and no harm is done.  But if stress happens too often or lasts too long, it can have bad effects. Long-term stress can make you more likely to get sick, and it can make symptoms of some diseases worse. If you tense up when you are stressed, you may develop neck, shoulder, or low back pain. Stress is linked to high blood pressure and heart disease.  Stress also harms your emotional health. It can make you ray, tense, or depressed. Your relationships may suffer, and you may not do well at work or school.  What can you do to manage stress?  You can try these things to help manage stress:   Do something active. Exercise or activity can help reduce stress. Walking is a great way to get started. Even everyday activities such as housecleaning or yard work can help.  Try yoga or sheri chi. These techniques combine exercise and meditation. You may need some training at first to learn them.  Do something you enjoy. For example, listen to music or go to a movie. Practice your hobby or do volunteer work.  Meditate. This can help you relax, because you are not  "worrying about what happened before or what may happen in the future.  Do guided imagery. Imagine yourself in any setting that helps you feel calm. You can use online videos, books, or a teacher to guide you.  Do breathing exercises. For example:  From a standing position, bend forward from the waist with your knees slightly bent. Let your arms dangle close to the floor.  Breathe in slowly and deeply as you return to a standing position. Roll up slowly and lift your head last.  Hold your breath for just a few seconds in the standing position.  Breathe out slowly and bend forward from the waist.  Let your feelings out. Talk, laugh, cry, and express anger when you need to. Talking with supportive friends or family, a counselor, or a frank leader about your feelings is a healthy way to relieve stress. Avoid discussing your feelings with people who make you feel worse.  Write. It may help to write about things that are bothering you. This helps you find out how much stress you feel and what is causing it. When you know this, you can find better ways to cope.  What can you do to prevent stress?  You might try some of these things to help prevent stress:  Manage your time. This helps you find time to do the things you want and need to do.  Get enough sleep. Your body recovers from the stresses of the day while you are sleeping.  Get support. Your family, friends, and community can make a difference in how you experience stress.  Limit your news feed. Avoid or limit time on social media or news that may make you feel stressed.  Do something active. Exercise or activity can help reduce stress. Walking is a great way to get started.  Where can you learn more?  Go to https://www.healthFreeAgent.net/patiented  Enter N032 in the search box to learn more about \"Learning About Stress.\"  Current as of: October 24, 2023               Content Version: 14.0    5185-5588 [employer].   Care instructions adapted under license " by your healthcare professional. If you have questions about a medical condition or this instruction, always ask your healthcare professional. Healthwise, [facility] disclaims any warranty or liability for your use of this information.      Learning About Depression Screening  What is depression screening?  Depression screening is a way to see if you have depression symptoms. It may be done by a doctor or counselor. It's often part of a routine checkup. That's because your mental health is just as important as your physical health.  Depression is a mental health condition that affects how you feel, think, and act. You may:  Have less energy.  Lose interest in your daily activities.  Feel sad and grouchy for a long time.  Depression is very common. It affects people of all ages.  Many things can lead to depression. Some people become depressed after they have a stroke or find out they have a major illness like cancer or heart disease. The death of a loved one or a breakup may lead to depression. It can run in families. Most experts believe that a combination of inherited genes and stressful life events can cause it.  What happens during screening?  You may be asked to fill out a form about your depression symptoms. You and the doctor will discuss your answers. The doctor may ask you more questions to learn more about how you think, act, and feel.  What happens after screening?  If you have symptoms of depression, your doctor will talk to you about your options.  Doctors usually treat depression with medicines or counseling. Often, combining the two works best. Many people don't get help because they think that they'll get over the depression on their own. But people with depression may not get better unless they get treatment.  The cause of depression is not well understood. There may be many factors involved. But if you have depression, it's not your fault.  A serious symptom of depression is thinking about death  "or suicide. If you or someone you care about talks about this or about feeling hopeless, get help right away.  It's important to know that depression can be treated. Medicine, counseling, and self-care may help.  Where can you learn more?  Go to https://www.[employer].net/patiented  Enter T185 in the search box to learn more about \"Learning About Depression Screening.\"  Current as of: June 24, 2023               Content Version: 14.0    9686-5219 [employer].   Care instructions adapted under license by your healthcare professional. If you have questions about a medical condition or this instruction, always ask your healthcare professional. [employer] disclaims any warranty or liability for your use of this information.      "

## 2024-05-13 NOTE — LETTER
My Asthma Action Plan    Name: Raphael Lizarraga   YOB: 1965  Date: 5/13/2024   My doctor: Chava Belcher MD   My clinic: [facility]        My Rescue Medicine:   Albuterol inhaler (Proair/Ventolin/Proventil HFA)  2-4 puffs EVERY 4 HOURS as needed. Use a spacer if recommended by your provider.   My Asthma Severity:   Intermittent / Exercise Induced  Know your asthma triggers:   None          GREEN ZONE   Good Control  I feel good  No cough or wheeze  Can work, sleep and play without asthma symptoms       Take your asthma control medicine every day.     If exercise triggers your asthma, take your rescue medication  15 minutes before exercise or sports, and  During exercise if you have asthma symptoms  Spacer to use with inhaler: If you have a spacer, make sure to use it with your inhaler             YELLOW ZONE Getting Worse  I have ANY of these:  I do not feel good  Cough or wheeze  Chest feels tight  Wake up at night   Keep taking your Green Zone medications  Start taking your rescue medicine:  every 20 minutes for up to 1 hour. Then every 4 hours for 24-48 hours.  If you stay in the Yellow Zone for more than 12-24 hours, contact your doctor.  If you do not return to the Green Zone in 12-24 hours or you get worse, start taking your oral steroid medicine if prescribed by your provider.           RED ZONE Medical Alert - Get Help  I have ANY of these:  I feel awful  Medicine is not helping  Breathing getting harder  Trouble walking or talking  Nose opens wide to breathe       Take your rescue medicine NOW  If your provider has prescribed an oral steroid medicine, start taking it NOW  Call your doctor NOW  If you are still in the Red Zone after 20 minutes and you have not reached your doctor:  Take your rescue medicine again and  Call 911 or go to the emergency room right away    See your regular doctor within 2 weeks of an Emergency Room or Urgent Care visit for follow-up  treatment.          Annual Reminders:  Meet with Asthma Educator,  Flu Shot in the Fall, consider Pneumonia Vaccination for patients with asthma (aged 19 and older).    [id number] [address] AT [facility] [address]    Electronically signed by Chava Belcher MD   Date: 05/13/24                    Asthma Triggers  How To Control Things That Make Your Asthma Worse    Triggers are things that make your asthma worse.  Look at the list below to help you find your triggers and   what you can do about them. You can help prevent asthma flare-ups by staying away from your triggers.      Trigger                                                          What you can do   Cigarette Smoke  Tobacco smoke can make asthma worse. Do not allow smoking in your home, car or around you.  Be sure no one smokes at a child s day care or school.  If you smoke, ask your health care provider for ways to help you quit.  Ask family members to quit too.  Ask your health care provider for a referral to Quit Plan to help you quit smoking, or call 3-694-527PLAN.     Colds, Flu, Bronchitis  These are common triggers of asthma. Wash your hands often.  Don t touch your eyes, nose or mouth.  Get a flu shot every year.     Dust Mites  These are tiny bugs that live in cloth or carpet. They are too small to see. Wash sheets and blankets in hot water every week.   Encase pillows and mattress in dust mite proof covers.  Avoid having carpet if you can. If you have carpet, vacuum weekly.   Use a dust mask and HEPA vacuum.   Pollen and Outdoor Mold  Some people are allergic to trees, grass, or weed pollen, or molds. Try to keep your windows closed.  Limit time out doors when pollen count is high.   Ask you health care provider about taking medicine during allergy season.     Animal Dander  Some people are allergic to skin flakes, urine or saliva from pets with fur or feathers. Keep pets with fur  or feathers out of your home.    If you can t keep the pet outdoors, then keep the pet out of your bedroom.  Keep the bedroom door closed.  Keep pets off cloth furniture and away from stuffed toys.     Mice, Rats, and Cockroaches  Some people are allergic to the waste from these pests.   Cover food and garbage.  Clean up spills and food crumbs.  Store grease in the refrigerator.   Keep food out of the bedroom.   Indoor Mold  This can be a trigger if your home has high moisture. Fix leaking faucets, pipes, or other sources of water.   Clean moldy surfaces.  Dehumidify basement if it is damp and smelly.   Smoke, Strong Odors, and Sprays  These can reduce air quality. Stay away from strong odors and sprays, such as perfume, powder, hair spray, paints, smoke incense, paint, cleaning products, candles and new carpet.   Exercise or Sports  Some people with asthma have this trigger. Be active!  Ask your doctor about taking medicine before sports or exercise to prevent symptoms.    Warm up for 5-10 minutes before and after sports or exercise.     Other Triggers of Asthma  Cold air:  Cover your nose and mouth with a scarf.  Sometimes laughing or crying can be a trigger.  Some medicines and food can trigger asthma.

## 2024-05-14 LAB
CHOLEST SERPL-MCNC: 221 MG/DL
FASTING STATUS PATIENT QL REPORTED: NO
FASTING STATUS PATIENT QL REPORTED: NO
GLUCOSE SERPL-MCNC: 86 MG/DL (ref 70–99)
HDLC SERPL-MCNC: 61 MG/DL
LDLC SERPL CALC-MCNC: 133 MG/DL
NONHDLC SERPL-MCNC: 160 MG/DL
PSA SERPL DL<=0.01 NG/ML-MCNC: 0.2 NG/ML (ref 0–3.5)
TRIGL SERPL-MCNC: 136 MG/DL

## 2024-05-17 ENCOUNTER — THERAPY VISIT (OUTPATIENT)
Dept: OCCUPATIONAL THERAPY | Facility: CLINIC | Age: 59
End: 2024-05-17
Payer: COMMERCIAL

## 2024-05-17 DIAGNOSIS — G56.02 CARPAL TUNNEL SYNDROME OF LEFT WRIST: Primary | ICD-10-CM

## 2024-05-17 PROCEDURE — 97110 THERAPEUTIC EXERCISES: CPT | Mod: GO

## 2024-05-17 NOTE — PROGRESS NOTES
"   05/17/24 0500   Appointment Info   Treating Provider Leigh Ann Noyola, OTR/L   Total/Authorized Visits 6 (POC)   Visits Used 6   Medical Diagnosis L CTS   OT Tx Diagnosis L CTS   Progress Note/Certification   Onset of Illness/Injury or Date of Surgery 01/03/24   Therapy Frequency 1x weekly   Predicted Duration 6 weeks   Progress Note Due Date 05/15/24   Progress Note Completed Date 05/17/24   Goals   OT Goals 1   OT Goal 1   Goal Identifier Sleeping   Goal Description Pt will report no difficulty with sleeping through the night   Rationale In order to maximize safety and independence with ADL/IADLs   Goal Progress No difficulty   Target Date 05/15/24   Date Met 05/17/24   Subjective Report   Subjective Report \"Good. I've been riding my motorcycle. Sometimes if I hold my wrist in a position for too long, it can go numb. I know it's because of how my wrist is positioned. It's stiff in the morning.\"   Objective Measures   Objective Measures Objective Measure 1;Hand Obj Measures   Hand Objective Measures ROM;Neural Tension Testing;Special Tests;Strength   Neural Tension Testing MNT   ROM Wrist ROM;Forearm ROM   Special Tests CTS Special Tests   Strength ;Lateral Pinch;3 Point Pinch   Objective Measure 1   Objective Measure Pain Scale   Details 1/10 pain   Forearm ROM   Supination 85   Pronation  82   Wrist ROM    Extension 63   Flexion 72   Radial Deviation (RD) 24   Ulnar Deviation (UD) 35   CTS Special Tests   Median Nerve Compression at Pronator -   Carpal Compression Test-Durkan Test (30 sec) -   Hall Test for Lumbrical Incursion (fist x30 sec) -   Tinel's at Carpal Tunnel -   Phalen's Sign -   MNT: Median Neurodynamic Test (based on BERNIE Chowdary's TIFFANY)   0-5 Scale 3/5    (measured in pounds)    Average Strength 93   Lateral Pinch (measured in pounds)   Lateral Pinch Average Strength 20   3 Point Pinch (measured in pounds)   3 Point Pinch Average Strength 20   Therapeutic Procedure/Exercise "   Therapeutic Procedure: strength, endurance, ROM, flexibillity minutes (88366) 13   PTRx Ther Proc 1 Finger Active Range of Motion Tendon Glides Fist Series   PTRx Ther Proc 1 - Details HEP   PTRx Ther Proc 2 Finger Active Range of Motion FDS Flexor Tendon Gliding   PTRx Ther Proc 2 - Details HEP   PTRx Ther Proc 3 Finger Active Range of Motion DIP Joint Blocking   PTRx Ther Proc 3 - Details HEP   Skilled Intervention To track therapy progress   Patient Response/Progress Pt is responding well to HEP and is ready to independently manage their symptoms. They understand that they are recommended to continue with their home program for an additional 4-8 months.   Ther Proc 1 Discharge Note   Ther Proc 1 - Details Please refer to the objective data gathered for today's discharge note.   Therapeutic Activity   PTRx Ther Act 1 Carpal Tunnel Syndrome Prevention   PTRx Ther Act 1 - Details HEP   PTRx Ther Act 2 Orthosis Wear and Care   PTRx Ther Act 2 - Details HEP   PTRx Ther Act 3 Warmth   PTRx Ther Act 3 - Details HEP   PTRx Ther Act 4 Ball Massage to Flexors   PTRx Ther Act 4 - Details HEP   Education   Learner/Method Patient;Demonstration;Pictures/Video   Education Comments PTRX via printout   Plan   Home program See PTRX: CTS protocol   Total Session Time   Timed Code Treatment Minutes 13   Total Treatment Time (sum of timed and untimed services) 13     Upper Extremity Functional Index Score:  SCORE:   Column Totals: /80: 78   (A lower score indicates greater disability.)    DISCHARGE  Reason for Discharge: Patient has met all goals.  Patient chooses to discontinue therapy.    Discharge Plan: Patient to continue home program.    Referring Provider:  Pamela Morocho

## 2024-05-28 ENCOUNTER — MYC MEDICAL ADVICE (OUTPATIENT)
Dept: SLEEP MEDICINE | Facility: CLINIC | Age: 59
End: 2024-05-28
Payer: COMMERCIAL

## 2024-05-28 DIAGNOSIS — G47.33 OSA (OBSTRUCTIVE SLEEP APNEA): Primary | ICD-10-CM

## 2024-05-29 NOTE — TELEPHONE ENCOUNTER
Patient requesting new supply order to change his mask.  Order pended for provider consideration.

## 2024-06-13 ASSESSMENT — ANXIETY QUESTIONNAIRES
4. TROUBLE RELAXING: NOT AT ALL
2. NOT BEING ABLE TO STOP OR CONTROL WORRYING: NOT AT ALL
IF YOU CHECKED OFF ANY PROBLEMS ON THIS QUESTIONNAIRE, HOW DIFFICULT HAVE THESE PROBLEMS MADE IT FOR YOU TO DO YOUR WORK, TAKE CARE OF THINGS AT HOME, OR GET ALONG WITH OTHER PEOPLE: NOT DIFFICULT AT ALL
6. BECOMING EASILY ANNOYED OR IRRITABLE: NOT AT ALL
5. BEING SO RESTLESS THAT IT IS HARD TO SIT STILL: NOT AT ALL
GAD7 TOTAL SCORE: 0
7. FEELING AFRAID AS IF SOMETHING AWFUL MIGHT HAPPEN: NOT AT ALL
8. IF YOU CHECKED OFF ANY PROBLEMS, HOW DIFFICULT HAVE THESE MADE IT FOR YOU TO DO YOUR WORK, TAKE CARE OF THINGS AT HOME, OR GET ALONG WITH OTHER PEOPLE?: NOT DIFFICULT AT ALL
GAD7 TOTAL SCORE: 0
7. FEELING AFRAID AS IF SOMETHING AWFUL MIGHT HAPPEN: NOT AT ALL
1. FEELING NERVOUS, ANXIOUS, OR ON EDGE: NOT AT ALL
3. WORRYING TOO MUCH ABOUT DIFFERENT THINGS: NOT AT ALL

## 2024-06-18 ENCOUNTER — OFFICE VISIT (OUTPATIENT)
Dept: FAMILY MEDICINE | Facility: CLINIC | Age: 59
End: 2024-06-18
Payer: COMMERCIAL

## 2024-06-18 VITALS
WEIGHT: 152.8 LBS | HEIGHT: 66 IN | RESPIRATION RATE: 15 BRPM | HEART RATE: 67 BPM | OXYGEN SATURATION: 100 % | BODY MASS INDEX: 24.56 KG/M2 | DIASTOLIC BLOOD PRESSURE: 78 MMHG | SYSTOLIC BLOOD PRESSURE: 128 MMHG | TEMPERATURE: 98.7 F

## 2024-06-18 DIAGNOSIS — F33.0 MAJOR DEPRESSIVE DISORDER, RECURRENT EPISODE, MILD (H): Primary | Chronic | ICD-10-CM

## 2024-06-18 DIAGNOSIS — F41.9 ANXIETY: ICD-10-CM

## 2024-06-18 PROCEDURE — 99213 OFFICE O/P EST LOW 20 MIN: CPT | Performed by: FAMILY MEDICINE

## 2024-06-18 ASSESSMENT — ENCOUNTER SYMPTOMS: NERVOUS/ANXIOUS: 1

## 2024-06-18 ASSESSMENT — PAIN SCALES - GENERAL: PAINLEVEL: NO PAIN (0)

## 2024-06-18 ASSESSMENT — PATIENT HEALTH QUESTIONNAIRE - PHQ9: SUM OF ALL RESPONSES TO PHQ QUESTIONS 1-9: 6

## 2024-06-18 NOTE — PROGRESS NOTES
"  Assessment & Plan     (F33.0) Major depressive disorder, recurrent episode, mild (H24)  (primary encounter diagnosis)  (F41.9) Anxiety  Comment: Successfully weaned off of bupropion, replaced by mirtazapine which appears to be helping with sleep as we had hoped.   Plan: Return in about 5 months (around 11/6/2024) for recheck depression, recheck medications, using a video visit.  He can follow up sooner if he has the sense that his depressive symptoms are worsening.         Jaime Lim is a 59 year old, presenting for the following health issues:  Depression and Anxiety      6/18/2024     3:13 PM   Additional Questions   Roomed by Lucinda   Accompanied by self     History of Present Illness       Mental Health Follow-up:  Patient presents to follow-up on Depression & Anxiety.Patient's depression since last visit has been:  Good  The patient is not having other symptoms associated with depression.  Patient's anxiety since last visit has been:  Good  The patient is not having other symptoms associated with anxiety.  Any significant life events: No  Patient is not feeling anxious or having panic attacks.  Patient has no concerns about alcohol or drug use.    He eats 0-1 servings of fruits and vegetables daily.He consumes 1 sweetened beverage(s) daily.He exercises with enough effort to increase his heart rate 60 or more minutes per day.  He exercises with enough effort to increase his heart rate 5 days per week.   He is taking medications regularly.        6/18/2024     3:28 PM   PHQ   PHQ-9 Total Score 6   Q9: Thoughts of better off dead/self-harm past 2 weeks Not at all        Review of Systems   Psychiatric/Behavioral:  The patient is nervous/anxious.           Objective    /78 (BP Location: Left arm, Patient Position: Sitting, Cuff Size: Adult Regular)   Pulse 67   Temp 98.7  F (37.1  C) (Temporal)   Resp 15   Ht 1.674 m (5' 5.91\")   Wt 69.3 kg (152 lb 12.8 oz)   SpO2 100%   BMI 24.73 kg/m    Body " mass index is 24.73 kg/m .  Physical Exam   GENERAL: healthy, alert and no distress  EYES: Eyes grossly normal to inspection, PERRL, EOMI, sclerae white and conjunctivae normal  MS: no gross musculoskeletal defects noted, no edema  SKIN: no suspicious lesions or rashes to visible skin  NEURO: Normal strength and tone, sensory exam grossly normal, mentation intact, oriented times 3 and cranial nerves 2-12 intact  PSYCH: mentation appears normal, affect normal/bright             Signed Electronically by: Chava Belcher MD      The information in this document, created by the medical scribe for me, accurately reflects the services I personally performed and the decisions made by me. I have reviewed and approved this document for accuracy prior to signature.  Luis Farias

## 2024-07-26 ENCOUNTER — TRANSFERRED RECORDS (OUTPATIENT)
Dept: HEALTH INFORMATION MANAGEMENT | Facility: CLINIC | Age: 59
End: 2024-07-26
Payer: COMMERCIAL

## 2024-08-09 NOTE — TELEPHONE ENCOUNTER
Please update PHQ-9. Schedule f/u appointment if >4   Unfortunately I will need to see her before I start prescribing any medications.

## 2024-08-17 ASSESSMENT — ANXIETY QUESTIONNAIRES
GAD7 TOTAL SCORE: 2
7. FEELING AFRAID AS IF SOMETHING AWFUL MIGHT HAPPEN: NOT AT ALL
1. FEELING NERVOUS, ANXIOUS, OR ON EDGE: NOT AT ALL
GAD7 TOTAL SCORE: 2
3. WORRYING TOO MUCH ABOUT DIFFERENT THINGS: SEVERAL DAYS
7. FEELING AFRAID AS IF SOMETHING AWFUL MIGHT HAPPEN: NOT AT ALL
IF YOU CHECKED OFF ANY PROBLEMS ON THIS QUESTIONNAIRE, HOW DIFFICULT HAVE THESE PROBLEMS MADE IT FOR YOU TO DO YOUR WORK, TAKE CARE OF THINGS AT HOME, OR GET ALONG WITH OTHER PEOPLE: SOMEWHAT DIFFICULT
5. BEING SO RESTLESS THAT IT IS HARD TO SIT STILL: NOT AT ALL
2. NOT BEING ABLE TO STOP OR CONTROL WORRYING: NOT AT ALL
6. BECOMING EASILY ANNOYED OR IRRITABLE: SEVERAL DAYS
8. IF YOU CHECKED OFF ANY PROBLEMS, HOW DIFFICULT HAVE THESE MADE IT FOR YOU TO DO YOUR WORK, TAKE CARE OF THINGS AT HOME, OR GET ALONG WITH OTHER PEOPLE?: SOMEWHAT DIFFICULT
4. TROUBLE RELAXING: NOT AT ALL

## 2024-08-20 ASSESSMENT — PATIENT HEALTH QUESTIONNAIRE - PHQ9
SUM OF ALL RESPONSES TO PHQ QUESTIONS 1-9: 9
10. IF YOU CHECKED OFF ANY PROBLEMS, HOW DIFFICULT HAVE THESE PROBLEMS MADE IT FOR YOU TO DO YOUR WORK, TAKE CARE OF THINGS AT HOME, OR GET ALONG WITH OTHER PEOPLE: SOMEWHAT DIFFICULT
SUM OF ALL RESPONSES TO PHQ QUESTIONS 1-9: 9

## 2024-08-21 ENCOUNTER — VIRTUAL VISIT (OUTPATIENT)
Dept: FAMILY MEDICINE | Facility: CLINIC | Age: 59
End: 2024-08-21
Payer: COMMERCIAL

## 2024-08-21 DIAGNOSIS — F41.9 ANXIETY: Chronic | ICD-10-CM

## 2024-08-21 DIAGNOSIS — F33.0 MAJOR DEPRESSIVE DISORDER, RECURRENT EPISODE, MILD (H): Chronic | ICD-10-CM

## 2024-08-21 PROCEDURE — 99213 OFFICE O/P EST LOW 20 MIN: CPT | Mod: 95 | Performed by: FAMILY MEDICINE

## 2024-08-21 RX ORDER — PAROXETINE 20 MG/1
20 TABLET, FILM COATED ORAL AT BEDTIME
Qty: 90 TABLET | Refills: 1 | Status: SHIPPED | OUTPATIENT
Start: 2024-08-21

## 2024-08-21 RX ORDER — MIRTAZAPINE 7.5 MG/1
7.5 TABLET, FILM COATED ORAL AT BEDTIME
Qty: 90 TABLET | Refills: 1 | Status: SHIPPED | OUTPATIENT
Start: 2024-08-21

## 2024-08-21 NOTE — PROGRESS NOTES
Raphael is a 59 year old who is being evaluated via a billable video visit.          Assessment & Plan     (F33.0) Major depressive disorder, recurrent episode, mild (H)  Comment: He was weaned off of bupropion earlier this year and started on mirtazapine to replace it, to hopefully help with sleep as well.  Despite the fact that his PHQ-9 is a little higher, the patient emphasizes that he feels better since last visit, and he is very pleased with the current medication regimen  Plan: mirtazapine (REMERON) 7.5 MG tablet, PARoxetine        (PAXIL) 20 MG tablet        Prescriptions updated. Return in 9 months (on 5/13/2025) for full physical, recheck depression, recheck medications.      (F41.9) Anxiety  Comment: Similarly, he continues to do well with anxiety treatment  Plan: PARoxetine (PAXIL) 20 MG tablet              Subjective   Raphael is a 59 year old, presenting for the following health issues:  Recheck Medication and  Follow Up        8/21/2024     4:54 PM   Additional Questions   Roomed by Ryan HURT     History of Present Illness       Mental Health Follow-up:  Patient presents to follow-up on Depression & Anxiety.Patient's depression since last visit has been:  Good  The patient is not having other symptoms associated with depression.  Patient's anxiety since last visit has been:  Good  The patient is not having other symptoms associated with anxiety.  Any significant life events: No  Patient is not feeling anxious or having panic attacks.  Patient has no concerns about alcohol or drug use.    He eats 0-1 servings of fruits and vegetables daily.He consumes 1 sweetened beverage(s) daily.He exercises with enough effort to increase his heart rate 30 to 60 minutes per day.  He exercises with enough effort to increase his heart rate 4 days per week.   He is taking medications regularly.                   Objective           Vitals:  No vitals were obtained today due to virtual visit.    Physical Exam   GENERAL: alert  and no distress  EYES: Eyes grossly normal to inspection.  No discharge or erythema, or obvious scleral/conjunctival abnormalities.  RESP: No audible wheeze, cough, or visible cyanosis.    SKIN: Visible skin clear. No significant rash, abnormal pigmentation or lesions.  NEURO: Cranial nerves grossly intact.  Mentation and speech appropriate for age.  PSYCH: Appropriate affect, tone, and pace of words          Video-Visit Details    Type of service:  Video Visit   Originating Location (pt. Location): Home    Distant Location (provider location):  On-site  Platform used for Video Visit: Lorena  Signed Electronically by: Chava Belcher MD

## 2024-09-03 ENCOUNTER — MYC MEDICAL ADVICE (OUTPATIENT)
Dept: FAMILY MEDICINE | Facility: CLINIC | Age: 59
End: 2024-09-03
Payer: COMMERCIAL

## 2024-09-03 ENCOUNTER — TELEPHONE (OUTPATIENT)
Dept: FAMILY MEDICINE | Facility: CLINIC | Age: 59
End: 2024-09-03
Payer: COMMERCIAL

## 2024-09-03 ASSESSMENT — ASTHMA QUESTIONNAIRES: ACT_TOTALSCORE: 25

## 2024-09-03 NOTE — TELEPHONE ENCOUNTER
----- Message from Chava Belcher sent at 6/18/2024  4:04 PM CDT -----  Regarding: ACT  Please update the ACT.

## 2024-09-13 ASSESSMENT — ASTHMA QUESTIONNAIRES
QUESTION_1 LAST FOUR WEEKS HOW MUCH OF THE TIME DID YOUR ASTHMA KEEP YOU FROM GETTING AS MUCH DONE AT WORK, SCHOOL OR AT HOME: NONE OF THE TIME
QUESTION_5 LAST FOUR WEEKS HOW WOULD YOU RATE YOUR ASTHMA CONTROL: COMPLETELY CONTROLLED
QUESTION_4 LAST FOUR WEEKS HOW OFTEN HAVE YOU USED YOUR RESCUE INHALER OR NEBULIZER MEDICATION (SUCH AS ALBUTEROL): NOT AT ALL
ACT_TOTALSCORE: 25
ACT_TOTALSCORE: 25
QUESTION_2 LAST FOUR WEEKS HOW OFTEN HAVE YOU HAD SHORTNESS OF BREATH: NOT AT ALL
QUESTION_3 LAST FOUR WEEKS HOW OFTEN DID YOUR ASTHMA SYMPTOMS (WHEEZING, COUGHING, SHORTNESS OF BREATH, CHEST TIGHTNESS OR PAIN) WAKE YOU UP AT NIGHT OR EARLIER THAN USUAL IN THE MORNING: NOT AT ALL

## 2024-09-18 DIAGNOSIS — G40.209 LOCALIZATION-RELATED PARTIAL EPILEPSY WITH COMPLEX PARTIAL SEIZURES (H): ICD-10-CM

## 2024-09-20 ENCOUNTER — MEDICAL CORRESPONDENCE (OUTPATIENT)
Dept: HEALTH INFORMATION MANAGEMENT | Facility: CLINIC | Age: 59
End: 2024-09-20
Payer: COMMERCIAL

## 2024-09-25 RX ORDER — PHENYTOIN SODIUM 100 MG/1
CAPSULE, EXTENDED RELEASE ORAL
Qty: 360 CAPSULE | Refills: 3 | OUTPATIENT
Start: 2024-09-25

## 2024-09-25 NOTE — TELEPHONE ENCOUNTER
PHENYTOIN SODIUM 100MG EXT CAPSULES  Take 2 capsule in the morning and 2 caps in the evening for a total of 400 mg      Last Written Prescription Date:  5/3/24  Last Fill Quantity: 360,   # refills: 3    [id number] - [place], [place] - [address] [facility]     Last Office Visit : 5/3/24  Future Office visit:  5/2/25   Refills on file

## 2024-10-03 ENCOUNTER — TELEPHONE (OUTPATIENT)
Dept: FAMILY MEDICINE | Facility: CLINIC | Age: 59
End: 2024-10-03
Payer: COMMERCIAL

## 2024-10-03 NOTE — TELEPHONE ENCOUNTER
Patient Quality Outreach    Patient is due for the following:   Depression  -  PHQ-9 needed    Next Steps:   Patient was assigned appropriate questionnaire to complete    Type of outreach:    Sent PowerInbox message.      Questions for provider review:    None           Lay Aranda MA

## 2024-10-16 ENCOUNTER — OFFICE VISIT (OUTPATIENT)
Dept: URGENT CARE | Facility: URGENT CARE | Age: 59
End: 2024-10-16
Payer: COMMERCIAL

## 2024-10-16 VITALS
OXYGEN SATURATION: 99 % | RESPIRATION RATE: 16 BRPM | DIASTOLIC BLOOD PRESSURE: 75 MMHG | BODY MASS INDEX: 24.93 KG/M2 | SYSTOLIC BLOOD PRESSURE: 125 MMHG | HEART RATE: 64 BPM | TEMPERATURE: 98.1 F | WEIGHT: 154 LBS

## 2024-10-16 DIAGNOSIS — M77.11 LATERAL EPICONDYLITIS OF RIGHT ELBOW: Primary | ICD-10-CM

## 2024-10-16 PROCEDURE — 99214 OFFICE O/P EST MOD 30 MIN: CPT | Performed by: PHYSICIAN ASSISTANT

## 2024-10-16 ASSESSMENT — ENCOUNTER SYMPTOMS
CONSTITUTIONAL NEGATIVE: 1
SORE THROAT: 0
WHEEZING: 0
RESPIRATORY NEGATIVE: 1
HEMATURIA: 0
DIARRHEA: 0
CHILLS: 0
CHEST TIGHTNESS: 0
FEVER: 0
ABDOMINAL PAIN: 0
ARTHRALGIAS: 1
NAUSEA: 0
VOMITING: 0
PALPITATIONS: 0
MYALGIAS: 0
DYSURIA: 0
COUGH: 0
HEADACHES: 0
GASTROINTESTINAL NEGATIVE: 1
CARDIOVASCULAR NEGATIVE: 1
SHORTNESS OF BREATH: 0
FREQUENCY: 0
NEUROLOGICAL NEGATIVE: 1
ALLERGIC/IMMUNOLOGIC NEGATIVE: 1

## 2024-10-16 NOTE — PROGRESS NOTES
Chief Complaint:     Chief Complaint   Patient presents with    Elbow Pain     C/o sharp pain in right elbow, while working he will feel sharp pain, no known injury, onset 2 weeks. Pt think it may be from repetitive work.       ASSESSMENT     1. Lateral epicondylitis of right elbow         PLAN    Patient given Tennis elbow arm band.  Ice the affected area  Recommended rest and avoidance of activities which cause pain or swelling.  Pain relief: Acetaminophen and or Ibuprofen with food.  Order placed for follow up with ortho for further evaluation and possible injection and PT.  Patient verbalized understanding, and agrees with this plan.        HPI: Raphael Lizarraga is an 59 year old male who presents today for evaluation of R elbow pain.  Onset of the pain was 2 weeks ago.  He is not aware of any injury to the elbow.  He has pain with movement of the elbow.  He has been taking Ibuprofen and this helps.      Patient denies any numbness, tingling, or dysfunction of the R arm.    ROS:      Review of Systems   Constitutional: Negative.  Negative for chills and fever.   HENT: Negative.  Negative for sore throat.    Respiratory: Negative.  Negative for cough, chest tightness, shortness of breath and wheezing.    Cardiovascular: Negative.  Negative for chest pain and palpitations.   Gastrointestinal: Negative.  Negative for abdominal pain, diarrhea, nausea and vomiting.   Genitourinary:  Negative for dysuria, frequency, hematuria and urgency.   Musculoskeletal:  Positive for arthralgias. Negative for myalgias.   Skin:  Negative for rash.   Allergic/Immunologic: Negative.  Negative for immunocompromised state.   Neurological: Negative.  Negative for headaches.        Problem history  Patient Active Problem List   Diagnosis    Epilepsy (H)    Anxiety    Major depressive disorder, recurrent episode, mild (H)    NELIA (obstructive sleep apnea)- moderate, mixed (AHI 23)    Intermittent asthma    Osteoporosis/osteopenia increased  risk    CARDIOVASCULAR SCREENING; LDL GOAL LESS THAN 160    Pain in thoracic spine    Chronic midline low back pain without sciatica    Diverticulosis    Pseudophakia, Yag Caps, ou    Posterior vitreous detachment, left eye    Carpal tunnel syndrome of left wrist        Allergies  No Known Allergies     Smoking History  History   Smoking Status    Former    Types: Cigarettes   Smokeless Tobacco    Never        Current Meds    Current Outpatient Medications:     albuterol (PROAIR HFA/PROVENTIL HFA/VENTOLIN HFA) 108 (90 Base) MCG/ACT inhaler, Inhale 2 puffs into the lungs every 4 hours as needed for asthma symptoms., Disp: 8.5 g, Rfl: 1    Calcium Carbonate-Vit D-Min (RA CALCIUM/VITAMIN D/MINERALS) 600-400 MG-UNIT TABS, Take 1 tablet by mouth 2 times daily, Disp: 180 tablet, Rfl: 3    calcium-vitamin D (OSCAL) 250-125 MG-UNIT TABS per tablet, Take 1 tablet by mouth 2 times daily, Disp: , Rfl:     mirtazapine (REMERON) 7.5 MG tablet, Take 1 tablet (7.5 mg) by mouth at bedtime. for depression., Disp: 90 tablet, Rfl: 1    PARoxetine (PAXIL) 20 MG tablet, Take 1 tablet (20 mg) by mouth at bedtime. for depression or anxiety prevention., Disp: 90 tablet, Rfl: 1    phenytoin (DILANTIN) 100 MG ER capsule, Take 2 capsule in the morning and 2 caps in the evening for a total of 400 mg, Disp: 360 capsule, Rfl: 3        Vital signs reviewed by Ludwig Roe PA-C  /75 (BP Location: Left arm, Patient Position: Sitting, Cuff Size: Adult Regular)   Pulse 64   Temp 98.1  F (36.7  C) (Tympanic)   Resp 16   Wt 69.9 kg (154 lb)   SpO2 99%   BMI 24.93 kg/m      Physical Exam     Physical Exam  Vitals and nursing note reviewed.   Constitutional:       General: He is not in acute distress.     Appearance: He is well-developed. He is not ill-appearing, toxic-appearing or diaphoretic.   HENT:      Head: Normocephalic and atraumatic.      Right Ear: Hearing, tympanic membrane, ear canal and external ear normal. Tympanic membrane  is not perforated, erythematous, retracted or bulging.      Left Ear: Hearing, tympanic membrane, ear canal and external ear normal. Tympanic membrane is not perforated, erythematous, retracted or bulging.      Nose: Nose normal. No mucosal edema, congestion or rhinorrhea.      Mouth/Throat:      Pharynx: No oropharyngeal exudate or posterior oropharyngeal erythema.      Tonsils: No tonsillar exudate or tonsillar abscesses. 0 on the right. 0 on the left.   Eyes:      Pupils: Pupils are equal, round, and reactive to light.   Cardiovascular:      Rate and Rhythm: Normal rate and regular rhythm.      Heart sounds: Normal heart sounds, S1 normal and S2 normal. Heart sounds not distant. No murmur heard.     No friction rub. No gallop.   Pulmonary:      Effort: Pulmonary effort is normal. No respiratory distress.      Breath sounds: Normal breath sounds. No decreased breath sounds, wheezing, rhonchi or rales.   Abdominal:      General: Bowel sounds are normal. There is no distension.      Palpations: Abdomen is soft.      Tenderness: There is no abdominal tenderness.   Musculoskeletal:      Right elbow: No swelling. Normal range of motion. Tenderness present in lateral epicondyle.        Arms:       Cervical back: Normal range of motion and neck supple.   Lymphadenopathy:      Cervical: No cervical adenopathy.   Skin:     General: Skin is warm and dry.      Findings: No rash.   Neurological:      Mental Status: He is alert.      Cranial Nerves: No cranial nerve deficit.   Psychiatric:         Attention and Perception: He is attentive.         Speech: Speech normal.         Behavior: Behavior normal. Behavior is cooperative.         Thought Content: Thought content normal.         Judgment: Judgment normal.             Ludwig Roe PA-C  10/16/2024, 2:29 PM

## 2024-10-17 ENCOUNTER — PATIENT OUTREACH (OUTPATIENT)
Dept: CARE COORDINATION | Facility: CLINIC | Age: 59
End: 2024-10-17
Payer: COMMERCIAL

## 2024-10-21 ENCOUNTER — PATIENT OUTREACH (OUTPATIENT)
Dept: CARE COORDINATION | Facility: CLINIC | Age: 59
End: 2024-10-21
Payer: COMMERCIAL

## 2024-10-25 ENCOUNTER — TRANSFERRED RECORDS (OUTPATIENT)
Dept: HEALTH INFORMATION MANAGEMENT | Facility: CLINIC | Age: 59
End: 2024-10-25
Payer: COMMERCIAL

## 2024-11-13 ENCOUNTER — OFFICE VISIT (OUTPATIENT)
Dept: FAMILY MEDICINE | Facility: CLINIC | Age: 59
End: 2024-11-13
Payer: COMMERCIAL

## 2024-11-13 VITALS
HEIGHT: 67 IN | HEART RATE: 71 BPM | DIASTOLIC BLOOD PRESSURE: 77 MMHG | RESPIRATION RATE: 17 BRPM | WEIGHT: 150.6 LBS | OXYGEN SATURATION: 97 % | BODY MASS INDEX: 23.64 KG/M2 | TEMPERATURE: 98.7 F | SYSTOLIC BLOOD PRESSURE: 127 MMHG

## 2024-11-13 DIAGNOSIS — Z23 NEED FOR VACCINATION: ICD-10-CM

## 2024-11-13 DIAGNOSIS — M77.11 LATERAL EPICONDYLITIS OF RIGHT ELBOW: Primary | ICD-10-CM

## 2024-11-13 PROCEDURE — 90480 ADMN SARSCOV2 VAC 1/ONLY CMP: CPT | Performed by: FAMILY MEDICINE

## 2024-11-13 PROCEDURE — 99213 OFFICE O/P EST LOW 20 MIN: CPT | Performed by: FAMILY MEDICINE

## 2024-11-13 PROCEDURE — 91320 SARSCV2 VAC 30MCG TRS-SUC IM: CPT | Performed by: FAMILY MEDICINE

## 2024-11-13 ASSESSMENT — PAIN SCALES - GENERAL: PAINLEVEL_OUTOF10: SEVERE PAIN (7)

## 2024-11-13 NOTE — PROGRESS NOTES
Assessment & Plan     (M77.11) Lateral epicondylitis of right elbow  (primary encounter diagnosis)  Comment: Patient reports mild improved using the tensis elbow brace, but he still gets occasional sharp pains because he has had no change in his work duties.   Plan: Orthopedic  Referral        Continue brace. Please see the Report Of Work Ability in the Letters section. Return in about 6 weeks (around 12/25/2024) for recheck if symptoms fail to resolve by then, to see the orthopedist .      (Z23) Need for vaccination  Comment:   Plan: COVID-19 12+ (PFIZER)          MED REC REQUIRED  Post Medication Reconciliation Status: patient was not discharged from an inpatient facility or TCU    Jaime Lim is a 59 year old, presenting for the following health issues:   Follow-Up        11/13/2024     1:59 PM   Additional Questions   Roomed by Ryan HAMILTON   Pain History:  When did you first notice your pain?  Approx 10/2/24 per  visit note  Have you seen anyone else for your pain? Yes -    How has your pain affected your ability to work? Can work part time with limitations   What type of work do you or did you do?    Where in your body do you have pain? Musculoskeletal problem/pain  Onset/Duration:  Approx 10/2/24 per  visit note  Description  Location: elbow - right  Joint Swelling: No  Redness: No  Pain: YES  Warmth: No  Intensity:  7/10  Progression of Symptoms:  worsening  Accompanying signs and symptoms:   Fevers: No  Numbness/tingling/weakness: No  History  Trauma to the area: No  Recent illness:  No  Previous similar problem: No  Previous evaluation:  No  Precipitating or alleviating factors:  Aggravating factors include: overuse   Therapies tried and outcome: compression (given tennis elbow brace at ), but no work restrictions      Objective    /77 (BP Location: Left arm, Patient Position: Sitting, Cuff Size: Adult Regular)   Pulse 71   Temp 98.7  F (37.1  C) (Temporal)    "Resp 17   Ht 1.689 m (5' 6.5\")   Wt 68.3 kg (150 lb 9.6 oz)   SpO2 97%   BMI 23.94 kg/m    Body mass index is 23.94 kg/m .    Physical Exam   GENERAL: healthy, alert and no distress  EYES: Eyes grossly normal to inspection, PERRL, EOMI, sclerae white and conjunctivae normal  MS: no gross musculoskeletal defects noted, no edema. Elbow pain is reproduced with resisted dorsiflexion of the right wrist and palpation over the lateral epicondyle.  SKIN: no suspicious lesions or rashes to visible skin  NEURO: Normal strength and tone, sensory exam grossly normal, mentation intact, oriented times 3 and cranial nerves 2-12 intact  PSYCH: mentation appears normal, affect normal/bright         This document serves as a record of the services and decisions personally performed and made by Dr. Belcher. It was created on his behalf by Lissette Vale, a trained medical scribe. The creation of this document is based the provider's statements to the medical scribe.  Lissette Vale, 2:26 PM         Signed Electronically by: Chava Belcher MD    "

## 2024-11-13 NOTE — LETTER
[address] [address]  Phone: [phone]      REPORT OF WORK ABILITY    NOTE TO EMPLOYEE: You must promptly provide a copy of this report to your  employer or worker's compensation insurer, and Qualified Rehabilitation Consultant.    Date: 11/13/2024                     Employee Name: Raphael Lizarraga         YOB: 1965  Medical Record Number: [id number]   Soc.Sec.No: [id number]  Employer: [employer]               Date of Onset of Symptoms: approx. 10/2/24  Managed Care Organization / Insurance Company Name: UNKNOWN    Diagnosis: Right lateral epicondylitis  Work Related: unknown     MMI: NO   Permanent Partial Disability(PPD) likely: NO    EMPLOYEE IS ABLE TO WORK: with restrictions from 11/14/24 to 12/26/24 -  Full shift     RESTRICTIONS IF ANY:     Stand:  Full time   Sit:  Full time   Walk: Full time   Lift, carry no more than:  20 - 50 lb. if right palm facing up, otherwise, less than 10 lb.  Push/Pull:  10 - 20 lb.   Shoulder/Elbow:  right Avoid gripping/grasping  Hand/Wrist:  right Avoid gripping/grasping    OTHER RESTRICTIONS: None    TREATMENT PLAN/NOTES: limited firm grasping on right and may need to restrict work activities for comfort. Any activity that hurts to perform worsens the problem and will slow the total recovery time.           Chava Belcher MD

## 2024-11-14 ENCOUNTER — PATIENT OUTREACH (OUTPATIENT)
Dept: CARE COORDINATION | Facility: CLINIC | Age: 59
End: 2024-11-14
Payer: COMMERCIAL

## 2024-11-16 NOTE — TELEPHONE ENCOUNTER
REASON FOR VISIT: Lateral epicondylitis of right elbow    DATE OF APPT: 12/27/2024   NOTES (FOR ALL VISITS) STATUS DETAILS   [facility] 11/13[facility]2024   EMG N/A    MEDICATION LIST N/A    IMAGING  (FOR ALL VISITS)     XR N/A    MRI (HEAD, NECK, SPINE) N/A    CT (HEAD, NECK, SPINE) N/A

## 2024-11-21 ENCOUNTER — TELEPHONE (OUTPATIENT)
Dept: NEUROLOGY | Facility: CLINIC | Age: 59
End: 2024-11-21
Payer: COMMERCIAL

## 2024-11-21 NOTE — TELEPHONE ENCOUNTER
Sent Mychart (1st Attempt) for the patient to call back and schedule the following:    Appointment type: Return Seizure  Provider: Dr. Mix  Return date: April or May 2025  Specialty phone number: [phone]  Additional appointment(s) needed:   Additonal Notes:    Patient answered the phone however was unable to hear Writer.    Sent a [email or web address] message to reschedule the appointment with Dr. Mix on 5/2/2025, Provider attending/precepting.    Radha SOLIMAN/Complex Procedure    [facility]   Neurology, NeuroSurgery, NeuroPsychology, Pain Management and Cardiology Specialties  Medical/Surgical Adult Specialties

## 2024-11-25 ENCOUNTER — OFFICE VISIT (OUTPATIENT)
Dept: URGENT CARE | Facility: URGENT CARE | Age: 59
End: 2024-11-25
Payer: COMMERCIAL

## 2024-11-25 VITALS
RESPIRATION RATE: 14 BRPM | WEIGHT: 155 LBS | OXYGEN SATURATION: 99 % | DIASTOLIC BLOOD PRESSURE: 80 MMHG | TEMPERATURE: 98.1 F | SYSTOLIC BLOOD PRESSURE: 152 MMHG | HEART RATE: 64 BPM | BODY MASS INDEX: 24.64 KG/M2

## 2024-11-25 DIAGNOSIS — J01.90 ACUTE SINUSITIS WITH SYMPTOMS > 10 DAYS: Primary | ICD-10-CM

## 2024-11-25 PROCEDURE — 99213 OFFICE O/P EST LOW 20 MIN: CPT

## 2024-11-25 NOTE — PROGRESS NOTES
ASSESSMENT:  (J01.90) Acute sinusitis with symptoms > 10 days  (primary encounter diagnosis)  Plan: amoxicillin-clavulanate (AUGMENTIN) 875-125 MG         tablet    PLAN:  Acute sinusitis patient instructions discussed and provided.  We discussed the need to take the antibiotic as prescribed and finish the full course even if symptoms improve.  We also discussed trying yogurt with active cultures or probiotic such as Culturelle daily to help find diarrhea while taking the antibiotic.  Informed the patient to use nasal saline spray, humidifier/steam and/or [place] pot for the nasal symptoms.  Discussed the need to follow-up with their primary care provider should symptoms persist.  Patient acknowledged their understanding of the above plan.    The use of Dragon/Funtactixation services may have been used to construct the content in this note; any grammatical or spelling errors are non-intentional. Please contact the author of this note directly if you are in need of any clarification.      SUBJECTIVE:   Raphael Lizarraga is a 59 year old male presenting with a chief complaint of runny nose, stuffy nose, cough - non-productive, and facial pain/pressure.  Onset of symptoms was 3 week(s) ago.  Course of illness is worsening.    Patient denies: ear pain, sore throat, vomiting, and diarrhea  Treatment measures tried include Tylenol/Ibuprofen.  Predisposing factors include None.    ROS:  Negative except noted above.    OBJECTIVE:  BP (!) 152/80 (BP Location: Left arm, Patient Position: Sitting, Cuff Size: Adult Regular)   Pulse 64   Temp 98.1  F (36.7  C) (Tympanic)   Resp 14   Wt 70.3 kg (155 lb)   SpO2 99%   BMI 24.64 kg/m    GENERAL APPEARANCE: healthy, alert and no distress  EYES: EOMI,  PERRL, conjunctiva clear  HENT: nasal turbinates erythematous, swollen and oral mucous membranes moist, no erythema noted  NECK: supple, nontender, no lymphadenopathy  RESP: lungs clear to auscultation - no rales, rhonchi or  wheezes  CV: regular rates and rhythm, normal S1 S2, no murmur noted  SKIN: no suspicious lesions or rashes

## 2024-11-25 NOTE — PATIENT INSTRUCTIONS
Take the antibiotic as prescribed and finish the full course even if symptoms improve.  Try yogurt with active cultures or probiotics such as Culturelle daily to help prevent diarrhea while using antibiotics.  Use nasal saline spray, Joy Pot and/or humidifier/steam for your symptoms.  Follow up with your primary care provider should symptoms persist.

## 2024-11-27 ENCOUNTER — TELEPHONE (OUTPATIENT)
Dept: NEUROLOGY | Facility: CLINIC | Age: 59
End: 2024-11-27

## 2024-11-27 NOTE — TELEPHONE ENCOUNTER
[facility] Call Center    Phone Message    May a detailed message be left on voicemail: yes     Reason for Call: Patient called to reschedule 5/2/25 appointment per clinic.  Writer unable to use template.  Please call patient to schedule    Action Taken: [facility]    Travel Screening: Not Applicable     Date of Service:

## 2024-11-27 NOTE — TELEPHONE ENCOUNTER
Patient has been rescheduled for his appointment with Dr. Mix on 5/9/25 (previously 5/2/25) in [place].     JOSTIN MarinN RN Care Coordinator  Neurology/Neurosurgery/PM&R/Pain Management

## 2024-12-05 ENCOUNTER — PATIENT OUTREACH (OUTPATIENT)
Dept: CARE COORDINATION | Facility: CLINIC | Age: 59
End: 2024-12-05
Payer: COMMERCIAL

## 2024-12-09 ENCOUNTER — ANCILLARY PROCEDURE (OUTPATIENT)
Dept: GENERAL RADIOLOGY | Facility: CLINIC | Age: 59
End: 2024-12-09
Attending: FAMILY MEDICINE
Payer: COMMERCIAL

## 2024-12-09 ENCOUNTER — OFFICE VISIT (OUTPATIENT)
Dept: ORTHOPEDICS | Facility: CLINIC | Age: 59
End: 2024-12-09
Payer: COMMERCIAL

## 2024-12-09 DIAGNOSIS — M25.521 RIGHT ELBOW PAIN: ICD-10-CM

## 2024-12-09 DIAGNOSIS — M77.11 LATERAL EPICONDYLITIS OF RIGHT ELBOW: ICD-10-CM

## 2024-12-09 PROCEDURE — 73080 X-RAY EXAM OF ELBOW: CPT | Mod: TC | Performed by: RADIOLOGY

## 2024-12-09 PROCEDURE — 99243 OFF/OP CNSLTJ NEW/EST LOW 30: CPT | Performed by: PEDIATRICS

## 2024-12-09 NOTE — LETTER
December 9, 2024      Raphael Lizarraga  [address]  [place] [address]        To Whom It May Concern:    Raphael Lizarraga was seen in our clinic for evaluation of the right upper extremity work comp issue. He may return to work with the following: limited to light duty on or about next scheduled work date.  Lift/carry/push/pull: up to 5 lbs with right upper extremity  May use right upper extremity as helper for left if comfortable.  Avoid outstretched arms.  Avoid repetitive motions, limit gripping/grasping as able.  Keep work close to body as able.  Restrictions in place 1 month or until recheck.    Sincerely,            Arthur Wolf

## 2024-12-09 NOTE — LETTER
12/9/2024      Raphael Lizarraga  [address]  [place] [address]      Dear Colleague,    Thank you for referring your patient, Raphael Lizarraga, to the [facility]. Please see a copy of my visit note below.    ASSESSMENT & PLAN    Raphael was seen today for pain.    Diagnoses and all orders for this visit:    Lateral epicondylitis of right elbow  -     Orthopedic  Referral  -     Hand Therapy Referral; Future      This issue is chronic and Unchanged.      See Patient Instructions  Patient Instructions   Right arm issues consistent with lateral epicondylitis.    Discussed the etiology of lateral epicondylitis, also called tennis elbow. This condition is frequently caused by repetitive stress from gripping/grasping activities. Cause of this issue may include a change in level of activity that seems trivial.  May do icing, heat, over the counter medication as needed. Additionally, treatment options include activity modification to avoid painful activities.  This is often treated with rehabilitation (therapy exercises) to improve the pain and overall function at the elbow; hand therapy referral placed.   Use of device such as counter force bracing (forearm strap, or tennis elbow brace), compression sleeve, and/or wrist bracing was discussed; ok to use the forearm strap that you have, and you can consider adding compression sleeve and/or wrist brace if desired. Bracing may only temporarily help the symptoms.  Activity modification reviewed. Updated work letter provided today. Given persistent symptoms, will restrict work further, continuing with light duty.  Recheck approximately 1 month, sooner if needed.    If you have any further questions for your physician or physician s care team you can contact them thru [employer] or by calling [phone].      Arthur Wolf DO  [facility]      CC: Chava Belcher      -----  Chief  Complaint   Patient presents with     Right Elbow - Pain       SUBJECTIVE  Raphael Lizarraga is a/an 59 year old male who is seen in consultation at the request of  Chava Belcher M.D. for evaluation of right elbow.     The patient is seen by themselves.  The patient is Right handed    Onset: 1 month(s) ago. Patient describes injury as repetitive usage at work.    Location of Pain: right elbow, lateral with radicular symptoms to hand.   Worsened by: repetitive work, and gripping  Better with: rest.   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        **  Above information per rooming staff.  Additional history:  Issue is work comp.    Sometimes gets cold sensation in right hand.  No change in appearance right UE.  Can get some popping lateral right elbow, some pain associated.    Has forearm strap already.        REVIEW OF SYSTEMS:  Review of Systems    OBJECTIVE:           Right Elbow exam:    Inspection:     no ecchymosis       Visible and palpable prominence over the lateral epicondyle, noted more in flexion    ROM: lacking few deg end extension with pain  Flexion, forearm rotation grossly intact, pain with rotation    Strength: pain with resisted wrist, long finger extension    Tender:     lateral epicondyle        RADIOLOGY:  Final results and radiologist's interpretation, available in the [facility] health record.  Images were reviewed with the patient in the office today.  My personal interpretation of the performed imaging: no acute bony abnormality noted. Small lateral epicondyle spur.        XR Elbow Right G/E 3 Views    Narrative    XR ELBOW RIGHT G/E 3 VIEWS 12/9/2024 9:35 AM    HISTORY: Right elbow pain    COMPARISON: None.      Impression    IMPRESSION: No fracture or effusion. No degenerative changes.     ANDREW TALBOT MD         SYSTEM ID:  [id number]                Again,  thank you for allowing me to participate in the care of your patient.        Sincerely,        Arthur Wolf, DO

## 2024-12-09 NOTE — PATIENT INSTRUCTIONS
Right arm issues consistent with lateral epicondylitis.    Discussed the etiology of lateral epicondylitis, also called tennis elbow. This condition is frequently caused by repetitive stress from gripping/grasping activities. Cause of this issue may include a change in level of activity that seems trivial.  May do icing, heat, over the counter medication as needed. Additionally, treatment options include activity modification to avoid painful activities.  This is often treated with rehabilitation (therapy exercises) to improve the pain and overall function at the elbow; hand therapy referral placed.   Use of device such as counter force bracing (forearm strap, or tennis elbow brace), compression sleeve, and/or wrist bracing was discussed; ok to use the forearm strap that you have, and you can consider adding compression sleeve and/or wrist brace if desired. Bracing may only temporarily help the symptoms.  Activity modification reviewed. Updated work letter provided today. Given persistent symptoms, will restrict work further, continuing with light duty.  Recheck approximately 1 month, sooner if needed.    If you have any further questions for your physician or physician s care team you can contact them thru [employer] or by calling [phone].

## 2024-12-23 ENCOUNTER — TELEPHONE (OUTPATIENT)
Dept: FAMILY MEDICINE | Facility: CLINIC | Age: 59
End: 2024-12-23

## 2024-12-23 ENCOUNTER — MYC MEDICAL ADVICE (OUTPATIENT)
Dept: FAMILY MEDICINE | Facility: CLINIC | Age: 59
End: 2024-12-23

## 2024-12-23 ASSESSMENT — ANXIETY QUESTIONNAIRES
GAD7 TOTAL SCORE: 2
8. IF YOU CHECKED OFF ANY PROBLEMS, HOW DIFFICULT HAVE THESE MADE IT FOR YOU TO DO YOUR WORK, TAKE CARE OF THINGS AT HOME, OR GET ALONG WITH OTHER PEOPLE?: NOT DIFFICULT AT ALL
2. NOT BEING ABLE TO STOP OR CONTROL WORRYING: NOT AT ALL
7. FEELING AFRAID AS IF SOMETHING AWFUL MIGHT HAPPEN: NOT AT ALL
7. FEELING AFRAID AS IF SOMETHING AWFUL MIGHT HAPPEN: NOT AT ALL
6. BECOMING EASILY ANNOYED OR IRRITABLE: SEVERAL DAYS
4. TROUBLE RELAXING: NOT AT ALL
5. BEING SO RESTLESS THAT IT IS HARD TO SIT STILL: NOT AT ALL
GAD7 TOTAL SCORE: 2
IF YOU CHECKED OFF ANY PROBLEMS ON THIS QUESTIONNAIRE, HOW DIFFICULT HAVE THESE PROBLEMS MADE IT FOR YOU TO DO YOUR WORK, TAKE CARE OF THINGS AT HOME, OR GET ALONG WITH OTHER PEOPLE: NOT DIFFICULT AT ALL
1. FEELING NERVOUS, ANXIOUS, OR ON EDGE: SEVERAL DAYS
GAD7 TOTAL SCORE: 2
3. WORRYING TOO MUCH ABOUT DIFFERENT THINGS: NOT AT ALL

## 2024-12-23 ASSESSMENT — PATIENT HEALTH QUESTIONNAIRE - PHQ9
SUM OF ALL RESPONSES TO PHQ QUESTIONS 1-9: 5
10. IF YOU CHECKED OFF ANY PROBLEMS, HOW DIFFICULT HAVE THESE PROBLEMS MADE IT FOR YOU TO DO YOUR WORK, TAKE CARE OF THINGS AT HOME, OR GET ALONG WITH OTHER PEOPLE: NOT DIFFICULT AT ALL
SUM OF ALL RESPONSES TO PHQ QUESTIONS 1-9: 5

## 2024-12-23 NOTE — TELEPHONE ENCOUNTER
----- Message from Chava Belcher sent at 8/21/2024  5:54 PM CDT -----  Regarding: depression and anxiety  Please update the PHQ-9 and JOSE-7. If the PHQ-9 is greater than 9, schedule a f/u video visit.

## 2024-12-27 ENCOUNTER — PRE VISIT (OUTPATIENT)
Dept: ORTHOPEDICS | Facility: CLINIC | Age: 59
End: 2024-12-27

## 2025-01-15 ENCOUNTER — THERAPY VISIT (OUTPATIENT)
Dept: OCCUPATIONAL THERAPY | Facility: CLINIC | Age: 60
End: 2025-01-15
Attending: PEDIATRICS
Payer: COMMERCIAL

## 2025-01-15 DIAGNOSIS — M77.11 LATERAL EPICONDYLITIS OF RIGHT ELBOW: ICD-10-CM

## 2025-01-15 PROCEDURE — 97165 OT EVAL LOW COMPLEX 30 MIN: CPT | Mod: GO

## 2025-01-15 PROCEDURE — 97530 THERAPEUTIC ACTIVITIES: CPT | Mod: GO

## 2025-01-15 NOTE — PROGRESS NOTES
"OCCUPATIONAL THERAPY EVALUATION  Type of Visit: Evaluation     Fall Risk Screen:  Fall screen completed by: OT  Have you fallen 2 or more times in the past year?: No  Have you fallen and had an injury in the past year?: No  Is patient a fall risk?: No    Subjective        Presenting condition or subjective complaint: Tennis elbow  Date of onset: 12/09/24 (Order Date - Over a month)    Relevant medical history: Chest pain   Dates & types of surgery:  None listed    Prior diagnostic imaging/testing results:     None  Prior therapy history for the same diagnosis, illness or injury: No      Prior Level of Function  Transfers: Independent  Ambulation: Independent  ADL: Independent  IADL:  Independent    Living Environment  Social support: With a significant other or spouse   Ramp: No   Stairs inside the home: Yes 13 Is there a railing: Yes    Employment:    Yes  Hobbies/Interests:  None    Patient goals for therapy:  Pain free work tasks    Pain assessment: Pain present  See objective evaluation for additional pain details     Objective   ADDITIONAL HISTORY:  Right hand dominant  Patient reports symptoms of pain, stiffness/loss of motion, and weakness/loss of strength  Transportation: drives  Currently working in normal job with restrictions    Functional Outcome Measure:   Upper Extremity Functional Index Score:  SCORE:   Column Totals: /80: (Patient-Rptd) 67   (A lower score indicates greater disability.)    PAIN:  Pain Level at Rest: 0/10  Pain Level with Use: 10/10  Pain Location: Right lateral epicondyle and extensor wad  Pain Quality: Sharp and \"electrical pain\"  Pain Frequency: intermittent  Pain is Worst: daytime or nighttime  Pain is Exacerbated By: Use  Pain is Relieved By: cold, heat, otc medications, rest, and stretch  Pain Progression: Improved    EDEMA: Mild     SENSATION: Decreased Radial Nerve distribution per pt report     ROM:   Wrist ROM  Left AROM Right AROM    Extension 60 61   Flexion 60 62   Radial " Deviation (RD) 15 17   Ulnar Deviation (UD) 31 37   Supination 85 67   Pronation 76 84     NEURAL TENSION TESTING: RNT: Radial Neurodynamic Test (based on DS Epi's ULNT)   1/15/2025   0-5 Scale 2/5 S1   Position:   0/5: Arm across abdomen in coronal plane  1/5: Depress shoulder, ER to neutral ABD shoulder to 45 degrees  2/5: IR shoulder to end range, keep elbow at 90 degrees  3/5: Extend elbow to 0 degrees  4/5: Fully pronate forearm  5/5: Flex wrist and fingers with UD  Notes:  (+) indicates beyond grade level but less than nursing home to next level  (-) indicates over nursing home to level  S1 onset/change of patient's symptoms  S2 definite stop point based on patient's discomfort level    RESISTED TESTING: Resisted Testing (pain report)   Right   Elbow Extension 5/5   Elbow Flexion 5/5   Supination  5/5   Pronation 5/5   Wrist Ext with RD, Elbow at side 5/5+   Wrist Ext with UD, Elbow at side 5/5+   EDC with Elbow at side 5/5+   Long Finger Test 5/5      STRENGTH:     Measured in pounds 1/15/2025 1/15/2025    Left Right   Trial 1 87 72   With elbow extended, 38lbs with pain    PALPATION:   Elbow Palpation    Spiral Groove -   Distal Triceps -   Anconeus 2/10   ECRB Origin -   ECU at Origin -   EDC at Origin 8/10   Radial Head -   Posterior Interosseous Nerve (PIN) 5/10   Extensor Wad -     Assessment & Plan   CLINICAL IMPRESSIONS  Medical Diagnosis: R LEP    Treatment Diagnosis: R LEP    Impression/Assessment: Pt is a 59 year old male presenting to Occupational Therapy due to R LEP.  The following significant findings have been identified: Impaired ROM, Impaired sensation, Impaired strength, and Pain.  These identified deficits interfere with their ability to perform self care tasks, work tasks, recreational activities, household chores, and meal planning and preparation as compared to previous level of function.   Patient's limitations or Problem List includes: Pain, Decreased ROM/motion, Increased edema,  Weakness, Sensory disturbance, Adherent scarring, Decreased stability, Decreased , Decreased pinch, Decreased coordination, Decreased dexterity, Tightness in musculature, and Adherence in connective tissue of the right elbow which interferes with the patient's ability to perform Self Care Tasks (dressing, hygiene/toileting), Work Tasks, Sleep Patterns, Recreational Activities, and Household Chores as compared to previous level of function.    Clinical Decision Making (Complexity):  Assessment of Occupational Performance: 5 or more Performance Deficits  Occupational Performance Limitations: dressing, hygiene and grooming, health management and maintenance, home establishment and management, meal preparation and cleanup, shopping, sleep, work, and leisure activities  Clinical Decision Making (Complexity): Low complexity    PLAN OF CARE  Treatment Interventions:  Modalities:  US and Paraffin  Therapeutic Exercise:  AROM, AAROM, PROM, Tendon Gliding, Blocking, Reverse Blocking, Place and Hold, Contract Relax, Extensor Tracking, Isotonics, Isometrics, and Stabilization  Neuromuscular re-education:  Nerve Gliding, Desensitization, Kinesthetic Training, Proprioceptive Training, Posture, Kinesiotaping, Isometrics, and Stabilization  Manual Techniques:  Joint mobilization, Scar mobilization, Friction massage, Myofascial release, and Manual edema mobilization  Orthotic Fabrication:  Static, Forearm based, and Long arm  Self Care:  Self Care Tasks, Ergonomic Considerations, and Work Tasks    Long Term Goals   OT Goal 1  Goal Identifier: Sleeping  Goal Description: Pt will report no symptoms sleeping 7/7 nights during the week  Rationale: In order to maximize safety and independence with ADL/IADLs  Goal Progress: 0/7 nights  Target Date: 04/09/25      Frequency of Treatment: 1x weekly, tapering  Duration of Treatment: 12 weeks     Education Assessment: Learner/Method: Patient;Demonstration;Pictures/Video  Education  Comments: PTRX via phone     Risks and benefits of evaluation/treatment have been explained.   Patient/Family/caregiver agrees with Plan of Care.     Evaluation Time:    OT [place], Low Complexity Minutes (52976): 16  Signing Clinician: Leigh Ann Noyola OT

## 2025-01-22 ENCOUNTER — THERAPY VISIT (OUTPATIENT)
Dept: OCCUPATIONAL THERAPY | Facility: CLINIC | Age: 60
End: 2025-01-22
Attending: PEDIATRICS
Payer: COMMERCIAL

## 2025-01-22 DIAGNOSIS — M77.11 LATERAL EPICONDYLITIS OF RIGHT ELBOW: Primary | ICD-10-CM

## 2025-01-22 PROCEDURE — 97140 MANUAL THERAPY 1/> REGIONS: CPT | Mod: GO

## 2025-01-22 PROCEDURE — 97035 APP MDLTY 1+ULTRASOUND EA 15: CPT | Mod: GO

## 2025-01-22 PROCEDURE — 97112 NEUROMUSCULAR REEDUCATION: CPT | Mod: GO

## 2025-01-29 ENCOUNTER — THERAPY VISIT (OUTPATIENT)
Dept: OCCUPATIONAL THERAPY | Facility: CLINIC | Age: 60
End: 2025-01-29
Attending: PEDIATRICS
Payer: COMMERCIAL

## 2025-01-29 DIAGNOSIS — M77.11 LATERAL EPICONDYLITIS OF RIGHT ELBOW: Primary | ICD-10-CM

## 2025-01-29 PROCEDURE — 97112 NEUROMUSCULAR REEDUCATION: CPT | Mod: GO

## 2025-01-29 PROCEDURE — 97140 MANUAL THERAPY 1/> REGIONS: CPT | Mod: GO

## 2025-01-29 PROCEDURE — 97110 THERAPEUTIC EXERCISES: CPT | Mod: GO

## 2025-02-12 ENCOUNTER — THERAPY VISIT (OUTPATIENT)
Dept: OCCUPATIONAL THERAPY | Facility: CLINIC | Age: 60
End: 2025-02-12
Payer: COMMERCIAL

## 2025-02-12 DIAGNOSIS — M77.11 LATERAL EPICONDYLITIS OF RIGHT ELBOW: Primary | ICD-10-CM

## 2025-02-12 PROCEDURE — 97112 NEUROMUSCULAR REEDUCATION: CPT | Mod: GO

## 2025-02-12 PROCEDURE — 97140 MANUAL THERAPY 1/> REGIONS: CPT | Mod: GO

## 2025-02-19 ENCOUNTER — THERAPY VISIT (OUTPATIENT)
Dept: OCCUPATIONAL THERAPY | Facility: CLINIC | Age: 60
End: 2025-02-19
Payer: COMMERCIAL

## 2025-02-19 DIAGNOSIS — M77.11 LATERAL EPICONDYLITIS OF RIGHT ELBOW: Primary | ICD-10-CM

## 2025-02-19 PROCEDURE — 97110 THERAPEUTIC EXERCISES: CPT | Mod: GO

## 2025-02-19 NOTE — PROGRESS NOTES
"   02/19/25 0500   Appointment Info   Treating Provider Leigh Ann Noyola, OTR/L   Total/Authorized Visits 8 (POC)   Visits Used 6   Medical Diagnosis R LEP   OT Tx Diagnosis R LEP   Precautions/Limitations No lifting greater than 5lbs, light duty   Progress Note/Certification   Onset of Illness/Injury or Date of Surgery 12/09/24  (Order Date - Over a month)   Therapy Frequency 1x weekly, tapering   Predicted Duration 12 weeks   Progress Note Due Date 02/26/25   Progress Note Completed Date 01/15/25   Goals   OT Goals 1   OT Goal 1   Goal Identifier Sleeping   Goal Description Pt will report no symptoms sleeping 7/7 nights during the week   Rationale In order to maximize safety and independence with ADL/IADLs   Goal Progress 3/7 nights   Target Date 04/09/25   Subjective Report   Subjective Report \"I must have worked it different today. I can feel it a little bit in my back. When I get the shot of pain, it's just in this one little area (lateral epicondyle). It's better than it was. I am wondering if I should stop wearing this thing during the day (counterforce strap).\"   Objective Measures   Objective Measures Objective Measure 1;Hand Obj Measures   Hand Objective Measures ROM;Neural Tension Testing;Palpation;Strength;Resisted Testing   Neural Tension Testing RNT   Palpation Elbow Palpation;Radial Nerve Palpation   Resisted Testing Wrist Ext with RD (Elbow at Side);Wrist Ext with RD (Elbow Ext);EDC with Elbow at Side   ROM Forearm ROM;Wrist ROM   Strength    Objective Measure 1   Objective Measure Pain Scale   Details 4/10 pain   Forearm ROM   Supination 78   Pronation  84   Wrist ROM    Extension 64   Flexion 67   Radial Deviation (RD) 25   Ulnar Deviation (UD) 40   RNT: Radial Neurodynamic Test (based on BERNIE Chowdary's ULNT)   0-5 Scale 3/5   Resisted Testing   Wrist Ext with RD, Elbow at Side 5/5, 0/10   Wrist Ext with RD, Elbow Ext 5/5, 5/10   EDC with Elbow at Side 5/5, 2/10    (measured in pounds)    " Average Strength 81lbs; with elbow ext 84lbs with min pain   Radial Nerve Palpation   Posterior Interosseous Nerve (PIN) 0/10   Dorsal Sensory Radial Nerve (DSRN) -   Elbow Palpation   Lateral Epicondyle 3/10   Extensor Wad 0/10   Medial Epicondyle -   Flexor Wad -   Pronator Teres -   Treatment Interventions (OT)   Interventions Therapeutic Procedure/Exercise   Neuromuscular Re-education   PTRx Neuro Re-ed 1 Nerve Gliding Proximal Radial   PTRx Neuro Re-ed 1 - Details HEP    Therapeutic Procedure/Exercise   Therapeutic Procedure: strength, endurance, ROM, flexibillity minutes (62602) 26   PTRx Ther Proc 1 Forearm Passive Range of Motion Extensor Stretch   PTRx Ther Proc 1 - Details HEP    PTRx Ther Proc 2 Forearm PROM Advanced Flexor Stretch   PTRx Ther Proc 2 - Details HEP    PTRx Ther Proc 3 Wrist Strengthening Isometric Extension   PTRx Ther Proc 3 - Details HEP    PTRx Ther Proc 4 Wrist Stabilization Wall Push Ups on Fists   PTRx Ther Proc 4 - Details HEP    PTRx Ther Proc 5 Wrist Stabilization Door Frame Pull Backs   PTRx Ther Proc 5 - Details HEP    Skilled Intervention Advancing HEP due to progress. To track therapy progress.   Patient Response/Progress Pt is progressing with hand therapy and HEP, particularly with pain reduction, ROM, strength, and neural tension. Pt reports readiness for independence with managing HEP and symptoms, as well as, decreasing frequency of therapy appointments.   PTRx Ther Proc 6 Wrist Isotonic Strengthening Radial Deviation   PTRx Ther Proc 6 - Details 3-4x daily 10-20 reps reviewed HEP    PTRx Ther Proc 7 Wrist Strengthening Isotonic Extension   PTRx Ther Proc 7 - Details 3-4x daily 10-20 reps reviewed HEP    PTRx Ther Proc 8 Elbow Strengthening Isotonic Flexion   PTRx Ther Proc 8 - Details 3-4x daily 10-20 reps reviewed HEP    PTRx Ther Proc 9 Bent Over Tricep Extension   PTRx Ther Proc 9 - Details 3-4x daily 10-20 reps reviewed HEP    Ther Proc 1 Progress Note   Ther Proc 1  - Details Please refer to the objective data gathered for today's progress note.   Therapeutic Activity   PTRx Ther Act 1 TENNIS ELBOW PREVENTION   PTRx Ther Act 1 - Details HEP    PTRx Ther Act 2 Warmth   PTRx Ther Act 2 - Details HEP    PTRx Ther Act 3 Ball Massage to Extensors   PTRx Ther Act 3 - Details HEP    PTRx Ther Act 4 Friction Massage   PTRx Ther Act 4 - Details HEP    PTRx Ther Act 5 Icing   PTRx Ther Act 5 - Details HEP    Education   Learner/Method Patient;Demonstration;Pictures/Video   Education Comments PTRX via phone   Plan   Home program See PTRX   Updates to plan of care Returning in 3 weeks   Total Session Time   Timed Code Treatment Minutes 26   Total Treatment Time (sum of timed and untimed services) 26     Upper Extremity Functional Index Score:  SCORE:   Column Totals: /80: 68   (A lower score indicates greater disability.)    PLAN  Continue therapy per current plan of care.    Beginning/End Dates of Progress Note Reporting Period:  01/15/25 to 02/19/2025    Referring Provider:  Arthur Wolf

## 2025-03-14 DIAGNOSIS — F33.0 MAJOR DEPRESSIVE DISORDER, RECURRENT EPISODE, MILD: Chronic | ICD-10-CM

## 2025-03-17 RX ORDER — MIRTAZAPINE 7.5 MG/1
7.5 TABLET, FILM COATED ORAL AT BEDTIME
Qty: 90 TABLET | Refills: 1 | Status: SHIPPED | OUTPATIENT
Start: 2025-03-17

## 2025-05-04 DIAGNOSIS — G40.209 LOCALIZATION-RELATED PARTIAL EPILEPSY WITH COMPLEX PARTIAL SEIZURES (H): ICD-10-CM

## 2025-05-06 RX ORDER — PHENYTOIN SODIUM 100 MG/1
CAPSULE, EXTENDED RELEASE ORAL
Qty: 360 CAPSULE | Refills: 0 | Status: SHIPPED | OUTPATIENT
Start: 2025-05-06

## 2025-05-06 NOTE — TELEPHONE ENCOUNTER
Last Written Prescription:  phenytoin (DILANTIN) 100 MG ER capsule 360 capsule 3 5/3/2024     ----------------------  Last Visit Date: 5/3/24  Future Visit Date: 5/9/25  ----------------------  Component      Latest Ref Rng 5/3/2024  2:04 PM   Phenytoin Free      1.0 - 2.5 ug/mL 2.3        Refill decision: Medication refilled per  Medication Refill in Ambulatory Care  policy.      Request from pharmacy:  Requested Prescriptions   Pending Prescriptions Disp Refills    phenytoin (DILANTIN) 100 MG ER capsule [Pharmacy Med Name: PHENYTOIN SODIUM 100MG EXT CAPSULES] 360 capsule 3     Sig: TAKE 2 CAPSULE BY MOUTH EVERY MORNING AND 2 CAPSULES EVERY EVENING FOR TOTAL OF 400MG.       Anti-Seizure Meds Protocol  Failed - 5/6/2025 11:07 AM        Failed - Review Authorizing provider's last note.      Refer to last progress notes: confirm request is for original authorizing provider (cannot be through other providers).          Failed - Normal ALT or AST on file in past 26 months     Recent Labs   Lab Test 02/17/23  1549   ALT 29     Recent Labs   Lab Test 02/17/23  1549   AST 15             Failed - Medication is active on med list and the sig matches. RN to manually verify dose and sig if red X/fail.     If the protocol passes (green check), you do not need to verify med dose and sig.    A prescription matches if they are the same clinical intention.    For Example: once daily and every morning are the same.    The protocol can not identify upper and lower case letters as matching and will fail.     For Example: Take 1 tablet (50 mg) by mouth daily     TAKE 1 TABLET (50 MG) BY MOUTH DAILY    For all fails (red x), verify dose and sig.    If the refill does match what is on file, the RN can still proceed to approve the refill request.       If they do not match, route to the appropriate provider.             Failed - Has GFR on file in past 12 months and most recent value is normal        Passed - Dilantin level within  therapeutic range in past 26 months     Recent Labs   Lab Test 05/03/24  1404   DILANTIN 25.4       Dilantin level must be checked 2-4 weeks after dosage change.          Passed - Recent (12 mo) or future (90 days) visit within the authorizing provider's specialty     The patient must have completed an in-person or virtual visit within the past 12 months or has a future visit scheduled within the next 90 days with the authorizing provider s specialty.  Urgent care and e-visits do not qualify as an office visit for this protocol.          Passed - Medication indicated for associated diagnosis     Medication is associated with one or more of the following diagnoses:     Bipolar   Dementia   Depression   Epilepsy   Migraine   Seizure   Trigeminal Neuralgia   Cyclothymia

## 2025-05-08 SDOH — HEALTH STABILITY: PHYSICAL HEALTH: ON AVERAGE, HOW MANY MINUTES DO YOU ENGAGE IN EXERCISE AT THIS LEVEL?: 0 MIN

## 2025-05-08 SDOH — HEALTH STABILITY: PHYSICAL HEALTH: ON AVERAGE, HOW MANY DAYS PER WEEK DO YOU ENGAGE IN MODERATE TO STRENUOUS EXERCISE (LIKE A BRISK WALK)?: 0 DAYS

## 2025-05-08 ASSESSMENT — ASTHMA QUESTIONNAIRES
QUESTION_5 LAST FOUR WEEKS HOW WOULD YOU RATE YOUR ASTHMA CONTROL: COMPLETELY CONTROLLED
QUESTION_1 LAST FOUR WEEKS HOW MUCH OF THE TIME DID YOUR ASTHMA KEEP YOU FROM GETTING AS MUCH DONE AT WORK, SCHOOL OR AT HOME: NONE OF THE TIME
QUESTION_4 LAST FOUR WEEKS HOW OFTEN HAVE YOU USED YOUR RESCUE INHALER OR NEBULIZER MEDICATION (SUCH AS ALBUTEROL): ONCE A WEEK OR LESS
ACT_TOTALSCORE: 23
QUESTION_2 LAST FOUR WEEKS HOW OFTEN HAVE YOU HAD SHORTNESS OF BREATH: ONCE OR TWICE A WEEK
QUESTION_3 LAST FOUR WEEKS HOW OFTEN DID YOUR ASTHMA SYMPTOMS (WHEEZING, COUGHING, SHORTNESS OF BREATH, CHEST TIGHTNESS OR PAIN) WAKE YOU UP AT NIGHT OR EARLIER THAN USUAL IN THE MORNING: NOT AT ALL

## 2025-05-08 ASSESSMENT — SOCIAL DETERMINANTS OF HEALTH (SDOH): HOW OFTEN DO YOU GET TOGETHER WITH FRIENDS OR RELATIVES?: NEVER

## 2025-05-12 ASSESSMENT — PATIENT HEALTH QUESTIONNAIRE - PHQ9
SUM OF ALL RESPONSES TO PHQ QUESTIONS 1-9: 4
SUM OF ALL RESPONSES TO PHQ QUESTIONS 1-9: 4
10. IF YOU CHECKED OFF ANY PROBLEMS, HOW DIFFICULT HAVE THESE PROBLEMS MADE IT FOR YOU TO DO YOUR WORK, TAKE CARE OF THINGS AT HOME, OR GET ALONG WITH OTHER PEOPLE: NOT DIFFICULT AT ALL

## 2025-05-13 ENCOUNTER — OFFICE VISIT (OUTPATIENT)
Dept: FAMILY MEDICINE | Facility: CLINIC | Age: 60
End: 2025-05-13
Attending: FAMILY MEDICINE
Payer: COMMERCIAL

## 2025-05-13 VITALS
HEART RATE: 74 BPM | WEIGHT: 151.2 LBS | HEIGHT: 66 IN | OXYGEN SATURATION: 96 % | BODY MASS INDEX: 24.3 KG/M2 | RESPIRATION RATE: 14 BRPM | SYSTOLIC BLOOD PRESSURE: 126 MMHG | TEMPERATURE: 98.2 F | DIASTOLIC BLOOD PRESSURE: 76 MMHG

## 2025-05-13 DIAGNOSIS — J45.20 INTERMITTENT ASTHMA WITHOUT COMPLICATION, UNSPECIFIED ASTHMA SEVERITY: ICD-10-CM

## 2025-05-13 DIAGNOSIS — F41.9 ANXIETY: ICD-10-CM

## 2025-05-13 DIAGNOSIS — Z00.00 ROUTINE GENERAL MEDICAL EXAMINATION AT A HEALTH CARE FACILITY: Primary | ICD-10-CM

## 2025-05-13 DIAGNOSIS — Z12.5 SCREENING FOR PROSTATE CANCER: ICD-10-CM

## 2025-05-13 DIAGNOSIS — F33.0 MAJOR DEPRESSIVE DISORDER, RECURRENT EPISODE, MILD: ICD-10-CM

## 2025-05-13 DIAGNOSIS — Z13.220 SCREENING FOR LIPID DISORDERS: ICD-10-CM

## 2025-05-13 DIAGNOSIS — Z13.1 SCREENING FOR DIABETES MELLITUS: ICD-10-CM

## 2025-05-13 PROCEDURE — 82947 ASSAY GLUCOSE BLOOD QUANT: CPT | Performed by: FAMILY MEDICINE

## 2025-05-13 PROCEDURE — 80061 LIPID PANEL: CPT | Performed by: FAMILY MEDICINE

## 2025-05-13 PROCEDURE — 1125F AMNT PAIN NOTED PAIN PRSNT: CPT | Performed by: FAMILY MEDICINE

## 2025-05-13 PROCEDURE — 99214 OFFICE O/P EST MOD 30 MIN: CPT | Mod: 25 | Performed by: FAMILY MEDICINE

## 2025-05-13 PROCEDURE — G0103 PSA SCREENING: HCPCS | Performed by: FAMILY MEDICINE

## 2025-05-13 PROCEDURE — 99396 PREV VISIT EST AGE 40-64: CPT | Performed by: FAMILY MEDICINE

## 2025-05-13 PROCEDURE — 36415 COLL VENOUS BLD VENIPUNCTURE: CPT | Performed by: FAMILY MEDICINE

## 2025-05-13 PROCEDURE — 3078F DIAST BP <80 MM HG: CPT | Performed by: FAMILY MEDICINE

## 2025-05-13 PROCEDURE — 3074F SYST BP LT 130 MM HG: CPT | Performed by: FAMILY MEDICINE

## 2025-05-13 RX ORDER — PAROXETINE 20 MG/1
20 TABLET, FILM COATED ORAL AT BEDTIME
Qty: 90 TABLET | Refills: 1 | Status: SHIPPED | OUTPATIENT
Start: 2025-05-13

## 2025-05-13 RX ORDER — MIRTAZAPINE 7.5 MG/1
7.5 TABLET, FILM COATED ORAL AT BEDTIME
Qty: 90 TABLET | Refills: 1 | Status: SHIPPED | OUTPATIENT
Start: 2025-05-13

## 2025-05-13 RX ORDER — ALBUTEROL SULFATE 90 UG/1
2 INHALANT RESPIRATORY (INHALATION) EVERY 4 HOURS PRN
Qty: 8.5 G | Refills: 1 | Status: SHIPPED | OUTPATIENT
Start: 2025-05-13

## 2025-05-13 ASSESSMENT — PAIN SCALES - GENERAL: PAINLEVEL_OUTOF10: MILD PAIN (3)

## 2025-05-13 NOTE — PATIENT INSTRUCTIONS
At [facility], we strive to deliver an exceptional experience to you, every time we see you. If you receive a survey, please let us know what we are doing well and/or what we could improve upon, as we do value your feedback.  If you have MyChart, you can expect to receive results automatically within 24 hours of their completion.  Your provider will send a note interpreting your results as well.   If you do not have MyChart, you should receive your results in about a week by mail.    Your care team:                            Family Medicine Internal Medicine   MD Jeff Juárez, MD Donna Hancock, MD Carter Bates, MD Meli Lazcano, PA-C    Carmelo Pang, MD Pediatrics   Delia Brunson, MD Jessica Mercado, MD Yulissa Amaya, APRN CNP Virginie CRAIG CNP   Saravanan Loya, MD Randa Gomez, MD Tanya Branch, CNP     Raquel Mehta, PA-C Same-Day Provider (No follow-up visits)   RAFA Cummings, BAKARI Leal, PA-C    Taisha Carlton PA-C     Clinic hours: Monday - Thursday 7 am-6 pm; Fridays 7 am-5 pm.   Urgent care: Monday - Friday 10 am- 8 pm; Saturday and Sunday 9 am-5 pm.    Clinic: [phone]       [facility]: Monday - Thursday 8 am - 7 pm; Friday 8 am - 6 pm  [facility]: [phone]     Patient Education   Preventive Care Advice   This is general advice given by our system to help you stay healthy. However, your care team may have specific advice just for you. Please talk to your care team about your preventive care needs.  Nutrition  Eat 5 or more servings of fruits and vegetables each day.  Try wheat bread, brown rice and whole grain pasta (instead of white bread, rice, and pasta).  Get enough calcium and vitamin D. Check the label on foods and aim for 100% of the RDA (recommended daily allowance).  Lifestyle  Exercise at least 150 minutes each week  (30  minutes a day, 5 days a week).  Do muscle strengthening activities 2 days a week. These help control your weight and prevent disease.  No smoking.  Wear sunscreen to prevent skin cancer.  Have a dental exam and cleaning every 6 months.  Yearly exams  See your health care team every year to talk about:  Any changes in your health.  Any medicines your care team has prescribed.  Preventive care, family planning, and ways to prevent chronic diseases.  Shots (vaccines)   HPV shots (up to age 26), if you've never had them before.  Hepatitis B shots (up to age 59), if you've never had them before.  COVID-19 shot: Get this shot when it's due.  Flu shot: Get a flu shot every year.  Tetanus shot: Get a tetanus shot every 10 years.  Pneumococcal, hepatitis A, and RSV shots: Ask your care team if you need these based on your risk.  Shingles shot (for age 50 and up)  General health tests  Diabetes screening:  Starting at age 35, Get screened for diabetes at least every 3 years.  If you are younger than age 35, ask your care team if you should be screened for diabetes.  Cholesterol test: At age 39, start having a cholesterol test every 5 years, or more often if advised.  Bone density scan (DEXA): At age 50, ask your care team if you should have this scan for osteoporosis (brittle bones).  Hepatitis C: Get tested at least once in your life.  STIs (sexually transmitted infections)  Before age 24: Ask your care team if you should be screened for STIs.  After age 24: Get screened for STIs if you're at risk. You are at risk for STIs (including HIV) if:  You are sexually active with more than one person.  You don't use condoms every time.  You or a partner was diagnosed with a sexually transmitted infection.  If you are at risk for HIV, ask about PrEP medicine to prevent HIV.  Get tested for HIV at least once in your life, whether you are at risk for HIV or not.  Cancer screening tests  Cervical cancer screening: If you have a cervix,  begin getting regular cervical cancer screening tests starting at age 21.  Breast cancer scan (mammogram): If you've ever had breasts, begin having regular mammograms starting at age 40. This is a scan to check for breast cancer.  Colon cancer screening: It is important to start screening for colon cancer at age 45.  Have a colonoscopy test every 10 years (or more often if you're at risk) Or, ask your provider about stool tests like a FIT test every year or Cologuard test every 3 years.  To learn more about your testing options, visit:   .  For help making a decision, visit:   https://bit.ly/xv37660.  Prostate cancer screening test: If you have a prostate, ask your care team if a prostate cancer screening test (PSA) at age 55 is right for you.  Lung cancer screening: If you are a current or former smoker ages 50 to 80, ask your care team if ongoing lung cancer screenings are right for you.  For informational purposes only. Not to replace the advice of your health care provider. Copyright   2023 [employer]. All rights reserved. Clinically reviewed by the [facility] Transitions Program. Gun.io [id number] - REV 01/24.  Preventing Falls: Care Instructions  Injuries and health problems such as trouble walking or poor eyesight can increase your risk of falling. So can some medicines. But there are things you can do to help prevent falls. You can exercise to get stronger. You can also arrange your home to make it safer.    Talk to your doctor about the medicines you take. Ask if any of them increase the risk of falls and whether they can be changed or stopped.   Try to exercise regularly. It can help improve your strength and balance. This can help lower your risk of falling.         Practice fall safety and prevention.   Wear low-heeled shoes that fit well and give your feet good support. Talk to your doctor if you have foot problems that make this hard.  Carry a cellphone or wear a medical alert  "device that you can use to call for help.  Use stepladders instead of chairs to reach high objects. Don't climb if you're at risk for falls. Ask for help, if needed.  Wear the correct eyeglasses, if you need them.        Make your home safer.   Remove rugs, cords, clutter, and furniture from walkways.  Keep your house well lit. Use night-lights in hallways and bathrooms.  Install and use sturdy handrails on stairways.  Wear nonskid footwear, even inside. Don't walk barefoot or in socks without shoes.        Be safe outside.   Use handrails, curb cuts, and ramps whenever possible.  Keep your hands free by using a shoulder bag or backpack.  Try to walk in well-lit areas. Watch out for uneven ground, changes in pavement, and debris.  Be careful in the winter. Walk on the grass or gravel when sidewalks are slippery. Use de-icer on steps and walkways. Add non-slip devices to shoes.    Put grab bars and nonskid mats in your shower or tub and near the toilet. Try to use a shower chair or bath bench when bathing.   Get into a tub or shower by putting in your weaker leg first. Get out with your strong side first. Have a phone or medical alert device in the bathroom with you.   Where can you learn more?  Go to https://www.Ne[employer]pl[employer].net/patiented  Enter G117 in the search box to learn more about \"Preventing Falls: Care Instructions.\"  Current as of: July 31, 2024  Content Version: 14.4    1568-5003 [employer].   Care instructions adapted under license by your healthcare professional. If you have questions about a medical condition or this instruction, always ask your healthcare professional. [employer] disclaims any warranty or liability for your use of this information.    Relationships for Good Health  Relationships are important for our health and happiness. Social isolation, loneliness and lack of support are bad for your health. Studies show that loneliness can harm health and limit your life span " as much as high blood pressure and smoking.   Take some time to reflect on your relationships. Then answer these questions:  Are there people in your life that cause you stress or drain your energy? What can you do to set limits?  ________________________________________________________________________________________________________________________________________________________________________________________________________________________________________________________________________________________________________________________________________________  Who do you enjoy spending time with? Who can you go to for support?  ________________________________________________________________________________________________________________________________________________________________________________________________________________________________________________________________________________________________________________________________________________  What can you do to improve your relationships with others?  __________________________________________________________________________________________________________________________________________________________________________________________________________________  ______________________________________________________________________________________________________________________________  What do you like most about your relationships with others?  ________________________________________________________________________________________________________________________________________________________________________________________________________________________________________________________________________________________________________________________________________________  My goal: ______________________________________________________________________  I will:  ______________________________________________________________________________________________________________________________________________________________________________________________    For informational purposes only. Not to replace the advice of your health care provider. Copyright   2018 [facility]. All rights reserved. Clinically reviewed by [facility]  Team. Laurie [id number] - Rev 06/24.

## 2025-05-13 NOTE — PROGRESS NOTES
Preventive Care Visit  [facility]  Chava Belcher MD, Family Medicine  May 13, 2025      Assessment & Plan     (Z00.00) Routine general medical examination at a health care facility  (primary encounter diagnosis)  Comment: Negative screening exam; up-to-date on preventive services.   Plan: Asthma Action Plan (AAP), Lipid panel reflex to        direct LDL Non-fasting, Glucose, Prostate         Specific Antigen Screen        Follow-up in 1 year.     (F33.0) Major depressive disorder, recurrent episode, mild  Comment: well controlled. He has no problems with this combination of medication.   Plan: mirtazapine (REMERON) 7.5 MG tablet, PARoxetine        (PAXIL) 20 MG tablet        Return in about 6 months (around 11/13/2025) for recheck medications, recheck depression, using a video visit.      (F41.9) Anxiety  Comment: also well controlled.  Plan: PARoxetine (PAXIL) 20 MG tablet        Return in about 6 months (around 11/13/2025) for recheck medications, recheck depression, using a video visit.      (J45.20) Intermittent asthma without complication, unspecified asthma severity  Comment: well controlled with rare use of albuterol, but he keeps it handy at work.   Plan: Asthma Action Plan (AAP), albuterol (PROAIR         HFA/PROVENTIL HFA/VENTOLIN HFA) 108 (90 Base)         MCG/ACT inhaler        Return in about 6 months (around 11/13/2025) for recheck medications, recheck depression, using a video visit.      (Z13.220) Screening for lipid disorders  Comment: non-fasting.   Plan: Lipid panel reflex to direct LDL Non-fasting            (Z13.1) Screening for diabetes mellitus  Comment:   Plan: Glucose            (Z12.5) Screening for prostate cancer  Comment: indications for screening discussed with the patient.  Plan: Prostate Specific Antigen Screen            Counseling  Appropriate preventive services were addressed with this patient via screening, questionnaire, or discussion as  appropriate for fall prevention, nutrition, physical activity, Tobacco-use cessation, social engagement, weight loss and cognition.  Checklist reviewing preventive services available has been given to the patient.  Reviewed patient's diet, addressing concerns and/or questions.   Patient is at risk for social isolation and has been provided with information about the benefit of social connection.     Jaime Lim is a 60 year old, presenting for the following:  Physical        5/13/2025     4:24 PM   Additional Questions   Roomed by haris   Accompanied by self         5/13/2025     4:24 PM   Patient Reported Additional Medications   Patient reports taking the following new medications haris        [facility]  Advance Care Planning    Discussed advance care planning with patient; informed AVS has link to [facility] [employer]. Information packet handed to the patient.         5/8/2025   General Health   How would you rate your overall physical health? Good   Feel stress (tense, anxious, or unable to sleep) Not at all         5/8/2025   Nutrition   Three or more servings of calcium each day? (!) NO   Diet: Regular (no restrictions)   How many servings of fruit and vegetables per day? (!) 0-1   How many sweetened beverages each day? 0-1         5/8/2025   Exercise   Days per week of moderate/strenous exercise 0 days   Average minutes spent exercising at this level 0 min   (!) EXERCISE CONCERN      5/8/2025   Social Factors   Frequency of gathering with friends or relatives Never   Worry food won't last until get money to buy more No   Food not last or not have enough money for food? No   Do you have housing? (Housing is defined as stable permanent housing and does not include staying outside in a car, in a tent, in an abandoned building, in an overnight shelter, or couch-surfing.) Yes   Are you worried about losing your housing? No   Lack of transportation? No   Unable to get utilities (heat,electricity)? No   (!) SOCIAL  "CONNECTIONS CONCERN      2025   Fall Risk   Gait Speed Test (Document in seconds) 4.18   Gait Speed Test Interpretation Less than or equal to 5.00 seconds - PASS          2025   Dental   Dentist two times every year? Yes       Today's PHQ-9 Score:       2025     5:46 PM   PHQ-9 SCORE   PHQ-9 Total Score MyChart 4 (Minimal depression)   PHQ-9 Total Score 4        Patient-reported         2025   Substance Use   Alcohol more than 3/day or more than 7/wk No   Do you use any other substances recreationally? No     Social History     Tobacco Use    Smoking status: Former     Current packs/day: 0.00     Types: Cigarettes     Quit date: 1993     Years since quittin.7     Passive exposure: Never    Smokeless tobacco: Never   Vaping Use    Vaping status: Never Used   Substance Use Topics    Alcohol use: Not Currently     Alcohol/week: 0.0 - 4.0 standard drinks of alcohol     Comment: beer - rare    Drug use: No           2025   STI Screening   New sexual partner(s) since last STI/HIV test? No   Last PSA:   Prostate Specific Antigen Screen   Date Value Ref Range Status   2024 0.20 0.00 - 3.50 ng/mL Final   2023 0.20 0.00 - 4.00 ug/L Final     ASCVD Risk   The 10-year ASCVD risk score (Janet ALVARENGA, et al., 2019) is: 6.7%    Values used to calculate the score:      Age: 60 years      Sex: Male      Is Non- : No      Diabetic: No      Tobacco smoker: No      Systolic Blood Pressure: 126 mmHg      Is BP treated: No      HDL Cholesterol: 61 mg/dL      Total Cholesterol: 221 mg/dL       Reviewed and updated as needed this visit by Provider   Tobacco   Meds   Med Hx  Surg Hx  Fam Hx          Review of Systems       Objective    Exam  /76 (BP Location: Left arm, Patient Position: Sitting, Cuff Size: Adult Regular)   Pulse 74   Temp 98.2  F (36.8  C) (Oral)   Resp 14   Ht 1.676 m (5' 6\")   Wt 68.6 kg (151 lb 3.2 oz)   SpO2 96%   BMI 24.40 kg/m  " "   Estimated body mass index is 24.4 kg/m  as calculated from the following:    Height as of this encounter: 1.676 m (5' 6\").    Weight as of this encounter: 68.6 kg (151 lb 3.2 oz).    Physical Exam  GENERAL: alert and no distress  EYES: Eyes grossly normal to inspection, PERRL and conjunctivae and sclerae normal  HENT: ear canals and TM's normal, nose and mouth without ulcers or lesions  NECK: no adenopathy, no asymmetry, masses, or scars  RESP: lungs clear to auscultation - no rales, rhonchi or wheezes  CV: regular rate and rhythm, normal S1 S2, no S3 or S4, no murmur, click or rub, no peripheral edema  ABDOMEN: soft, nontender, no hepatosplenomegaly, no masses and bowel sounds normal   (male): normal male genitalia without lesions or urethral discharge, no hernia  MS: no gross musculoskeletal defects noted, no edema  SKIN: no suspicious lesions or rashes  NEURO: Normal strength and tone, mentation intact and speech normal  PSYCH: mentation appears normal, affect normal/bright    This document serves as a record of the services and decisions personally performed and made by Dr. Belcher. It was created on his behalf by Lissette Vale, a trained medical scribe. The creation of this document is based the provider's statements to the medical scribe.  Lissette Vale, 4:43 PM         Signed Electronically by: Chava Belcher MD    Answers submitted by the patient for this visit:  Patient Health Questionnaire (Submitted on 5/12/2025)  If you checked off any problems, how difficult have these problems made it for you to do your work, take care of things at home, or get along with other people?: Not difficult at all  PHQ9 TOTAL SCORE: 4    "

## 2025-05-13 NOTE — LETTER
My Asthma Action Plan    Name: Raphael Lizarraga   YOB: 1965  Date: 5/13/2025   My doctor: Chava Belcher MD   My clinic: [facility]        My Rescue Medicine: Albuterol (Proair/Ventolin/Proventil HFA) 2-4 puffs EVERY 4 HOURS as needed. Use a spacer if recommended by your provider.   My Asthma Severity:   Intermittent / Exercise Induced  Know your asthma triggers.  None          GREEN ZONE   Good Control  I feel good  No cough or wheeze  Can work, sleep and play without asthma symptoms       Take your asthma control medicine every day.     If exercise triggers your asthma, take your rescue medication  15 minutes before exercise or sports, and  During exercise if you have asthma symptoms  Spacer to use with inhaler: If you have a spacer, make sure to use it with your inhaler             YELLOW ZONE Getting Worse  I have ANY of these:  I do not feel good  Cough or wheeze  Chest feels tight  Wake up at night   Keep taking your Green Zone medications  Start taking your rescue medicine:  every 20 minutes for up to 1 hour. Then every 4 hours for 24-48 hours.  If you stay in the Yellow Zone for more than 12-24 hours, contact your doctor.  If you do not return to the Green Zone in 12-24 hours or you get worse, start taking your oral steroid medicine if prescribed by your provider.           RED ZONE Medical Alert - Get Help  I have ANY of these:  I feel awful  Medicine is not helping  Breathing getting harder  Trouble walking or talking  Nose opens wide to breathe       Take your rescue medicine NOW  If your provider has prescribed an oral steroid medicine, start taking it NOW  Call your doctor NOW  If you are still in the Red Zone after 20 minutes and you have not reached your doctor:  Take your rescue medicine again and  Call 911 or go to the emergency room right away    See your regular doctor within 2 weeks of an Emergency Room or Urgent Care visit for follow-up treatment.           Annual Reminders:  Meet with Asthma Educator,  Flu Shot in the Fall, consider Pneumonia Vaccination for patients with asthma (aged 19 and older).    [id number] [address] [facility] [address] [place]    Electronically signed by Chava Belcher MD   Date: 05/13/25                    Asthma Triggers  How To Control Things That Make Your Asthma Worse    Triggers are things that make your asthma worse.  Look at the list below to help you find your triggers and   what you can do about them. You can help prevent asthma flare-ups by staying away from your triggers.      Trigger                                                          What you can do   Cigarette Smoke  Tobacco smoke can make asthma worse. Do not allow smoking in your home, car or around you.  Be sure no one smokes at a child s day care or school.  If you smoke, ask your health care provider for ways to help you quit.  Ask family members to quit too.  Ask your health care provider for a referral to Quit Plan to help you quit smoking, or call 8-250-813-PLAN.     Colds, Flu, Bronchitis  These are common triggers of asthma. Wash your hands often.  Don t touch your eyes, nose or mouth.  Get a flu shot every year.     Dust Mites  These are tiny bugs that live in cloth or carpet. They are too small to see. Wash sheets and blankets in hot water every week.   Encase pillows and mattress in dust mite proof covers.  Avoid having carpet if you can. If you have carpet, vacuum weekly.   Use a dust mask and HEPA vacuum.   Pollen and Outdoor Mold  Some people are allergic to trees, grass, or weed pollen, or molds. Try to keep your windows closed.  Limit time out doors when pollen count is high.   Ask you health care provider about taking medicine during allergy season.     Animal Dander  Some people are allergic to skin flakes, urine or saliva from pets with fur or feathers. Keep pets with fur or feathers  out of your home.    If you can t keep the pet outdoors, then keep the pet out of your bedroom.  Keep the bedroom door closed.  Keep pets off cloth furniture and away from stuffed toys.     Mice, Rats, and Cockroaches  Some people are allergic to the waste from these pests.   Cover food and garbage.  Clean up spills and food crumbs.  Store grease in the refrigerator.   Keep food out of the bedroom.   Indoor Mold  This can be a trigger if your home has high moisture. Fix leaking faucets, pipes, or other sources of water.   Clean moldy surfaces.  Dehumidify basement if it is damp and smelly.   Smoke, Strong Odors, and Sprays  These can reduce air quality. Stay away from strong odors and sprays, such as perfume, powder, hair spray, paints, smoke incense, paint, cleaning products, candles and new carpet.   Exercise or Sports  Some people with asthma have this trigger. Be active!  Ask your doctor about taking medicine before sports or exercise to prevent symptoms.    Warm up for 5-10 minutes before and after sports or exercise.     Other Triggers of Asthma  Cold air:  Cover your nose and mouth with a scarf.  Sometimes laughing or crying can be a trigger.  Some medicines and food can trigger asthma.

## 2025-05-14 LAB
CHOLEST SERPL-MCNC: 218 MG/DL
FASTING STATUS PATIENT QL REPORTED: ABNORMAL
FASTING STATUS PATIENT QL REPORTED: NORMAL
GLUCOSE SERPL-MCNC: 82 MG/DL (ref 70–99)
HDLC SERPL-MCNC: 68 MG/DL
LDLC SERPL CALC-MCNC: 131 MG/DL
NONHDLC SERPL-MCNC: 150 MG/DL
PSA SERPL DL<=0.01 NG/ML-MCNC: 0.17 NG/ML (ref 0–3.5)
TRIGL SERPL-MCNC: 94 MG/DL

## 2025-06-26 NOTE — PROGRESS NOTES
ASSESSMENT & PLAN    Raphael was seen today for pain.    Diagnoses and all orders for this visit:    Lateral epicondylitis of right elbow  -     Orthopedic  Referral  -     Hand Therapy Referral; Future      This issue is chronic and Unchanged.      See Patient Instructions  Patient Instructions   Right arm issues consistent with lateral epicondylitis.    Discussed the etiology of lateral epicondylitis, also called tennis elbow. This condition is frequently caused by repetitive stress from gripping/grasping activities. Cause of this issue may include a change in level of activity that seems trivial.  May do icing, heat, over the counter medication as needed. Additionally, treatment options include activity modification to avoid painful activities.  This is often treated with rehabilitation (therapy exercises) to improve the pain and overall function at the elbow; hand therapy referral placed.   Use of device such as counter force bracing (forearm strap, or tennis elbow brace), compression sleeve, and/or wrist bracing was discussed; ok to use the forearm strap that you have, and you can consider adding compression sleeve and/or wrist brace if desired. Bracing may only temporarily help the symptoms.  Activity modification reviewed. Updated work letter provided today. Given persistent symptoms, will restrict work further, continuing with light duty.  Recheck approximately 1 month, sooner if needed.    If you have any further questions for your physician or physician s care team you can contact them thru MyChart or by calling [phone].      [facility]      CC: Chava Belcher      -----  Chief Complaint   Patient presents with    Right Elbow - Pain       SUBJECTIVE  Raphael Lizarraga is a/an 59 year old male who is seen in consultation at the request of  Chava Belcher M.D. for evaluation of right elbow.     The patient is seen by themselves.  The  patient is Right handed    Onset: 1 month(s) ago. Patient describes injury as repetitive usage at work.    Location of Pain: right elbow, lateral with radicular symptoms to hand.   Worsened by: repetitive work, and gripping  Better with: rest.   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        **  Above information per rooming staff.  Additional history:  Issue is work comp.    Sometimes gets cold sensation in right hand.  No change in appearance right UE.  Can get some popping lateral right elbow, some pain associated.    Has forearm strap already.        REVIEW OF SYSTEMS:  Review of Systems    OBJECTIVE:           Right Elbow exam:    Inspection:     no ecchymosis       Visible and palpable prominence over the lateral epicondyle, noted more in flexion    ROM: lacking few deg end extension with pain  Flexion, forearm rotation grossly intact, pain with rotation    Strength: pain with resisted wrist, long finger extension    Tender:     lateral epicondyle        RADIOLOGY:  Final results and radiologist's interpretation, available in the [facility] health record.  Images were reviewed with the patient in the office today.  My personal interpretation of the performed imaging: no acute bony abnormality noted. Small lateral epicondyle spur.        XR Elbow Right G/E 3 Views    Narrative    XR ELBOW RIGHT G/E 3 VIEWS 12/9/2024 9:35 AM    HISTORY: Right elbow pain    COMPARISON: None.      Impression    IMPRESSION: No fracture or effusion. No degenerative changes.     ANDREW TALBOT MD         SYSTEM ID:  [id number]             Quality 402: Tobacco Use And Help With Quitting Among Adolescents: Patient screened for tobacco and never smoked Quality 431: Preventive Care And Screening: Unhealthy Alcohol Use - Screening: Patient not identified as an unhealthy alcohol user when screened for unhealthy alcohol use using a systematic screening method Detail Level: Detailed Quality 226: Preventive Care And Screening: Tobacco Use: Screening And Cessation Intervention: Patient screened for tobacco use and is an ex/non-smoker Quality 130: Documentation Of Current Medications In The Medical Record: Current Medications Documented

## 2025-08-07 ENCOUNTER — TRANSFERRED RECORDS (OUTPATIENT)
Dept: HEALTH INFORMATION MANAGEMENT | Facility: CLINIC | Age: 60
End: 2025-08-07
Payer: COMMERCIAL
